# Patient Record
Sex: MALE | Race: WHITE | Employment: OTHER | URBAN - METROPOLITAN AREA
[De-identification: names, ages, dates, MRNs, and addresses within clinical notes are randomized per-mention and may not be internally consistent; named-entity substitution may affect disease eponyms.]

---

## 2017-03-06 ENCOUNTER — CARE COORDINATION (OUTPATIENT)
Dept: GERIATRIC MEDICINE | Facility: CLINIC | Age: 67
End: 2017-03-06

## 2017-03-06 NOTE — PROGRESS NOTES
"[facility] Six-Month Telephone Assessment    6 month telephone assessment completed on 3/6/17.    ER visits: No  Hospitalizations: No  TCU stays: No  Significant health status changes: denies  Falls/Injuries: No  ADL/IADL changes: No  Changes in services: No    Caregiver Assessment follow up:  n/a    Goals: See POC in chart for goal progress documentation.  No CP.  Refusal     Member continues to decline HRA visit. States he continues to live and care for his elderly mother.  States his wt is 235.  States he lives a sedentary life style and diet is poor.  Member has not seen his PCP in 2 years.  Is aware he should made an appointment with his PCP. Is also aware should have a colonoscopy, states a family hx. States plans to wait until after flu season.    States he recently put in an application \"at the [employer] down the street\".  Waiting to hear back.  Struggles with some anxiety. Attributes this to living with his brother.   Working on his MA renewal.  Recently did a spend down, $3000.  Shares with  that he is 33 years sober.     Encouraged client to call  with any questions or concerns in the meantime.         Ольга Arriaza RN, BSN, PHN  [facility]  [phone]  Fax: [phone]    "

## 2017-03-27 ENCOUNTER — CARE COORDINATION (OUTPATIENT)
Dept: GERIATRIC MEDICINE | Facility: CLINIC | Age: 67
End: 2017-03-27

## 2017-03-27 NOTE — PROGRESS NOTES
"Per CM, mailed client a \"Refusal of Home Visit\" letter.  MMIS entry.    Mailed member [employer] Self Report Health Assessment with self addressed return envelope.       Brie Herrera  Case Management Specialist  [facility]   [phone]    "

## 2017-03-27 NOTE — PROGRESS NOTES
Spoke to member who wishes to decline a home visit.  States will contact CM as needed.  Ольга Arriaza RN, BSN, PHN  [facility]  [phone]  Fax: [phone]

## 2017-06-27 ENCOUNTER — CARE COORDINATION (OUTPATIENT)
Dept: GERIATRIC MEDICINE | Facility: CLINIC | Age: 67
End: 2017-06-27

## 2017-06-27 NOTE — PROGRESS NOTES
Call placed to member to notify him of disenrollment. [employer]  termed 5/31/17.  Disenrollment checklist completed.   Ольга Arriaza RN, BSN, PHN  [facility]  [phone]  Fax: [phone]

## 2017-06-29 PROBLEM — Z76.89 HEALTH CARE HOME: Status: ACTIVE | Noted: 2017-06-29

## 2017-08-28 ENCOUNTER — OFFICE VISIT (OUTPATIENT)
Dept: INTERNAL MEDICINE | Facility: CLINIC | Age: 67
End: 2017-08-28
Payer: MEDICARE

## 2017-08-28 VITALS
WEIGHT: 228.3 LBS | DIASTOLIC BLOOD PRESSURE: 74 MMHG | HEIGHT: 71 IN | OXYGEN SATURATION: 96 % | HEART RATE: 68 BPM | BODY MASS INDEX: 31.96 KG/M2 | SYSTOLIC BLOOD PRESSURE: 128 MMHG | TEMPERATURE: 98.5 F

## 2017-08-28 DIAGNOSIS — E55.9 VITAMIN D DEFICIENCY: ICD-10-CM

## 2017-08-28 DIAGNOSIS — Z00.00 MEDICARE ANNUAL WELLNESS VISIT, SUBSEQUENT: Primary | ICD-10-CM

## 2017-08-28 DIAGNOSIS — N52.8 OTHER MALE ERECTILE DYSFUNCTION: ICD-10-CM

## 2017-08-28 DIAGNOSIS — Z11.59 NEED FOR HEPATITIS C SCREENING TEST: ICD-10-CM

## 2017-08-28 DIAGNOSIS — Z12.5 SPECIAL SCREENING FOR MALIGNANT NEOPLASM OF PROSTATE: ICD-10-CM

## 2017-08-28 DIAGNOSIS — Z12.11 SPECIAL SCREENING FOR MALIGNANT NEOPLASMS, COLON: ICD-10-CM

## 2017-08-28 DIAGNOSIS — E66.811 OBESITY (BMI 30.0-34.9): ICD-10-CM

## 2017-08-28 DIAGNOSIS — Z71.89 ACP (ADVANCE CARE PLANNING): ICD-10-CM

## 2017-08-28 DIAGNOSIS — Z13.29 SCREENING FOR THYROID DISORDER: ICD-10-CM

## 2017-08-28 DIAGNOSIS — Z13.220 SCREENING FOR LIPID DISORDERS: ICD-10-CM

## 2017-08-28 DIAGNOSIS — F32.0 MILD MAJOR DEPRESSION (H): ICD-10-CM

## 2017-08-28 DIAGNOSIS — Z23 NEED FOR VACCINATION: ICD-10-CM

## 2017-08-28 DIAGNOSIS — Z13.6 CARDIOVASCULAR SCREENING; LDL GOAL LESS THAN 130: ICD-10-CM

## 2017-08-28 DIAGNOSIS — F10.21 ALCOHOL DEPENDENCE IN REMISSION (H): ICD-10-CM

## 2017-08-28 LAB
ANION GAP SERPL CALCULATED.3IONS-SCNC: 6 MMOL/L (ref 3–14)
BASOPHILS # BLD AUTO: 0 10E9/L (ref 0–0.2)
BASOPHILS NFR BLD AUTO: 0.6 %
BUN SERPL-MCNC: 10 MG/DL (ref 7–30)
CALCIUM SERPL-MCNC: 9 MG/DL (ref 8.5–10.1)
CHLORIDE SERPL-SCNC: 105 MMOL/L (ref 94–109)
CHOLEST SERPL-MCNC: 188 MG/DL
CO2 SERPL-SCNC: 28 MMOL/L (ref 20–32)
CREAT SERPL-MCNC: 1.22 MG/DL (ref 0.66–1.25)
DIFFERENTIAL METHOD BLD: NORMAL
EOSINOPHIL # BLD AUTO: 0.2 10E9/L (ref 0–0.7)
EOSINOPHIL NFR BLD AUTO: 4.4 %
ERYTHROCYTE [DISTWIDTH] IN BLOOD BY AUTOMATED COUNT: 12.6 % (ref 10–15)
GFR SERPL CREATININE-BSD FRML MDRD: 59 ML/MIN/1.7M2
GLUCOSE SERPL-MCNC: 100 MG/DL (ref 70–99)
HCT VFR BLD AUTO: 46.2 % (ref 40–53)
HDLC SERPL-MCNC: 41 MG/DL
HGB BLD-MCNC: 15.7 G/DL (ref 13.3–17.7)
LDLC SERPL CALC-MCNC: 125 MG/DL
LYMPHOCYTES # BLD AUTO: 2 10E9/L (ref 0.8–5.3)
LYMPHOCYTES NFR BLD AUTO: 37.1 %
MCH RBC QN AUTO: 32.7 PG (ref 26.5–33)
MCHC RBC AUTO-ENTMCNC: 34 G/DL (ref 31.5–36.5)
MCV RBC AUTO: 96 FL (ref 78–100)
MONOCYTES # BLD AUTO: 0.6 10E9/L (ref 0–1.3)
MONOCYTES NFR BLD AUTO: 10.3 %
NEUTROPHILS # BLD AUTO: 2.6 10E9/L (ref 1.6–8.3)
NEUTROPHILS NFR BLD AUTO: 47.6 %
NONHDLC SERPL-MCNC: 147 MG/DL
PLATELET # BLD AUTO: 171 10E9/L (ref 150–450)
POTASSIUM SERPL-SCNC: 4 MMOL/L (ref 3.4–5.3)
PSA SERPL-ACNC: 0.97 UG/L (ref 0–4)
RBC # BLD AUTO: 4.8 10E12/L (ref 4.4–5.9)
SODIUM SERPL-SCNC: 139 MMOL/L (ref 133–144)
TRIGL SERPL-MCNC: 111 MG/DL
TSH SERPL DL<=0.005 MIU/L-ACNC: 0.89 MU/L (ref 0.4–4)
WBC # BLD AUTO: 5.5 10E9/L (ref 4–11)

## 2017-08-28 PROCEDURE — 80048 BASIC METABOLIC PNL TOTAL CA: CPT | Performed by: INTERNAL MEDICINE

## 2017-08-28 PROCEDURE — 82306 VITAMIN D 25 HYDROXY: CPT | Performed by: INTERNAL MEDICINE

## 2017-08-28 PROCEDURE — 84443 ASSAY THYROID STIM HORMONE: CPT | Performed by: INTERNAL MEDICINE

## 2017-08-28 PROCEDURE — G0472 HEP C SCREEN HIGH RISK/OTHER: HCPCS | Performed by: INTERNAL MEDICINE

## 2017-08-28 PROCEDURE — 90670 PCV13 VACCINE IM: CPT | Performed by: INTERNAL MEDICINE

## 2017-08-28 PROCEDURE — 36415 COLL VENOUS BLD VENIPUNCTURE: CPT | Performed by: INTERNAL MEDICINE

## 2017-08-28 PROCEDURE — G0009 ADMIN PNEUMOCOCCAL VACCINE: HCPCS | Performed by: INTERNAL MEDICINE

## 2017-08-28 PROCEDURE — G0439 PPPS, SUBSEQ VISIT: HCPCS | Performed by: INTERNAL MEDICINE

## 2017-08-28 PROCEDURE — G0103 PSA SCREENING: HCPCS | Performed by: INTERNAL MEDICINE

## 2017-08-28 PROCEDURE — 85025 COMPLETE CBC W/AUTO DIFF WBC: CPT | Performed by: INTERNAL MEDICINE

## 2017-08-28 PROCEDURE — 99213 OFFICE O/P EST LOW 20 MIN: CPT | Mod: 25 | Performed by: INTERNAL MEDICINE

## 2017-08-28 PROCEDURE — 80061 LIPID PANEL: CPT | Performed by: INTERNAL MEDICINE

## 2017-08-28 RX ORDER — SILDENAFIL CITRATE 20 MG/1
20-60 TABLET ORAL DAILY PRN
Qty: 30 TABLET | Refills: 0 | Status: SHIPPED | OUTPATIENT
Start: 2017-08-28 | End: 2018-06-07

## 2017-08-28 ASSESSMENT — PATIENT HEALTH QUESTIONNAIRE - PHQ9: SUM OF ALL RESPONSES TO PHQ QUESTIONS 1-9: 2

## 2017-08-28 NOTE — PATIENT INSTRUCTIONS
VIAGRA (SILDENAFIL):     *  Trial of Sildenafil (Viagra) for help with erections.      *  Brand name Viagra is Sildenafil that comes in a 25, 50, or 100 mg size tablet that is manufactured and marketed by the drug company Pfizer.    *  Sildenafil is also available as a generic medication that comes in a 20 or 40 mg tablet.  It is not technically called Viagra, but is the same medication.      *  Visit the Viagra web site (http://www.Helpstream/) for more information and also for possible discount cards.      *  Viagra and generic Sildenafil are potentially available at much cheaper prices from some [place] [facility], which can be very important if you are paying for the cost of this medication.  One example is www.Telepath    *  Depending on which version of Sildenafil you have, Take a 20 mg, 40 mg or 50 mg dose (1/2 of 100 mg tablet) 30-60 minutes before sexual intercourse.  If the first time lower dose does not work, then retry the same dose the next time.  If that dose dose not work, then try 100 mg dose.    The medication rarely works the very first time you try it since you are likely to be nervous about the medication itself, but there has to be a first time.      *  Beware of possible side effects including dizziness, headaches, colosr vision, upset stomach, nausea, passing out, problems with urination and sustained erections.  Never take the medication with nitroglycerin containing medications.  If you experience any severe side effects stop the medication and do not take it again until you speak with our clinic.     *  There may be an increased rate of side effects from Sildenafil/Viagra when alcohol is used too close to viagra    *  I will prescribe either Sildenafil or Viagra as long as it helps improve erectile dysfunction and does not cause side effects.  Contact me after you finish the first prescription.  If it works well with no major side effects, then I will give you further  "prescriptions.  If Sildenafil does not work as desired, then we may consider another similar medication (Levitra or Cialis)    *  Contact me after you use the first prescripton and let me know if it works and that there are no side effects.   If it works and there are no side effects, then I will continue it.  Also let me know what form of Sildenafil you would like to continue and where you would like to get the prescriptions.        *    5 GOALS TO PREVENT VASCULAR DISEASE:     1.  Aggressive blood pressure control, under 130/80 ideally.  Using medications if needed.    Your blood pressure is under good control    BP Readings from Last 4 Encounters:   08/28/17 128/74   02/25/15 141/80   11/05/14 112/76   11/25/13 122/80       2.  Aggressive LDL cholesterol (\"bad cholesterol\") lowering as indicated.    Your goal is an LDL under 130 for sure, preferably under 100.  (If you have diabetes or previous vascular disease, the the LDL goals would be under 100 for sure, preferably under 70.)    New guidelines identify four high-risk groups who could benefit from statins:   *people with pre-existing heart disease, such as those who have had a heart attack;   *people ages 40 to 75 who have diabetes of any type  *patients ages 40 to 75 with at least a 7.5% risk of developing cardiovascular disease over the next decade, according to a formula described in the guidelines  *patients with the sort of super-high cholesterol that sometimes runs in families, as evidenced by an LDL of 190 milligrams per deciliter or higher    Your cholesterol levels are well controlled.    Recent Labs   Lab Test  04/20/11   1144   CHOL  212*   HDL  38*   LDL  151*   TRIG  112   CHOLHDLRATIO  5.6*       3.  Aggressive diabetic prevention, screening and/or management.      You do not have diabetes as of the most recent blood tests.     4.  No smoking    5.  Consider taking low dose aspirin (81 mg) tablet once per day over the age of 50, every day unless " "there is a specific reason that you cannot take aspirin (such as side effect, allergy, or you are on another \"blood thinner\").        --Based on your current cardiac risk factors, you should take Aspirin 81 mg once per day if you are over 50 years of age.               Preventive Health Recommendations:       Male Ages 65 and over    Yearly exam:             See your health care provider every year in order to  o   Review health changes.   o   Discuss preventive care.    o   Review your medicines if your doctor has prescribed any.    Talk with your health care provider about whether you should have a test to screen for prostate cancer (PSA).    Every 3 years, have a diabetes test (fasting glucose). If you are at risk for diabetes, you should have this test more often.    Every 5 years, have a cholesterol test. Have this test more often if you are at risk for high cholesterol or heart disease.     Every 10 years, have a colonoscopy. Or, have a yearly FIT test (stool test). These exams will check for colon cancer.    Talk to with your health care provider about screening for Abdominal Aortic Aneurysm if you have a family history of AAA or have a history of smoking.  Shots:     Get a flu shot each year.     Get a tetanus shot every 10 years.     Talk to your doctor about your pneumonia vaccines. There are now two you should receive - Pneumovax (PPSV 23) and Prevnar (PCV 13).    Talk to your doctor about a shingles vaccine.     Talk to your doctor about the hepatitis B vaccine.  Nutrition:     Eat at least 5 servings of fruits and vegetables each day.     Eat whole-grain bread, whole-wheat pasta and brown rice instead of white grains and rice.     Talk to your doctor about Calcium and Vitamin D.   Lifestyle    Exercise for at least 150 minutes a week (30 minutes a day, 5 days a week). This will help you control your weight and prevent disease.     Limit alcohol to one drink per day.     No smoking.     Wear sunscreen to " prevent skin cancer.     See your dentist every six months for an exam and cleaning.     See your eye doctor every 1 to 2 years to screen for conditions such as glaucoma, macular degeneration and cataracts.

## 2017-08-28 NOTE — ASSESSMENT & PLAN NOTE
Advance Care Planning 8/28/2017: ACP Review of Chart / Resources Provided:  Reviewed chart for advance care plan.  Jesús Stock has been provided information and resources to begin or update their advance care plan.  Added by Kerry Murrell

## 2017-08-28 NOTE — LETTER
Jesús Sotck  [address] [address]      8/28/2017      Dear Mr. Jesús Stock:    I am writing to inform you of the results of the laboratory tests you had done recently.    Total Cholesterol:   Lab Results   Component Value Date    CHOL 188 08/28/2017          (Recommended: below 200)  HDL (good) Cholesterol :   Lab Results   Component Value Date    HDL 41 08/28/2017         (Recommended: 40 or more)  LDL (bad) Cholesterol:    Lab Results   Component Value Date     08/28/2017          (Recommended: below 130, below 100 if heart disease or diabetes is diagnosed)  Triglycerides:    Lab Results   Component Value Date    TRIG 111 08/28/2017       (Recommended: below 180)  Non HDL cholesterol (Cholesterol ratio:   Lab Results   Component Value Date    CHOLHDLRATIO 5.6 04/20/2011    NHDL 147 08/28/2017     (Ideally below 130, Acceptable below 160).    Additional results of your recent labs are as noted.  Liver function: NORMAL  Kidney  function: NORMAL  Hemoglobin: NORMAL  PSA: NORMAL  Electrolytes: NORMAL  Glucose: NORMAL    Your labs all looked good.      Thank you for allowing me to participate in your care. If you have any further questions or problems, please contact me via our nurse line at [phone]    Sincerely,          Tan Jaramillo M.D.  Department of Internal Medicine  [facility]

## 2017-08-28 NOTE — MR AVS SNAPSHOT
After Visit Summary   8/28/2017    Jesús Stock    MRN: [id number]           Patient Information     Date Of Birth          1950        Visit Information        Provider Department      8/28/2017 8:40 AM Tan Jaramillo MD [facility]        Today's Diagnoses     Medicare annual wellness visit, subsequent    -  1    Mild major depression (H)        Alcohol dependence in remission sober since 1984 (H)        Special screening for malignant neoplasms, colon        Screening for lipid disorders        ACP (advance care planning)        CARDIOVASCULAR SCREENING; LDL GOAL LESS THAN 130        Need for vaccination        Screening for thyroid disorder        Special screening for malignant neoplasm of prostate        Need for hepatitis C screening test        Vitamin D deficiency        Obesity (BMI 30.0-34.9)        Other male erectile dysfunction          Care Instructions    VIAGRA (SILDENAFIL):     *  Trial of Sildenafil (Viagra) for help with erections.      *  Brand name Viagra is Sildenafil that comes in a 25, 50, or 100 mg size tablet that is manufactured and marketed by the drug company Pfizer.    *  Sildenafil is also available as a generic medication that comes in a 20 or 40 mg tablet.  It is not technically called Viagra, but is the same medication.      *  Visit the Viagra web site (http://www.OpenGamma/) for more information and also for possible discount cards.      *  Viagra and generic Sildenafil are potentially available at much cheaper prices from some [place] [facility], which can be very important if you are paying for the cost of this medication.  One example is www.AVOS Cloud    *  Depending on which version of Sildenafil you have, Take a 20 mg, 40 mg or 50 mg dose (1/2 of 100 mg tablet) 30-60 minutes before sexual intercourse.  If the first time lower dose does not work, then retry the same dose the next time.  If that dose dose not  "work, then try 100 mg dose.    The medication rarely works the very first time you try it since you are likely to be nervous about the medication itself, but there has to be a first time.      *  Beware of possible side effects including dizziness, headaches, colosr vision, upset stomach, nausea, passing out, problems with urination and sustained erections.  Never take the medication with nitroglycerin containing medications.  If you experience any severe side effects stop the medication and do not take it again until you speak with our clinic.     *  There may be an increased rate of side effects from Sildenafil/Viagra when alcohol is used too close to viagra    *  I will prescribe either Sildenafil or Viagra as long as it helps improve erectile dysfunction and does not cause side effects.  Contact me after you finish the first prescription.  If it works well with no major side effects, then I will give you further prescriptions.  If Sildenafil does not work as desired, then we may consider another similar medication (Levitra or Cialis)    *  Contact me after you use the first prescripton and let me know if it works and that there are no side effects.   If it works and there are no side effects, then I will continue it.  Also let me know what form of Sildenafil you would like to continue and where you would like to get the prescriptions.        *    5 GOALS TO PREVENT VASCULAR DISEASE:     1.  Aggressive blood pressure control, under 130/80 ideally.  Using medications if needed.    Your blood pressure is under good control    BP Readings from Last 4 Encounters:   08/28/17 128/74   02/25/15 141/80   11/05/14 112/76   11/25/13 122/80       2.  Aggressive LDL cholesterol (\"bad cholesterol\") lowering as indicated.    Your goal is an LDL under 130 for sure, preferably under 100.  (If you have diabetes or previous vascular disease, the the LDL goals would be under 100 for sure, preferably under 70.)    New guidelines " "identify four high-risk groups who could benefit from statins:   *people with pre-existing heart disease, such as those who have had a heart attack;   *people ages 40 to 75 who have diabetes of any type  *patients ages 40 to 75 with at least a 7.5% risk of developing cardiovascular disease over the next decade, according to a formula described in the guidelines  *patients with the sort of super-high cholesterol that sometimes runs in families, as evidenced by an LDL of 190 milligrams per deciliter or higher    Your cholesterol levels are well controlled.    Recent Labs   Lab Test  04/20/11   1144   CHOL  212*   HDL  38*   LDL  151*   TRIG  112   CHOLHDLRATIO  5.6*       3.  Aggressive diabetic prevention, screening and/or management.      You do not have diabetes as of the most recent blood tests.     4.  No smoking    5.  Consider taking low dose aspirin (81 mg) tablet once per day over the age of 50, every day unless there is a specific reason that you cannot take aspirin (such as side effect, allergy, or you are on another \"blood thinner\").        --Based on your current cardiac risk factors, you should take Aspirin 81 mg once per day if you are over 50 years of age.               Preventive Health Recommendations:       Male Ages 65 and over    Yearly exam:             See your health care provider every year in order to  o   Review health changes.   o   Discuss preventive care.    o   Review your medicines if your doctor has prescribed any.    Talk with your health care provider about whether you should have a test to screen for prostate cancer (PSA).    Every 3 years, have a diabetes test (fasting glucose). If you are at risk for diabetes, you should have this test more often.    Every 5 years, have a cholesterol test. Have this test more often if you are at risk for high cholesterol or heart disease.     Every 10 years, have a colonoscopy. Or, have a yearly FIT test (stool test). These exams will check for " colon cancer.    Talk to with your health care provider about screening for Abdominal Aortic Aneurysm if you have a family history of AAA or have a history of smoking.  Shots:     Get a flu shot each year.     Get a tetanus shot every 10 years.     Talk to your doctor about your pneumonia vaccines. There are now two you should receive - Pneumovax (PPSV 23) and Prevnar (PCV 13).    Talk to your doctor about a shingles vaccine.     Talk to your doctor about the hepatitis B vaccine.  Nutrition:     Eat at least 5 servings of fruits and vegetables each day.     Eat whole-grain bread, whole-wheat pasta and brown rice instead of white grains and rice.     Talk to your doctor about Calcium and Vitamin D.   Lifestyle    Exercise for at least 150 minutes a week (30 minutes a day, 5 days a week). This will help you control your weight and prevent disease.     Limit alcohol to one drink per day.     No smoking.     Wear sunscreen to prevent skin cancer.     See your dentist every six months for an exam and cleaning.     See your eye doctor every 1 to 2 years to screen for conditions such as glaucoma, macular degeneration and cataracts.               Follow-ups after your visit        Additional Services     GASTROENTEROLOGY ADULT REF PROCEDURE ONLY       Last Lab Result: Creatinine (mg/dL)       Date                     Value                 04/20/2011               1.18             ----------  There is no height or weight on file to calculate BMI.     Needed:  No  Language:  English    Patient will be contacted to schedule procedure.     Please be aware that coverage of these services is subject to the terms and limitations of your health insurance plan.  Call member services at your health plan with any benefit or coverage questions.  Any procedures must be performed at a [facility] facility OR coordinated by your clinic's referral office.    Please bring the following with you to your appointment:    (1) Any  "X-Rays, CTs or MRIs which have been performed.  Contact the facility where they were done to arrange for  prior to your scheduled appointment.    (2) List of current medications   (3) This referral request   (4) Any documents/labs given to you for this referral                  Who to contact     If you have questions or need follow up information about today's clinic visit or your schedule please contact [facility] directly at [phone].  Normal or non-critical lab and imaging results will be communicated to you by MyChart, letter or phone within 4 business days after the clinic has received the results. If you do not hear from us within 7 days, please contact the clinic through [employer]rowse[employer] or phone. If you have a critical or abnormal lab result, we will notify you by phone as soon as possible.  Submit refill requests through Mozes or call your pharmacy and they will forward the refill request to us. Please allow 3 business days for your refill to be completed.          Additional Information About Your Visit        Mozes Information     Mozes lets you send messages to your doctor, view your test results, renew your prescriptions, schedule appointments and more. To sign up, go to [email or web address] . Click on \"Log in\" on the left side of the screen, which will take you to the Welcome page. Then click on \"Sign up Now\" on the right side of the page.     You will be asked to enter the access code listed below, as well as some personal information. Please follow the directions to create your username and password.     Your access code is: [email or web address]  Expires: 2017  9:49 AM     Your access code will  in 90 days. If you need help or a new code, please call your [facility] or [phone].        Care EveryWhere ID     This is your Care EveryWhere ID. This could be used by other organizations to access your [facility] medical records  [id number]        Your " "Vitals Were     Pulse Temperature Height Pulse Oximetry BMI (Body Mass Index)       68 98.5  F (36.9  C) (Oral) 5' 11\" (1.803 m) 96% 31.84 kg/m2        Blood Pressure from Last 3 Encounters:   08/28/17 128/74   02/25/15 141/80   11/05/14 112/76    Weight from Last 3 Encounters:   08/28/17 228 lb 4.8 oz (103.6 kg)   02/25/15 238 lb (108 kg)   11/05/14 230 lb (104.3 kg)              We Performed the Following     **Hepatitis C Screen Reflex to RNA FUTURE anytime     ADMIN MEDICARE: Pneumococcal Vaccine ()     Basic metabolic panel     CBC with platelets and differential     GASTROENTEROLOGY ADULT REF PROCEDURE ONLY     Lipid panel reflex to direct LDL     Pneumococcal vaccine 13 valent PCV13 IM (Prevnar) [06070]     PSA, screen     TSH with free T4 reflex     Vitamin D Deficiency          Today's Medication Changes          These changes are accurate as of: 8/28/17  9:52 AM.  If you have any questions, ask your nurse or doctor.               Start taking these medicines.        Dose/Directions    sildenafil 20 MG tablet   Commonly known as:  REVATIO/VIAGRA   Used for:  Other male erectile dysfunction   Started by:  Tan Jaramillo MD        Dose:  20-60 mg   Take 1-3 tablets (20-60 mg) by mouth daily as needed Never use with nitroglycerin, terazosin or doxazosin.   Quantity:  30 tablet   Refills:  0            Where to get your medicines      Some of these will need a paper prescription and others can be bought over the counter.  Ask your nurse if you have questions.     Bring a paper prescription for each of these medications     sildenafil 20 MG tablet                Primary Care Provider Office Phone # Fax #    Tan Jaramillo [phone]       [address] [facility] [address]        Equal Access to Services     BISI BERNSTEIN AH: Maribeth Vazquez, sonjada luqadaha, qaybta kaalmada michelle, haley juan. So wac " [phone].    ATENCIÓN: Si elio chambers, tiene a blair disposición servicios gratuitos de asistencia lingüística. Mirza bai [phone].    We comply with applicable federal civil rights laws and [place] laws. We do not discriminate on the basis of race, color, national origin, age, disability sex, sexual orientation or gender identity.            Thank you!     Thank you for choosing [facility]  for your care. Our goal is always to provide you with excellent care. Hearing back from our patients is one way we can continue to improve our services. Please take a few minutes to complete the written survey that you may receive in the mail after your visit with us. Thank you!             Your Updated Medication List - Protect others around you: Learn how to safely use, store and throw away your medicines at www.disposemymeds.org.          This list is accurate as of: 8/28/17  9:52 AM.  Always use your most recent med list.                   Brand Name Dispense Instructions for use Diagnosis    ibuprofen 200 MG tablet    ADVIL/MOTRIN     Take 1 tablet by mouth every 4 hours as needed for pain.        sildenafil 20 MG tablet    REVATIO/VIAGRA    30 tablet    Take 1-3 tablets (20-60 mg) by mouth daily as needed Never use with nitroglycerin, terazosin or doxazosin.    Other male erectile dysfunction       TYLENOL 500 MG tablet   Generic drug:  acetaminophen      Take 1-2 tablets by mouth every 6 hours as needed for pain.

## 2017-08-28 NOTE — NURSING NOTE
"Chief Complaint   Patient presents with     Medicare Visit     pt is fasting       Initial /74  Pulse 68  Temp 98.5  F (36.9  C) (Oral)  Ht 5' 11\" (1.803 m)  Wt 228 lb 4.8 oz (103.6 kg)  SpO2 96%  BMI 31.84 kg/m2 Estimated body mass index is 31.84 kg/(m^2) as calculated from the following:    Height as of this encounter: 5' 11\" (1.803 m).    Weight as of this encounter: 228 lb 4.8 oz (103.6 kg).  Medication Reconciliation: complete   Kerry Murrell CMA    Screening Questionnaire for Adult Immunization    Are you sick today?   No   Do you have allergies to medications, food, a vaccine component or latex?   No   Have you ever had a serious reaction after receiving a vaccination?   No   Do you have a long-term health problem with heart disease, lung disease, asthma, kidney disease, metabolic disease (e.g. diabetes), anemia, or other blood disorder?   No   Do you have cancer, leukemia, HIV/AIDS, or any other immune system problem?   No   In the past 3 months, have you taken medications that affect  your immune system, such as prednisone, other steroids, or anticancer drugs; drugs for the treatment of rheumatoid arthritis, Crohn s disease, or psoriasis; or have you had radiation treatments?   No   Have you had a seizure, or a brain or other nervous system problem?   No   During the past year, have you received a transfusion of blood or blood     products, or been given immune (gamma) globulin or antiviral drug?   No   For women: Are you pregnant or is there a chance you could become        pregnant during the next month?   No   Have you received any vaccinations in the past 4 weeks?   No     Immunization questionnaire answers were all negative.        Per orders of Dr. zacarias, injection of pneumonia given by Kerry Murrell. Patient instructed to remain in clinic for 15 minutes afterwards, and to report any adverse reaction to me immediately.       Screening performed by Kerry Murrell on 8/28/2017 at 9:07 " AM.

## 2017-08-28 NOTE — LETTER
[facility]  [address]  [address]  [phone]          Jesús Stock  [address] [address]      8/29/2017        Dear Rene Jesús CARRILLO Montrell:    I am writing to inform you of the results of the laboratory tests you had done recently.    Total Cholesterol:   Lab Results   Component Value Date    CHOL 188 08/28/2017          (Recommended: below 200)        HDL (good) Cholesterol :   Lab Results   Component Value Date    HDL 41 08/28/2017         (Recommended: 40 or more)  LDL (bad) Cholesterol:    Lab Results   Component Value Date     08/28/2017          (Recommended: below 130, below 100 if heart disease or diabetes is diagnosed)   Triglycerides:    Lab Results   Component Value Date    TRIG 111 08/28/2017       (Recommended: below 180)  Total cholesterol/HDL (Cholesterol ratio:   Lab Results   Component Value Date    CHOLHDLRATIO 5.6 04/20/2011    NHDL 147 08/28/2017     (Recommended: below 5.0)    Slight variations and daily fluctuations are normal and should cause little concern. Overall patterns and values are most significant.  An elevated cholesterol is one factor in increasing your risk of heart and vascular disease.    Additional results of your recent labs are as noted.   Kidney  function: NORMAL  Hemoglobin: NORMAL  PSA: NORMAL  Thyroid function: NORMAL  Electrolytes: NORMAL  Glucose: NORMAL  Vitamin D:  NORMAL    Your labs all looked good.      Thank you for allowing me to participate in your care. If you have any further questions or problems, please contact me via our nurse line at [phone].    Sincerely,          Tan Jaramillo MD  Department of Internal Medicine  [facility]

## 2017-08-29 LAB
DEPRECATED CALCIDIOL+CALCIFEROL SERPL-MC: 40 UG/L (ref 20–75)
HCV AB SERPL QL IA: NONREACTIVE

## 2017-09-01 ENCOUNTER — TELEPHONE (OUTPATIENT)
Dept: NURSING | Facility: CLINIC | Age: 67
End: 2017-09-01

## 2017-09-01 NOTE — TELEPHONE ENCOUNTER
Jesús Stock is a 66 year old male who calls with sinus congestion.  Patient feels d/t allergies.  Reports nasal congestion, nasal drainage, and mild sinus discomfort.  States symptoms began 2-3 days ago.  Nasal drainage clear in color.  Denies fever, pain, headache, or difficulty breathing/SOB.  He received the pneumococcal vaccine at office visit on 8/28/17, patient wondering if injection could cause symptoms.   Per CDC web site on pneumococcal vaccine, reviewed possible side effects of vaccine with patient.  Advised patient of home care measures and if further questions/concerns or if symptoms do not improve, worsen or new symptoms develop, call your PCP or [facility] as soon as possible.  Patient stated understanding.      Guideline used:  Telephone Triage Protocols for Nurses, Fifth Edition, Chayo Espinoza RN

## 2017-09-07 ENCOUNTER — SURGERY (OUTPATIENT)
Age: 67
End: 2017-09-07

## 2017-09-07 ENCOUNTER — HOSPITAL ENCOUNTER (OUTPATIENT)
Facility: CLINIC | Age: 67
Discharge: HOME | End: 2017-09-07
Attending: SPECIALIST | Admitting: SPECIALIST
Payer: MEDICARE

## 2017-09-07 VITALS
SYSTOLIC BLOOD PRESSURE: 110 MMHG | RESPIRATION RATE: 8 BRPM | WEIGHT: 228 LBS | DIASTOLIC BLOOD PRESSURE: 80 MMHG | BODY MASS INDEX: 31.92 KG/M2 | HEIGHT: 71 IN | OXYGEN SATURATION: 93 %

## 2017-09-07 LAB — COLONOSCOPY: NORMAL

## 2017-09-07 PROCEDURE — 25000128 H RX IP 250 OP 636: Performed by: SPECIALIST

## 2017-09-07 PROCEDURE — 88305 TISSUE EXAM BY PATHOLOGIST: CPT | Mod: 26 | Performed by: SPECIALIST

## 2017-09-07 PROCEDURE — 45380 COLONOSCOPY AND BIOPSY: CPT | Performed by: SPECIALIST

## 2017-09-07 PROCEDURE — 88305 TISSUE EXAM BY PATHOLOGIST: CPT | Performed by: SPECIALIST

## 2017-09-07 PROCEDURE — G0500 MOD SEDAT ENDO SERVICE >5YRS: HCPCS | Performed by: SPECIALIST

## 2017-09-07 PROCEDURE — 45385 COLONOSCOPY W/LESION REMOVAL: CPT | Performed by: SPECIALIST

## 2017-09-07 RX ORDER — ONDANSETRON 2 MG/ML
4 INJECTION INTRAMUSCULAR; INTRAVENOUS
Status: DISCONTINUED | OUTPATIENT
Start: 2017-09-07 | End: 2017-09-07 | Stop reason: HOSPADM

## 2017-09-07 RX ORDER — LIDOCAINE 40 MG/G
CREAM TOPICAL
Status: DISCONTINUED | OUTPATIENT
Start: 2017-09-07 | End: 2017-09-07 | Stop reason: HOSPADM

## 2017-09-07 RX ORDER — FENTANYL CITRATE 50 UG/ML
INJECTION, SOLUTION INTRAMUSCULAR; INTRAVENOUS PRN
Status: DISCONTINUED | OUTPATIENT
Start: 2017-09-07 | End: 2017-09-07 | Stop reason: HOSPADM

## 2017-09-07 RX ADMIN — MIDAZOLAM HYDROCHLORIDE 1 MG: 1 INJECTION, SOLUTION INTRAMUSCULAR; INTRAVENOUS at 08:21

## 2017-09-07 RX ADMIN — FENTANYL CITRATE 50 MCG: 50 INJECTION, SOLUTION INTRAMUSCULAR; INTRAVENOUS at 08:22

## 2017-09-07 NOTE — H&P
Pre-Endoscopy History and Physical     Jesús Stock MRN# [id number]   YOB: 1950 Age: 66 year old     Date of Procedure: 9/7/2017  Primary care provider: Tan Jaramillo  Type of Endoscopy: Colonoscopy with possible biopsy, possible polypectomy  Reason for Procedure: screening; family history of polyps  Type of Anesthesia Anticipated: Conscious Sedation    HPI:    Jesús is a 66 year old male who will be undergoing the above procedure.      A history and physical has been performed. The patient's medications and allergies have been reviewed. The risks and benefits of the procedure and the sedation options and risks were discussed with the patient.  All questions were answered and informed consent was obtained.      He denies a personal or family history of anesthesia complications or bleeding disorders.     Patient Active Problem List   Diagnosis     CARDIOVASCULAR SCREENING; LDL GOAL LESS THAN 130     Mild major depression (H)     Colon polyps     Diverticulosis of colon     ACP (advance care planning)     Health Care Home     Obesity (BMI 30.0-34.9)     Alcohol dependence in remission sober since 1984 (H)        Past Medical History:   Diagnosis Date     Alcohol dependence in remission sober since 1984 (H)     sober since 8/25/84     Backache, unspecified      Colon polyps 5/24/11    2 colon polyps removed at colonoscopy     Diverticulosis of colon 5/24/11    mild pan-colonic diverticulosis seen as incidental finding on colonoscopy     Major depressive disorder, single episode, mild (H)      Obesity (BMI 30.0-34.9) 08/28/2017    BMI 31.4     Osteoarthrosis, unspecified whether generalized or localized, unspecified site         Past Surgical History:   Procedure Laterality Date     APPENDECTOMY       COLONOSCOPY  5/24/11    2 polyps, mild pan-colonic diverticulosis     HERNIA REPAIR      X3     TONSILLECTOMY         Social History   Substance Use Topics     Smoking status: Passive Smoke  "Exposure - Never Smoker     Types: Cigars     Smokeless tobacco: Never Used      Comment: occasional     Alcohol use No      Comment: sober x 27 years       Family History   Problem Relation Age of Onset     DIABETES Father      b:1926     Hypertension Mother      b:1925     Family History Negative Brother      b:1948     Family History Negative Brother      b:1951  deferred tremor     Family History Negative Sister      b:1953     Family History Negative Brother      b:1956     Family History Negative Brother      b:1958       Prior to Admission medications    Medication Sig Start Date End Date Taking? Authorizing Provider   sildenafil (REVATIO/VIAGRA) 20 MG tablet Take 1-3 tablets (20-60 mg) by mouth daily as needed Never use with nitroglycerin, terazosin or doxazosin. 8/28/17   Tan Jaramillo MD   ibuprofen (ADVIL,MOTRIN) 200 MG tablet Take 1 tablet by mouth every 4 hours as needed for pain. 11/1/11   Tan Jaramillo MD   acetaminophen (TYLENOL) 500 MG tablet Take 1-2 tablets by mouth every 6 hours as needed for pain. 11/1/11   Tan Jaramillo MD       Allergies   Allergen Reactions     Dust Mites      Mold      No Known Drug Allergies         REVIEW OF SYSTEMS:   5 point ROS negative except as noted above in HPI, including Gen., Resp., CV, GI &  system review.    PHYSICAL EXAM:   BP (!) 157/103  Resp 12  Ht 1.803 m (5' 11\")  Wt 103.4 kg (228 lb)  SpO2 95%  BMI 31.8 kg/m2 Estimated body mass index is 31.8 kg/(m^2) as calculated from the following:    Height as of this encounter: 1.803 m (5' 11\").    Weight as of this encounter: 103.4 kg (228 lb).   GENERAL APPEARANCE: alert, and oriented  MENTAL STATUS: alert  AIRWAY EXAM: Mallampatti Class I (visualization of the soft palate, fauces, uvula, anterior and posterior pillars)  RESP: lungs clear to auscultation - no rales, rhonchi or wheezes  CV: regular rates and rhythm  DIAGNOSTICS:    Not indicated    IMPRESSION   ASA Class 1 - " Healthy patient, no medical problems    PLAN:   Plan for Colonoscopy with possible biopsy, possible polypectomy. We discussed the risks, benefits and alternatives and the patient wished to proceed.    The above has been forwarded to the consulting provider.      Signed Electronically by: eGne Grimes  September 7, 2017

## 2017-09-07 NOTE — BRIEF OP NOTE
[facility] Brief Operative Note    Pre-operative diagnosis: screening   Post-operative diagnosis Polyps, diverticula     Procedure: Procedure(s):  COLONOSCOPY - Wound Class: II-Clean Contaminated   Surgeon(s): Surgeon(s) and Role:     * Gene Grimes MD - Primary   Estimated blood loss: * No values recorded between 9/7/2017 12:00 AM and 9/7/2017  8:55 AM *    Specimens:   ID Type Source Tests Collected by Time Destination   A : polyps x 2, hot snare and cold forcep Polyp Large Intestine, Cecum SURGICAL PATHOLOGY EXAM Bella Caal RN 9/7/2017  8:34 AM    B : hot snare Polyp Large Intestine, Rectum SURGICAL PATHOLOGY EXAM Bella Caal RN 9/7/2017  8:43 AM    C : recto-sigmoid polyp, hot snare Polyp Large Intestine, Other SURGICAL PATHOLOGY EXAM Bella Caal RN 9/7/2017  8:49 AM       Findings: Please see ProVation procedure note in Chart Review

## 2017-09-08 LAB — COPATH REPORT: NORMAL

## 2017-09-10 ENCOUNTER — TRANSFERRED RECORDS (OUTPATIENT)
Dept: HEALTH INFORMATION MANAGEMENT | Facility: CLINIC | Age: 67
End: 2017-09-10

## 2018-06-07 ENCOUNTER — OFFICE VISIT (OUTPATIENT)
Dept: INTERNAL MEDICINE | Facility: CLINIC | Age: 68
End: 2018-06-07
Payer: COMMERCIAL

## 2018-06-07 VITALS
TEMPERATURE: 97.9 F | WEIGHT: 237.2 LBS | OXYGEN SATURATION: 97 % | DIASTOLIC BLOOD PRESSURE: 74 MMHG | SYSTOLIC BLOOD PRESSURE: 140 MMHG | BODY MASS INDEX: 33.08 KG/M2 | HEART RATE: 75 BPM

## 2018-06-07 DIAGNOSIS — J30.2 SEASONAL ALLERGIC RHINITIS, UNSPECIFIED CHRONICITY, UNSPECIFIED TRIGGER: ICD-10-CM

## 2018-06-07 DIAGNOSIS — H69.93 DYSFUNCTION OF BOTH EUSTACHIAN TUBES: ICD-10-CM

## 2018-06-07 DIAGNOSIS — R06.83 SNORING: ICD-10-CM

## 2018-06-07 DIAGNOSIS — R42 DIZZINESS: Primary | ICD-10-CM

## 2018-06-07 DIAGNOSIS — R40.0 DAYTIME SOMNOLENCE: ICD-10-CM

## 2018-06-07 PROCEDURE — 99214 OFFICE O/P EST MOD 30 MIN: CPT | Performed by: INTERNAL MEDICINE

## 2018-06-07 RX ORDER — AMOXICILLIN 500 MG/1
500 CAPSULE ORAL 3 TIMES DAILY
COMMUNITY
Start: 2018-06-07 | End: 2018-06-15

## 2018-06-07 RX ORDER — AMOXICILLIN 500 MG/1
CAPSULE ORAL
COMMUNITY
Start: 2018-06-06 | End: 2018-06-07

## 2018-06-07 NOTE — MR AVS SNAPSHOT
After Visit Summary   6/7/2018    Jesús Stock    MRN: [id number]           Patient Information     Date Of Birth          1950        Visit Information        Provider Department      6/7/2018 8:20 AM Tan Jaramillo MD [facility]        Today's Diagnoses     Dizziness    -  1    Dysfunction of both eustachian tubes        Seasonal allergic rhinitis, unspecified chronicity, unspecified trigger        Snoring        Daytime somnolence          Care Instructions    *  Dizziness, unclear reason.    *  Evaluation at [facility] for further clarification.      --Consult at [facility] [phone]   [email or web address]      *  evaluation for obstructive sleep apnea due to history of snoring and waking up not refreshed after a night's sleep.      --[facility] [phone]      EUSTACHIAN TUBE DYSFUNCTION:     *  Build up of fluid in the middle ear space.  There is no evidence for infection.    *  No evidence for bacterial infection, No antibiotics indicated.       --You are taking the same antibiotic we use for ear infections (amoxicillin) already for the dental infection.      *  This is relieved with decongestants (pseudoephedrine), either alone or in combination with other cold medications (such as Claritin D, Dayquil, Tylenol Cold and Sinus, etc.)    *  If there is any suggestion of allergies (sneezing, watery eyes, scratchy throat), then Claritin (or Claritin D) can be helpful.      *  Mucinex extended release (Guaifenisin) twice per day on a regular basis for the next few days, then twice per day as needed.  This helps make the secretions drain easier.     *  Take Ibuprofen (Motrin, Advil) as needed for the pain.    *  If any changes such as fevers, worsening pain, or drainage, then call us and you may need to be re-evaluated.    *  If you fail to improve with  "conservative measures or if you have multiple recurrences, then we may need to send you to the ENT specialists to evaluate your middle ear.             SEASONAL ALLERGIES:     *  Take one of the following over the counter non-sedating anti-histamines allergy tablet once per day, every day for the next 4-8 weeks during the duration of the allergy season.      --generic Allegra (fexofenidine)  OR  --generic Zyrtec (cetirizine)  OR  --generic Claritin (loratidine)         Benadryl (diphenhydramine) is a good anti-histmaine, but it causes drowsiness.  Insurances do not cover over the counter medications.      EXAMPLES OF OVER THE COUNTER NON-SEDATING ALLERGY MEDICATIONS:  (brand name and then generic versions)  The generic version are the exact same medication and work just as well, but just cost less.                  *  Use steroid nasal spray (available over the counter), Use 2 sprays into each nostril once per day, every day regardless of how you feel for the next 4-8 weeks, depending on the length of the allergy season.  This type of steroid nasal spray will take at least 10 days to reach full effect, please use it for at least 3 full weeks before deciding if it doesn't work for you.       --typical brands include Flonase (fluticasone) or Nasonex (mometasone) or Nasocort (triamcinolone)    Examples of steroid nasal spray:  (cheapest prices are from [employer] or [employer])             * Beware of decongestants or medications preparations that have a \"D\", these contain pseudoephedrine or phenylephrine which may raise your blood pressure. If your blood pressure is well controlled and you are not on multiple medications, then you may be able to take small amounts of decongestant without a large chance of side effects, but please monitor your blood pressure and if >140/90 while on the decongestants, then stop those products.     *  If you cannot tolerate decongestants or have been told not to take decongestants, you can " use chlortrimeton (chlorpheniramine) if you react poorly to decongestants.  Always try and use the lowest dose needed.  If you have a low of overnight or early morning allergy symptoms, then try taking you favorite nighttime multi symptom cold reliever medication (such as Nyquil, Vicks Formula 44, etc.)                  Follow-ups after your visit        Additional Services     NEUROLOGY ADULT REFERRAL       Your provider has referred you for the following:     Consult at [facility] [phone]   [email or web address]    Please be aware that coverage of these services is subject to the terms and limitations of your health insurance plan.  Call member services at your health plan with any benefit or coverage questions.      Please bring the following with you to your appointment:    (1) Any X-Rays, CTs or MRIs which have been performed.  Contact the facility where they were done to arrange for  prior to your scheduled appointment.    (2) List of current medications  (3) This referral request   (4) Any documents/labs given to you for this referral            SLEEP EVALUATION & MANAGEMENT REFERRAL - [facility] [phone]  (Age 18 and up)       Please be aware that coverage of these services is subject to the terms and limitations of your health insurance plan.  Call member services at your health plan with any benefit or coverage questions.      Please bring the following to your appointment:    >>   List of current medications   >>   This referral request   >>   Any documents/labs given to you for this referral                      Future tests that were ordered for you today     Open Future Orders        Priority Expected Expires Ordered    SLEEP EVALUATION & MANAGEMENT REFERRAL - [facility] [phone]  (Age 18 and up) Routine  6/7/2019 6/7/2018            Who to contact     If you have  "questions or need follow up information about today's clinic visit or your schedule please contact [facility] directly at [phone].  Normal or non-critical lab and imaging results will be communicated to you by MyChart, letter or phone within 4 business days after the clinic has received the results. If you do not hear from us within 7 days, please contact the clinic through [employer] or phone. If you have a critical or abnormal lab result, we will notify you by phone as soon as possible.  Submit refill requests through [employer] or call your pharmacy and they will forward the refill request to us. Please allow 3 business days for your refill to be completed.          Additional Information About Your Visit        [employer]Done Information     [employer] lets you send messages to your doctor, view your test results, renew your prescriptions, schedule appointments and more. To sign up, go to [email or web address] . Click on \"Log in\" on the left side of the screen, which will take you to the Welcome page. Then click on \"Sign up Now\" on the right side of the page.     You will be asked to enter the access code listed below, as well as some personal information. Please follow the directions to create your username and password.     Your access code is: [email or web address]  Expires: 2018  9:10 AM     Your access code will  in 90 days. If you need help or a new code, please call your [facility] clinic or [phone].        Care EveryWhere ID     This is your Care EveryWhere ID. This could be used by other organizations to access your [facility] medical records  [id number]        Your Vitals Were     Pulse Temperature Pulse Oximetry BMI (Body Mass Index)          75 97.9  F (36.6  C) (Oral) 97% 33.08 kg/m2         Blood Pressure from Last 3 Encounters:   18 140/74   17 110/80   17 128/74    Weight from Last 3 Encounters:   18 237 lb 3.2 oz (107.6 kg)   17 228 lb (103.4 kg) "   08/28/17 228 lb 4.8 oz (103.6 kg)              We Performed the Following     NEUROLOGY ADULT REFERRAL          Today's Medication Changes          These changes are accurate as of 6/7/18  9:10 AM.  If you have any questions, ask your nurse or doctor.               These medicines have changed or have updated prescriptions.        Dose/Directions    amoxicillin 500 MG capsule   Commonly known as:  AMOXIL   This may have changed:    - how much to take  - how to take this  - when to take this   Changed by:  Tan Jaramillo MD        Dose:  500 mg   Take 1 capsule (500 mg) by mouth 3 times daily   Refills:  0                Primary Care Provider Office Phone # Fax #    Tan Jaramillo [phone]       [address] [facility] [address]        Equal Access to Services     BROOKLYN BERNSTEIN : Hadii aad ku hadasho Soomaali, waaxda luqadaha, qaybta kaalmada adeegyada, waxjan agosto . So [facility] [phone].    ATENCIÓN: Si habla español, tiene a blair disposición servicios gratuitos de asistencia lingüística. [facility] [phone].    We comply with applicable federal civil rights laws and [place] laws. We do not discriminate on the basis of race, color, national origin, age, disability, sex, sexual orientation, or gender identity.            Thank you!     Thank you for choosing [facility]  for your care. Our goal is always to provide you with excellent care. Hearing back from our patients is one way we can continue to improve our services. Please take a few minutes to complete the written survey that you may receive in the mail after your visit with us. Thank you!             Your Updated Medication List - Protect others around you: Learn how to safely use, store and throw away your medicines at www.disposemymeds.org.          This list is accurate as of 6/7/18  9:10 AM.  Always use your most recent med list.                   Brand Name  Dispense Instructions for use Diagnosis    amoxicillin 500 MG capsule    AMOXIL     Take 1 capsule (500 mg) by mouth 3 times daily        ibuprofen 200 MG tablet    ADVIL/MOTRIN     Take 1 tablet by mouth every 4 hours as needed for pain.        TYLENOL 500 MG tablet   Generic drug:  acetaminophen      Take 1-2 tablets by mouth every 6 hours as needed for pain.

## 2018-06-07 NOTE — PROGRESS NOTES
SUBJECTIVE:   Jesús Stock is a 67 year old male who presents to clinic today for the following health issues:      Dizziness      Duration: 6 months, constant    Description   Feeling faint:  YES  Feeling like the surroundings are moving: YES  Loss of consciousness or falls: no     Intensity:  Moderate- severe    Accompanying signs and symptoms:   Nausea/vomitting: no   Palpitations: no   Weakness in arms or legs: YES- legs  Vision or speech changes: YES- focusing more on vision  Ringing in ears (Tinnitus): YES- sometimes feels clogged  Hearing loss related to dizziness: no   Other (fevers/chills/sweating/dyspnea): no     History (similar episodes/head trauma/previous evaluation/recent bleeding): has been using q tips a lot lately    Precipitating or alleviating factors (new meds/chemicals): is taking oneal X 10 days  Worse with activity/head movement: YES    Therapies tried and outcome: None          Problem list and histories reviewed & adjusted, as indicated.  Additional history: as documented        Reviewed and updated as needed this visit by clinical staff  Allergies       Reviewed and updated as needed this visit by Provider           Past Medical History:  ---------------------------  Past Medical History:   Diagnosis Date     Alcohol dependence in remission sober since 1984 (H)     sober since 8/25/84     Backache, unspecified      Colon polyps 5/24/11    2 colon polyps removed at colonoscopy     Diverticulosis of colon 5/24/11    mild pan-colonic diverticulosis seen as incidental finding on colonoscopy     Major depressive disorder, single episode, mild (H)      Obesity (BMI 30.0-34.9) 08/28/2017    BMI 31.4     Osteoarthrosis, unspecified whether generalized or localized, unspecified site        Past Surgical History:  ---------------------------  Past Surgical History:   Procedure Laterality Date     APPENDECTOMY       COLONOSCOPY  5/24/11    2 polyps, mild pan-colonic diverticulosis     COLONOSCOPY  N/A 9/7/2017    Procedure: COMBINED COLONOSCOPY, SINGLE OR MULTIPLE BIOPSY/POLYPECTOMY BY BIOPSY;;  Surgeon: Gene Grimes MD;  Location: [facility]     HERNIA REPAIR      X3     TONSILLECTOMY         Current Medications:  ---------------------------  Current Outpatient Prescriptions   Medication Sig Dispense Refill     acetaminophen (TYLENOL) 500 MG tablet Take 1-2 tablets by mouth every 6 hours as needed for pain.       amoxicillin (AMOXIL) 500 MG capsule        ibuprofen (ADVIL,MOTRIN) 200 MG tablet Take 1 tablet by mouth every 4 hours as needed for pain.       sildenafil (REVATIO/VIAGRA) 20 MG tablet Take 1-3 tablets (20-60 mg) by mouth daily as needed Never use with nitroglycerin, terazosin or doxazosin. (Patient not taking: Reported on 6/7/2018) 30 tablet 0       Allergies:  -------------  Allergies   Allergen Reactions     Dust Mites      Mold      No Known Drug Allergies        Social History:  -------------------  Social History     Social History     Marital status: Single     Spouse name: N/A     Number of children: N/A     Years of education: N/A     Occupational History     Not on file.     Social History Main Topics     Smoking status: Passive Smoke Exposure - Never Smoker     Types: Cigars     Smokeless tobacco: Never Used      Comment: occasional     Alcohol use No      Comment: sober x 27 years     Drug use: No     Sexual activity: No     Other Topics Concern     Not on file     Social History Narrative       Family Medical History:  ------------------------------  Family History   Problem Relation Age of Onset     DIABETES Father      b:1926     Hypertension Mother      b:1925     Family History Negative Brother      b:1948     Family History Negative Brother      b:1951  deferred tremor     Family History Negative Sister      b:1953     Family History Negative Brother      b:1956     Family History Negative Brother      b:1958         ROS:  REVIEW OF SYSTEMS:    RESP: negative for cough, dyspnea,  wheezing, hemoptysis  CV: negative for chest pain, palpitations, PND, DUGGAN, orthopnea; reports no changes in their ability to perform physical activity (from cardiovascular standpoint)  GI: negative for dysphagia, N/V, pain, melena, diarrhea and constipation  NEURO: negative for numbness/tingling, paralysis, incoordination, or focal weakness     OBJECTIVE:                                                    /74  Pulse 75  Temp 97.9  F (36.6  C) (Oral)  Wt 237 lb 3.2 oz (107.6 kg)  SpO2 97%  BMI 33.08 kg/m2     GENERAL alert and no distress  EYES:  Normal sclera,conjunctiva, EOMI  HENT: oral and posterior pharynx without lesions or erythema, facies symmetric  NECK: Neck supple. No LAD, without thyroidmegaly or JVD., Carotids without bruits.  RESP: Clear to ausculation bilaterally without wheezes or crackles. Normal BS in all fields.  CV: RRR normal S1S2 without murmurs, rubs or gallops. PMI normal  LYMPH: no cervical lymph adenopathy appreciated  MS: extremities- no gross deformities of the visible extremities noted, no edema  PSYCH: Alert and oriented times 3; speech- coherent  SKIN:  No obvious significant skin lesions on visible portions of face   NEURO:  CN II-XII intact and equal on both sides.    Normal and symmetrical strength in both upper and lower extremities.   Coordination with FNF intact and normal on both sodes.   Romberg test negative.   Gait moving around the office and in the hallway when leaving was normal.  Heel-toe tightrope tandem gait walk was normal  Able to balance on each foot singly.   [place] maneuvers were negative.        ASSESSMENT/PLAN:                                                      (R42) Dizziness  (primary encounter diagnosis)  Comment: Discussed issues around dizziness including benign positional vertigo and viral labyrinthitis.  Discussed basic principles of mgmt. including changing head position slowly, avoiding alcohol, trials of meclizine (or even valium if sx  severe enough).  Sx. expected to be transient over next few days to weeks and should slowly resolve.    Discussed indication for further workup, especially in pts with significant vascular risk factors.   Plan: NEUROLOGY ADULT REFERRAL            (H69.83) Dysfunction of both eustachian tubes  Comment: Discussed the pathophysiology of eustachian tube dysfunciton with the pt including a basic description of the anatomy.  Symptomatic therapy suggested: push fluids, use decongestant nasal spray up to 3 days, decongestant of choice or antihistamine-decongestant of choice as needed, saline nasal spray as needed, Robitussin DM prn.  RTC prn if symptoms persist or worsen. Call or return to clinic prn if these symptoms worsen or fail to improve as anticipated.   Plan:     (J30.2) Seasonal allergic rhinitis, unspecified chronicity, unspecified trigger  Comment: Reviewed seasonal allergies/ environmental allergies/allergic and/or chronic rhintis with the pt. and available treatment options.  Pt understands that the insurance companies have lately desiring to see trial and failure of claritin OTC before covering prescription.  Also disucssed the role of steroid nasal sprays in these types of situations. Discussed the concept of avoidance measures and taking an active role in modifying whatever can be changed in preventing exposures to knwon allergens.  Also discussed the imprtance of reconsdiering ets if they are part of the problem. Also discussed the absolute need for smoking cessation if any tobacco abuse present.   Plan:     (R06.83) Snoring  Comment: The patients symptoms as listed above sound suspicious for sleep apnea.  Discussed sleep apnea syndrome in detail including symptoms, workup, diagnostic process, treatment considerations including sleep studies, CPAP, and ENT surgery if abnormal upper airway physical abnormalities.  Will refer to pulmonary for initiation of diagnostic procedures to include sleep study if  applicable.   Plan: SLEEP EVALUATION & MANAGEMENT REFERRAL - [facility]         [phone]  (Age 18 and up)            (R40.0) Daytime somnolence  Comment: suspect obstructive sleep apnea   Plan: SLEEP EVALUATION & MANAGEMENT REFERRAL - [facility]         [phone]  (Age 18 and up)            *  Dizziness, unclear reason.    *  Evaluation at [facility] for further clarification.      --Consult at [facility] [place] [phone]   [email or web address]      *  evaluation for obstructive sleep apnea due to history of snoring and waking up not refreshed after a night's sleep.      --[facility] [phone]         See Patient Instructions    BRIANDA WALSH M.D., MD  [facility]

## 2018-06-07 NOTE — PATIENT INSTRUCTIONS
*  Dizziness, unclear reason.    *  Evaluation at [facility] for further clarification.      --Consult at [facility] - [place] [phone]   [email or web address]      *  evaluation for obstructive sleep apnea due to history of snoring and waking up not refreshed after a night's sleep.      --[facility] [phone]      EUSTACHIAN TUBE DYSFUNCTION:     *  Build up of fluid in the middle ear space.  There is no evidence for infection.    *  No evidence for bacterial infection, No antibiotics indicated.       --You are taking the same antibiotic we use for ear infections (amoxicillin) already for the dental infection.      *  This is relieved with decongestants (pseudoephedrine), either alone or in combination with other cold medications (such as Claritin D, Dayquil, Tylenol Cold and Sinus, etc.)    *  If there is any suggestion of allergies (sneezing, watery eyes, scratchy throat), then Claritin (or Claritin D) can be helpful.      *  Mucinex extended release (Guaifenisin) twice per day on a regular basis for the next few days, then twice per day as needed.  This helps make the secretions drain easier.     *  Take Ibuprofen (Motrin, Advil) as needed for the pain.    *  If any changes such as fevers, worsening pain, or drainage, then call us and you may need to be re-evaluated.    *  If you fail to improve with conservative measures or if you have multiple recurrences, then we may need to send you to the ENT specialists to evaluate your middle ear.             SEASONAL ALLERGIES:     *  Take one of the following over the counter non-sedating anti-histamines allergy tablet once per day, every day for the next 4-8 weeks during the duration of the allergy season.      --generic Allegra (fexofenidine)  OR  --generic Zyrtec (cetirizine)  OR  --generic Claritin (loratidine)         Benadryl (diphenhydramine) is a good  "anti-histmaine, but it causes drowsiness.  Insurances do not cover over the counter medications.      EXAMPLES OF OVER THE COUNTER NON-SEDATING ALLERGY MEDICATIONS:  (brand name and then generic versions)  The generic version are the exact same medication and work just as well, but just cost less.                  *  Use steroid nasal spray (available over the counter), Use 2 sprays into each nostril once per day, every day regardless of how you feel for the next 4-8 weeks, depending on the length of the allergy season.  This type of steroid nasal spray will take at least 10 days to reach full effect, please use it for at least 3 full weeks before deciding if it doesn't work for you.       --typical brands include Flonase (fluticasone) or Nasonex (mometasone) or Nasocort (triamcinolone)    Examples of steroid nasal spray:  (cheapest prices are from [employer] or [employer])             * Beware of decongestants or medications preparations that have a \"D\", these contain pseudoephedrine or phenylephrine which may raise your blood pressure. If your blood pressure is well controlled and you are not on multiple medications, then you may be able to take small amounts of decongestant without a large chance of side effects, but please monitor your blood pressure and if >140/90 while on the decongestants, then stop those products.     *  If you cannot tolerate decongestants or have been told not to take decongestants, you can use chlortrimeton (chlorpheniramine) if you react poorly to decongestants.  Always try and use the lowest dose needed.  If you have a low of overnight or early morning allergy symptoms, then try taking you favorite nighttime multi symptom cold reliever medication (such as Nyquil, Vicks Formula 44, etc.)          "

## 2019-01-01 ENCOUNTER — TELEPHONE (OUTPATIENT)
Dept: INTERNAL MEDICINE | Facility: CLINIC | Age: 69
End: 2019-01-01

## 2019-01-01 ENCOUNTER — HOME INFUSION (PRE-WILLOW HOME INFUSION) (OUTPATIENT)
Dept: PHARMACY | Facility: CLINIC | Age: 69
End: 2019-01-01

## 2019-01-01 ENCOUNTER — TRANSFERRED RECORDS (OUTPATIENT)
Dept: HEALTH INFORMATION MANAGEMENT | Facility: CLINIC | Age: 69
End: 2019-01-01

## 2019-01-01 ENCOUNTER — PATIENT OUTREACH (OUTPATIENT)
Dept: CARE COORDINATION | Facility: CLINIC | Age: 69
End: 2019-01-01

## 2019-01-01 ENCOUNTER — OFFICE VISIT (OUTPATIENT)
Dept: INTERNAL MEDICINE | Facility: CLINIC | Age: 69
End: 2019-01-01
Payer: COMMERCIAL

## 2019-01-01 ENCOUNTER — TELEPHONE (OUTPATIENT)
Dept: CARE COORDINATION | Facility: CLINIC | Age: 69
End: 2019-01-01

## 2019-01-01 ENCOUNTER — HOSPITAL ENCOUNTER (OUTPATIENT)
Facility: CLINIC | Age: 69
End: 2019-01-01
Payer: COMMERCIAL

## 2019-01-01 ENCOUNTER — DOCUMENTATION ONLY (OUTPATIENT)
Dept: INTERNAL MEDICINE | Facility: CLINIC | Age: 69
End: 2019-01-01

## 2019-01-01 ENCOUNTER — TELEPHONE (OUTPATIENT)
Dept: INTERVENTIONAL RADIOLOGY/VASCULAR | Facility: CLINIC | Age: 69
End: 2019-01-01

## 2019-01-01 ENCOUNTER — HOSPITAL ENCOUNTER (OUTPATIENT)
Facility: CLINIC | Age: 69
Discharge: HOME | End: 2019-07-29
Attending: RADIOLOGY | Admitting: RADIOLOGY
Payer: COMMERCIAL

## 2019-01-01 ENCOUNTER — APPOINTMENT (OUTPATIENT)
Dept: PHYSICAL THERAPY | Facility: CLINIC | Age: 69
DRG: 314 | End: 2019-01-01
Payer: COMMERCIAL

## 2019-01-01 ENCOUNTER — ANCILLARY PROCEDURE (OUTPATIENT)
Dept: GENERAL RADIOLOGY | Facility: CLINIC | Age: 69
End: 2019-01-01
Attending: INTERNAL MEDICINE
Payer: COMMERCIAL

## 2019-01-01 ENCOUNTER — NURSING HOME VISIT (OUTPATIENT)
Dept: GERIATRICS | Facility: CLINIC | Age: 69
End: 2019-01-01
Payer: COMMERCIAL

## 2019-01-01 ENCOUNTER — APPOINTMENT (OUTPATIENT)
Dept: PHYSICAL THERAPY | Facility: CLINIC | Age: 69
DRG: 314 | End: 2019-01-01
Attending: INTERNAL MEDICINE
Payer: COMMERCIAL

## 2019-01-01 ENCOUNTER — APPOINTMENT (OUTPATIENT)
Dept: ULTRASOUND IMAGING | Facility: CLINIC | Age: 69
DRG: 314 | End: 2019-01-01
Attending: INTERNAL MEDICINE
Payer: COMMERCIAL

## 2019-01-01 ENCOUNTER — HOSPITAL LABORATORY (OUTPATIENT)
Dept: OTHER | Facility: CLINIC | Age: 69
End: 2019-01-01

## 2019-01-01 ENCOUNTER — HOSPITAL ENCOUNTER (OUTPATIENT)
Facility: CLINIC | Age: 69
End: 2019-01-01
Admitting: RADIOLOGY
Payer: COMMERCIAL

## 2019-01-01 ENCOUNTER — ANESTHESIA (OUTPATIENT)
Dept: SURGERY | Facility: CLINIC | Age: 69
End: 2019-01-01
Payer: COMMERCIAL

## 2019-01-01 ENCOUNTER — HOSPITAL ENCOUNTER (OUTPATIENT)
Facility: CLINIC | Age: 69
Discharge: HOME | End: 2019-06-05
Attending: INTERNAL MEDICINE | Admitting: INTERNAL MEDICINE
Payer: COMMERCIAL

## 2019-01-01 ENCOUNTER — HOSPITAL ENCOUNTER (OUTPATIENT)
Facility: CLINIC | Age: 69
Setting detail: OBSERVATION
Discharge: HOME | End: 2019-11-12
Attending: EMERGENCY MEDICINE | Admitting: HOSPITALIST
Payer: COMMERCIAL

## 2019-01-01 ENCOUNTER — ANESTHESIA EVENT (OUTPATIENT)
Dept: SURGERY | Facility: CLINIC | Age: 69
End: 2019-01-01
Payer: COMMERCIAL

## 2019-01-01 ENCOUNTER — HOSPITAL ENCOUNTER (OUTPATIENT)
Dept: GENERAL RADIOLOGY | Facility: CLINIC | Age: 69
Discharge: HOME | End: 2019-11-05
Attending: INTERNAL MEDICINE | Admitting: INTERNAL MEDICINE
Payer: COMMERCIAL

## 2019-01-01 ENCOUNTER — HOSPITAL ENCOUNTER (EMERGENCY)
Facility: CLINIC | Age: 69
Discharge: LEFT BEFORE DISCHARGE | End: 2019-09-06
Payer: COMMERCIAL

## 2019-01-01 ENCOUNTER — APPOINTMENT (OUTPATIENT)
Dept: CT IMAGING | Facility: CLINIC | Age: 69
DRG: 314 | End: 2019-01-01
Attending: HOSPITALIST
Payer: COMMERCIAL

## 2019-01-01 ENCOUNTER — DOCUMENTATION ONLY (OUTPATIENT)
Dept: OTHER | Facility: CLINIC | Age: 69
End: 2019-01-01

## 2019-01-01 ENCOUNTER — APPOINTMENT (OUTPATIENT)
Dept: CT IMAGING | Facility: CLINIC | Age: 69
End: 2019-01-01
Attending: EMERGENCY MEDICINE
Payer: COMMERCIAL

## 2019-01-01 ENCOUNTER — ALLIED HEALTH/NURSE VISIT (OUTPATIENT)
Dept: PHARMACY | Facility: CLINIC | Age: 69
End: 2019-01-01
Payer: COMMERCIAL

## 2019-01-01 ENCOUNTER — APPOINTMENT (OUTPATIENT)
Dept: GENERAL RADIOLOGY | Facility: CLINIC | Age: 69
DRG: 314 | End: 2019-01-01
Attending: PHYSICIAN ASSISTANT
Payer: COMMERCIAL

## 2019-01-01 ENCOUNTER — HOSPITAL ENCOUNTER (EMERGENCY)
Facility: CLINIC | Age: 69
Discharge: HOME | End: 2019-12-09
Attending: EMERGENCY MEDICINE | Admitting: EMERGENCY MEDICINE
Payer: COMMERCIAL

## 2019-01-01 ENCOUNTER — MYC REFILL (OUTPATIENT)
Dept: INTERNAL MEDICINE | Facility: CLINIC | Age: 69
End: 2019-01-01

## 2019-01-01 ENCOUNTER — APPOINTMENT (OUTPATIENT)
Dept: LAB | Facility: CLINIC | Age: 69
End: 2019-01-01
Attending: INTERNAL MEDICINE
Payer: COMMERCIAL

## 2019-01-01 ENCOUNTER — HEALTH MAINTENANCE LETTER (OUTPATIENT)
Age: 69
End: 2019-01-01

## 2019-01-01 ENCOUNTER — TELEPHONE (OUTPATIENT)
Dept: OTHER | Facility: CLINIC | Age: 69
End: 2019-01-01

## 2019-01-01 ENCOUNTER — APPOINTMENT (OUTPATIENT)
Dept: INTERVENTIONAL RADIOLOGY/VASCULAR | Facility: CLINIC | Age: 69
End: 2019-01-01
Attending: PHYSICIAN ASSISTANT
Payer: COMMERCIAL

## 2019-01-01 ENCOUNTER — HOSPITAL ENCOUNTER (OUTPATIENT)
Dept: CT IMAGING | Facility: CLINIC | Age: 69
Discharge: HOME | End: 2019-10-09
Attending: NURSE PRACTITIONER | Admitting: NURSE PRACTITIONER
Payer: COMMERCIAL

## 2019-01-01 ENCOUNTER — HOSPITAL ENCOUNTER (OUTPATIENT)
Facility: CLINIC | Age: 69
Discharge: HOME | End: 2019-08-28
Attending: RADIOLOGY | Admitting: RADIOLOGY
Payer: COMMERCIAL

## 2019-01-01 ENCOUNTER — APPOINTMENT (OUTPATIENT)
Dept: CT IMAGING | Facility: CLINIC | Age: 69
DRG: 314 | End: 2019-01-01
Attending: INTERNAL MEDICINE
Payer: COMMERCIAL

## 2019-01-01 ENCOUNTER — HOSPITAL ENCOUNTER (EMERGENCY)
Facility: CLINIC | Age: 69
Discharge: HOME | End: 2019-08-17
Attending: EMERGENCY MEDICINE | Admitting: EMERGENCY MEDICINE
Payer: COMMERCIAL

## 2019-01-01 ENCOUNTER — APPOINTMENT (OUTPATIENT)
Dept: INTERVENTIONAL RADIOLOGY/VASCULAR | Facility: CLINIC | Age: 69
DRG: 314 | End: 2019-01-01
Attending: INTERNAL MEDICINE
Payer: COMMERCIAL

## 2019-01-01 ENCOUNTER — DISCHARGE SUMMARY NURSING HOME (OUTPATIENT)
Dept: GERIATRICS | Facility: CLINIC | Age: 69
End: 2019-01-01
Payer: COMMERCIAL

## 2019-01-01 ENCOUNTER — ALLIED HEALTH/NURSE VISIT (OUTPATIENT)
Dept: EDUCATION SERVICES | Facility: CLINIC | Age: 69
End: 2019-01-01
Payer: COMMERCIAL

## 2019-01-01 ENCOUNTER — DOCUMENTATION ONLY (OUTPATIENT)
Dept: CARE COORDINATION | Facility: CLINIC | Age: 69
End: 2019-01-01

## 2019-01-01 ENCOUNTER — HOSPITAL ENCOUNTER (EMERGENCY)
Facility: CLINIC | Age: 69
End: 2019-01-01
Payer: COMMERCIAL

## 2019-01-01 ENCOUNTER — HOSPITAL ENCOUNTER (OUTPATIENT)
Facility: CLINIC | Age: 69
Discharge: HOME | End: 2019-10-11
Attending: RADIOLOGY | Admitting: RADIOLOGY
Payer: COMMERCIAL

## 2019-01-01 ENCOUNTER — HOSPITAL ENCOUNTER (INPATIENT)
Facility: CLINIC | Age: 69
LOS: 10 days | Discharge: NURSING, REHAB OR LONG-TERM CARE | DRG: 314 | End: 2019-09-18
Attending: EMERGENCY MEDICINE | Admitting: INTERNAL MEDICINE
Payer: COMMERCIAL

## 2019-01-01 ENCOUNTER — HOSPITAL ENCOUNTER (OUTPATIENT)
Facility: CLINIC | Age: 69
Setting detail: OBSERVATION
Discharge: HOME | End: 2019-06-04
Attending: EMERGENCY MEDICINE | Admitting: INTERNAL MEDICINE
Payer: COMMERCIAL

## 2019-01-01 ENCOUNTER — HOSPITAL ENCOUNTER (EMERGENCY)
Facility: CLINIC | Age: 69
Discharge: HOME | End: 2019-10-20
Attending: EMERGENCY MEDICINE | Admitting: EMERGENCY MEDICINE
Payer: COMMERCIAL

## 2019-01-01 ENCOUNTER — APPOINTMENT (OUTPATIENT)
Dept: INTERVENTIONAL RADIOLOGY/VASCULAR | Facility: CLINIC | Age: 69
End: 2019-01-01
Attending: INTERNAL MEDICINE
Payer: COMMERCIAL

## 2019-01-01 ENCOUNTER — APPOINTMENT (OUTPATIENT)
Dept: GENERAL RADIOLOGY | Facility: CLINIC | Age: 69
End: 2019-01-01
Attending: EMERGENCY MEDICINE
Payer: COMMERCIAL

## 2019-01-01 ENCOUNTER — HOSPITAL ENCOUNTER (OUTPATIENT)
Facility: CLINIC | Age: 69
Discharge: HOME | End: 2019-07-18
Attending: INTERNAL MEDICINE | Admitting: INTERNAL MEDICINE
Payer: COMMERCIAL

## 2019-01-01 ENCOUNTER — HOSPITAL ENCOUNTER (EMERGENCY)
Facility: CLINIC | Age: 69
Discharge: HOME | End: 2019-09-20
Attending: EMERGENCY MEDICINE | Admitting: EMERGENCY MEDICINE
Payer: COMMERCIAL

## 2019-01-01 ENCOUNTER — PATIENT OUTREACH (OUTPATIENT)
Dept: EDUCATION SERVICES | Facility: CLINIC | Age: 69
End: 2019-01-01
Payer: COMMERCIAL

## 2019-01-01 ENCOUNTER — MEDICAL CORRESPONDENCE (OUTPATIENT)
Dept: PHARMACY | Facility: CLINIC | Age: 69
End: 2019-01-01

## 2019-01-01 ENCOUNTER — MEDICAL CORRESPONDENCE (OUTPATIENT)
Dept: HEALTH INFORMATION MANAGEMENT | Facility: CLINIC | Age: 69
End: 2019-01-01

## 2019-01-01 ENCOUNTER — APPOINTMENT (OUTPATIENT)
Dept: ULTRASOUND IMAGING | Facility: CLINIC | Age: 69
DRG: 314 | End: 2019-01-01
Attending: NURSE PRACTITIONER
Payer: COMMERCIAL

## 2019-01-01 VITALS
DIASTOLIC BLOOD PRESSURE: 79 MMHG | OXYGEN SATURATION: 99 % | RESPIRATION RATE: 16 BRPM | BODY MASS INDEX: 18.75 KG/M2 | HEART RATE: 81 BPM | SYSTOLIC BLOOD PRESSURE: 124 MMHG | WEIGHT: 133.9 LBS | TEMPERATURE: 97.6 F | HEIGHT: 71 IN

## 2019-01-01 VITALS
RESPIRATION RATE: 18 BRPM | WEIGHT: 164 LBS | SYSTOLIC BLOOD PRESSURE: 151 MMHG | OXYGEN SATURATION: 98 % | TEMPERATURE: 98.1 F | HEIGHT: 71 IN | DIASTOLIC BLOOD PRESSURE: 98 MMHG | BODY MASS INDEX: 22.96 KG/M2 | HEART RATE: 84 BPM

## 2019-01-01 VITALS
HEART RATE: 99 BPM | BODY MASS INDEX: 27.37 KG/M2 | WEIGHT: 195.5 LBS | DIASTOLIC BLOOD PRESSURE: 68 MMHG | TEMPERATURE: 96.9 F | DIASTOLIC BLOOD PRESSURE: 77 MMHG | OXYGEN SATURATION: 99 % | SYSTOLIC BLOOD PRESSURE: 113 MMHG | HEART RATE: 81 BPM | HEIGHT: 71 IN | SYSTOLIC BLOOD PRESSURE: 112 MMHG | RESPIRATION RATE: 12 BRPM | OXYGEN SATURATION: 98 % | RESPIRATION RATE: 16 BRPM | TEMPERATURE: 98.2 F

## 2019-01-01 VITALS
OXYGEN SATURATION: 97 % | DIASTOLIC BLOOD PRESSURE: 77 MMHG | RESPIRATION RATE: 16 BRPM | HEART RATE: 74 BPM | SYSTOLIC BLOOD PRESSURE: 120 MMHG

## 2019-01-01 VITALS
OXYGEN SATURATION: 96 % | HEIGHT: 71 IN | RESPIRATION RATE: 16 BRPM | HEART RATE: 84 BPM | DIASTOLIC BLOOD PRESSURE: 57 MMHG | BODY MASS INDEX: 20.29 KG/M2 | TEMPERATURE: 97.7 F | SYSTOLIC BLOOD PRESSURE: 79 MMHG | WEIGHT: 144.9 LBS

## 2019-01-01 VITALS
RESPIRATION RATE: 20 BRPM | DIASTOLIC BLOOD PRESSURE: 55 MMHG | SYSTOLIC BLOOD PRESSURE: 95 MMHG | OXYGEN SATURATION: 98 % | HEART RATE: 90 BPM | TEMPERATURE: 98.3 F

## 2019-01-01 VITALS
DIASTOLIC BLOOD PRESSURE: 66 MMHG | SYSTOLIC BLOOD PRESSURE: 108 MMHG | RESPIRATION RATE: 12 BRPM | HEIGHT: 71 IN | OXYGEN SATURATION: 98 % | BODY MASS INDEX: 28.28 KG/M2 | TEMPERATURE: 98 F | HEART RATE: 94 BPM | WEIGHT: 202 LBS

## 2019-01-01 VITALS
RESPIRATION RATE: 16 BRPM | TEMPERATURE: 95.9 F | WEIGHT: 186.8 LBS | DIASTOLIC BLOOD PRESSURE: 69 MMHG | SYSTOLIC BLOOD PRESSURE: 119 MMHG | BODY MASS INDEX: 26.15 KG/M2 | HEART RATE: 68 BPM | OXYGEN SATURATION: 98 % | HEIGHT: 71 IN

## 2019-01-01 VITALS
HEART RATE: 62 BPM | HEIGHT: 71 IN | SYSTOLIC BLOOD PRESSURE: 115 MMHG | RESPIRATION RATE: 18 BRPM | TEMPERATURE: 98.4 F | DIASTOLIC BLOOD PRESSURE: 68 MMHG | BODY MASS INDEX: 20.4 KG/M2 | OXYGEN SATURATION: 95 % | WEIGHT: 145.7 LBS

## 2019-01-01 VITALS
SYSTOLIC BLOOD PRESSURE: 139 MMHG | OXYGEN SATURATION: 97 % | RESPIRATION RATE: 18 BRPM | DIASTOLIC BLOOD PRESSURE: 76 MMHG | HEIGHT: 71 IN | TEMPERATURE: 97.7 F | WEIGHT: 144.9 LBS | BODY MASS INDEX: 20.29 KG/M2 | HEART RATE: 66 BPM

## 2019-01-01 VITALS
BODY MASS INDEX: 25.93 KG/M2 | SYSTOLIC BLOOD PRESSURE: 147 MMHG | TEMPERATURE: 96.6 F | HEIGHT: 71 IN | RESPIRATION RATE: 16 BRPM | DIASTOLIC BLOOD PRESSURE: 86 MMHG | HEART RATE: 73 BPM | WEIGHT: 185.19 LBS | OXYGEN SATURATION: 100 %

## 2019-01-01 VITALS
TEMPERATURE: 97.5 F | SYSTOLIC BLOOD PRESSURE: 152 MMHG | HEIGHT: 71 IN | BODY MASS INDEX: 18.62 KG/M2 | DIASTOLIC BLOOD PRESSURE: 84 MMHG | OXYGEN SATURATION: 94 % | WEIGHT: 133 LBS | RESPIRATION RATE: 12 BRPM | HEART RATE: 73 BPM

## 2019-01-01 VITALS
BODY MASS INDEX: 27.86 KG/M2 | HEART RATE: 70 BPM | BODY MASS INDEX: 28.73 KG/M2 | HEART RATE: 85 BPM | WEIGHT: 199 LBS | TEMPERATURE: 97.8 F | DIASTOLIC BLOOD PRESSURE: 70 MMHG | OXYGEN SATURATION: 98 % | WEIGHT: 206 LBS | OXYGEN SATURATION: 97 % | SYSTOLIC BLOOD PRESSURE: 110 MMHG | DIASTOLIC BLOOD PRESSURE: 70 MMHG | TEMPERATURE: 97.5 F | HEIGHT: 71 IN | SYSTOLIC BLOOD PRESSURE: 118 MMHG

## 2019-01-01 VITALS
HEIGHT: 71 IN | RESPIRATION RATE: 12 BRPM | OXYGEN SATURATION: 97 % | SYSTOLIC BLOOD PRESSURE: 88 MMHG | HEART RATE: 109 BPM | TEMPERATURE: 97.4 F | WEIGHT: 135 LBS | BODY MASS INDEX: 18.9 KG/M2 | DIASTOLIC BLOOD PRESSURE: 50 MMHG

## 2019-01-01 VITALS
DIASTOLIC BLOOD PRESSURE: 72 MMHG | WEIGHT: 147.2 LBS | OXYGEN SATURATION: 97 % | SYSTOLIC BLOOD PRESSURE: 122 MMHG | HEIGHT: 71 IN | TEMPERATURE: 97 F | BODY MASS INDEX: 20.61 KG/M2 | HEART RATE: 61 BPM | RESPIRATION RATE: 18 BRPM

## 2019-01-01 VITALS
WEIGHT: 170 LBS | HEART RATE: 82 BPM | HEIGHT: 71 IN | SYSTOLIC BLOOD PRESSURE: 117 MMHG | OXYGEN SATURATION: 94 % | RESPIRATION RATE: 18 BRPM | BODY MASS INDEX: 23.8 KG/M2 | TEMPERATURE: 97.5 F | DIASTOLIC BLOOD PRESSURE: 75 MMHG

## 2019-01-01 VITALS
HEIGHT: 71 IN | DIASTOLIC BLOOD PRESSURE: 79 MMHG | HEART RATE: 73 BPM | WEIGHT: 168.4 LBS | RESPIRATION RATE: 18 BRPM | BODY MASS INDEX: 23.57 KG/M2 | OXYGEN SATURATION: 100 % | SYSTOLIC BLOOD PRESSURE: 122 MMHG | TEMPERATURE: 97.6 F

## 2019-01-01 VITALS
OXYGEN SATURATION: 95 % | HEART RATE: 71 BPM | SYSTOLIC BLOOD PRESSURE: 152 MMHG | WEIGHT: 144.9 LBS | BODY MASS INDEX: 20.29 KG/M2 | HEIGHT: 71 IN | DIASTOLIC BLOOD PRESSURE: 95 MMHG | RESPIRATION RATE: 18 BRPM | TEMPERATURE: 98.5 F

## 2019-01-01 VITALS
HEART RATE: 109 BPM | OXYGEN SATURATION: 96 % | RESPIRATION RATE: 18 BRPM | HEIGHT: 71 IN | SYSTOLIC BLOOD PRESSURE: 109 MMHG | TEMPERATURE: 97.6 F | BODY MASS INDEX: 23.38 KG/M2 | WEIGHT: 167 LBS | DIASTOLIC BLOOD PRESSURE: 72 MMHG

## 2019-01-01 VITALS
TEMPERATURE: 98 F | RESPIRATION RATE: 16 BRPM | DIASTOLIC BLOOD PRESSURE: 66 MMHG | SYSTOLIC BLOOD PRESSURE: 110 MMHG | HEIGHT: 71 IN | OXYGEN SATURATION: 99 % | BODY MASS INDEX: 24.48 KG/M2 | WEIGHT: 174.9 LBS | HEART RATE: 90 BPM

## 2019-01-01 VITALS
HEART RATE: 65 BPM | BODY MASS INDEX: 24.74 KG/M2 | OXYGEN SATURATION: 94 % | SYSTOLIC BLOOD PRESSURE: 126 MMHG | RESPIRATION RATE: 14 BRPM | WEIGHT: 176.7 LBS | DIASTOLIC BLOOD PRESSURE: 68 MMHG | TEMPERATURE: 98.4 F | HEIGHT: 71 IN

## 2019-01-01 VITALS
DIASTOLIC BLOOD PRESSURE: 80 MMHG | TEMPERATURE: 97.4 F | BODY MASS INDEX: 24.08 KG/M2 | OXYGEN SATURATION: 98 % | HEIGHT: 71 IN | RESPIRATION RATE: 14 BRPM | WEIGHT: 172 LBS | SYSTOLIC BLOOD PRESSURE: 146 MMHG | HEART RATE: 66 BPM

## 2019-01-01 VITALS
DIASTOLIC BLOOD PRESSURE: 71 MMHG | TEMPERATURE: 98.6 F | OXYGEN SATURATION: 97 % | SYSTOLIC BLOOD PRESSURE: 123 MMHG | HEART RATE: 72 BPM | RESPIRATION RATE: 15 BRPM

## 2019-01-01 VITALS
HEART RATE: 98 BPM | BODY MASS INDEX: 25.9 KG/M2 | RESPIRATION RATE: 20 BRPM | HEIGHT: 71 IN | WEIGHT: 185 LBS | SYSTOLIC BLOOD PRESSURE: 100 MMHG | DIASTOLIC BLOOD PRESSURE: 60 MMHG | OXYGEN SATURATION: 100 % | TEMPERATURE: 98.9 F

## 2019-01-01 VITALS
TEMPERATURE: 97.9 F | HEART RATE: 65 BPM | BODY MASS INDEX: 17.78 KG/M2 | DIASTOLIC BLOOD PRESSURE: 66 MMHG | RESPIRATION RATE: 18 BRPM | HEIGHT: 71 IN | WEIGHT: 127 LBS | OXYGEN SATURATION: 99 % | SYSTOLIC BLOOD PRESSURE: 99 MMHG

## 2019-01-01 VITALS
SYSTOLIC BLOOD PRESSURE: 132 MMHG | RESPIRATION RATE: 18 BRPM | HEART RATE: 84 BPM | TEMPERATURE: 97.9 F | OXYGEN SATURATION: 99 % | DIASTOLIC BLOOD PRESSURE: 87 MMHG

## 2019-01-01 DIAGNOSIS — R53.81 PHYSICAL DECONDITIONING: ICD-10-CM

## 2019-01-01 DIAGNOSIS — G89.29 OTHER CHRONIC PAIN: ICD-10-CM

## 2019-01-01 DIAGNOSIS — R74.01 TRANSAMINITIS: ICD-10-CM

## 2019-01-01 DIAGNOSIS — E11.9 TYPE 2 DIABETES MELLITUS WITHOUT COMPLICATION, WITH LONG-TERM CURRENT USE OF INSULIN (H): ICD-10-CM

## 2019-01-01 DIAGNOSIS — F32.0 MILD MAJOR DEPRESSION (H): ICD-10-CM

## 2019-01-01 DIAGNOSIS — Z79.4 TYPE 2 DIABETES MELLITUS WITHOUT COMPLICATION, WITH LONG-TERM CURRENT USE OF INSULIN (H): ICD-10-CM

## 2019-01-01 DIAGNOSIS — Z71.89 OTHER SPECIFIED COUNSELING: ICD-10-CM

## 2019-01-01 DIAGNOSIS — C25.9 MALIGNANT NEOPLASM OF PANCREAS, UNSPECIFIED LOCATION OF MALIGNANCY (H): ICD-10-CM

## 2019-01-01 DIAGNOSIS — Z51.5 HOSPICE CARE PATIENT: ICD-10-CM

## 2019-01-01 DIAGNOSIS — E11.9 TYPE 2 DIABETES MELLITUS WITHOUT COMPLICATION, WITH LONG-TERM CURRENT USE OF INSULIN (H): Primary | ICD-10-CM

## 2019-01-01 DIAGNOSIS — D63.0 ANEMIA IN NEOPLASTIC DISEASE: ICD-10-CM

## 2019-01-01 DIAGNOSIS — F33.2 SEVERE EPISODE OF RECURRENT MAJOR DEPRESSIVE DISORDER, WITHOUT PSYCHOTIC FEATURES (H): ICD-10-CM

## 2019-01-01 DIAGNOSIS — C25.0 MALIGNANT NEOPLASM OF HEAD OF PANCREAS (H): ICD-10-CM

## 2019-01-01 DIAGNOSIS — R11.2 NAUSEA AND VOMITING, INTRACTABILITY OF VOMITING NOT SPECIFIED, UNSPECIFIED VOMITING TYPE: ICD-10-CM

## 2019-01-01 DIAGNOSIS — Z79.4 TYPE 2 DIABETES MELLITUS WITHOUT COMPLICATION, WITH LONG-TERM CURRENT USE OF INSULIN (H): Primary | ICD-10-CM

## 2019-01-01 DIAGNOSIS — R78.81 MSSA BACTEREMIA: ICD-10-CM

## 2019-01-01 DIAGNOSIS — E46 MALNUTRITION, UNSPECIFIED TYPE (H): Primary | ICD-10-CM

## 2019-01-01 DIAGNOSIS — K75.0 LIVER ABSCESS: ICD-10-CM

## 2019-01-01 DIAGNOSIS — B95.61 MSSA BACTEREMIA: ICD-10-CM

## 2019-01-01 DIAGNOSIS — E86.1 HYPOTENSION DUE TO HYPOVOLEMIA: ICD-10-CM

## 2019-01-01 DIAGNOSIS — K86.89 PANCREATIC MASS: ICD-10-CM

## 2019-01-01 DIAGNOSIS — R63.4 UNINTENDED WEIGHT LOSS: ICD-10-CM

## 2019-01-01 DIAGNOSIS — M62.81 GENERALIZED MUSCLE WEAKNESS: ICD-10-CM

## 2019-01-01 DIAGNOSIS — E46 MALNUTRITION, UNSPECIFIED TYPE (H): ICD-10-CM

## 2019-01-01 DIAGNOSIS — K59.00 CONSTIPATION, UNSPECIFIED CONSTIPATION TYPE: ICD-10-CM

## 2019-01-01 DIAGNOSIS — E80.6 HYPERBILIRUBINEMIA: ICD-10-CM

## 2019-01-01 DIAGNOSIS — Z23 NEED FOR PROPHYLACTIC VACCINATION AGAINST STREPTOCOCCUS PNEUMONIAE (PNEUMOCOCCUS): ICD-10-CM

## 2019-01-01 DIAGNOSIS — R11.0 NAUSEA: Primary | ICD-10-CM

## 2019-01-01 DIAGNOSIS — C25.0 MALIGNANT NEOPLASM OF HEAD OF PANCREAS (H): Primary | ICD-10-CM

## 2019-01-01 DIAGNOSIS — E43 SEVERE PROTEIN-CALORIE MALNUTRITION (H): Primary | ICD-10-CM

## 2019-01-01 DIAGNOSIS — R62.7 FAILURE TO THRIVE IN ADULT: ICD-10-CM

## 2019-01-01 DIAGNOSIS — R10.9 ABDOMINAL PAIN, UNSPECIFIED ABDOMINAL LOCATION: ICD-10-CM

## 2019-01-01 DIAGNOSIS — E86.0 DEHYDRATION: ICD-10-CM

## 2019-01-01 DIAGNOSIS — Z00.00 MEDICARE ANNUAL WELLNESS VISIT, SUBSEQUENT: Primary | ICD-10-CM

## 2019-01-01 DIAGNOSIS — F32.A DEPRESSION, UNSPECIFIED DEPRESSION TYPE: ICD-10-CM

## 2019-01-01 DIAGNOSIS — R50.9 FEVER, UNSPECIFIED FEVER CAUSE: ICD-10-CM

## 2019-01-01 DIAGNOSIS — J30.2 SEASONAL ALLERGIC RHINITIS: ICD-10-CM

## 2019-01-01 DIAGNOSIS — Z53.9 DIAGNOSIS NOT YET DEFINED: Primary | ICD-10-CM

## 2019-01-01 DIAGNOSIS — B37.0 THRUSH: ICD-10-CM

## 2019-01-01 DIAGNOSIS — R78.81 STAPHYLOCOCCUS AUREUS BACTEREMIA: Primary | ICD-10-CM

## 2019-01-01 DIAGNOSIS — B95.61 STAPHYLOCOCCUS AUREUS BACTEREMIA: Primary | ICD-10-CM

## 2019-01-01 DIAGNOSIS — R11.2 NON-INTRACTABLE VOMITING WITH NAUSEA, UNSPECIFIED VOMITING TYPE: ICD-10-CM

## 2019-01-01 DIAGNOSIS — E86.0 DEHYDRATION: Primary | ICD-10-CM

## 2019-01-01 DIAGNOSIS — R62.7 FAILURE TO THRIVE IN ADULT: Primary | ICD-10-CM

## 2019-01-01 DIAGNOSIS — Z01.818 PREOPERATIVE EXAMINATION: Primary | ICD-10-CM

## 2019-01-01 DIAGNOSIS — F10.21 ALCOHOL DEPENDENCE IN REMISSION (H): ICD-10-CM

## 2019-01-01 DIAGNOSIS — K59.03 DRUG INDUCED CONSTIPATION: ICD-10-CM

## 2019-01-01 DIAGNOSIS — E43 SEVERE MALNUTRITION (H): Primary | ICD-10-CM

## 2019-01-01 DIAGNOSIS — R41.89 COGNITIVE IMPAIRMENT: ICD-10-CM

## 2019-01-01 DIAGNOSIS — N52.9 ED (ERECTILE DYSFUNCTION): ICD-10-CM

## 2019-01-01 DIAGNOSIS — Z91.89 AT RISK FOR HEALTHCARE ASSOCIATED INFECTION: ICD-10-CM

## 2019-01-01 DIAGNOSIS — Z09 HOSPITAL DISCHARGE FOLLOW-UP: Primary | ICD-10-CM

## 2019-01-01 DIAGNOSIS — Z76.89 HEALTH CARE HOME: ICD-10-CM

## 2019-01-01 DIAGNOSIS — K75.0 HEPATIC ABSCESS: ICD-10-CM

## 2019-01-01 DIAGNOSIS — F41.9 ANXIETY: ICD-10-CM

## 2019-01-01 DIAGNOSIS — K75.0 LIVER ABSCESS: Primary | ICD-10-CM

## 2019-01-01 DIAGNOSIS — K59.00 CONSTIPATION: ICD-10-CM

## 2019-01-01 DIAGNOSIS — R10.9 ABDOMINAL DISCOMFORT: Primary | ICD-10-CM

## 2019-01-01 DIAGNOSIS — N52.8 OTHER MALE ERECTILE DYSFUNCTION: ICD-10-CM
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
## 2019-01-01 LAB
ABO + RH BLD: NORMAL
ABO + RH BLD: NORMAL
ALBUMIN SERPL-MCNC: 1.7 G/DL (ref 3.4–5)
ALBUMIN SERPL-MCNC: 1.9 G/DL (ref 3.4–5)
ALBUMIN SERPL-MCNC: 2.2 G/DL (ref 3.4–5)
ALBUMIN SERPL-MCNC: 2.6 G/DL (ref 3.4–5)
ALBUMIN SERPL-MCNC: 3.2 G/DL (ref 3.4–5)
ALBUMIN SERPL-MCNC: 3.4 G/DL (ref 3.4–5)
ALBUMIN SERPL-MCNC: 3.5 G/DL (ref 3.4–5)
ALBUMIN SERPL-MCNC: 3.5 G/DL (ref 3.4–5)
ALBUMIN SERPL-MCNC: 3.9 G/DL (ref 3.4–5)
ALBUMIN SERPL-MCNC: 4 G/DL (ref 3.4–5)
ALBUMIN UR-MCNC: NEGATIVE MG/DL
ALP SERPL-CCNC: 128 U/L (ref 40–150)
ALP SERPL-CCNC: 128 U/L (ref 40–150)
ALP SERPL-CCNC: 145 U/L (ref 40–150)
ALP SERPL-CCNC: 158 U/L (ref 40–150)
ALP SERPL-CCNC: 211 U/L (ref 40–150)
ALP SERPL-CCNC: 300 U/L (ref 40–150)
ALP SERPL-CCNC: 356 U/L (ref 40–150)
ALP SERPL-CCNC: 517 U/L (ref 40–150)
ALP SERPL-CCNC: 546 U/L (ref 40–150)
ALP SERPL-CCNC: 555 U/L (ref 40–150)
ALT SERPL W P-5'-P-CCNC: 16 U/L (ref 0–70)
ALT SERPL W P-5'-P-CCNC: 18 U/L (ref 0–70)
ALT SERPL W P-5'-P-CCNC: 21 U/L (ref 0–70)
ALT SERPL W P-5'-P-CCNC: 22 U/L (ref 0–70)
ALT SERPL W P-5'-P-CCNC: 31 U/L (ref 0–70)
ALT SERPL W P-5'-P-CCNC: 34 U/L (ref 0–70)
ALT SERPL W P-5'-P-CCNC: 465 U/L (ref 0–70)
ALT SERPL W P-5'-P-CCNC: 489 U/L (ref 0–70)
ALT SERPL W P-5'-P-CCNC: 537 U/L (ref 0–70)
ALT SERPL W P-5'-P-CCNC: 6 U/L (ref 0–70)
ALT SERPL W P-5'-P-CCNC: 64 U/L (ref 0–70)
AMMONIA PLAS-SCNC: <10 UMOL/L (ref 10–50)
ANION GAP SERPL CALCULATED.3IONS-SCNC: 10 MMOL/L (ref 3–14)
ANION GAP SERPL CALCULATED.3IONS-SCNC: 11 MMOL/L (ref 3–14)
ANION GAP SERPL CALCULATED.3IONS-SCNC: 13 MMOL/L (ref 3–14)
ANION GAP SERPL CALCULATED.3IONS-SCNC: 18 MMOL/L (ref 3–14)
ANION GAP SERPL CALCULATED.3IONS-SCNC: 3 MMOL/L (ref 3–14)
ANION GAP SERPL CALCULATED.3IONS-SCNC: 4 MMOL/L (ref 3–14)
ANION GAP SERPL CALCULATED.3IONS-SCNC: 5 MMOL/L (ref 3–14)
ANION GAP SERPL CALCULATED.3IONS-SCNC: 6 MMOL/L (ref 3–14)
ANION GAP SERPL CALCULATED.3IONS-SCNC: 7 MMOL/L (ref 3–14)
ANION GAP SERPL CALCULATED.3IONS-SCNC: 8 MMOL/L (ref 3–14)
ANION GAP SERPL CALCULATED.3IONS-SCNC: 8 MMOL/L (ref 3–14)
APAP SERPL-MCNC: <2 MG/L (ref 10–20)
APPEARANCE UR: CLEAR
APTT PPP: 41 SEC (ref 22–37)
AST SERPL W P-5'-P-CCNC: 14 U/L (ref 0–45)
AST SERPL W P-5'-P-CCNC: 14 U/L (ref 0–45)
AST SERPL W P-5'-P-CCNC: 15 U/L (ref 0–45)
AST SERPL W P-5'-P-CCNC: 15 U/L (ref 0–45)
AST SERPL W P-5'-P-CCNC: 19 U/L (ref 0–45)
AST SERPL W P-5'-P-CCNC: 22 U/L (ref 0–45)
AST SERPL W P-5'-P-CCNC: 24 U/L (ref 0–45)
AST SERPL W P-5'-P-CCNC: 243 U/L (ref 0–45)
AST SERPL W P-5'-P-CCNC: 25 U/L (ref 0–45)
AST SERPL W P-5'-P-CCNC: 286 U/L (ref 0–45)
AST SERPL W P-5'-P-CCNC: 303 U/L (ref 0–45)
AST SERPL W P-5'-P-CCNC: 31 U/L (ref 0–45)
AST SERPL W P-5'-P-CCNC: 53 U/L (ref 0–45)
BACTERIA SPEC CULT: ABNORMAL
BACTERIA SPEC CULT: NO GROWTH
BACTERIA SPEC CULT: NORMAL
BASOPHILS # BLD AUTO: 0 10E9/L (ref 0–0.2)
BASOPHILS # BLD AUTO: 0.1 10E9/L (ref 0–0.2)
BASOPHILS NFR BLD AUTO: 0.2 %
BASOPHILS NFR BLD AUTO: 0.4 %
BASOPHILS NFR BLD AUTO: 0.4 %
BASOPHILS NFR BLD AUTO: 0.7 %
BASOPHILS NFR BLD AUTO: 0.9 %
BASOPHILS NFR BLD AUTO: 1.1 %
BASOPHILS NFR BLD AUTO: 1.1 %
BASOPHILS NFR BLD AUTO: 1.4 %
BASOPHILS NFR BLD AUTO: 1.9 %
BASOPHILS NFR BLD AUTO: 2 %
BASOPHILS NFR BLD AUTO: 2.6 %
BILIRUB DIRECT SERPL-MCNC: 0.2 MG/DL (ref 0–0.2)
BILIRUB DIRECT SERPL-MCNC: 8.2 MG/DL (ref 0–0.2)
BILIRUB SERPL-MCNC: 0.5 MG/DL (ref 0.2–1.3)
BILIRUB SERPL-MCNC: 0.7 MG/DL (ref 0.2–1.3)
BILIRUB SERPL-MCNC: 1 MG/DL (ref 0.2–1.3)
BILIRUB SERPL-MCNC: 10.6 MG/DL (ref 0.2–1.3)
BILIRUB SERPL-MCNC: 7.3 MG/DL (ref 0.2–1.3)
BILIRUB SERPL-MCNC: 8.3 MG/DL (ref 0.2–1.3)
BILIRUB UR QL STRIP: NEGATIVE
BLD GP AB SCN SERPL QL: NORMAL
BLOOD BANK CMNT PATIENT-IMP: NORMAL
BUN SERPL-MCNC: 10 MG/DL (ref 7–30)
BUN SERPL-MCNC: 10 MG/DL (ref 7–30)
BUN SERPL-MCNC: 11 MG/DL (ref 7–30)
BUN SERPL-MCNC: 12 MG/DL (ref 7–30)
BUN SERPL-MCNC: 12 MG/DL (ref 7–30)
BUN SERPL-MCNC: 14 MG/DL (ref 7–30)
BUN SERPL-MCNC: 15 MG/DL (ref 7–30)
BUN SERPL-MCNC: 16 MG/DL (ref 7–30)
BUN SERPL-MCNC: 2 MG/DL (ref 7–30)
BUN SERPL-MCNC: 22 MG/DL (ref 7–30)
BUN SERPL-MCNC: 4 MG/DL (ref 7–30)
BUN SERPL-MCNC: 4 MG/DL (ref 7–30)
BUN SERPL-MCNC: 5 MG/DL (ref 7–30)
BUN SERPL-MCNC: 6 MG/DL (ref 7–30)
BUN SERPL-MCNC: 7 MG/DL (ref 7–30)
BUN SERPL-MCNC: 8 MG/DL (ref 7–30)
BUN SERPL-MCNC: 8 MG/DL (ref 7–30)
BUN SERPL-MCNC: 9 MG/DL (ref 7–30)
CALCIUM SERPL-MCNC: 7.3 MG/DL (ref 8.5–10.1)
CALCIUM SERPL-MCNC: 7.5 MG/DL (ref 8.5–10.1)
CALCIUM SERPL-MCNC: 7.7 MG/DL (ref 8.5–10.1)
CALCIUM SERPL-MCNC: 7.9 MG/DL (ref 8.5–10.1)
CALCIUM SERPL-MCNC: 8 MG/DL (ref 8.5–10.1)
CALCIUM SERPL-MCNC: 8.1 MG/DL (ref 8.5–10.1)
CALCIUM SERPL-MCNC: 8.2 MG/DL (ref 8.5–10.1)
CALCIUM SERPL-MCNC: 8.2 MG/DL (ref 8.5–10.1)
CALCIUM SERPL-MCNC: 8.3 MG/DL (ref 8.5–10.1)
CALCIUM SERPL-MCNC: 8.4 MG/DL (ref 8.5–10.1)
CALCIUM SERPL-MCNC: 8.4 MG/DL (ref 8.5–10.1)
CALCIUM SERPL-MCNC: 8.6 MG/DL (ref 8.5–10.1)
CALCIUM SERPL-MCNC: 8.8 MG/DL (ref 8.5–10.1)
CALCIUM SERPL-MCNC: 8.8 MG/DL (ref 8.5–10.1)
CALCIUM SERPL-MCNC: 9 MG/DL (ref 8.5–10.1)
CALCIUM SERPL-MCNC: 9.1 MG/DL (ref 8.5–10.1)
CALCIUM SERPL-MCNC: 9.1 MG/DL (ref 8.5–10.1)
CALCIUM SERPL-MCNC: 9.4 MG/DL (ref 8.5–10.1)
CALCIUM SERPL-MCNC: 9.4 MG/DL (ref 8.5–10.1)
CALCIUM SERPL-MCNC: 9.5 MG/DL (ref 8.5–10.1)
CHLORIDE SERPL-SCNC: 101 MMOL/L (ref 94–109)
CHLORIDE SERPL-SCNC: 103 MMOL/L (ref 94–109)
CHLORIDE SERPL-SCNC: 104 MMOL/L (ref 94–109)
CHLORIDE SERPL-SCNC: 105 MMOL/L (ref 94–109)
CHLORIDE SERPL-SCNC: 105 MMOL/L (ref 94–109)
CHLORIDE SERPL-SCNC: 106 MMOL/L (ref 94–109)
CHLORIDE SERPL-SCNC: 106 MMOL/L (ref 94–109)
CHLORIDE SERPL-SCNC: 107 MMOL/L (ref 94–109)
CHLORIDE SERPL-SCNC: 108 MMOL/L (ref 94–109)
CHLORIDE SERPL-SCNC: 109 MMOL/L (ref 94–109)
CHLORIDE SERPL-SCNC: 110 MMOL/L (ref 94–109)
CHLORIDE SERPL-SCNC: 113 MMOL/L (ref 94–109)
CHLORIDE SERPL-SCNC: 95 MMOL/L (ref 94–109)
CHLORIDE SERPL-SCNC: 97 MMOL/L (ref 94–109)
CHLORIDE SERPL-SCNC: 98 MMOL/L (ref 94–109)
CHOLEST SERPL-MCNC: 183 MG/DL
CO2 BLDCOV-SCNC: 22 MMOL/L (ref 21–28)
CO2 BLDCOV-SCNC: 24 MMOL/L (ref 21–28)
CO2 BLDCOV-SCNC: 27 MMOL/L (ref 21–28)
CO2 SERPL-SCNC: 18 MMOL/L (ref 20–32)
CO2 SERPL-SCNC: 22 MMOL/L (ref 20–32)
CO2 SERPL-SCNC: 23 MMOL/L (ref 20–32)
CO2 SERPL-SCNC: 23 MMOL/L (ref 20–32)
CO2 SERPL-SCNC: 24 MMOL/L (ref 20–32)
CO2 SERPL-SCNC: 24 MMOL/L (ref 20–32)
CO2 SERPL-SCNC: 25 MMOL/L (ref 20–32)
CO2 SERPL-SCNC: 26 MMOL/L (ref 20–32)
CO2 SERPL-SCNC: 27 MMOL/L (ref 20–32)
CO2 SERPL-SCNC: 28 MMOL/L (ref 20–32)
CO2 SERPL-SCNC: 28 MMOL/L (ref 20–32)
CO2 SERPL-SCNC: 29 MMOL/L (ref 20–32)
CO2 SERPL-SCNC: 30 MMOL/L (ref 20–32)
COLOR UR AUTO: YELLOW
COPATH REPORT: NORMAL
CREAT BLD-MCNC: 1 MG/DL (ref 0.66–1.25)
CREAT SERPL-MCNC: 0.63 MG/DL (ref 0.66–1.25)
CREAT SERPL-MCNC: 0.64 MG/DL (ref 0.66–1.25)
CREAT SERPL-MCNC: 0.67 MG/DL (ref 0.66–1.25)
CREAT SERPL-MCNC: 0.69 MG/DL (ref 0.66–1.25)
CREAT SERPL-MCNC: 0.71 MG/DL (ref 0.66–1.25)
CREAT SERPL-MCNC: 0.73 MG/DL (ref 0.66–1.25)
CREAT SERPL-MCNC: 0.73 MG/DL (ref 0.66–1.25)
CREAT SERPL-MCNC: 0.74 MG/DL (ref 0.66–1.25)
CREAT SERPL-MCNC: 0.77 MG/DL (ref 0.66–1.25)
CREAT SERPL-MCNC: 0.81 MG/DL (ref 0.66–1.25)
CREAT SERPL-MCNC: 0.84 MG/DL (ref 0.66–1.25)
CREAT SERPL-MCNC: 0.86 MG/DL (ref 0.66–1.25)
CREAT SERPL-MCNC: 0.88 MG/DL (ref 0.66–1.25)
CREAT SERPL-MCNC: 0.88 MG/DL (ref 0.66–1.25)
CREAT SERPL-MCNC: 0.89 MG/DL (ref 0.66–1.25)
CREAT SERPL-MCNC: 0.89 MG/DL (ref 0.66–1.25)
CREAT SERPL-MCNC: 0.9 MG/DL (ref 0.66–1.25)
CREAT SERPL-MCNC: 0.9 MG/DL (ref 0.66–1.25)
CREAT SERPL-MCNC: 0.91 MG/DL (ref 0.66–1.25)
CREAT SERPL-MCNC: 0.92 MG/DL (ref 0.66–1.25)
CREAT SERPL-MCNC: 0.93 MG/DL (ref 0.66–1.25)
CREAT SERPL-MCNC: 0.95 MG/DL (ref 0.66–1.25)
CREAT SERPL-MCNC: 0.96 MG/DL (ref 0.66–1.25)
CREAT SERPL-MCNC: 0.96 MG/DL (ref 0.66–1.25)
CREAT SERPL-MCNC: 0.98 MG/DL (ref 0.66–1.25)
CREAT SERPL-MCNC: 0.98 MG/DL (ref 0.66–1.25)
CREAT SERPL-MCNC: 1 MG/DL (ref 0.66–1.25)
CREAT SERPL-MCNC: 1.01 MG/DL (ref 0.66–1.25)
CREAT SERPL-MCNC: 1.03 MG/DL (ref 0.66–1.25)
CREAT SERPL-MCNC: 1.06 MG/DL (ref 0.66–1.25)
CREAT SERPL-MCNC: 1.07 MG/DL (ref 0.66–1.25)
CREAT SERPL-MCNC: 1.11 MG/DL (ref 0.66–1.25)
CREAT UR-MCNC: 100 MG/DL
CRP SERPL-MCNC: 5.8 MG/L (ref 0–8)
CRP SERPL-MCNC: 7.6 MG/L (ref 0–8)
DIFFERENTIAL METHOD BLD: ABNORMAL
EOSINOPHIL # BLD AUTO: 0 10E9/L (ref 0–0.7)
EOSINOPHIL # BLD AUTO: 0.1 10E9/L (ref 0–0.7)
EOSINOPHIL # BLD AUTO: 0.2 10E9/L (ref 0–0.7)
EOSINOPHIL # BLD AUTO: 0.3 10E9/L (ref 0–0.7)
EOSINOPHIL NFR BLD AUTO: 0.1 %
EOSINOPHIL NFR BLD AUTO: 0.2 %
EOSINOPHIL NFR BLD AUTO: 0.4 %
EOSINOPHIL NFR BLD AUTO: 2.1 %
EOSINOPHIL NFR BLD AUTO: 2.4 %
EOSINOPHIL NFR BLD AUTO: 2.5 %
EOSINOPHIL NFR BLD AUTO: 2.9 %
EOSINOPHIL NFR BLD AUTO: 3.7 %
EOSINOPHIL NFR BLD AUTO: 3.7 %
EOSINOPHIL NFR BLD AUTO: 4.9 %
EOSINOPHIL NFR BLD AUTO: 7.7 %
EOSINOPHIL NFR BLD AUTO: 8.2 %
EOSINOPHIL NFR BLD AUTO: 9 %
ERCP: NORMAL
ERCP: NORMAL
ERYTHROCYTE [DISTWIDTH] IN BLOOD BY AUTOMATED COUNT: 12.3 % (ref 10–15)
ERYTHROCYTE [DISTWIDTH] IN BLOOD BY AUTOMATED COUNT: 12.4 % (ref 10–15)
ERYTHROCYTE [DISTWIDTH] IN BLOOD BY AUTOMATED COUNT: 12.5 % (ref 10–15)
ERYTHROCYTE [DISTWIDTH] IN BLOOD BY AUTOMATED COUNT: 12.7 % (ref 10–15)
ERYTHROCYTE [DISTWIDTH] IN BLOOD BY AUTOMATED COUNT: 12.7 % (ref 10–15)
ERYTHROCYTE [DISTWIDTH] IN BLOOD BY AUTOMATED COUNT: 12.8 % (ref 10–15)
ERYTHROCYTE [DISTWIDTH] IN BLOOD BY AUTOMATED COUNT: 13.1 % (ref 10–15)
ERYTHROCYTE [DISTWIDTH] IN BLOOD BY AUTOMATED COUNT: 13.4 % (ref 10–15)
ERYTHROCYTE [DISTWIDTH] IN BLOOD BY AUTOMATED COUNT: 13.5 % (ref 10–15)
ERYTHROCYTE [DISTWIDTH] IN BLOOD BY AUTOMATED COUNT: 13.5 % (ref 10–15)
ERYTHROCYTE [DISTWIDTH] IN BLOOD BY AUTOMATED COUNT: 13.6 % (ref 10–15)
ERYTHROCYTE [DISTWIDTH] IN BLOOD BY AUTOMATED COUNT: 13.7 % (ref 10–15)
ERYTHROCYTE [DISTWIDTH] IN BLOOD BY AUTOMATED COUNT: 14.2 % (ref 10–15)
ERYTHROCYTE [DISTWIDTH] IN BLOOD BY AUTOMATED COUNT: 14.5 % (ref 10–15)
ERYTHROCYTE [DISTWIDTH] IN BLOOD BY AUTOMATED COUNT: 14.8 % (ref 10–15)
ERYTHROCYTE [DISTWIDTH] IN BLOOD BY AUTOMATED COUNT: 15.2 % (ref 10–15)
ERYTHROCYTE [DISTWIDTH] IN BLOOD BY AUTOMATED COUNT: 15.3 % (ref 10–15)
ERYTHROCYTE [DISTWIDTH] IN BLOOD BY AUTOMATED COUNT: 15.4 % (ref 10–15)
ERYTHROCYTE [SEDIMENTATION RATE] IN BLOOD BY WESTERGREN METHOD: 55 MM/H (ref 0–20)
ETHANOL SERPL-MCNC: <0.01 G/DL
GFR SERPL CREATININE-BSD FRML MDRD: 67 ML/MIN/{1.73_M2}
GFR SERPL CREATININE-BSD FRML MDRD: 70 ML/MIN/{1.73_M2}
GFR SERPL CREATININE-BSD FRML MDRD: 71 ML/MIN/{1.73_M2}
GFR SERPL CREATININE-BSD FRML MDRD: 74 ML/MIN/{1.73_M2}
GFR SERPL CREATININE-BSD FRML MDRD: 74 ML/MIN/{1.73_M2}
GFR SERPL CREATININE-BSD FRML MDRD: 75 ML/MIN/{1.73_M2}
GFR SERPL CREATININE-BSD FRML MDRD: 76 ML/MIN/{1.73_M2}
GFR SERPL CREATININE-BSD FRML MDRD: 78 ML/MIN/{1.73_M2}
GFR SERPL CREATININE-BSD FRML MDRD: 78 ML/MIN/{1.73_M2}
GFR SERPL CREATININE-BSD FRML MDRD: 80 ML/MIN/{1.73_M2}
GFR SERPL CREATININE-BSD FRML MDRD: 81 ML/MIN/{1.73_M2}
GFR SERPL CREATININE-BSD FRML MDRD: 81 ML/MIN/{1.73_M2}
GFR SERPL CREATININE-BSD FRML MDRD: 83 ML/MIN/{1.73_M2}
GFR SERPL CREATININE-BSD FRML MDRD: 84 ML/MIN/{1.73_M2}
GFR SERPL CREATININE-BSD FRML MDRD: 85 ML/MIN/{1.73_M2}
GFR SERPL CREATININE-BSD FRML MDRD: 86 ML/MIN/{1.73_M2}
GFR SERPL CREATININE-BSD FRML MDRD: 86 ML/MIN/{1.73_M2}
GFR SERPL CREATININE-BSD FRML MDRD: 87 ML/MIN/{1.73_M2}
GFR SERPL CREATININE-BSD FRML MDRD: 88 ML/MIN/{1.73_M2}
GFR SERPL CREATININE-BSD FRML MDRD: 89 ML/MIN/{1.73_M2}
GFR SERPL CREATININE-BSD FRML MDRD: >90 ML/MIN/{1.73_M2}
GLUCOSE BLDC GLUCOMTR-MCNC: 100 MG/DL (ref 70–99)
GLUCOSE BLDC GLUCOMTR-MCNC: 101 MG/DL (ref 70–99)
GLUCOSE BLDC GLUCOMTR-MCNC: 101 MG/DL (ref 70–99)
GLUCOSE BLDC GLUCOMTR-MCNC: 103 MG/DL (ref 70–99)
GLUCOSE BLDC GLUCOMTR-MCNC: 105 MG/DL (ref 70–99)
GLUCOSE BLDC GLUCOMTR-MCNC: 106 MG/DL (ref 70–99)
GLUCOSE BLDC GLUCOMTR-MCNC: 107 MG/DL (ref 70–99)
GLUCOSE BLDC GLUCOMTR-MCNC: 109 MG/DL (ref 70–99)
GLUCOSE BLDC GLUCOMTR-MCNC: 109 MG/DL (ref 70–99)
GLUCOSE BLDC GLUCOMTR-MCNC: 112 MG/DL (ref 70–99)
GLUCOSE BLDC GLUCOMTR-MCNC: 112 MG/DL (ref 70–99)
GLUCOSE BLDC GLUCOMTR-MCNC: 113 MG/DL (ref 70–99)
GLUCOSE BLDC GLUCOMTR-MCNC: 115 MG/DL (ref 70–99)
GLUCOSE BLDC GLUCOMTR-MCNC: 115 MG/DL (ref 70–99)
GLUCOSE BLDC GLUCOMTR-MCNC: 118 MG/DL (ref 70–99)
GLUCOSE BLDC GLUCOMTR-MCNC: 119 MG/DL (ref 70–99)
GLUCOSE BLDC GLUCOMTR-MCNC: 120 MG/DL (ref 70–99)
GLUCOSE BLDC GLUCOMTR-MCNC: 120 MG/DL (ref 70–99)
GLUCOSE BLDC GLUCOMTR-MCNC: 122 MG/DL (ref 70–99)
GLUCOSE BLDC GLUCOMTR-MCNC: 122 MG/DL (ref 70–99)
GLUCOSE BLDC GLUCOMTR-MCNC: 123 MG/DL (ref 70–99)
GLUCOSE BLDC GLUCOMTR-MCNC: 123 MG/DL (ref 70–99)
GLUCOSE BLDC GLUCOMTR-MCNC: 124 MG/DL (ref 70–99)
GLUCOSE BLDC GLUCOMTR-MCNC: 125 MG/DL (ref 70–99)
GLUCOSE BLDC GLUCOMTR-MCNC: 125 MG/DL (ref 70–99)
GLUCOSE BLDC GLUCOMTR-MCNC: 127 MG/DL (ref 70–99)
GLUCOSE BLDC GLUCOMTR-MCNC: 128 MG/DL (ref 70–99)
GLUCOSE BLDC GLUCOMTR-MCNC: 130 MG/DL (ref 70–99)
GLUCOSE BLDC GLUCOMTR-MCNC: 132 MG/DL (ref 70–99)
GLUCOSE BLDC GLUCOMTR-MCNC: 134 MG/DL (ref 70–99)
GLUCOSE BLDC GLUCOMTR-MCNC: 134 MG/DL (ref 70–99)
GLUCOSE BLDC GLUCOMTR-MCNC: 136 MG/DL (ref 70–99)
GLUCOSE BLDC GLUCOMTR-MCNC: 138 MG/DL (ref 70–99)
GLUCOSE BLDC GLUCOMTR-MCNC: 138 MG/DL (ref 70–99)
GLUCOSE BLDC GLUCOMTR-MCNC: 140 MG/DL (ref 70–99)
GLUCOSE BLDC GLUCOMTR-MCNC: 141 MG/DL (ref 70–99)
GLUCOSE BLDC GLUCOMTR-MCNC: 145 MG/DL (ref 70–99)
GLUCOSE BLDC GLUCOMTR-MCNC: 146 MG/DL (ref 70–99)
GLUCOSE BLDC GLUCOMTR-MCNC: 147 MG/DL (ref 70–99)
GLUCOSE BLDC GLUCOMTR-MCNC: 148 MG/DL (ref 70–99)
GLUCOSE BLDC GLUCOMTR-MCNC: 149 MG/DL (ref 70–99)
GLUCOSE BLDC GLUCOMTR-MCNC: 151 MG/DL (ref 70–99)
GLUCOSE BLDC GLUCOMTR-MCNC: 153 MG/DL (ref 70–99)
GLUCOSE BLDC GLUCOMTR-MCNC: 156 MG/DL (ref 70–99)
GLUCOSE BLDC GLUCOMTR-MCNC: 161 MG/DL (ref 70–99)
GLUCOSE BLDC GLUCOMTR-MCNC: 161 MG/DL (ref 70–99)
GLUCOSE BLDC GLUCOMTR-MCNC: 164 MG/DL (ref 70–99)
GLUCOSE BLDC GLUCOMTR-MCNC: 164 MG/DL (ref 70–99)
GLUCOSE BLDC GLUCOMTR-MCNC: 167 MG/DL (ref 70–99)
GLUCOSE BLDC GLUCOMTR-MCNC: 167 MG/DL (ref 70–99)
GLUCOSE BLDC GLUCOMTR-MCNC: 172 MG/DL (ref 70–99)
GLUCOSE BLDC GLUCOMTR-MCNC: 172 MG/DL (ref 70–99)
GLUCOSE BLDC GLUCOMTR-MCNC: 179 MG/DL (ref 70–99)
GLUCOSE BLDC GLUCOMTR-MCNC: 181 MG/DL (ref 70–99)
GLUCOSE BLDC GLUCOMTR-MCNC: 185 MG/DL (ref 70–99)
GLUCOSE BLDC GLUCOMTR-MCNC: 188 MG/DL (ref 70–99)
GLUCOSE BLDC GLUCOMTR-MCNC: 198 MG/DL (ref 70–99)
GLUCOSE BLDC GLUCOMTR-MCNC: 244 MG/DL (ref 70–99)
GLUCOSE BLDC GLUCOMTR-MCNC: 46 MG/DL (ref 70–99)
GLUCOSE BLDC GLUCOMTR-MCNC: 49 MG/DL (ref 70–99)
GLUCOSE BLDC GLUCOMTR-MCNC: 55 MG/DL (ref 70–99)
GLUCOSE BLDC GLUCOMTR-MCNC: 58 MG/DL (ref 70–99)
GLUCOSE BLDC GLUCOMTR-MCNC: 58 MG/DL (ref 70–99)
GLUCOSE BLDC GLUCOMTR-MCNC: 59 MG/DL (ref 70–99)
GLUCOSE BLDC GLUCOMTR-MCNC: 60 MG/DL (ref 70–99)
GLUCOSE BLDC GLUCOMTR-MCNC: 63 MG/DL (ref 70–99)
GLUCOSE BLDC GLUCOMTR-MCNC: 70 MG/DL (ref 70–99)
GLUCOSE BLDC GLUCOMTR-MCNC: 73 MG/DL (ref 70–99)
GLUCOSE BLDC GLUCOMTR-MCNC: 73 MG/DL (ref 70–99)
GLUCOSE BLDC GLUCOMTR-MCNC: 74 MG/DL (ref 70–99)
GLUCOSE BLDC GLUCOMTR-MCNC: 75 MG/DL (ref 70–99)
GLUCOSE BLDC GLUCOMTR-MCNC: 76 MG/DL (ref 70–99)
GLUCOSE BLDC GLUCOMTR-MCNC: 79 MG/DL (ref 70–99)
GLUCOSE BLDC GLUCOMTR-MCNC: 82 MG/DL (ref 70–99)
GLUCOSE BLDC GLUCOMTR-MCNC: 82 MG/DL (ref 70–99)
GLUCOSE BLDC GLUCOMTR-MCNC: 83 MG/DL (ref 70–99)
GLUCOSE BLDC GLUCOMTR-MCNC: 83 MG/DL (ref 70–99)
GLUCOSE BLDC GLUCOMTR-MCNC: 88 MG/DL (ref 70–99)
GLUCOSE BLDC GLUCOMTR-MCNC: 91 MG/DL (ref 70–99)
GLUCOSE BLDC GLUCOMTR-MCNC: 93 MG/DL (ref 70–99)
GLUCOSE BLDC GLUCOMTR-MCNC: 94 MG/DL (ref 70–99)
GLUCOSE BLDC GLUCOMTR-MCNC: 97 MG/DL (ref 70–99)
GLUCOSE SERPL-MCNC: 101 MG/DL (ref 70–99)
GLUCOSE SERPL-MCNC: 102 MG/DL (ref 70–99)
GLUCOSE SERPL-MCNC: 106 MG/DL (ref 70–99)
GLUCOSE SERPL-MCNC: 109 MG/DL (ref 70–99)
GLUCOSE SERPL-MCNC: 110 MG/DL (ref 70–99)
GLUCOSE SERPL-MCNC: 114 MG/DL (ref 70–99)
GLUCOSE SERPL-MCNC: 115 MG/DL (ref 70–99)
GLUCOSE SERPL-MCNC: 117 MG/DL (ref 70–99)
GLUCOSE SERPL-MCNC: 125 MG/DL (ref 70–99)
GLUCOSE SERPL-MCNC: 134 MG/DL (ref 70–99)
GLUCOSE SERPL-MCNC: 135 MG/DL (ref 70–99)
GLUCOSE SERPL-MCNC: 136 MG/DL (ref 70–99)
GLUCOSE SERPL-MCNC: 139 MG/DL (ref 70–99)
GLUCOSE SERPL-MCNC: 156 MG/DL (ref 70–99)
GLUCOSE SERPL-MCNC: 160 MG/DL (ref 70–99)
GLUCOSE SERPL-MCNC: 165 MG/DL (ref 70–99)
GLUCOSE SERPL-MCNC: 167 MG/DL (ref 70–99)
GLUCOSE SERPL-MCNC: 178 MG/DL (ref 70–99)
GLUCOSE SERPL-MCNC: 198 MG/DL (ref 70–99)
GLUCOSE SERPL-MCNC: 200 MG/DL (ref 70–99)
GLUCOSE SERPL-MCNC: 277 MG/DL (ref 70–99)
GLUCOSE SERPL-MCNC: 395 MG/DL (ref 70–99)
GLUCOSE SERPL-MCNC: 65 MG/DL (ref 70–99)
GLUCOSE SERPL-MCNC: 73 MG/DL (ref 70–99)
GLUCOSE SERPL-MCNC: 86 MG/DL (ref 70–99)
GLUCOSE SERPL-MCNC: 98 MG/DL (ref 70–99)
GLUCOSE UR STRIP-MCNC: 300 MG/DL
GLUCOSE UR STRIP-MCNC: >=1000 MG/DL
GLUCOSE UR STRIP-MCNC: NEGATIVE MG/DL
GRAM STN SPEC: NORMAL
GRAM STN SPEC: NORMAL
HBA1C MFR BLD: 14 % (ref 0–5.6)
HBA1C MFR BLD: 7.6 % (ref 0–5.6)
HBA1C MFR BLD: 7.7 % (ref 0–5.6)
HBA1C MFR BLD: 8.5 % (ref 0–5.6)
HCT VFR BLD AUTO: 24.3 % (ref 40–53)
HCT VFR BLD AUTO: 25.4 % (ref 40–53)
HCT VFR BLD AUTO: 25.6 % (ref 40–53)
HCT VFR BLD AUTO: 25.6 % (ref 40–53)
HCT VFR BLD AUTO: 26.3 % (ref 40–53)
HCT VFR BLD AUTO: 27.5 % (ref 40–53)
HCT VFR BLD AUTO: 28.3 % (ref 40–53)
HCT VFR BLD AUTO: 28.8 % (ref 40–53)
HCT VFR BLD AUTO: 31.2 % (ref 40–53)
HCT VFR BLD AUTO: 31.2 % (ref 40–53)
HCT VFR BLD AUTO: 31.3 % (ref 40–53)
HCT VFR BLD AUTO: 31.9 % (ref 40–53)
HCT VFR BLD AUTO: 32 % (ref 40–53)
HCT VFR BLD AUTO: 34.5 % (ref 40–53)
HCT VFR BLD AUTO: 37.8 % (ref 40–53)
HCT VFR BLD AUTO: 38.6 % (ref 40–53)
HCT VFR BLD AUTO: 39.7 % (ref 40–53)
HCT VFR BLD AUTO: 40.3 % (ref 40–53)
HCT VFR BLD AUTO: 41.1 % (ref 40–53)
HCT VFR BLD AUTO: 42.4 % (ref 40–53)
HCT VFR BLD AUTO: 45.7 % (ref 40–53)
HDLC SERPL-MCNC: 34 MG/DL
HGB BLD-MCNC: 10 G/DL (ref 13.3–17.7)
HGB BLD-MCNC: 10.1 G/DL (ref 13.3–17.7)
HGB BLD-MCNC: 10.2 G/DL (ref 13.3–17.7)
HGB BLD-MCNC: 10.4 G/DL (ref 13.3–17.7)
HGB BLD-MCNC: 10.7 G/DL (ref 13.3–17.7)
HGB BLD-MCNC: 11.3 G/DL (ref 13.3–17.7)
HGB BLD-MCNC: 12.9 G/DL (ref 13.3–17.7)
HGB BLD-MCNC: 13.1 G/DL (ref 13.3–17.7)
HGB BLD-MCNC: 13.7 G/DL (ref 13.3–17.7)
HGB BLD-MCNC: 13.8 G/DL (ref 13.3–17.7)
HGB BLD-MCNC: 13.9 G/DL (ref 13.3–17.7)
HGB BLD-MCNC: 14.3 G/DL (ref 13.3–17.7)
HGB BLD-MCNC: 16.1 G/DL (ref 13.3–17.7)
HGB BLD-MCNC: 8 G/DL (ref 13.3–17.7)
HGB BLD-MCNC: 8.5 G/DL (ref 13.3–17.7)
HGB BLD-MCNC: 8.6 G/DL (ref 13.3–17.7)
HGB BLD-MCNC: 8.8 G/DL (ref 13.3–17.7)
HGB BLD-MCNC: 9.1 G/DL (ref 13.3–17.7)
HGB BLD-MCNC: 9.4 G/DL (ref 13.3–17.7)
HGB BLD-MCNC: 9.7 G/DL (ref 13.3–17.7)
HGB BLD-MCNC: 9.9 G/DL (ref 13.3–17.7)
HGB UR QL STRIP: NEGATIVE
IMM GRANULOCYTES # BLD: 0 10E9/L (ref 0–0.4)
IMM GRANULOCYTES # BLD: 0.1 10E9/L (ref 0–0.4)
IMM GRANULOCYTES NFR BLD: 0 %
IMM GRANULOCYTES NFR BLD: 0.2 %
IMM GRANULOCYTES NFR BLD: 0.3 %
IMM GRANULOCYTES NFR BLD: 0.3 %
IMM GRANULOCYTES NFR BLD: 0.4 %
IMM GRANULOCYTES NFR BLD: 0.4 %
IMM GRANULOCYTES NFR BLD: 0.6 %
IMM GRANULOCYTES NFR BLD: 0.7 %
IMM GRANULOCYTES NFR BLD: 0.7 %
INR PPP: 1.05 (ref 0.86–1.14)
INR PPP: 1.09 (ref 0.86–1.14)
INR PPP: 1.13 (ref 0.86–1.14)
INR PPP: 1.17 (ref 0.86–1.14)
INR PPP: 1.77 (ref 0.86–1.14)
INTERPRETATION ECG - MUSE: NORMAL
INTERPRETATION ECG - MUSE: NORMAL
KETONES UR STRIP-MCNC: 40 MG/DL
KETONES UR STRIP-MCNC: NEGATIVE MG/DL
KETONES UR STRIP-MCNC: NEGATIVE MG/DL
LACTATE BLD-SCNC: 0.7 MMOL/L (ref 0.7–2.1)
LACTATE BLD-SCNC: 0.9 MMOL/L (ref 0.7–2)
LACTATE BLD-SCNC: 1 MMOL/L (ref 0.7–2)
LACTATE BLD-SCNC: 1.1 MMOL/L (ref 0.7–2.1)
LACTATE BLD-SCNC: 1.5 MMOL/L (ref 0.7–2.1)
LACTATE BLD-SCNC: 1.9 MMOL/L (ref 0.7–2)
LACTATE BLD-SCNC: 2.8 MMOL/L (ref 0.7–2)
LACTATE BLD-SCNC: 5.3 MMOL/L (ref 0.7–2)
LDLC SERPL CALC-MCNC: 106 MG/DL
LEUKOCYTE ESTERASE UR QL STRIP: NEGATIVE
LIPASE SERPL-CCNC: 20 U/L (ref 73–393)
LIPASE SERPL-CCNC: 95 U/L (ref 73–393)
LIPASE SERPL-CCNC: <10 U/L (ref 73–393)
LYMPHOCYTES # BLD AUTO: 0.9 10E9/L (ref 0.8–5.3)
LYMPHOCYTES # BLD AUTO: 1 10E9/L (ref 0.8–5.3)
LYMPHOCYTES # BLD AUTO: 1.1 10E9/L (ref 0.8–5.3)
LYMPHOCYTES # BLD AUTO: 1.2 10E9/L (ref 0.8–5.3)
LYMPHOCYTES # BLD AUTO: 1.2 10E9/L (ref 0.8–5.3)
LYMPHOCYTES # BLD AUTO: 1.5 10E9/L (ref 0.8–5.3)
LYMPHOCYTES # BLD AUTO: 1.5 10E9/L (ref 0.8–5.3)
LYMPHOCYTES # BLD AUTO: 1.6 10E9/L (ref 0.8–5.3)
LYMPHOCYTES # BLD AUTO: 1.7 10E9/L (ref 0.8–5.3)
LYMPHOCYTES # BLD AUTO: 1.8 10E9/L (ref 0.8–5.3)
LYMPHOCYTES # BLD AUTO: 1.9 10E9/L (ref 0.8–5.3)
LYMPHOCYTES # BLD AUTO: 2 10E9/L (ref 0.8–5.3)
LYMPHOCYTES # BLD AUTO: 2 10E9/L (ref 0.8–5.3)
LYMPHOCYTES NFR BLD AUTO: 10.2 %
LYMPHOCYTES NFR BLD AUTO: 17.4 %
LYMPHOCYTES NFR BLD AUTO: 19.6 %
LYMPHOCYTES NFR BLD AUTO: 23.9 %
LYMPHOCYTES NFR BLD AUTO: 24.4 %
LYMPHOCYTES NFR BLD AUTO: 25.4 %
LYMPHOCYTES NFR BLD AUTO: 31.4 %
LYMPHOCYTES NFR BLD AUTO: 31.6 %
LYMPHOCYTES NFR BLD AUTO: 35.1 %
LYMPHOCYTES NFR BLD AUTO: 37.2 %
LYMPHOCYTES NFR BLD AUTO: 42.4 %
LYMPHOCYTES NFR BLD AUTO: 44 %
LYMPHOCYTES NFR BLD AUTO: 45 %
Lab: ABNORMAL
Lab: NORMAL
MAGNESIUM SERPL-MCNC: 1.8 MG/DL (ref 1.6–2.3)
MAGNESIUM SERPL-MCNC: 1.9 MG/DL (ref 1.6–2.3)
MAGNESIUM SERPL-MCNC: 2 MG/DL (ref 1.6–2.3)
MAGNESIUM SERPL-MCNC: 2.1 MG/DL (ref 1.6–2.3)
MAGNESIUM SERPL-MCNC: 2.2 MG/DL (ref 1.6–2.3)
MAGNESIUM SERPL-MCNC: 2.2 MG/DL (ref 1.6–2.3)
MCH RBC QN AUTO: 31 PG (ref 26.5–33)
MCH RBC QN AUTO: 31.1 PG (ref 26.5–33)
MCH RBC QN AUTO: 31.2 PG (ref 26.5–33)
MCH RBC QN AUTO: 31.4 PG (ref 26.5–33)
MCH RBC QN AUTO: 31.4 PG (ref 26.5–33)
MCH RBC QN AUTO: 31.7 PG (ref 26.5–33)
MCH RBC QN AUTO: 31.7 PG (ref 26.5–33)
MCH RBC QN AUTO: 31.9 PG (ref 26.5–33)
MCH RBC QN AUTO: 32 PG (ref 26.5–33)
MCH RBC QN AUTO: 32 PG (ref 26.5–33)
MCH RBC QN AUTO: 32.2 PG (ref 26.5–33)
MCH RBC QN AUTO: 32.2 PG (ref 26.5–33)
MCH RBC QN AUTO: 32.3 PG (ref 26.5–33)
MCH RBC QN AUTO: 32.4 PG (ref 26.5–33)
MCH RBC QN AUTO: 32.5 PG (ref 26.5–33)
MCH RBC QN AUTO: 32.5 PG (ref 26.5–33)
MCH RBC QN AUTO: 32.6 PG (ref 26.5–33)
MCH RBC QN AUTO: 33 PG (ref 26.5–33)
MCH RBC QN AUTO: 33.1 PG (ref 26.5–33)
MCHC RBC AUTO-ENTMCNC: 31.3 G/DL (ref 31.5–36.5)
MCHC RBC AUTO-ENTMCNC: 32.3 G/DL (ref 31.5–36.5)
MCHC RBC AUTO-ENTMCNC: 32.5 G/DL (ref 31.5–36.5)
MCHC RBC AUTO-ENTMCNC: 32.7 G/DL (ref 31.5–36.5)
MCHC RBC AUTO-ENTMCNC: 32.8 G/DL (ref 31.5–36.5)
MCHC RBC AUTO-ENTMCNC: 32.9 G/DL (ref 31.5–36.5)
MCHC RBC AUTO-ENTMCNC: 33 G/DL (ref 31.5–36.5)
MCHC RBC AUTO-ENTMCNC: 33.5 G/DL (ref 31.5–36.5)
MCHC RBC AUTO-ENTMCNC: 33.5 G/DL (ref 31.5–36.5)
MCHC RBC AUTO-ENTMCNC: 33.6 G/DL (ref 31.5–36.5)
MCHC RBC AUTO-ENTMCNC: 33.7 G/DL (ref 31.5–36.5)
MCHC RBC AUTO-ENTMCNC: 33.7 G/DL (ref 31.5–36.5)
MCHC RBC AUTO-ENTMCNC: 33.8 G/DL (ref 31.5–36.5)
MCHC RBC AUTO-ENTMCNC: 34 G/DL (ref 31.5–36.5)
MCHC RBC AUTO-ENTMCNC: 34.1 G/DL (ref 31.5–36.5)
MCHC RBC AUTO-ENTMCNC: 34.2 G/DL (ref 31.5–36.5)
MCHC RBC AUTO-ENTMCNC: 34.3 G/DL (ref 31.5–36.5)
MCHC RBC AUTO-ENTMCNC: 35 G/DL (ref 31.5–36.5)
MCHC RBC AUTO-ENTMCNC: 35.2 G/DL (ref 31.5–36.5)
MCHC RBC AUTO-ENTMCNC: 35.5 G/DL (ref 31.5–36.5)
MCHC RBC AUTO-ENTMCNC: 35.8 G/DL (ref 31.5–36.5)
MCV RBC AUTO: 92 FL (ref 78–100)
MCV RBC AUTO: 92 FL (ref 78–100)
MCV RBC AUTO: 93 FL (ref 78–100)
MCV RBC AUTO: 94 FL (ref 78–100)
MCV RBC AUTO: 95 FL (ref 78–100)
MCV RBC AUTO: 96 FL (ref 78–100)
MCV RBC AUTO: 96 FL (ref 78–100)
MCV RBC AUTO: 97 FL (ref 78–100)
MCV RBC AUTO: 97 FL (ref 78–100)
MCV RBC AUTO: 98 FL (ref 78–100)
MCV RBC AUTO: 99 FL (ref 78–100)
MICROALBUMIN UR-MCNC: 14 MG/L
MICROALBUMIN/CREAT UR: 13.8 MG/G CR (ref 0–17)
MONOCYTES # BLD AUTO: 0.3 10E9/L (ref 0–1.3)
MONOCYTES # BLD AUTO: 0.4 10E9/L (ref 0–1.3)
MONOCYTES # BLD AUTO: 0.5 10E9/L (ref 0–1.3)
MONOCYTES # BLD AUTO: 0.5 10E9/L (ref 0–1.3)
MONOCYTES # BLD AUTO: 0.6 10E9/L (ref 0–1.3)
MONOCYTES # BLD AUTO: 0.7 10E9/L (ref 0–1.3)
MONOCYTES # BLD AUTO: 0.8 10E9/L (ref 0–1.3)
MONOCYTES # BLD AUTO: 0.9 10E9/L (ref 0–1.3)
MONOCYTES # BLD AUTO: 1.3 10E9/L (ref 0–1.3)
MONOCYTES NFR BLD AUTO: 10.3 %
MONOCYTES NFR BLD AUTO: 10.4 %
MONOCYTES NFR BLD AUTO: 11.1 %
MONOCYTES NFR BLD AUTO: 11.5 %
MONOCYTES NFR BLD AUTO: 12.3 %
MONOCYTES NFR BLD AUTO: 12.9 %
MONOCYTES NFR BLD AUTO: 13.6 %
MONOCYTES NFR BLD AUTO: 15.6 %
MONOCYTES NFR BLD AUTO: 16.9 %
MONOCYTES NFR BLD AUTO: 17.7 %
MONOCYTES NFR BLD AUTO: 4.4 %
MONOCYTES NFR BLD AUTO: 6 %
MONOCYTES NFR BLD AUTO: 9.8 %
NEUTROPHILS # BLD AUTO: 1 10E9/L (ref 1.6–8.3)
NEUTROPHILS # BLD AUTO: 1.2 10E9/L (ref 1.6–8.3)
NEUTROPHILS # BLD AUTO: 1.4 10E9/L (ref 1.6–8.3)
NEUTROPHILS # BLD AUTO: 1.7 10E9/L (ref 1.6–8.3)
NEUTROPHILS # BLD AUTO: 12 10E9/L (ref 1.6–8.3)
NEUTROPHILS # BLD AUTO: 2.3 10E9/L (ref 1.6–8.3)
NEUTROPHILS # BLD AUTO: 2.6 10E9/L (ref 1.6–8.3)
NEUTROPHILS # BLD AUTO: 2.8 10E9/L (ref 1.6–8.3)
NEUTROPHILS # BLD AUTO: 2.9 10E9/L (ref 1.6–8.3)
NEUTROPHILS # BLD AUTO: 3.1 10E9/L (ref 1.6–8.3)
NEUTROPHILS # BLD AUTO: 3.8 10E9/L (ref 1.6–8.3)
NEUTROPHILS # BLD AUTO: 3.9 10E9/L (ref 1.6–8.3)
NEUTROPHILS # BLD AUTO: 4.7 10E9/L (ref 1.6–8.3)
NEUTROPHILS NFR BLD AUTO: 29.6 %
NEUTROPHILS NFR BLD AUTO: 33.6 %
NEUTROPHILS NFR BLD AUTO: 33.7 %
NEUTROPHILS NFR BLD AUTO: 44.1 %
NEUTROPHILS NFR BLD AUTO: 49.4 %
NEUTROPHILS NFR BLD AUTO: 52.4 %
NEUTROPHILS NFR BLD AUTO: 53.6 %
NEUTROPHILS NFR BLD AUTO: 55.5 %
NEUTROPHILS NFR BLD AUTO: 55.9 %
NEUTROPHILS NFR BLD AUTO: 59.9 %
NEUTROPHILS NFR BLD AUTO: 69.9 %
NEUTROPHILS NFR BLD AUTO: 74.6 %
NEUTROPHILS NFR BLD AUTO: 82.8 %
NITRATE UR QL: NEGATIVE
NONHDLC SERPL-MCNC: 149 MG/DL
NRBC # BLD AUTO: 0 10*3/UL
NRBC BLD AUTO-RTO: 0 /100
NT-PROBNP SERPL-MCNC: 49 PG/ML (ref 0–900)
PCO2 BLDV: 30 MM HG (ref 40–50)
PCO2 BLDV: 38 MM HG (ref 40–50)
PCO2 BLDV: 40 MM HG (ref 40–50)
PH BLDV: 7.42 PH (ref 7.32–7.43)
PH BLDV: 7.44 PH (ref 7.32–7.43)
PH BLDV: 7.46 PH (ref 7.32–7.43)
PH UR STRIP: 5 PH (ref 5–7)
PH UR STRIP: 5 PH (ref 5–7)
PH UR STRIP: 5.5 PH (ref 5–7)
PHOSPHATE SERPL-MCNC: 2.6 MG/DL (ref 2.5–4.5)
PHOSPHATE SERPL-MCNC: 2.7 MG/DL (ref 2.5–4.5)
PHOSPHATE SERPL-MCNC: 2.8 MG/DL (ref 2.5–4.5)
PHOSPHATE SERPL-MCNC: 2.9 MG/DL (ref 2.5–4.5)
PHOSPHATE SERPL-MCNC: 3 MG/DL (ref 2.5–4.5)
PLATELET # BLD AUTO: 102 10E9/L (ref 150–450)
PLATELET # BLD AUTO: 141 10E9/L (ref 150–450)
PLATELET # BLD AUTO: 162 10E9/L (ref 150–450)
PLATELET # BLD AUTO: 176 10E9/L (ref 150–450)
PLATELET # BLD AUTO: 179 10E9/L (ref 150–450)
PLATELET # BLD AUTO: 179 10E9/L (ref 150–450)
PLATELET # BLD AUTO: 180 10E9/L (ref 150–450)
PLATELET # BLD AUTO: 183 10E9/L (ref 150–450)
PLATELET # BLD AUTO: 183 10E9/L (ref 150–450)
PLATELET # BLD AUTO: 185 10E9/L (ref 150–450)
PLATELET # BLD AUTO: 194 10E9/L (ref 150–450)
PLATELET # BLD AUTO: 195 10E9/L (ref 150–450)
PLATELET # BLD AUTO: 205 10E9/L (ref 150–450)
PLATELET # BLD AUTO: 224 10E9/L (ref 150–450)
PLATELET # BLD AUTO: 263 10E9/L (ref 150–450)
PLATELET # BLD AUTO: 278 10E9/L (ref 150–450)
PLATELET # BLD AUTO: 294 10E9/L (ref 150–450)
PLATELET # BLD AUTO: 320 10E9/L (ref 150–450)
PLATELET # BLD AUTO: 321 10E9/L (ref 150–450)
PLATELET # BLD AUTO: 357 10E9/L (ref 150–450)
PLATELET # BLD AUTO: 63 10E9/L (ref 150–450)
PLATELET # BLD AUTO: 71 10E9/L (ref 150–450)
PO2 BLDV: 15 MM HG (ref 25–47)
PO2 BLDV: 30 MM HG (ref 25–47)
PO2 BLDV: 35 MM HG (ref 25–47)
POTASSIUM SERPL-SCNC: 3.2 MMOL/L (ref 3.4–5.3)
POTASSIUM SERPL-SCNC: 3.3 MMOL/L (ref 3.4–5.3)
POTASSIUM SERPL-SCNC: 3.5 MMOL/L (ref 3.4–5.3)
POTASSIUM SERPL-SCNC: 3.6 MMOL/L (ref 3.4–5.3)
POTASSIUM SERPL-SCNC: 3.7 MMOL/L (ref 3.4–5.3)
POTASSIUM SERPL-SCNC: 3.8 MMOL/L (ref 3.4–5.3)
POTASSIUM SERPL-SCNC: 3.9 MMOL/L (ref 3.4–5.3)
POTASSIUM SERPL-SCNC: 3.9 MMOL/L (ref 3.4–5.3)
POTASSIUM SERPL-SCNC: 4 MMOL/L (ref 3.4–5.3)
POTASSIUM SERPL-SCNC: 4 MMOL/L (ref 3.4–5.3)
POTASSIUM SERPL-SCNC: 4.1 MMOL/L (ref 3.4–5.3)
POTASSIUM SERPL-SCNC: 4.2 MMOL/L (ref 3.4–5.3)
POTASSIUM SERPL-SCNC: 4.3 MMOL/L (ref 3.4–5.3)
POTASSIUM SERPL-SCNC: 4.5 MMOL/L (ref 3.4–5.3)
PROCALCITONIN SERPL-MCNC: 0.84 NG/ML
PROCALCITONIN SERPL-MCNC: 12.68 NG/ML
PROT SERPL-MCNC: 5.6 G/DL (ref 6.8–8.8)
PROT SERPL-MCNC: 5.8 G/DL (ref 6.8–8.8)
PROT SERPL-MCNC: 6 G/DL (ref 6.8–8.8)
PROT SERPL-MCNC: 6.1 G/DL (ref 6.8–8.8)
PROT SERPL-MCNC: 6.6 G/DL (ref 6.8–8.8)
PROT SERPL-MCNC: 7.1 G/DL (ref 6.8–8.8)
PROT SERPL-MCNC: 7.1 G/DL (ref 6.8–8.8)
PROT SERPL-MCNC: 7.2 G/DL (ref 6.8–8.8)
PROT SERPL-MCNC: 7.7 G/DL (ref 6.8–8.8)
PROT SERPL-MCNC: 8.1 G/DL (ref 6.8–8.8)
RBC # BLD AUTO: 2.57 10E12/L (ref 4.4–5.9)
RBC # BLD AUTO: 2.68 10E12/L (ref 4.4–5.9)
RBC # BLD AUTO: 2.74 10E12/L (ref 4.4–5.9)
RBC # BLD AUTO: 2.75 10E12/L (ref 4.4–5.9)
RBC # BLD AUTO: 2.76 10E12/L (ref 4.4–5.9)
RBC # BLD AUTO: 2.94 10E12/L (ref 4.4–5.9)
RBC # BLD AUTO: 3.06 10E12/L (ref 4.4–5.9)
RBC # BLD AUTO: 3.06 10E12/L (ref 4.4–5.9)
RBC # BLD AUTO: 3.22 10E12/L (ref 4.4–5.9)
RBC # BLD AUTO: 3.23 10E12/L (ref 4.4–5.9)
RBC # BLD AUTO: 3.27 10E12/L (ref 4.4–5.9)
RBC # BLD AUTO: 3.32 10E12/L (ref 4.4–5.9)
RBC # BLD AUTO: 3.35 10E12/L (ref 4.4–5.9)
RBC # BLD AUTO: 3.64 10E12/L (ref 4.4–5.9)
RBC # BLD AUTO: 3.99 10E12/L (ref 4.4–5.9)
RBC # BLD AUTO: 4.07 10E12/L (ref 4.4–5.9)
RBC # BLD AUTO: 4.18 10E12/L (ref 4.4–5.9)
RBC # BLD AUTO: 4.21 10E12/L (ref 4.4–5.9)
RBC # BLD AUTO: 4.35 10E12/L (ref 4.4–5.9)
RBC # BLD AUTO: 4.39 10E12/L (ref 4.4–5.9)
RBC # BLD AUTO: 4.95 10E12/L (ref 4.4–5.9)
RBC #/AREA URNS AUTO: 1 /HPF (ref 0–2)
RBC #/AREA URNS AUTO: <1 /HPF (ref 0–2)
SAO2 % BLDV FROM PO2: 21 %
SAO2 % BLDV FROM PO2: 61 %
SAO2 % BLDV FROM PO2: 72 %
SODIUM SERPL-SCNC: 131 MMOL/L (ref 133–144)
SODIUM SERPL-SCNC: 132 MMOL/L (ref 133–144)
SODIUM SERPL-SCNC: 132 MMOL/L (ref 133–144)
SODIUM SERPL-SCNC: 134 MMOL/L (ref 133–144)
SODIUM SERPL-SCNC: 135 MMOL/L (ref 133–144)
SODIUM SERPL-SCNC: 135 MMOL/L (ref 133–144)
SODIUM SERPL-SCNC: 136 MMOL/L (ref 133–144)
SODIUM SERPL-SCNC: 136 MMOL/L (ref 133–144)
SODIUM SERPL-SCNC: 137 MMOL/L (ref 133–144)
SODIUM SERPL-SCNC: 138 MMOL/L (ref 133–144)
SODIUM SERPL-SCNC: 139 MMOL/L (ref 133–144)
SODIUM SERPL-SCNC: 140 MMOL/L (ref 133–144)
SODIUM SERPL-SCNC: 141 MMOL/L (ref 133–144)
SODIUM SERPL-SCNC: 141 MMOL/L (ref 133–144)
SODIUM SERPL-SCNC: 143 MMOL/L (ref 133–144)
SODIUM SERPL-SCNC: 145 MMOL/L (ref 133–144)
SOURCE: ABNORMAL
SOURCE: ABNORMAL
SOURCE: NORMAL
SP GR UR STRIP: 1 (ref 1–1.03)
SP GR UR STRIP: 1 (ref 1–1.03)
SP GR UR STRIP: 1.02 (ref 1–1.03)
SPECIMEN EXP DATE BLD: NORMAL
SPECIMEN SOURCE: ABNORMAL
SPECIMEN SOURCE: ABNORMAL
SPECIMEN SOURCE: NORMAL
SQUAMOUS #/AREA URNS AUTO: <1 /HPF (ref 0–1)
TRIGL SERPL-MCNC: 217 MG/DL
TROPONIN I SERPL-MCNC: <0.015 UG/L (ref 0–0.04)
TSH SERPL DL<=0.005 MIU/L-ACNC: 1.75 MU/L (ref 0.4–4)
UPPER EUS: NORMAL
UROBILINOGEN UR STRIP-ACNC: 0.2 EU/DL (ref 0.2–1)
UROBILINOGEN UR STRIP-MCNC: 2 MG/DL (ref 0–2)
UROBILINOGEN UR STRIP-MCNC: 2 MG/DL (ref 0–2)
VIT B12 SERPL-MCNC: 631 PG/ML (ref 193–986)
WBC # BLD AUTO: 10.4 10E9/L (ref 4–11)
WBC # BLD AUTO: 12.3 10E9/L (ref 4–11)
WBC # BLD AUTO: 14.6 10E9/L (ref 4–11)
WBC # BLD AUTO: 3.2 10E9/L (ref 4–11)
WBC # BLD AUTO: 3.5 10E9/L (ref 4–11)
WBC # BLD AUTO: 3.5 10E9/L (ref 4–11)
WBC # BLD AUTO: 4.3 10E9/L (ref 4–11)
WBC # BLD AUTO: 4.7 10E9/L (ref 4–11)
WBC # BLD AUTO: 4.8 10E9/L (ref 4–11)
WBC # BLD AUTO: 5.2 10E9/L (ref 4–11)
WBC # BLD AUTO: 5.3 10E9/L (ref 4–11)
WBC # BLD AUTO: 5.4 10E9/L (ref 4–11)
WBC # BLD AUTO: 5.5 10E9/L (ref 4–11)
WBC # BLD AUTO: 5.6 10E9/L (ref 4–11)
WBC # BLD AUTO: 5.6 10E9/L (ref 4–11)
WBC # BLD AUTO: 5.7 10E9/L (ref 4–11)
WBC # BLD AUTO: 6.2 10E9/L (ref 4–11)
WBC # BLD AUTO: 6.3 10E9/L (ref 4–11)
WBC # BLD AUTO: 6.8 10E9/L (ref 4–11)
WBC # BLD AUTO: 7.1 10E9/L (ref 4–11)
WBC # BLD AUTO: 7.9 10E9/L (ref 4–11)
WBC #/AREA URNS AUTO: 1 /HPF (ref 0–5)
WBC #/AREA URNS AUTO: 3 /HPF (ref 0–5)

## 2019-01-01 PROCEDURE — 83605 ASSAY OF LACTIC ACID: CPT

## 2019-01-01 PROCEDURE — 27210437 ZZH NUTRITION PRODUCT SEMIELEM INTERMED LITER

## 2019-01-01 PROCEDURE — 25000132 ZZH RX MED GY IP 250 OP 250 PS 637: Performed by: PHYSICIAN ASSISTANT

## 2019-01-01 PROCEDURE — 85027 COMPLETE CBC AUTOMATED: CPT | Performed by: HOSPITALIST

## 2019-01-01 PROCEDURE — G0108 DIAB MANAGE TRN  PER INDIV: HCPCS

## 2019-01-01 PROCEDURE — 40000305 ZZH STATISTIC PRE PROC ASSESS I: Performed by: INTERNAL MEDICINE

## 2019-01-01 PROCEDURE — 36415 COLL VENOUS BLD VENIPUNCTURE: CPT | Performed by: INTERNAL MEDICINE

## 2019-01-01 PROCEDURE — 25000128 H RX IP 250 OP 636: Performed by: NURSE ANESTHETIST, CERTIFIED REGISTERED

## 2019-01-01 PROCEDURE — 80053 COMPREHEN METABOLIC PANEL: CPT | Performed by: EMERGENCY MEDICINE

## 2019-01-01 PROCEDURE — 36415 COLL VENOUS BLD VENIPUNCTURE: CPT | Performed by: PHYSICIAN ASSISTANT

## 2019-01-01 PROCEDURE — 25800030 ZZH RX IP 258 OP 636: Performed by: NURSE PRACTITIONER

## 2019-01-01 PROCEDURE — 25000128 H RX IP 250 OP 636: Performed by: ANESTHESIOLOGY

## 2019-01-01 PROCEDURE — 87186 SC STD MICRODIL/AGAR DIL: CPT | Performed by: RADIOLOGY

## 2019-01-01 PROCEDURE — 99214 OFFICE O/P EST MOD 30 MIN: CPT | Performed by: INTERNAL MEDICINE

## 2019-01-01 PROCEDURE — 95250 CONT GLUC MNTR PHYS/QHP EQP: CPT

## 2019-01-01 PROCEDURE — 85027 COMPLETE CBC AUTOMATED: CPT | Performed by: INTERNAL MEDICINE

## 2019-01-01 PROCEDURE — C1769 GUIDE WIRE: HCPCS | Performed by: INTERNAL MEDICINE

## 2019-01-01 PROCEDURE — 83036 HEMOGLOBIN GLYCOSYLATED A1C: CPT | Performed by: NURSE PRACTITIONER

## 2019-01-01 PROCEDURE — 83036 HEMOGLOBIN GLYCOSYLATED A1C: CPT | Performed by: EMERGENCY MEDICINE

## 2019-01-01 PROCEDURE — 25000132 ZZH RX MED GY IP 250 OP 250 PS 637: Performed by: HOSPITALIST

## 2019-01-01 PROCEDURE — 25800025 ZZH RX 258: Performed by: PHYSICIAN ASSISTANT

## 2019-01-01 PROCEDURE — 99310 SBSQ NF CARE HIGH MDM 45: CPT | Performed by: NURSE PRACTITIONER

## 2019-01-01 PROCEDURE — 85027 COMPLETE CBC AUTOMATED: CPT | Performed by: NURSE PRACTITIONER

## 2019-01-01 PROCEDURE — 96375 TX/PRO/DX INJ NEW DRUG ADDON: CPT

## 2019-01-01 PROCEDURE — 25800030 ZZH RX IP 258 OP 636: Performed by: INTERNAL MEDICINE

## 2019-01-01 PROCEDURE — 84132 ASSAY OF SERUM POTASSIUM: CPT | Performed by: PHYSICIAN ASSISTANT

## 2019-01-01 PROCEDURE — 25000128 H RX IP 250 OP 636: Performed by: INTERNAL MEDICINE

## 2019-01-01 PROCEDURE — 83605 ASSAY OF LACTIC ACID: CPT | Performed by: INTERNAL MEDICINE

## 2019-01-01 PROCEDURE — 82962 GLUCOSE BLOOD TEST: CPT | Mod: 91

## 2019-01-01 PROCEDURE — 00000146 ZZHCL STATISTIC GLUCOSE BY METER IP

## 2019-01-01 PROCEDURE — 82565 ASSAY OF CREATININE: CPT | Performed by: HOSPITALIST

## 2019-01-01 PROCEDURE — 96376 TX/PRO/DX INJ SAME DRUG ADON: CPT

## 2019-01-01 PROCEDURE — 80048 BASIC METABOLIC PNL TOTAL CA: CPT | Performed by: EMERGENCY MEDICINE

## 2019-01-01 PROCEDURE — 12000000 ZZH R&B MED SURG/OB

## 2019-01-01 PROCEDURE — 25800030 ZZH RX IP 258 OP 636: Performed by: EMERGENCY MEDICINE

## 2019-01-01 PROCEDURE — 25000128 H RX IP 250 OP 636

## 2019-01-01 PROCEDURE — 82140 ASSAY OF AMMONIA: CPT | Performed by: EMERGENCY MEDICINE

## 2019-01-01 PROCEDURE — 36415 COLL VENOUS BLD VENIPUNCTURE: CPT | Performed by: HOSPITALIST

## 2019-01-01 PROCEDURE — 84100 ASSAY OF PHOSPHORUS: CPT | Performed by: EMERGENCY MEDICINE

## 2019-01-01 PROCEDURE — G0378 HOSPITAL OBSERVATION PER HR: HCPCS

## 2019-01-01 PROCEDURE — 83735 ASSAY OF MAGNESIUM: CPT | Performed by: INTERNAL MEDICINE

## 2019-01-01 PROCEDURE — 88305 TISSUE EXAM BY PATHOLOGIST: CPT | Performed by: INTERNAL MEDICINE

## 2019-01-01 PROCEDURE — 99607 MTMS BY PHARM ADDL 15 MIN: CPT | Performed by: PHARMACIST

## 2019-01-01 PROCEDURE — 90732 PPSV23 VACC 2 YRS+ SUBQ/IM: CPT | Performed by: INTERNAL MEDICINE

## 2019-01-01 PROCEDURE — 84145 PROCALCITONIN (PCT): CPT | Performed by: INTERNAL MEDICINE

## 2019-01-01 PROCEDURE — 25000125 ZZHC RX 250: Performed by: INTERNAL MEDICINE

## 2019-01-01 PROCEDURE — 97161 PT EVAL LOW COMPLEX 20 MIN: CPT | Mod: GP

## 2019-01-01 PROCEDURE — 96374 THER/PROPH/DIAG INJ IV PUSH: CPT

## 2019-01-01 PROCEDURE — 85025 COMPLETE CBC W/AUTO DIFF WBC: CPT | Performed by: EMERGENCY MEDICINE

## 2019-01-01 PROCEDURE — C2617 STENT, NON-COR, TEM W/O DEL: HCPCS | Performed by: INTERNAL MEDICINE

## 2019-01-01 PROCEDURE — 25000128 H RX IP 250 OP 636: Performed by: PHYSICIAN ASSISTANT

## 2019-01-01 PROCEDURE — 84460 ALANINE AMINO (ALT) (SGPT): CPT | Performed by: NURSE PRACTITIONER

## 2019-01-01 PROCEDURE — 25000128 H RX IP 250 OP 636: Performed by: EMERGENCY MEDICINE

## 2019-01-01 PROCEDURE — 87070 CULTURE OTHR SPECIMN AEROBIC: CPT | Performed by: RADIOLOGY

## 2019-01-01 PROCEDURE — 88305 TISSUE EXAM BY PATHOLOGIST: CPT | Mod: 26 | Performed by: INTERNAL MEDICINE

## 2019-01-01 PROCEDURE — 99232 SBSQ HOSP IP/OBS MODERATE 35: CPT | Performed by: PHYSICIAN ASSISTANT

## 2019-01-01 PROCEDURE — 84484 ASSAY OF TROPONIN QUANT: CPT | Performed by: EMERGENCY MEDICINE

## 2019-01-01 PROCEDURE — 80053 COMPREHEN METABOLIC PANEL: CPT | Performed by: INTERNAL MEDICINE

## 2019-01-01 PROCEDURE — 80048 BASIC METABOLIC PNL TOTAL CA: CPT | Performed by: NURSE PRACTITIONER

## 2019-01-01 PROCEDURE — 85025 COMPLETE CBC W/AUTO DIFF WBC: CPT | Performed by: INTERNAL MEDICINE

## 2019-01-01 PROCEDURE — 85610 PROTHROMBIN TIME: CPT | Performed by: INTERNAL MEDICINE

## 2019-01-01 PROCEDURE — 99152 MOD SED SAME PHYS/QHP 5/>YRS: CPT

## 2019-01-01 PROCEDURE — 99285 EMERGENCY DEPT VISIT HI MDM: CPT | Mod: 25

## 2019-01-01 PROCEDURE — 36569 INSJ PICC 5 YR+ W/O IMAGING: CPT

## 2019-01-01 PROCEDURE — 25000132 ZZH RX MED GY IP 250 OP 250 PS 637: Performed by: INTERNAL MEDICINE

## 2019-01-01 PROCEDURE — 85610 PROTHROMBIN TIME: CPT | Performed by: EMERGENCY MEDICINE

## 2019-01-01 PROCEDURE — 80076 HEPATIC FUNCTION PANEL: CPT | Performed by: HOSPITALIST

## 2019-01-01 PROCEDURE — 25000125 ZZHC RX 250: Performed by: NURSE PRACTITIONER

## 2019-01-01 PROCEDURE — 36415 COLL VENOUS BLD VENIPUNCTURE: CPT | Performed by: EMERGENCY MEDICINE

## 2019-01-01 PROCEDURE — 99231 SBSQ HOSP IP/OBS SF/LOW 25: CPT | Performed by: SURGERY

## 2019-01-01 PROCEDURE — 40000170 ZZH STATISTIC PRE-PROCEDURE ASSESSMENT II: Performed by: INTERNAL MEDICINE

## 2019-01-01 PROCEDURE — 85025 COMPLETE CBC W/AUTO DIFF WBC: CPT | Performed by: HOSPITALIST

## 2019-01-01 PROCEDURE — 25800030 ZZH RX IP 258 OP 636: Performed by: NURSE ANESTHETIST, CERTIFIED REGISTERED

## 2019-01-01 PROCEDURE — 99207 ZZC MOONLIGHTING INDICATOR: CPT | Performed by: INTERNAL MEDICINE

## 2019-01-01 PROCEDURE — 99213 OFFICE O/P EST LOW 20 MIN: CPT | Performed by: NURSE PRACTITIONER

## 2019-01-01 PROCEDURE — 74019 RADEX ABDOMEN 2 VIEWS: CPT

## 2019-01-01 PROCEDURE — 77001 FLUOROGUIDE FOR VEIN DEVICE: CPT

## 2019-01-01 PROCEDURE — 40000894 ZZH STATISTIC OT IP EVAL DEFER: Performed by: OCCUPATIONAL THERAPIST

## 2019-01-01 PROCEDURE — 25000131 ZZH RX MED GY IP 250 OP 636 PS 637: Performed by: PHYSICIAN ASSISTANT

## 2019-01-01 PROCEDURE — 83735 ASSAY OF MAGNESIUM: CPT | Performed by: EMERGENCY MEDICINE

## 2019-01-01 PROCEDURE — 93005 ELECTROCARDIOGRAM TRACING: CPT

## 2019-01-01 PROCEDURE — 87070 CULTURE OTHR SPECIMN AEROBIC: CPT | Performed by: PHYSICIAN ASSISTANT

## 2019-01-01 PROCEDURE — 99207 ZZC CDG-CODE CATEGORY CHANGED: CPT | Performed by: INTERNAL MEDICINE

## 2019-01-01 PROCEDURE — 40000067 ZZH STATISTIC ERCP (OR PROCEDURE): Performed by: INTERNAL MEDICINE

## 2019-01-01 PROCEDURE — 80076 HEPATIC FUNCTION PANEL: CPT | Performed by: INTERNAL MEDICINE

## 2019-01-01 PROCEDURE — 25000131 ZZH RX MED GY IP 250 OP 636 PS 637: Performed by: INTERNAL MEDICINE

## 2019-01-01 PROCEDURE — 74177 CT ABD & PELVIS W/CONTRAST: CPT

## 2019-01-01 PROCEDURE — 25000566 ZZH SEVOFLURANE, EA 15 MIN: Performed by: INTERNAL MEDICINE

## 2019-01-01 PROCEDURE — 85049 AUTOMATED PLATELET COUNT: CPT | Performed by: EMERGENCY MEDICINE

## 2019-01-01 PROCEDURE — 96361 HYDRATE IV INFUSION ADD-ON: CPT

## 2019-01-01 PROCEDURE — 88172 CYTP DX EVAL FNA 1ST EA SITE: CPT | Performed by: INTERNAL MEDICINE

## 2019-01-01 PROCEDURE — 71000012 ZZH RECOVERY PHASE 1 LEVEL 1 FIRST HR: Performed by: INTERNAL MEDICINE

## 2019-01-01 PROCEDURE — 36415 COLL VENOUS BLD VENIPUNCTURE: CPT | Performed by: NURSE PRACTITIONER

## 2019-01-01 PROCEDURE — 27210218 ZZH KIT SHRLOCK 4FR SNGL LUM PICC

## 2019-01-01 PROCEDURE — 27211193 ZZ H WOUND GLUE CR1

## 2019-01-01 PROCEDURE — 96372 THER/PROPH/DIAG INJ SC/IM: CPT

## 2019-01-01 PROCEDURE — 83735 ASSAY OF MAGNESIUM: CPT | Performed by: PHYSICIAN ASSISTANT

## 2019-01-01 PROCEDURE — 85730 THROMBOPLASTIN TIME PARTIAL: CPT | Performed by: RADIOLOGY

## 2019-01-01 PROCEDURE — 82803 BLOOD GASES ANY COMBINATION: CPT

## 2019-01-01 PROCEDURE — 71000027 ZZH RECOVERY PHASE 2 EACH 15 MINS: Performed by: INTERNAL MEDICINE

## 2019-01-01 PROCEDURE — 86900 BLOOD TYPING SEROLOGIC ABO: CPT | Performed by: EMERGENCY MEDICINE

## 2019-01-01 PROCEDURE — 0F9030Z DRAINAGE OF LIVER WITH DRAINAGE DEVICE, PERCUTANEOUS APPROACH: ICD-10-PCS | Performed by: RADIOLOGY

## 2019-01-01 PROCEDURE — 25000128 H RX IP 250 OP 636: Performed by: RADIOLOGY

## 2019-01-01 PROCEDURE — C1788 PORT, INDWELLING, IMP: HCPCS

## 2019-01-01 PROCEDURE — 83735 ASSAY OF MAGNESIUM: CPT | Performed by: HOSPITALIST

## 2019-01-01 PROCEDURE — 83690 ASSAY OF LIPASE: CPT | Performed by: EMERGENCY MEDICINE

## 2019-01-01 PROCEDURE — 25000128 H RX IP 250 OP 636: Performed by: NURSE PRACTITIONER

## 2019-01-01 PROCEDURE — 25000125 ZZHC RX 250: Performed by: NURSE ANESTHETIST, CERTIFIED REGISTERED

## 2019-01-01 PROCEDURE — C1876 STENT, NON-COA/NON-COV W/DEL: HCPCS | Performed by: INTERNAL MEDICINE

## 2019-01-01 PROCEDURE — 99225 ZZC SUBSEQUENT OBSERVATION CARE,LEVEL II: CPT | Performed by: PHYSICIAN ASSISTANT

## 2019-01-01 PROCEDURE — 87040 BLOOD CULTURE FOR BACTERIA: CPT | Performed by: PHYSICIAN ASSISTANT

## 2019-01-01 PROCEDURE — 99233 SBSQ HOSP IP/OBS HIGH 50: CPT | Performed by: INTERNAL MEDICINE

## 2019-01-01 PROCEDURE — G0439 PPPS, SUBSEQ VISIT: HCPCS | Performed by: INTERNAL MEDICINE

## 2019-01-01 PROCEDURE — P9041 ALBUMIN (HUMAN),5%, 50ML: HCPCS | Performed by: INTERNAL MEDICINE

## 2019-01-01 PROCEDURE — 81001 URINALYSIS AUTO W/SCOPE: CPT | Performed by: EMERGENCY MEDICINE

## 2019-01-01 PROCEDURE — 37000008 ZZH ANESTHESIA TECHNICAL FEE, 1ST 30 MIN: Performed by: INTERNAL MEDICINE

## 2019-01-01 PROCEDURE — 36000054 ZZH SURGERY LEVEL 2 W FLUORO 1ST 30 MIN: Performed by: INTERNAL MEDICINE

## 2019-01-01 PROCEDURE — 99207 C PAF COMPLETED  NO CHARGE: CPT | Performed by: INTERNAL MEDICINE

## 2019-01-01 PROCEDURE — 97116 GAIT TRAINING THERAPY: CPT | Mod: GP

## 2019-01-01 PROCEDURE — 25000128 H RX IP 250 OP 636: Performed by: HOSPITALIST

## 2019-01-01 PROCEDURE — G0009 ADMIN PNEUMOCOCCAL VACCINE: HCPCS | Performed by: INTERNAL MEDICINE

## 2019-01-01 PROCEDURE — 25000125 ZZHC RX 250: Performed by: EMERGENCY MEDICINE

## 2019-01-01 PROCEDURE — G0179 MD RECERTIFICATION HHA PT: HCPCS | Mod: GV | Performed by: INTERNAL MEDICINE

## 2019-01-01 PROCEDURE — 80329 ANALGESICS NON-OPIOID 1 OR 2: CPT | Performed by: EMERGENCY MEDICINE

## 2019-01-01 PROCEDURE — 27210735 XR PERCUTANEOUS GASTROSTOMY TUBE PLACEMENT

## 2019-01-01 PROCEDURE — 37000009 ZZH ANESTHESIA TECHNICAL FEE, EACH ADDTL 15 MIN: Performed by: INTERNAL MEDICINE

## 2019-01-01 PROCEDURE — 96365 THER/PROPH/DIAG IV INF INIT: CPT

## 2019-01-01 PROCEDURE — 83605 ASSAY OF LACTIC ACID: CPT | Performed by: NURSE PRACTITIONER

## 2019-01-01 PROCEDURE — 85025 COMPLETE CBC W/AUTO DIFF WBC: CPT | Performed by: NURSE PRACTITIONER

## 2019-01-01 PROCEDURE — 25000125 ZZHC RX 250

## 2019-01-01 PROCEDURE — 25000125 ZZHC RX 250: Performed by: RADIOLOGY

## 2019-01-01 PROCEDURE — 85025 COMPLETE CBC W/AUTO DIFF WBC: CPT | Performed by: PHYSICIAN ASSISTANT

## 2019-01-01 PROCEDURE — 99213 OFFICE O/P EST LOW 20 MIN: CPT | Performed by: INTERNAL MEDICINE

## 2019-01-01 PROCEDURE — 99284 EMERGENCY DEPT VISIT MOD MDM: CPT | Mod: 25

## 2019-01-01 PROCEDURE — 86140 C-REACTIVE PROTEIN: CPT | Performed by: EMERGENCY MEDICINE

## 2019-01-01 PROCEDURE — 82565 ASSAY OF CREATININE: CPT | Performed by: NURSE PRACTITIONER

## 2019-01-01 PROCEDURE — 82043 UR ALBUMIN QUANTITATIVE: CPT | Performed by: INTERNAL MEDICINE

## 2019-01-01 PROCEDURE — 82962 GLUCOSE BLOOD TEST: CPT

## 2019-01-01 PROCEDURE — 36000052 ZZH SURGERY LEVEL 2 EA 15 ADDTL MIN: Performed by: INTERNAL MEDICINE

## 2019-01-01 PROCEDURE — 25000132 ZZH RX MED GY IP 250 OP 250 PS 637: Performed by: EMERGENCY MEDICINE

## 2019-01-01 PROCEDURE — 25000125 ZZHC RX 250: Performed by: HOSPITALIST

## 2019-01-01 PROCEDURE — 80048 BASIC METABOLIC PNL TOTAL CA: CPT | Performed by: PHYSICIAN ASSISTANT

## 2019-01-01 PROCEDURE — 25500064 ZZH RX 255 OP 636: Performed by: INTERNAL MEDICINE

## 2019-01-01 PROCEDURE — 82565 ASSAY OF CREATININE: CPT | Mod: 91

## 2019-01-01 PROCEDURE — 71046 X-RAY EXAM CHEST 2 VIEWS: CPT

## 2019-01-01 PROCEDURE — 84132 ASSAY OF SERUM POTASSIUM: CPT | Performed by: HOSPITALIST

## 2019-01-01 PROCEDURE — 99232 SBSQ HOSP IP/OBS MODERATE 35: CPT | Performed by: INTERNAL MEDICINE

## 2019-01-01 PROCEDURE — 27210820 ZZH WOUND GLUE CR3

## 2019-01-01 PROCEDURE — 80061 LIPID PANEL: CPT | Performed by: INTERNAL MEDICINE

## 2019-01-01 PROCEDURE — 99232 SBSQ HOSP IP/OBS MODERATE 35: CPT | Performed by: PSYCHIATRY & NEUROLOGY

## 2019-01-01 PROCEDURE — 80320 DRUG SCREEN QUANTALCOHOLS: CPT | Performed by: EMERGENCY MEDICINE

## 2019-01-01 PROCEDURE — 99309 SBSQ NF CARE MODERATE MDM 30: CPT | Performed by: NURSE PRACTITIONER

## 2019-01-01 PROCEDURE — 87205 SMEAR GRAM STAIN: CPT | Performed by: RADIOLOGY

## 2019-01-01 PROCEDURE — C1726 CATH, BAL DIL, NON-VASCULAR: HCPCS | Performed by: INTERNAL MEDICINE

## 2019-01-01 PROCEDURE — 27210908 ZZH NEEDLE CR4

## 2019-01-01 PROCEDURE — 80048 BASIC METABOLIC PNL TOTAL CA: CPT | Performed by: INTERNAL MEDICINE

## 2019-01-01 PROCEDURE — 87040 BLOOD CULTURE FOR BACTERIA: CPT | Performed by: INTERNAL MEDICINE

## 2019-01-01 PROCEDURE — 80053 COMPREHEN METABOLIC PANEL: CPT | Performed by: NURSE PRACTITIONER

## 2019-01-01 PROCEDURE — 76937 US GUIDE VASCULAR ACCESS: CPT

## 2019-01-01 PROCEDURE — 96374 THER/PROPH/DIAG INJ IV PUSH: CPT | Mod: 59

## 2019-01-01 PROCEDURE — 36561 INSERT TUNNELED CV CATH: CPT

## 2019-01-01 PROCEDURE — 99207 ZZC CDG-MDM COMPONENT: MEETS MODERATE - UP CODED: CPT | Performed by: INTERNAL MEDICINE

## 2019-01-01 PROCEDURE — G0463 HOSPITAL OUTPT CLINIC VISIT: HCPCS

## 2019-01-01 PROCEDURE — 93970 EXTREMITY STUDY: CPT

## 2019-01-01 PROCEDURE — 84450 TRANSFERASE (AST) (SGOT): CPT | Performed by: INTERNAL MEDICINE

## 2019-01-01 PROCEDURE — 76942 ECHO GUIDE FOR BIOPSY: CPT

## 2019-01-01 PROCEDURE — 99316 NF DSCHRG MGMT 30 MIN+: CPT | Performed by: NURSE PRACTITIONER

## 2019-01-01 PROCEDURE — 99219 ZZC INITIAL OBSERVATION CARE,LEVL II: CPT | Performed by: INTERNAL MEDICINE

## 2019-01-01 PROCEDURE — 99207 ZZC DOWN CODE DUE TO SUBSEQUENT EXAM: CPT | Performed by: CLINICAL NURSE SPECIALIST

## 2019-01-01 PROCEDURE — 99219 ZZC INITIAL OBSERVATION CARE,LEVL II: CPT | Performed by: PHYSICIAN ASSISTANT

## 2019-01-01 PROCEDURE — 81001 URINALYSIS AUTO W/SCOPE: CPT | Performed by: PHYSICIAN ASSISTANT

## 2019-01-01 PROCEDURE — 86901 BLOOD TYPING SEROLOGIC RH(D): CPT | Performed by: EMERGENCY MEDICINE

## 2019-01-01 PROCEDURE — C9113 INJ PANTOPRAZOLE SODIUM, VIA: HCPCS | Performed by: EMERGENCY MEDICINE

## 2019-01-01 PROCEDURE — 83605 ASSAY OF LACTIC ACID: CPT | Performed by: EMERGENCY MEDICINE

## 2019-01-01 PROCEDURE — 85652 RBC SED RATE AUTOMATED: CPT | Performed by: INTERNAL MEDICINE

## 2019-01-01 PROCEDURE — 99605 MTMS BY PHARM NP 15 MIN: CPT | Performed by: PHARMACIST

## 2019-01-01 PROCEDURE — 74330 X-RAY BILE/PANC ENDOSCOPY: CPT

## 2019-01-01 PROCEDURE — 84100 ASSAY OF PHOSPHORUS: CPT | Performed by: PHYSICIAN ASSISTANT

## 2019-01-01 PROCEDURE — 99214 OFFICE O/P EST MOD 30 MIN: CPT | Performed by: NURSE PRACTITIONER

## 2019-01-01 PROCEDURE — 86140 C-REACTIVE PROTEIN: CPT | Performed by: INTERNAL MEDICINE

## 2019-01-01 PROCEDURE — 87800 DETECT AGNT MULT DNA DIREC: CPT | Performed by: PHYSICIAN ASSISTANT

## 2019-01-01 PROCEDURE — 27210794 ZZH OR GENERAL SUPPLY STERILE: Performed by: INTERNAL MEDICINE

## 2019-01-01 PROCEDURE — 86850 RBC ANTIBODY SCREEN: CPT | Performed by: EMERGENCY MEDICINE

## 2019-01-01 PROCEDURE — 99214 OFFICE O/P EST MOD 30 MIN: CPT | Mod: 25 | Performed by: INTERNAL MEDICINE

## 2019-01-01 PROCEDURE — 83036 HEMOGLOBIN GLYCOSYLATED A1C: CPT | Performed by: INTERNAL MEDICINE

## 2019-01-01 PROCEDURE — 99217 ZZC OBSERVATION CARE DISCHARGE: CPT | Mod: GW | Performed by: HOSPITALIST

## 2019-01-01 PROCEDURE — 25800030 ZZH RX IP 258 OP 636: Performed by: ANESTHESIOLOGY

## 2019-01-01 PROCEDURE — 81003 URINALYSIS AUTO W/O SCOPE: CPT | Performed by: INTERNAL MEDICINE

## 2019-01-01 PROCEDURE — 82607 VITAMIN B-12: CPT | Performed by: INTERNAL MEDICINE

## 2019-01-01 PROCEDURE — 88172 CYTP DX EVAL FNA 1ST EA SITE: CPT | Mod: 26,76 | Performed by: INTERNAL MEDICINE

## 2019-01-01 PROCEDURE — 97530 THERAPEUTIC ACTIVITIES: CPT | Mod: GP

## 2019-01-01 PROCEDURE — 99223 1ST HOSP IP/OBS HIGH 75: CPT | Performed by: FAMILY MEDICINE

## 2019-01-01 PROCEDURE — 99226 ZZC SUBSEQUENT OBSERVATION CARE,LEVEL III: CPT | Performed by: PHYSICIAN ASSISTANT

## 2019-01-01 PROCEDURE — 87040 BLOOD CULTURE FOR BACTERIA: CPT | Mod: XS | Performed by: PHYSICIAN ASSISTANT

## 2019-01-01 PROCEDURE — 99223 1ST HOSP IP/OBS HIGH 75: CPT | Performed by: PSYCHIATRY & NEUROLOGY

## 2019-01-01 PROCEDURE — 87077 CULTURE AEROBIC IDENTIFY: CPT | Performed by: RADIOLOGY

## 2019-01-01 PROCEDURE — 80076 HEPATIC FUNCTION PANEL: CPT | Performed by: PHYSICIAN ASSISTANT

## 2019-01-01 PROCEDURE — 83605 ASSAY OF LACTIC ACID: CPT | Performed by: PHYSICIAN ASSISTANT

## 2019-01-01 PROCEDURE — 99305 1ST NF CARE MODERATE MDM 35: CPT | Mod: AI | Performed by: INTERNAL MEDICINE

## 2019-01-01 PROCEDURE — 88173 CYTOPATH EVAL FNA REPORT: CPT | Mod: 26 | Performed by: INTERNAL MEDICINE

## 2019-01-01 PROCEDURE — 40000863 ZZH STATISTIC RADIOLOGY XRAY, US, CT, MAR, NM

## 2019-01-01 PROCEDURE — 40000063 ZZH STATISTIC EGD (OR PROCEDURE): Performed by: INTERNAL MEDICINE

## 2019-01-01 PROCEDURE — 85610 PROTHROMBIN TIME: CPT | Performed by: RADIOLOGY

## 2019-01-01 PROCEDURE — 87186 SC STD MICRODIL/AGAR DIL: CPT | Performed by: PHYSICIAN ASSISTANT

## 2019-01-01 PROCEDURE — 83880 ASSAY OF NATRIURETIC PEPTIDE: CPT | Performed by: EMERGENCY MEDICINE

## 2019-01-01 PROCEDURE — 96360 HYDRATION IV INFUSION INIT: CPT

## 2019-01-01 PROCEDURE — 84443 ASSAY THYROID STIM HORMONE: CPT | Performed by: INTERNAL MEDICINE

## 2019-01-01 PROCEDURE — 99239 HOSP IP/OBS DSCHRG MGMT >30: CPT | Performed by: HOSPITALIST

## 2019-01-01 PROCEDURE — 36000056 ZZH SURGERY LEVEL 3 1ST 30 MIN: Performed by: INTERNAL MEDICINE

## 2019-01-01 PROCEDURE — 99217 ZZC OBSERVATION CARE DISCHARGE: CPT | Performed by: INTERNAL MEDICINE

## 2019-01-01 PROCEDURE — 88173 CYTOPATH EVAL FNA REPORT: CPT | Performed by: INTERNAL MEDICINE

## 2019-01-01 PROCEDURE — 99221 1ST HOSP IP/OBS SF/LOW 40: CPT | Performed by: SURGERY

## 2019-01-01 PROCEDURE — 36000058 ZZH SURGERY LEVEL 3 EA 15 ADDTL MIN: Performed by: INTERNAL MEDICINE

## 2019-01-01 PROCEDURE — C1887 CATHETER, GUIDING: HCPCS | Performed by: INTERNAL MEDICINE

## 2019-01-01 PROCEDURE — 0JPV3WZ REMOVAL OF TOTALLY IMPLANTABLE VASCULAR ACCESS DEVICE FROM UPPER EXTREMITY SUBCUTANEOUS TISSUE AND FASCIA, PERCUTANEOUS APPROACH: ICD-10-PCS | Performed by: RADIOLOGY

## 2019-01-01 PROCEDURE — 84145 PROCALCITONIN (PCT): CPT | Performed by: HOSPITALIST

## 2019-01-01 PROCEDURE — 87077 CULTURE AEROBIC IDENTIFY: CPT | Performed by: PHYSICIAN ASSISTANT

## 2019-01-01 PROCEDURE — 99215 OFFICE O/P EST HI 40 MIN: CPT | Performed by: CLINICAL NURSE SPECIALIST

## 2019-01-01 PROCEDURE — 99226 ZZC SUBSEQUENT OBSERVATION CARE,LEVEL III: CPT | Performed by: HOSPITALIST

## 2019-01-01 PROCEDURE — 80048 BASIC METABOLIC PNL TOTAL CA: CPT | Performed by: HOSPITALIST

## 2019-01-01 DEVICE — STENT BILIARY WALLFLEX UNCOVERED 10X40MM M00570890: Type: IMPLANTABLE DEVICE | Site: BILE DUCT | Status: FUNCTIONAL

## 2019-01-01 DEVICE — STENT COTTON LEUNG (AMSTERDAM) BIL 10FRX07CM CLSO-10-7
Type: IMPLANTABLE DEVICE | Site: BILE DUCT | Status: NON-FUNCTIONAL
Removed: 2019-07-18

## 2019-01-01 RX ORDER — FENTANYL CITRATE 50 UG/ML
INJECTION, SOLUTION INTRAMUSCULAR; INTRAVENOUS
Status: DISCONTINUED
Start: 2019-01-01 | End: 2019-01-01 | Stop reason: HOSPADM

## 2019-01-01 RX ORDER — MAGNESIUM SULFATE HEPTAHYDRATE 40 MG/ML
2 INJECTION, SOLUTION INTRAVENOUS DAILY PRN
Status: DISCONTINUED | OUTPATIENT
Start: 2019-01-01 | End: 2019-01-01 | Stop reason: HOSPADM

## 2019-01-01 RX ORDER — POLYETHYLENE GLYCOL 3350 17 G/17G
17 POWDER, FOR SOLUTION ORAL DAILY
COMMUNITY

## 2019-01-01 RX ORDER — FENTANYL CITRATE 50 UG/ML
25-50 INJECTION, SOLUTION INTRAMUSCULAR; INTRAVENOUS
Status: DISCONTINUED | OUTPATIENT
Start: 2019-01-01 | End: 2019-01-01 | Stop reason: HOSPADM

## 2019-01-01 RX ORDER — ATROPINE SULFATE 10 MG/ML
2-4 SOLUTION/ DROPS OPHTHALMIC
Qty: 5 ML | Refills: 1 | Status: SHIPPED | OUTPATIENT
Start: 2019-01-01

## 2019-01-01 RX ORDER — LIDOCAINE 40 MG/G
CREAM TOPICAL
Status: DISCONTINUED | OUTPATIENT
Start: 2019-01-01 | End: 2019-01-01 | Stop reason: HOSPADM

## 2019-01-01 RX ORDER — HEPARIN SODIUM (PORCINE) LOCK FLUSH IV SOLN 100 UNIT/ML 100 UNIT/ML
SOLUTION INTRAVENOUS
Status: COMPLETED
Start: 2019-01-01 | End: 2019-01-01

## 2019-01-01 RX ORDER — METFORMIN HCL 500 MG
1000 TABLET, EXTENDED RELEASE 24 HR ORAL
Status: ON HOLD | COMMUNITY
End: 2019-01-01

## 2019-01-01 RX ORDER — DEXAMETHASONE SODIUM PHOSPHATE 4 MG/ML
INJECTION, SOLUTION INTRA-ARTICULAR; INTRALESIONAL; INTRAMUSCULAR; INTRAVENOUS; SOFT TISSUE PRN
Status: DISCONTINUED | OUTPATIENT
Start: 2019-01-01 | End: 2019-01-01

## 2019-01-01 RX ORDER — SULFAMETHOXAZOLE AND TRIMETHOPRIM 200; 40 MG/5ML; MG/5ML
20 SUSPENSION ORAL 2 TIMES DAILY
Status: DISCONTINUED | OUTPATIENT
Start: 2019-01-01 | End: 2019-01-01

## 2019-01-01 RX ORDER — LIDOCAINE HYDROCHLORIDE 20 MG/ML
INJECTION, SOLUTION INFILTRATION; PERINEURAL PRN
Status: DISCONTINUED | OUTPATIENT
Start: 2019-01-01 | End: 2019-01-01

## 2019-01-01 RX ORDER — DEXTROSE MONOHYDRATE 25 G/50ML
25-50 INJECTION, SOLUTION INTRAVENOUS
Status: CANCELLED | OUTPATIENT
Start: 2019-01-01

## 2019-01-01 RX ORDER — MIRTAZAPINE 15 MG/1
15 TABLET, FILM COATED ORAL AT BEDTIME
Qty: 30 TABLET | Refills: 1 | Status: SHIPPED | OUTPATIENT
Start: 2019-01-01 | End: 2019-01-01

## 2019-01-01 RX ORDER — FENTANYL CITRATE 50 UG/ML
INJECTION, SOLUTION INTRAMUSCULAR; INTRAVENOUS
Status: DISCONTINUED
Start: 2019-01-01 | End: 2019-01-01 | Stop reason: WASHOUT

## 2019-01-01 RX ORDER — NICOTINE POLACRILEX 4 MG
15-30 LOZENGE BUCCAL
Status: DISCONTINUED | OUTPATIENT
Start: 2019-01-01 | End: 2019-01-01

## 2019-01-01 RX ORDER — AMOXICILLIN 250 MG
1 CAPSULE ORAL 2 TIMES DAILY PRN
Status: DISCONTINUED | OUTPATIENT
Start: 2019-01-01 | End: 2019-01-01 | Stop reason: HOSPADM

## 2019-01-01 RX ORDER — LORAZEPAM 0.5 MG/1
0.5 TABLET ORAL EVERY 6 HOURS PRN
Qty: 10 TABLET | Refills: 0 | Status: SHIPPED | OUTPATIENT
Start: 2019-01-01 | End: 2019-01-01

## 2019-01-01 RX ORDER — LEVOFLOXACIN 500 MG/1
500 TABLET, FILM COATED ORAL DAILY
Status: DISCONTINUED | OUTPATIENT
Start: 2019-01-01 | End: 2019-01-01 | Stop reason: HOSPADM

## 2019-01-01 RX ORDER — DEXTROSE MONOHYDRATE 25 G/50ML
25-50 INJECTION, SOLUTION INTRAVENOUS
Status: DISCONTINUED | OUTPATIENT
Start: 2019-01-01 | End: 2019-01-01 | Stop reason: HOSPADM

## 2019-01-01 RX ORDER — FENTANYL CITRATE 50 UG/ML
INJECTION, SOLUTION INTRAMUSCULAR; INTRAVENOUS
Status: COMPLETED
Start: 2019-01-01 | End: 2019-01-01

## 2019-01-01 RX ORDER — LIDOCAINE HYDROCHLORIDE 10 MG/ML
10 INJECTION, SOLUTION EPIDURAL; INFILTRATION; INTRACAUDAL; PERINEURAL ONCE
Status: COMPLETED | OUTPATIENT
Start: 2019-01-01 | End: 2019-01-01

## 2019-01-01 RX ORDER — POTASSIUM CHLORIDE 1500 MG/1
20-40 TABLET, EXTENDED RELEASE ORAL
Status: DISCONTINUED | OUTPATIENT
Start: 2019-01-01 | End: 2019-01-01 | Stop reason: HOSPADM

## 2019-01-01 RX ORDER — DEXTROSE MONOHYDRATE 25 G/50ML
25-50 INJECTION, SOLUTION INTRAVENOUS
Status: DISCONTINUED | OUTPATIENT
Start: 2019-01-01 | End: 2019-01-01

## 2019-01-01 RX ORDER — MEPERIDINE HYDROCHLORIDE 25 MG/ML
12.5 INJECTION INTRAMUSCULAR; INTRAVENOUS; SUBCUTANEOUS
Status: DISCONTINUED | OUTPATIENT
Start: 2019-01-01 | End: 2019-01-01 | Stop reason: HOSPADM

## 2019-01-01 RX ORDER — SULFAMETHOXAZOLE/TRIMETHOPRIM 800-160 MG
1 TABLET ORAL 2 TIMES DAILY
Status: DISPENSED | OUTPATIENT
Start: 2019-01-01 | End: 2019-01-01

## 2019-01-01 RX ORDER — LIDOCAINE HYDROCHLORIDE 20 MG/ML
JELLY TOPICAL
Status: COMPLETED
Start: 2019-01-01 | End: 2019-01-01

## 2019-01-01 RX ORDER — MORPHINE SULFATE 15 MG/1
15 TABLET, FILM COATED, EXTENDED RELEASE ORAL EVERY 12 HOURS
Qty: 60 TABLET | Refills: 0 | Status: SHIPPED | OUTPATIENT
Start: 2019-01-01

## 2019-01-01 RX ORDER — ONDANSETRON 4 MG/1
4 TABLET, FILM COATED ORAL EVERY 8 HOURS PRN
COMMUNITY
End: 2019-01-01

## 2019-01-01 RX ORDER — SULFAMETHOXAZOLE/TRIMETHOPRIM 400MG-80MG
1 TABLET ORAL 2 TIMES DAILY
Status: DISCONTINUED | OUTPATIENT
Start: 2019-01-01 | End: 2019-01-01 | Stop reason: DRUGHIGH

## 2019-01-01 RX ORDER — MAGNESIUM SULFATE HEPTAHYDRATE 40 MG/ML
4 INJECTION, SOLUTION INTRAVENOUS EVERY 4 HOURS PRN
Status: DISCONTINUED | OUTPATIENT
Start: 2019-01-01 | End: 2019-01-01 | Stop reason: HOSPADM

## 2019-01-01 RX ORDER — NALOXONE HYDROCHLORIDE 0.4 MG/ML
.1-.4 INJECTION, SOLUTION INTRAMUSCULAR; INTRAVENOUS; SUBCUTANEOUS
Status: DISCONTINUED | OUTPATIENT
Start: 2019-01-01 | End: 2019-01-01 | Stop reason: HOSPADM

## 2019-01-01 RX ORDER — ACETAMINOPHEN 325 MG/1
650 TABLET ORAL EVERY 4 HOURS PRN
Status: DISCONTINUED | OUTPATIENT
Start: 2019-01-01 | End: 2019-01-01 | Stop reason: HOSPADM

## 2019-01-01 RX ORDER — CEFAZOLIN SODIUM 2 G/100ML
2 INJECTION, SOLUTION INTRAVENOUS ONCE
Status: COMPLETED | OUTPATIENT
Start: 2019-01-01 | End: 2019-01-01

## 2019-01-01 RX ORDER — SODIUM CHLORIDE AND POTASSIUM CHLORIDE 150; 900 MG/100ML; MG/100ML
INJECTION, SOLUTION INTRAVENOUS CONTINUOUS
Status: DISCONTINUED | OUTPATIENT
Start: 2019-01-01 | End: 2019-01-01

## 2019-01-01 RX ORDER — MIRTAZAPINE 15 MG/1
15 TABLET, FILM COATED ORAL AT BEDTIME
DISCHARGE
Start: 2019-01-01 | End: 2019-01-01

## 2019-01-01 RX ORDER — ONDANSETRON 2 MG/ML
INJECTION INTRAMUSCULAR; INTRAVENOUS
Status: COMPLETED
Start: 2019-01-01 | End: 2019-01-01

## 2019-01-01 RX ORDER — PROCHLORPERAZINE 25 MG
12.5 SUPPOSITORY, RECTAL RECTAL EVERY 12 HOURS PRN
Status: DISCONTINUED | OUTPATIENT
Start: 2019-01-01 | End: 2019-01-01 | Stop reason: HOSPADM

## 2019-01-01 RX ORDER — IOPAMIDOL 755 MG/ML
89 INJECTION, SOLUTION INTRAVASCULAR ONCE
Status: COMPLETED | OUTPATIENT
Start: 2019-01-01 | End: 2019-01-01

## 2019-01-01 RX ORDER — SODIUM CHLORIDE 9 MG/ML
1000 INJECTION, SOLUTION INTRAVENOUS CONTINUOUS
Status: DISCONTINUED | OUTPATIENT
Start: 2019-01-01 | End: 2019-01-01 | Stop reason: HOSPADM

## 2019-01-01 RX ORDER — PIPERACILLIN SODIUM, TAZOBACTAM SODIUM 3; .375 G/15ML; G/15ML
3.38 INJECTION, POWDER, LYOPHILIZED, FOR SOLUTION INTRAVENOUS EVERY 6 HOURS
Status: DISCONTINUED | OUTPATIENT
Start: 2019-01-01 | End: 2019-01-01

## 2019-01-01 RX ORDER — HYDROMORPHONE HYDROCHLORIDE 1 MG/ML
0.5 INJECTION, SOLUTION INTRAMUSCULAR; INTRAVENOUS; SUBCUTANEOUS ONCE
Status: COMPLETED | OUTPATIENT
Start: 2019-01-01 | End: 2019-01-01

## 2019-01-01 RX ORDER — CEFAZOLIN SODIUM 2 G/100ML
2 INJECTION, SOLUTION INTRAVENOUS EVERY 8 HOURS
Status: DISCONTINUED | OUTPATIENT
Start: 2019-01-01 | End: 2019-01-01

## 2019-01-01 RX ORDER — GLUCOSAMINE HCL/CHONDROITIN SU 500-400 MG
CAPSULE ORAL
Qty: 100 EACH | Refills: 3 | Status: SHIPPED | OUTPATIENT
Start: 2019-01-01 | End: 2019-01-01

## 2019-01-01 RX ORDER — SODIUM CHLORIDE, SODIUM LACTATE, POTASSIUM CHLORIDE, CALCIUM CHLORIDE 600; 310; 30; 20 MG/100ML; MG/100ML; MG/100ML; MG/100ML
INJECTION, SOLUTION INTRAVENOUS CONTINUOUS
Status: DISCONTINUED | OUTPATIENT
Start: 2019-01-01 | End: 2019-01-01 | Stop reason: HOSPADM

## 2019-01-01 RX ORDER — INDOMETHACIN 50 MG/1
100 SUPPOSITORY RECTAL
Status: COMPLETED | OUTPATIENT
Start: 2019-01-01 | End: 2019-01-01

## 2019-01-01 RX ORDER — ACETAMINOPHEN 650 MG/1
650 SUPPOSITORY RECTAL EVERY 4 HOURS PRN
Status: DISCONTINUED | OUTPATIENT
Start: 2019-01-01 | End: 2019-01-01 | Stop reason: HOSPADM

## 2019-01-01 RX ORDER — FENTANYL CITRATE 50 UG/ML
INJECTION, SOLUTION INTRAMUSCULAR; INTRAVENOUS PRN
Status: DISCONTINUED | OUTPATIENT
Start: 2019-01-01 | End: 2019-01-01

## 2019-01-01 RX ORDER — PROCHLORPERAZINE MALEATE 5 MG
5 TABLET ORAL EVERY 6 HOURS PRN
Status: DISCONTINUED | OUTPATIENT
Start: 2019-01-01 | End: 2019-01-01 | Stop reason: HOSPADM

## 2019-01-01 RX ORDER — FLUORIDE TOOTHPASTE
15 TOOTHPASTE DENTAL 4 TIMES DAILY PRN
Status: DISCONTINUED | OUTPATIENT
Start: 2019-01-01 | End: 2019-01-01 | Stop reason: HOSPADM

## 2019-01-01 RX ORDER — FLUMAZENIL 0.1 MG/ML
0.2 INJECTION, SOLUTION INTRAVENOUS
Status: DISCONTINUED | OUTPATIENT
Start: 2019-01-01 | End: 2019-01-01 | Stop reason: HOSPADM

## 2019-01-01 RX ORDER — GLIMEPIRIDE 1 MG/1
1 TABLET ORAL
Qty: 30 TABLET | Refills: 2 | Status: SHIPPED | OUTPATIENT
Start: 2019-01-01 | End: 2019-01-01

## 2019-01-01 RX ORDER — LORAZEPAM 0.5 MG/1
0.5 TABLET ORAL EVERY 6 HOURS PRN
Status: DISCONTINUED | OUTPATIENT
Start: 2019-01-01 | End: 2019-01-01 | Stop reason: HOSPADM

## 2019-01-01 RX ORDER — AMOXICILLIN 250 MG
2 CAPSULE ORAL 2 TIMES DAILY PRN
Status: DISCONTINUED | OUTPATIENT
Start: 2019-01-01 | End: 2019-01-01 | Stop reason: HOSPADM

## 2019-01-01 RX ORDER — METFORMIN HCL 500 MG
1000 TABLET, EXTENDED RELEASE 24 HR ORAL
Qty: 90 TABLET | Refills: 1 | Status: SHIPPED | OUTPATIENT
Start: 2019-01-01 | End: 2019-01-01

## 2019-01-01 RX ORDER — NICOTINE POLACRILEX 4 MG
15-30 LOZENGE BUCCAL
Status: CANCELLED | OUTPATIENT
Start: 2019-01-01

## 2019-01-01 RX ORDER — ONDANSETRON 4 MG/1
4 TABLET, FILM COATED ORAL EVERY 8 HOURS PRN
Qty: 30 TABLET | Refills: 1 | Status: CANCELLED | OUTPATIENT
Start: 2019-01-01

## 2019-01-01 RX ORDER — PROCHLORPERAZINE MALEATE 5 MG
5 TABLET ORAL EVERY 6 HOURS PRN
COMMUNITY
End: 2019-01-01

## 2019-01-01 RX ORDER — ONDANSETRON 2 MG/ML
4 INJECTION INTRAMUSCULAR; INTRAVENOUS EVERY 6 HOURS PRN
Status: DISCONTINUED | OUTPATIENT
Start: 2019-01-01 | End: 2019-01-01 | Stop reason: HOSPADM

## 2019-01-01 RX ORDER — OXYCODONE HYDROCHLORIDE 5 MG/1
5 TABLET ORAL EVERY 6 HOURS PRN
Qty: 10 TABLET | Refills: 0 | Status: SHIPPED | OUTPATIENT
Start: 2019-01-01 | End: 2019-01-01

## 2019-01-01 RX ORDER — HEPARIN SODIUM 1000 [USP'U]/ML
INJECTION, SOLUTION INTRAVENOUS; SUBCUTANEOUS
Status: DISCONTINUED
Start: 2019-01-01 | End: 2019-01-01 | Stop reason: WASHOUT

## 2019-01-01 RX ORDER — NYSTATIN 100000/ML
500000 SUSPENSION, ORAL (FINAL DOSE FORM) ORAL 4 TIMES DAILY
Qty: 120 ML | Refills: 0 | Status: SHIPPED | OUTPATIENT
Start: 2019-01-01 | End: 2019-01-01

## 2019-01-01 RX ORDER — FENTANYL CITRATE 50 UG/ML
25-50 INJECTION, SOLUTION INTRAMUSCULAR; INTRAVENOUS EVERY 5 MIN PRN
Status: DISCONTINUED | OUTPATIENT
Start: 2019-01-01 | End: 2019-01-01 | Stop reason: HOSPADM

## 2019-01-01 RX ORDER — MORPHINE SULFATE 15 MG/1
15 TABLET, FILM COATED, EXTENDED RELEASE ORAL EVERY 12 HOURS
Status: DISCONTINUED | OUTPATIENT
Start: 2019-01-01 | End: 2019-01-01 | Stop reason: HOSPADM

## 2019-01-01 RX ORDER — POLYETHYLENE GLYCOL 3350 17 G/17G
17 POWDER, FOR SOLUTION ORAL DAILY PRN
Status: DISCONTINUED | OUTPATIENT
Start: 2019-01-01 | End: 2019-01-01 | Stop reason: HOSPADM

## 2019-01-01 RX ORDER — GLIMEPIRIDE 1 MG/1
1 TABLET ORAL
Qty: 30 TABLET | Refills: 2
Start: 2019-01-01 | End: 2019-01-01

## 2019-01-01 RX ORDER — NALOXONE HYDROCHLORIDE 0.4 MG/ML
.1-.4 INJECTION, SOLUTION INTRAMUSCULAR; INTRAVENOUS; SUBCUTANEOUS
Status: DISCONTINUED | OUTPATIENT
Start: 2019-01-01 | End: 2019-01-01

## 2019-01-01 RX ORDER — CEFAZOLIN SODIUM 2 G/100ML
INJECTION, SOLUTION INTRAVENOUS
Status: DISCONTINUED
Start: 2019-01-01 | End: 2019-01-01 | Stop reason: HOSPADM

## 2019-01-01 RX ORDER — CEFAZOLIN SODIUM 2 G/100ML
2 INJECTION, SOLUTION INTRAVENOUS EVERY 8 HOURS
Status: DISCONTINUED | OUTPATIENT
Start: 2019-01-01 | End: 2019-01-01 | Stop reason: HOSPADM

## 2019-01-01 RX ORDER — SULFAMETHOXAZOLE/TRIMETHOPRIM 800-160 MG
1 TABLET ORAL 2 TIMES DAILY
Status: DISCONTINUED | OUTPATIENT
Start: 2019-01-01 | End: 2019-01-01

## 2019-01-01 RX ORDER — DEXAMETHASONE SODIUM PHOSPHATE 4 MG/ML
4 INJECTION, SOLUTION INTRA-ARTICULAR; INTRALESIONAL; INTRAMUSCULAR; INTRAVENOUS; SOFT TISSUE EVERY 10 MIN PRN
Status: DISCONTINUED | OUTPATIENT
Start: 2019-01-01 | End: 2019-01-01 | Stop reason: HOSPADM

## 2019-01-01 RX ORDER — AMOXICILLIN 250 MG
2 CAPSULE ORAL 2 TIMES DAILY
Status: DISCONTINUED | OUTPATIENT
Start: 2019-01-01 | End: 2019-01-01 | Stop reason: HOSPADM

## 2019-01-01 RX ORDER — LORAZEPAM 0.5 MG/1
0.5 TABLET ORAL EVERY 6 HOURS PRN
Status: CANCELLED | OUTPATIENT
Start: 2019-01-01

## 2019-01-01 RX ORDER — BISACODYL 10 MG
10 SUPPOSITORY, RECTAL RECTAL DAILY PRN
Status: DISCONTINUED | OUTPATIENT
Start: 2019-01-01 | End: 2019-01-01 | Stop reason: HOSPADM

## 2019-01-01 RX ORDER — MORPHINE SULFATE 15 MG/1
15 TABLET, FILM COATED, EXTENDED RELEASE ORAL EVERY 12 HOURS
Qty: 14 TABLET | Refills: 0 | Status: SHIPPED | OUTPATIENT
Start: 2019-01-01 | End: 2019-01-01

## 2019-01-01 RX ORDER — ACETAMINOPHEN 325 MG/1
650 TABLET ORAL EVERY 4 HOURS PRN
Status: DISCONTINUED | OUTPATIENT
Start: 2019-01-01 | End: 2019-01-01

## 2019-01-01 RX ORDER — SIMETHICONE 125 MG
125 TABLET,CHEWABLE ORAL 2 TIMES DAILY PRN
COMMUNITY

## 2019-01-01 RX ORDER — LORAZEPAM 0.5 MG/1
0.5 TABLET ORAL EVERY 6 HOURS PRN
Status: ON HOLD | COMMUNITY
End: 2019-01-01

## 2019-01-01 RX ORDER — HYDROMORPHONE HYDROCHLORIDE 1 MG/ML
0.2 INJECTION, SOLUTION INTRAMUSCULAR; INTRAVENOUS; SUBCUTANEOUS
Status: DISCONTINUED | OUTPATIENT
Start: 2019-01-01 | End: 2019-01-01 | Stop reason: HOSPADM

## 2019-01-01 RX ORDER — OXYCODONE HYDROCHLORIDE 5 MG/1
5 TABLET ORAL EVERY 6 HOURS PRN
Qty: 60 TABLET | Refills: 0 | Status: ON HOLD | OUTPATIENT
Start: 2019-01-01 | End: 2019-01-01

## 2019-01-01 RX ORDER — IOPAMIDOL 755 MG/ML
500 INJECTION, SOLUTION INTRAVASCULAR ONCE
Status: COMPLETED | OUTPATIENT
Start: 2019-01-01 | End: 2019-01-01

## 2019-01-01 RX ORDER — LEVOFLOXACIN 500 MG/1
500 TABLET, FILM COATED ORAL DAILY
DISCHARGE
Start: 2019-01-01 | End: 2019-01-01

## 2019-01-01 RX ORDER — BISACODYL 10 MG
10 SUPPOSITORY, RECTAL RECTAL DAILY PRN
DISCHARGE
Start: 2019-01-01 | End: 2019-01-01

## 2019-01-01 RX ORDER — CEFAZOLIN SODIUM 1 G/3ML
2 INJECTION, POWDER, FOR SOLUTION INTRAMUSCULAR; INTRAVENOUS EVERY 8 HOURS
Qty: 1 EACH | Refills: 1 | Status: SHIPPED | OUTPATIENT
Start: 2019-01-01 | End: 2019-01-01

## 2019-01-01 RX ORDER — ONDANSETRON 2 MG/ML
INJECTION INTRAMUSCULAR; INTRAVENOUS PRN
Status: DISCONTINUED | OUTPATIENT
Start: 2019-01-01 | End: 2019-01-01

## 2019-01-01 RX ORDER — DRONABINOL 2.5 MG/1
10 CAPSULE ORAL 2 TIMES DAILY
Status: DISCONTINUED | OUTPATIENT
Start: 2019-01-01 | End: 2019-01-01

## 2019-01-01 RX ORDER — SILDENAFIL CITRATE 20 MG/1
20 TABLET ORAL PRN
COMMUNITY

## 2019-01-01 RX ORDER — FENTANYL CITRATE 50 UG/ML
25-50 INJECTION, SOLUTION INTRAMUSCULAR; INTRAVENOUS EVERY 5 MIN PRN
Status: DISCONTINUED | OUTPATIENT
Start: 2019-01-01 | End: 2019-01-01

## 2019-01-01 RX ORDER — CEFAZOLIN SODIUM 2 G/100ML
2 INJECTION, SOLUTION INTRAVENOUS
Status: COMPLETED | OUTPATIENT
Start: 2019-01-01 | End: 2019-01-01

## 2019-01-01 RX ORDER — SIMETHICONE 125 MG
125 TABLET,CHEWABLE ORAL 2 TIMES DAILY
Status: ON HOLD | COMMUNITY
End: 2019-01-01

## 2019-01-01 RX ORDER — LANCETS
EACH MISCELLANEOUS
Qty: 100 EACH | Refills: 6 | Status: SHIPPED | OUTPATIENT
Start: 2019-01-01 | End: 2019-01-01

## 2019-01-01 RX ORDER — POLYETHYLENE GLYCOL 3350 17 G/17G
17 POWDER, FOR SOLUTION ORAL DAILY
Status: DISCONTINUED | OUTPATIENT
Start: 2019-01-01 | End: 2019-01-01 | Stop reason: HOSPADM

## 2019-01-01 RX ORDER — DRONABINOL 10 MG/1
10 CAPSULE ORAL 2 TIMES DAILY
COMMUNITY
End: 2019-01-01

## 2019-01-01 RX ORDER — POTASSIUM CHLORIDE 7.45 MG/ML
10 INJECTION INTRAVENOUS
Status: DISCONTINUED | OUTPATIENT
Start: 2019-01-01 | End: 2019-01-01 | Stop reason: HOSPADM

## 2019-01-01 RX ORDER — EPHEDRINE SULFATE 50 MG/ML
INJECTION, SOLUTION INTRAMUSCULAR; INTRAVENOUS; SUBCUTANEOUS PRN
Status: DISCONTINUED | OUTPATIENT
Start: 2019-01-01 | End: 2019-01-01

## 2019-01-01 RX ORDER — GLIMEPIRIDE 1 MG/1
1 TABLET ORAL
Qty: 30 TABLET | Refills: 2 | Status: ON HOLD | OUTPATIENT
Start: 2019-01-01 | End: 2019-01-01

## 2019-01-01 RX ORDER — LANOLIN ALCOHOL/MO/W.PET/CERES
100 CREAM (GRAM) TOPICAL DAILY
Status: DISPENSED | OUTPATIENT
Start: 2019-01-01 | End: 2019-01-01

## 2019-01-01 RX ORDER — POTASSIUM CL/LIDO/0.9 % NACL 10MEQ/0.1L
10 INTRAVENOUS SOLUTION, PIGGYBACK (ML) INTRAVENOUS
Status: DISCONTINUED | OUTPATIENT
Start: 2019-01-01 | End: 2019-01-01 | Stop reason: HOSPADM

## 2019-01-01 RX ORDER — TRIPROLIDINE/PSEUDOEPHEDRINE 2.5MG-60MG
1 TABLET ORAL EVERY 6 HOURS PRN
Status: ON HOLD | COMMUNITY
End: 2019-01-01

## 2019-01-01 RX ORDER — ONDANSETRON 4 MG/1
4 TABLET, FILM COATED ORAL EVERY 8 HOURS PRN
Qty: 30 TABLET | Refills: 1 | Status: SHIPPED | OUTPATIENT
Start: 2019-01-01 | End: 2019-01-01 | Stop reason: DRUGHIGH

## 2019-01-01 RX ORDER — MORPHINE SULFATE 15 MG/1
15 TABLET, FILM COATED, EXTENDED RELEASE ORAL EVERY 12 HOURS
Status: ON HOLD | COMMUNITY
End: 2019-01-01

## 2019-01-01 RX ORDER — MORPHINE SULFATE 15 MG/1
15 TABLET, FILM COATED, EXTENDED RELEASE ORAL EVERY 12 HOURS
Status: CANCELLED | OUTPATIENT
Start: 2019-01-01

## 2019-01-01 RX ORDER — AMOXICILLIN 250 MG
1-2 CAPSULE ORAL 2 TIMES DAILY
DISCHARGE
Start: 2019-01-01 | End: 2019-01-01

## 2019-01-01 RX ORDER — METFORMIN HCL 500 MG
1000 TABLET, EXTENDED RELEASE 24 HR ORAL
Start: 2019-01-01 | End: 2019-01-01

## 2019-01-01 RX ORDER — IOPAMIDOL 755 MG/ML
72 INJECTION, SOLUTION INTRAVASCULAR ONCE
Status: COMPLETED | OUTPATIENT
Start: 2019-01-01 | End: 2019-01-01

## 2019-01-01 RX ORDER — POTASSIUM CHLORIDE 29.8 MG/ML
20 INJECTION INTRAVENOUS
Status: DISCONTINUED | OUTPATIENT
Start: 2019-01-01 | End: 2019-01-01

## 2019-01-01 RX ORDER — POLYETHYLENE GLYCOL 3350 17 G/17G
17 POWDER, FOR SOLUTION ORAL DAILY
Status: DISCONTINUED | OUTPATIENT
Start: 2019-01-01 | End: 2019-01-01

## 2019-01-01 RX ORDER — SILDENAFIL CITRATE 20 MG/1
TABLET ORAL
Qty: 30 TABLET | Refills: 0 | Status: SHIPPED | OUTPATIENT
Start: 2019-01-01 | End: 2019-01-01

## 2019-01-01 RX ORDER — PSEUDOEPHEDRINE HCL 60 MG
60 TABLET ORAL PRN
Status: ON HOLD | COMMUNITY
End: 2019-01-01

## 2019-01-01 RX ORDER — ONDANSETRON 2 MG/ML
4 INJECTION INTRAMUSCULAR; INTRAVENOUS ONCE
Status: COMPLETED | OUTPATIENT
Start: 2019-01-01 | End: 2019-01-01

## 2019-01-01 RX ORDER — LORAZEPAM 0.5 MG/1
0.5 TABLET ORAL EVERY 6 HOURS PRN
Qty: 30 TABLET | Refills: 0 | Status: SHIPPED | OUTPATIENT
Start: 2019-01-01 | End: 2019-01-01

## 2019-01-01 RX ORDER — HALOPERIDOL 0.5 MG/1
.5-1 TABLET ORAL EVERY 6 HOURS PRN
Qty: 30 TABLET | Refills: 1 | Status: SHIPPED | OUTPATIENT
Start: 2019-01-01

## 2019-01-01 RX ORDER — CALCIUM CARBONATE 500 MG/1
1000 TABLET, CHEWABLE ORAL EVERY 4 HOURS PRN
Status: DISCONTINUED | OUTPATIENT
Start: 2019-01-01 | End: 2019-01-01 | Stop reason: HOSPADM

## 2019-01-01 RX ORDER — SODIUM CHLORIDE, SODIUM LACTATE, POTASSIUM CHLORIDE, CALCIUM CHLORIDE 600; 310; 30; 20 MG/100ML; MG/100ML; MG/100ML; MG/100ML
INJECTION, SOLUTION INTRAVENOUS CONTINUOUS
Status: DISCONTINUED | OUTPATIENT
Start: 2019-01-01 | End: 2019-01-01

## 2019-01-01 RX ORDER — DRONABINOL 2.5 MG/1
10 CAPSULE ORAL 2 TIMES DAILY
Status: DISCONTINUED | OUTPATIENT
Start: 2019-01-01 | End: 2019-01-01 | Stop reason: HOSPADM

## 2019-01-01 RX ORDER — ACETAMINOPHEN 650 MG/1
650 SUPPOSITORY RECTAL EVERY 4 HOURS PRN
Qty: 12 SUPPOSITORY | Refills: 1 | Status: SHIPPED | OUTPATIENT
Start: 2019-01-01 | End: 2019-01-01

## 2019-01-01 RX ORDER — ONDANSETRON HYDROCHLORIDE 4 MG/5ML
4 SOLUTION ORAL 2 TIMES DAILY
Status: DISCONTINUED | OUTPATIENT
Start: 2019-01-01 | End: 2019-01-01 | Stop reason: HOSPADM

## 2019-01-01 RX ORDER — PROCHLORPERAZINE MALEATE 5 MG
5 TABLET ORAL EVERY 6 HOURS PRN
Qty: 60 TABLET | Refills: 1 | Status: SHIPPED | OUTPATIENT
Start: 2019-01-01 | End: 2019-01-01

## 2019-01-01 RX ORDER — DRONABINOL 10 MG/1
10 CAPSULE ORAL 2 TIMES DAILY
Qty: 60 CAPSULE | Refills: 0 | Status: SHIPPED | OUTPATIENT
Start: 2019-01-01 | End: 2019-01-01

## 2019-01-01 RX ORDER — NEOSTIGMINE METHYLSULFATE 1 MG/ML
VIAL (ML) INJECTION PRN
Status: DISCONTINUED | OUTPATIENT
Start: 2019-01-01 | End: 2019-01-01

## 2019-01-01 RX ORDER — POTASSIUM CHLORIDE 1.5 G/1.58G
20-40 POWDER, FOR SOLUTION ORAL
Status: DISCONTINUED | OUTPATIENT
Start: 2019-01-01 | End: 2019-01-01 | Stop reason: HOSPADM

## 2019-01-01 RX ORDER — FLASH GLUCOSE SCANNING READER
1 EACH MISCELLANEOUS
Qty: 4 DEVICE | Refills: 0 | Status: SHIPPED | OUTPATIENT
Start: 2019-01-01 | End: 2019-01-01

## 2019-01-01 RX ORDER — BISACODYL 10 MG
SUPPOSITORY, RECTAL RECTAL
Qty: 12 SUPPOSITORY | Refills: 1 | Status: SHIPPED | OUTPATIENT
Start: 2019-01-01

## 2019-01-01 RX ORDER — SULFAMETHOXAZOLE/TRIMETHOPRIM 400MG-80MG
1 TABLET ORAL 2 TIMES DAILY
Status: DISCONTINUED | OUTPATIENT
Start: 2019-01-01 | End: 2019-01-01

## 2019-01-01 RX ORDER — LIDOCAINE HYDROCHLORIDE AND EPINEPHRINE 10; 10 MG/ML; UG/ML
INJECTION, SOLUTION INFILTRATION; PERINEURAL
Status: DISCONTINUED
Start: 2019-01-01 | End: 2019-01-01 | Stop reason: WASHOUT

## 2019-01-01 RX ORDER — LORAZEPAM 2 MG/ML
1 INJECTION INTRAMUSCULAR ONCE
Status: COMPLETED | OUTPATIENT
Start: 2019-01-01 | End: 2019-01-01

## 2019-01-01 RX ORDER — INDOMETHACIN 50 MG/1
100 SUPPOSITORY RECTAL
Status: DISCONTINUED | OUTPATIENT
Start: 2019-01-01 | End: 2019-01-01 | Stop reason: HOSPADM

## 2019-01-01 RX ORDER — ONDANSETRON 2 MG/ML
4 INJECTION INTRAMUSCULAR; INTRAVENOUS EVERY 30 MIN PRN
Status: DISCONTINUED | OUTPATIENT
Start: 2019-01-01 | End: 2019-01-01

## 2019-01-01 RX ORDER — PROPOFOL 10 MG/ML
INJECTION, EMULSION INTRAVENOUS PRN
Status: DISCONTINUED | OUTPATIENT
Start: 2019-01-01 | End: 2019-01-01

## 2019-01-01 RX ORDER — FLASH GLUCOSE SENSOR
1 KIT MISCELLANEOUS
Qty: 4 EACH | Refills: 3 | Status: SHIPPED | OUTPATIENT
Start: 2019-01-01 | End: 2019-01-01

## 2019-01-01 RX ORDER — MIRTAZAPINE 7.5 MG/1
7.5 TABLET, FILM COATED ORAL AT BEDTIME
DISCHARGE
Start: 2019-01-01 | End: 2019-01-01

## 2019-01-01 RX ORDER — HEPARIN SODIUM,PORCINE 10 UNIT/ML
2-5 VIAL (ML) INTRAVENOUS
Status: DISCONTINUED | OUTPATIENT
Start: 2019-01-01 | End: 2019-01-01 | Stop reason: HOSPADM

## 2019-01-01 RX ORDER — NICOTINE POLACRILEX 4 MG
15-30 LOZENGE BUCCAL
Status: DISCONTINUED | OUTPATIENT
Start: 2019-01-01 | End: 2019-01-01 | Stop reason: HOSPADM

## 2019-01-01 RX ORDER — SULFAMETHOXAZOLE/TRIMETHOPRIM 800-160 MG
1 TABLET ORAL 2 TIMES DAILY
Status: ON HOLD | COMMUNITY
Start: 2019-01-01 | End: 2019-01-01

## 2019-01-01 RX ORDER — HYDROMORPHONE HYDROCHLORIDE 1 MG/ML
.3-.5 INJECTION, SOLUTION INTRAMUSCULAR; INTRAVENOUS; SUBCUTANEOUS EVERY 10 MIN PRN
Status: DISCONTINUED | OUTPATIENT
Start: 2019-01-01 | End: 2019-01-01 | Stop reason: HOSPADM

## 2019-01-01 RX ORDER — LORAZEPAM 0.5 MG/1
.25-.5 TABLET ORAL EVERY 4 HOURS PRN
Qty: 30 TABLET | Refills: 1 | Status: SHIPPED | OUTPATIENT
Start: 2019-01-01

## 2019-01-01 RX ORDER — METFORMIN HCL 500 MG
1000 TABLET, EXTENDED RELEASE 24 HR ORAL
Qty: 60 TABLET | Refills: 2 | Status: ON HOLD | OUTPATIENT
Start: 2019-01-01 | End: 2019-01-01

## 2019-01-01 RX ORDER — LIDOCAINE HYDROCHLORIDE 10 MG/ML
INJECTION, SOLUTION EPIDURAL; INFILTRATION; INTRACAUDAL; PERINEURAL
Status: DISCONTINUED
Start: 2019-01-01 | End: 2019-01-01 | Stop reason: HOSPADM

## 2019-01-01 RX ORDER — ALBUMIN, HUMAN INJ 5% 5 %
25 SOLUTION INTRAVENOUS ONCE
Status: COMPLETED | OUTPATIENT
Start: 2019-01-01 | End: 2019-01-01

## 2019-01-01 RX ORDER — MIRTAZAPINE 7.5 MG/1
7.5 TABLET, FILM COATED ORAL AT BEDTIME
Status: DISCONTINUED | OUTPATIENT
Start: 2019-01-01 | End: 2019-01-01

## 2019-01-01 RX ORDER — GUAR GUM
PACKET (EA) ORAL
COMMUNITY
End: 2019-01-01

## 2019-01-01 RX ORDER — GLYCOPYRROLATE 0.2 MG/ML
INJECTION, SOLUTION INTRAMUSCULAR; INTRAVENOUS PRN
Status: DISCONTINUED | OUTPATIENT
Start: 2019-01-01 | End: 2019-01-01

## 2019-01-01 RX ORDER — ONDANSETRON 4 MG/1
4 TABLET, ORALLY DISINTEGRATING ORAL EVERY 8 HOURS PRN
Qty: 10 TABLET | Refills: 0 | Status: SHIPPED | OUTPATIENT
Start: 2019-01-01 | End: 2019-01-01

## 2019-01-01 RX ORDER — AMOXICILLIN 250 MG
1-2 CAPSULE ORAL 2 TIMES DAILY PRN
DISCHARGE
Start: 2019-01-01 | End: 2019-01-01

## 2019-01-01 RX ORDER — LIDOCAINE HYDROCHLORIDE 10 MG/ML
INJECTION, SOLUTION INFILTRATION; PERINEURAL PRN
Status: DISCONTINUED | OUTPATIENT
Start: 2019-01-01 | End: 2019-01-01

## 2019-01-01 RX ORDER — SODIUM CHLORIDE, SODIUM LACTATE, POTASSIUM CHLORIDE, CALCIUM CHLORIDE 600; 310; 30; 20 MG/100ML; MG/100ML; MG/100ML; MG/100ML
INJECTION, SOLUTION INTRAVENOUS CONTINUOUS
Status: ACTIVE | OUTPATIENT
Start: 2019-01-01 | End: 2019-01-01

## 2019-01-01 RX ORDER — GLIMEPIRIDE 1 MG/1
TABLET ORAL
Qty: 90 TABLET | Refills: 0 | Status: SHIPPED | OUTPATIENT
Start: 2019-01-01 | End: 2019-01-01

## 2019-01-01 RX ORDER — SODIUM CHLORIDE, SODIUM LACTATE, POTASSIUM CHLORIDE, CALCIUM CHLORIDE 600; 310; 30; 20 MG/100ML; MG/100ML; MG/100ML; MG/100ML
INJECTION, SOLUTION INTRAVENOUS CONTINUOUS PRN
Status: DISCONTINUED | OUTPATIENT
Start: 2019-01-01 | End: 2019-01-01

## 2019-01-01 RX ORDER — ONDANSETRON 4 MG/1
4 TABLET, FILM COATED ORAL 2 TIMES DAILY
COMMUNITY

## 2019-01-01 RX ORDER — HYDRALAZINE HYDROCHLORIDE 20 MG/ML
2.5-5 INJECTION INTRAMUSCULAR; INTRAVENOUS EVERY 10 MIN PRN
Status: DISCONTINUED | OUTPATIENT
Start: 2019-01-01 | End: 2019-01-01 | Stop reason: HOSPADM

## 2019-01-01 RX ORDER — LIDOCAINE HYDROCHLORIDE 20 MG/ML
JELLY TOPICAL ONCE
Status: COMPLETED | OUTPATIENT
Start: 2019-01-01 | End: 2019-01-01

## 2019-01-01 RX ORDER — ONDANSETRON 4 MG/1
4 TABLET, ORALLY DISINTEGRATING ORAL EVERY 30 MIN PRN
Status: DISCONTINUED | OUTPATIENT
Start: 2019-01-01 | End: 2019-01-01 | Stop reason: HOSPADM

## 2019-01-01 RX ORDER — IOPAMIDOL 755 MG/ML
84 INJECTION, SOLUTION INTRAVASCULAR ONCE
Status: COMPLETED | OUTPATIENT
Start: 2019-01-01 | End: 2019-01-01

## 2019-01-01 RX ORDER — ONDANSETRON 2 MG/ML
4 INJECTION INTRAMUSCULAR; INTRAVENOUS EVERY 30 MIN PRN
Status: DISCONTINUED | OUTPATIENT
Start: 2019-01-01 | End: 2019-01-01 | Stop reason: HOSPADM

## 2019-01-01 RX ORDER — ONDANSETRON 4 MG/1
4 TABLET, ORALLY DISINTEGRATING ORAL EVERY 6 HOURS PRN
Status: DISCONTINUED | OUTPATIENT
Start: 2019-01-01 | End: 2019-01-01 | Stop reason: HOSPADM

## 2019-01-01 RX ORDER — LABETALOL 20 MG/4 ML (5 MG/ML) INTRAVENOUS SYRINGE
10
Status: DISCONTINUED | OUTPATIENT
Start: 2019-01-01 | End: 2019-01-01 | Stop reason: HOSPADM

## 2019-01-01 RX ORDER — HEPARIN SODIUM (PORCINE) LOCK FLUSH IV SOLN 100 UNIT/ML 100 UNIT/ML
SOLUTION INTRAVENOUS
Status: DISCONTINUED
Start: 2019-01-01 | End: 2019-01-01 | Stop reason: WASHOUT

## 2019-01-01 RX ORDER — OXYCODONE HYDROCHLORIDE 5 MG/1
5 TABLET ORAL EVERY 6 HOURS PRN
Status: DISCONTINUED | OUTPATIENT
Start: 2019-01-01 | End: 2019-01-01 | Stop reason: ALTCHOICE

## 2019-01-01 RX ORDER — LORAZEPAM 0.5 MG/1
0.5 TABLET ORAL 2 TIMES DAILY
COMMUNITY

## 2019-01-01 RX ORDER — SODIUM CHLORIDE 9 MG/ML
INJECTION, SOLUTION INTRAVENOUS CONTINUOUS
Status: DISCONTINUED | OUTPATIENT
Start: 2019-01-01 | End: 2019-01-01

## 2019-01-01 RX ORDER — LIDOCAINE HYDROCHLORIDE 10 MG/ML
INJECTION, SOLUTION INFILTRATION; PERINEURAL
Status: DISCONTINUED
Start: 2019-01-01 | End: 2019-01-01 | Stop reason: HOSPADM

## 2019-01-01 RX ORDER — DEXTROSE, SODIUM CHLORIDE, SODIUM LACTATE, POTASSIUM CHLORIDE, AND CALCIUM CHLORIDE 5; .6; .31; .03; .02 G/100ML; G/100ML; G/100ML; G/100ML; G/100ML
1000 INJECTION, SOLUTION INTRAVENOUS ONCE
Status: COMPLETED | OUTPATIENT
Start: 2019-01-01 | End: 2019-01-01

## 2019-01-01 RX ORDER — FLASH GLUCOSE SCANNING READER
1 EACH MISCELLANEOUS
Qty: 4 DEVICE | Refills: 3 | Status: SHIPPED | OUTPATIENT
Start: 2019-01-01 | End: 2019-01-01

## 2019-01-01 RX ORDER — LIDOCAINE HYDROCHLORIDE 10 MG/ML
INJECTION, SOLUTION INFILTRATION; PERINEURAL
Status: COMPLETED
Start: 2019-01-01 | End: 2019-01-01

## 2019-01-01 RX ORDER — MORPHINE SULFATE 10 MG/5ML
10 SOLUTION ORAL EVERY 4 HOURS PRN
Status: DISCONTINUED | OUTPATIENT
Start: 2019-01-01 | End: 2019-01-01 | Stop reason: HOSPADM

## 2019-01-01 RX ORDER — SIMETHICONE 80 MG
80-160 TABLET,CHEWABLE ORAL 2 TIMES DAILY PRN
Status: DISCONTINUED | OUTPATIENT
Start: 2019-01-01 | End: 2019-01-01 | Stop reason: HOSPADM

## 2019-01-01 RX ORDER — POTASSIUM CHLORIDE 29.8 MG/ML
20 INJECTION INTRAVENOUS
Status: DISCONTINUED | OUTPATIENT
Start: 2019-01-01 | End: 2019-01-01 | Stop reason: HOSPADM

## 2019-01-01 RX ORDER — SODIUM CHLORIDE 9 MG/ML
1000 INJECTION, SOLUTION INTRAVENOUS DAILY
Qty: 5000 ML | Refills: 0 | Status: SHIPPED | OUTPATIENT
Start: 2019-01-01 | End: 2019-01-01

## 2019-01-01 RX ORDER — MIRTAZAPINE 15 MG/1
15 TABLET, FILM COATED ORAL AT BEDTIME
Status: DISCONTINUED | OUTPATIENT
Start: 2019-01-01 | End: 2019-01-01 | Stop reason: HOSPADM

## 2019-01-01 RX ORDER — MORPHINE SULFATE 100 MG/5ML
10 SOLUTION ORAL
Qty: 30 ML | Refills: 0 | Status: SHIPPED | OUTPATIENT
Start: 2019-01-01

## 2019-01-01 RX ORDER — AMOXICILLIN 250 MG
1 CAPSULE ORAL 2 TIMES DAILY
Status: DISCONTINUED | OUTPATIENT
Start: 2019-01-01 | End: 2019-01-01 | Stop reason: HOSPADM

## 2019-01-01 RX ORDER — OXYCODONE HYDROCHLORIDE 5 MG/1
5 CAPSULE ORAL EVERY 4 HOURS PRN
Status: ON HOLD | COMMUNITY
End: 2019-01-01

## 2019-01-01 RX ORDER — TRAMADOL HYDROCHLORIDE 50 MG/1
50 TABLET ORAL EVERY 4 HOURS PRN
COMMUNITY
End: 2019-01-01

## 2019-01-01 RX ORDER — FLUMAZENIL 0.1 MG/ML
0.2 INJECTION, SOLUTION INTRAVENOUS
Status: DISCONTINUED | OUTPATIENT
Start: 2019-01-01 | End: 2019-01-01

## 2019-01-01 RX ORDER — OXYCODONE HYDROCHLORIDE 5 MG/1
5 TABLET ORAL
Status: DISCONTINUED | OUTPATIENT
Start: 2019-01-01 | End: 2019-01-01 | Stop reason: HOSPADM

## 2019-01-01 RX ORDER — LIDOCAINE HYDROCHLORIDE AND EPINEPHRINE 10; 10 MG/ML; UG/ML
INJECTION, SOLUTION INFILTRATION; PERINEURAL
Status: COMPLETED
Start: 2019-01-01 | End: 2019-01-01

## 2019-01-01 RX ORDER — DEXTROSE MONOHYDRATE 50 MG/ML
INJECTION, SOLUTION INTRAVENOUS CONTINUOUS
Status: DISCONTINUED | OUTPATIENT
Start: 2019-01-01 | End: 2019-01-01 | Stop reason: HOSPADM

## 2019-01-01 RX ADMIN — Medication 100 MG: at 20:26

## 2019-01-01 RX ADMIN — SENNOSIDES AND DOCUSATE SODIUM 2 TABLET: 8.6; 5 TABLET ORAL at 08:12

## 2019-01-01 RX ADMIN — MORPHINE SULFATE 10 MG: 10 SOLUTION ORAL at 14:35

## 2019-01-01 RX ADMIN — MORPHINE SULFATE 15 MG: 15 TABLET, EXTENDED RELEASE ORAL at 08:47

## 2019-01-01 RX ADMIN — CEFAZOLIN SODIUM 2 G: 2 INJECTION, SOLUTION INTRAVENOUS at 10:52

## 2019-01-01 RX ADMIN — MORPHINE SULFATE 15 MG: 15 TABLET, EXTENDED RELEASE ORAL at 06:36

## 2019-01-01 RX ADMIN — SODIUM CHLORIDE, POTASSIUM CHLORIDE, SODIUM LACTATE AND CALCIUM CHLORIDE: 600; 310; 30; 20 INJECTION, SOLUTION INTRAVENOUS at 14:35

## 2019-01-01 RX ADMIN — SODIUM CHLORIDE 59 ML: 9 INJECTION, SOLUTION INTRAVENOUS at 12:15

## 2019-01-01 RX ADMIN — MIDAZOLAM 2 MG: 1 INJECTION INTRAMUSCULAR; INTRAVENOUS at 14:56

## 2019-01-01 RX ADMIN — ROCURONIUM BROMIDE 40 MG: 10 INJECTION INTRAVENOUS at 15:02

## 2019-01-01 RX ADMIN — Medication 100 MG: at 08:18

## 2019-01-01 RX ADMIN — POLYETHYLENE GLYCOL 3350 17 G: 17 POWDER, FOR SOLUTION ORAL at 08:10

## 2019-01-01 RX ADMIN — ENOXAPARIN SODIUM 40 MG: 40 INJECTION SUBCUTANEOUS at 18:04

## 2019-01-01 RX ADMIN — ONDANSETRON 4 MG: 2 INJECTION INTRAMUSCULAR; INTRAVENOUS at 12:01

## 2019-01-01 RX ADMIN — POTASSIUM CHLORIDE 40 MEQ: 1500 TABLET, EXTENDED RELEASE ORAL at 08:21

## 2019-01-01 RX ADMIN — DRONABINOL 10 MG: 2.5 CAPSULE ORAL at 15:29

## 2019-01-01 RX ADMIN — PIPERACILLIN SODIUM AND TAZOBACTAM SODIUM 3.38 G: 3; .375 INJECTION, POWDER, LYOPHILIZED, FOR SOLUTION INTRAVENOUS at 17:33

## 2019-01-01 RX ADMIN — ONDANSETRON HYDROCHLORIDE 4 MG: 4 SOLUTION ORAL at 20:08

## 2019-01-01 RX ADMIN — MORPHINE SULFATE 15 MG: 15 TABLET, EXTENDED RELEASE ORAL at 21:22

## 2019-01-01 RX ADMIN — LORAZEPAM 0.5 MG: 0.5 TABLET ORAL at 11:14

## 2019-01-01 RX ADMIN — FENTANYL CITRATE 50 MCG: 50 INJECTION, SOLUTION INTRAMUSCULAR; INTRAVENOUS at 15:01

## 2019-01-01 RX ADMIN — INSULIN ASPART 1 UNITS: 100 INJECTION, SOLUTION INTRAVENOUS; SUBCUTANEOUS at 21:23

## 2019-01-01 RX ADMIN — CEFAZOLIN SODIUM 2 G: 2 INJECTION, SOLUTION INTRAVENOUS at 18:00

## 2019-01-01 RX ADMIN — CEFAZOLIN SODIUM 2 G: 2 INJECTION, SOLUTION INTRAVENOUS at 01:02

## 2019-01-01 RX ADMIN — DRONABINOL 10 MG: 2.5 CAPSULE ORAL at 08:12

## 2019-01-01 RX ADMIN — MORPHINE SULFATE 15 MG: 15 TABLET, EXTENDED RELEASE ORAL at 08:39

## 2019-01-01 RX ADMIN — SODIUM CHLORIDE, POTASSIUM CHLORIDE, SODIUM LACTATE AND CALCIUM CHLORIDE: 600; 310; 30; 20 INJECTION, SOLUTION INTRAVENOUS at 14:55

## 2019-01-01 RX ADMIN — LORAZEPAM 0.5 MG: 0.5 TABLET ORAL at 09:18

## 2019-01-01 RX ADMIN — INSULIN ASPART 2 UNITS: 100 INJECTION, SOLUTION INTRAVENOUS; SUBCUTANEOUS at 12:39

## 2019-01-01 RX ADMIN — LIDOCAINE HYDROCHLORIDE 10 ML: 10 INJECTION, SOLUTION INFILTRATION; PERINEURAL at 13:53

## 2019-01-01 RX ADMIN — ONDANSETRON HYDROCHLORIDE 4 MG: 4 SOLUTION ORAL at 19:43

## 2019-01-01 RX ADMIN — MORPHINE SULFATE 15 MG: 15 TABLET, EXTENDED RELEASE ORAL at 06:56

## 2019-01-01 RX ADMIN — POTASSIUM CHLORIDE AND SODIUM CHLORIDE: 900; 150 INJECTION, SOLUTION INTRAVENOUS at 00:16

## 2019-01-01 RX ADMIN — MULTIVITAMIN 15 ML: LIQUID ORAL at 08:18

## 2019-01-01 RX ADMIN — PHENYLEPHRINE HYDROCHLORIDE 100 MCG: 10 INJECTION INTRAVENOUS at 15:19

## 2019-01-01 RX ADMIN — POTASSIUM CHLORIDE AND SODIUM CHLORIDE: 900; 150 INJECTION, SOLUTION INTRAVENOUS at 04:54

## 2019-01-01 RX ADMIN — DEXTROSE MONOHYDRATE: 50 INJECTION, SOLUTION INTRAVENOUS at 09:54

## 2019-01-01 RX ADMIN — ALBUMIN HUMAN 25 G: 0.05 INJECTION, SOLUTION INTRAVENOUS at 00:26

## 2019-01-01 RX ADMIN — INSULIN GLARGINE 6 UNITS: 100 INJECTION, SOLUTION SUBCUTANEOUS at 21:19

## 2019-01-01 RX ADMIN — FENTANYL CITRATE 100 MCG: 50 INJECTION, SOLUTION INTRAMUSCULAR; INTRAVENOUS at 14:31

## 2019-01-01 RX ADMIN — PIPERACILLIN SODIUM AND TAZOBACTAM SODIUM 3.38 G: 3; .375 INJECTION, POWDER, LYOPHILIZED, FOR SOLUTION INTRAVENOUS at 12:30

## 2019-01-01 RX ADMIN — MIDAZOLAM 1 MG: 1 INJECTION INTRAMUSCULAR; INTRAVENOUS at 14:22

## 2019-01-01 RX ADMIN — PHENYLEPHRINE HYDROCHLORIDE 100 MCG: 10 INJECTION INTRAVENOUS at 15:13

## 2019-01-01 RX ADMIN — SODIUM CHLORIDE, POTASSIUM CHLORIDE, SODIUM LACTATE AND CALCIUM CHLORIDE: 600; 310; 30; 20 INJECTION, SOLUTION INTRAVENOUS at 05:00

## 2019-01-01 RX ADMIN — ONDANSETRON 4 MG: 2 INJECTION INTRAMUSCULAR; INTRAVENOUS at 16:26

## 2019-01-01 RX ADMIN — DOCUSATE SODIUM 286 ML: 50 LIQUID ORAL at 02:30

## 2019-01-01 RX ADMIN — ONDANSETRON 4 MG: 2 INJECTION INTRAMUSCULAR; INTRAVENOUS at 14:27

## 2019-01-01 RX ADMIN — DEXAMETHASONE SODIUM PHOSPHATE 4 MG: 4 INJECTION, SOLUTION INTRA-ARTICULAR; INTRALESIONAL; INTRAMUSCULAR; INTRAVENOUS; SOFT TISSUE at 15:13

## 2019-01-01 RX ADMIN — MORPHINE SULFATE 15 MG: 15 TABLET, EXTENDED RELEASE ORAL at 20:06

## 2019-01-01 RX ADMIN — SODIUM CHLORIDE, POTASSIUM CHLORIDE, SODIUM LACTATE AND CALCIUM CHLORIDE 1000 ML: 600; 310; 30; 20 INJECTION, SOLUTION INTRAVENOUS at 10:31

## 2019-01-01 RX ADMIN — INSULIN ASPART 1 UNITS: 100 INJECTION, SOLUTION INTRAVENOUS; SUBCUTANEOUS at 19:57

## 2019-01-01 RX ADMIN — SENNOSIDES AND DOCUSATE SODIUM 1 TABLET: 8.6; 5 TABLET ORAL at 20:06

## 2019-01-01 RX ADMIN — INSULIN GLARGINE 12 UNITS: 100 INJECTION, SOLUTION SUBCUTANEOUS at 23:19

## 2019-01-01 RX ADMIN — DRONABINOL 10 MG: 2.5 CAPSULE ORAL at 21:54

## 2019-01-01 RX ADMIN — POLYETHYLENE GLYCOL 3350 17 G: 17 POWDER, FOR SOLUTION ORAL at 08:21

## 2019-01-01 RX ADMIN — FENTANYL CITRATE 25 MCG: 50 INJECTION, SOLUTION INTRAMUSCULAR; INTRAVENOUS at 08:19

## 2019-01-01 RX ADMIN — CEFAZOLIN SODIUM 2 G: 2 INJECTION, SOLUTION INTRAVENOUS at 00:26

## 2019-01-01 RX ADMIN — SODIUM CHLORIDE 1000 ML: 9 INJECTION, SOLUTION INTRAVENOUS at 23:53

## 2019-01-01 RX ADMIN — PIPERACILLIN SODIUM,TAZOBACTAM SODIUM 3.38 G: 3; .375 INJECTION, POWDER, FOR SOLUTION INTRAVENOUS at 00:30

## 2019-01-01 RX ADMIN — IOPAMIDOL 74 ML: 755 INJECTION, SOLUTION INTRAVENOUS at 12:15

## 2019-01-01 RX ADMIN — INSULIN ASPART 1 UNITS: 100 INJECTION, SOLUTION INTRAVENOUS; SUBCUTANEOUS at 16:01

## 2019-01-01 RX ADMIN — MORPHINE SULFATE 15 MG: 15 TABLET, EXTENDED RELEASE ORAL at 20:00

## 2019-01-01 RX ADMIN — ROCURONIUM BROMIDE 10 MG: 10 INJECTION INTRAVENOUS at 15:45

## 2019-01-01 RX ADMIN — CEFAZOLIN SODIUM 2 G: 2 INJECTION, SOLUTION INTRAVENOUS at 00:16

## 2019-01-01 RX ADMIN — OXYCODONE HYDROCHLORIDE 5 MG: 5 TABLET ORAL at 08:12

## 2019-01-01 RX ADMIN — POLYETHYLENE GLYCOL 3350 17 G: 17 POWDER, FOR SOLUTION ORAL at 08:22

## 2019-01-01 RX ADMIN — MORPHINE SULFATE 15 MG: 15 TABLET, EXTENDED RELEASE ORAL at 09:20

## 2019-01-01 RX ADMIN — FENTANYL CITRATE 25 MCG: 50 INJECTION, SOLUTION INTRAMUSCULAR; INTRAVENOUS at 08:15

## 2019-01-01 RX ADMIN — Medication 5 MG: at 15:32

## 2019-01-01 RX ADMIN — SENNOSIDES AND DOCUSATE SODIUM 1 TABLET: 8.6; 5 TABLET ORAL at 08:22

## 2019-01-01 RX ADMIN — LORAZEPAM 0.5 MG: 0.5 TABLET ORAL at 08:47

## 2019-01-01 RX ADMIN — ONDANSETRON 4 MG: 2 INJECTION INTRAMUSCULAR; INTRAVENOUS at 09:21

## 2019-01-01 RX ADMIN — DRONABINOL 10 MG: 2.5 CAPSULE ORAL at 20:07

## 2019-01-01 RX ADMIN — PIPERACILLIN SODIUM,TAZOBACTAM SODIUM 3.38 G: 3; .375 INJECTION, POWDER, FOR SOLUTION INTRAVENOUS at 18:31

## 2019-01-01 RX ADMIN — SENNOSIDES AND DOCUSATE SODIUM 1 TABLET: 8.6; 5 TABLET ORAL at 08:00

## 2019-01-01 RX ADMIN — CEFAZOLIN SODIUM 2 G: 2 INJECTION, SOLUTION INTRAVENOUS at 08:22

## 2019-01-01 RX ADMIN — ROCURONIUM BROMIDE 35 MG: 10 INJECTION INTRAVENOUS at 14:30

## 2019-01-01 RX ADMIN — MORPHINE SULFATE 15 MG: 15 TABLET, EXTENDED RELEASE ORAL at 08:07

## 2019-01-01 RX ADMIN — MIDAZOLAM 1 MG: 1 INJECTION INTRAMUSCULAR; INTRAVENOUS at 08:15

## 2019-01-01 RX ADMIN — PHENYLEPHRINE HYDROCHLORIDE 100 MCG: 10 INJECTION INTRAVENOUS at 15:03

## 2019-01-01 RX ADMIN — HYDROMORPHONE HYDROCHLORIDE 0.2 MG: 1 INJECTION, SOLUTION INTRAMUSCULAR; INTRAVENOUS; SUBCUTANEOUS at 05:31

## 2019-01-01 RX ADMIN — MIDAZOLAM HYDROCHLORIDE 1 MG: 1 INJECTION, SOLUTION INTRAMUSCULAR; INTRAVENOUS at 10:13

## 2019-01-01 RX ADMIN — IOPAMIDOL 98 ML: 755 INJECTION, SOLUTION INTRAVENOUS at 20:16

## 2019-01-01 RX ADMIN — SODIUM CHLORIDE, SODIUM LACTATE, POTASSIUM CHLORIDE, CALCIUM CHLORIDE AND DEXTROSE MONOHYDRATE 1000 ML: 5; 600; 310; 30; 20 INJECTION, SOLUTION INTRAVENOUS at 00:56

## 2019-01-01 RX ADMIN — DRONABINOL 10 MG: 2.5 CAPSULE ORAL at 08:53

## 2019-01-01 RX ADMIN — POLYETHYLENE GLYCOL 3350 17 G: 17 POWDER, FOR SOLUTION ORAL at 08:12

## 2019-01-01 RX ADMIN — ONDANSETRON 4 MG: 4 TABLET, ORALLY DISINTEGRATING ORAL at 07:23

## 2019-01-01 RX ADMIN — PIPERACILLIN SODIUM,TAZOBACTAM SODIUM 3.38 G: 3; .375 INJECTION, POWDER, FOR SOLUTION INTRAVENOUS at 05:33

## 2019-01-01 RX ADMIN — SODIUM CHLORIDE, POTASSIUM CHLORIDE, SODIUM LACTATE AND CALCIUM CHLORIDE: 600; 310; 30; 20 INJECTION, SOLUTION INTRAVENOUS at 04:09

## 2019-01-01 RX ADMIN — SODIUM CHLORIDE 62 ML: 9 INJECTION, SOLUTION INTRAVENOUS at 16:39

## 2019-01-01 RX ADMIN — SODIUM CHLORIDE, POTASSIUM CHLORIDE, SODIUM LACTATE AND CALCIUM CHLORIDE: 600; 310; 30; 20 INJECTION, SOLUTION INTRAVENOUS at 15:11

## 2019-01-01 RX ADMIN — MORPHINE SULFATE 10 MG: 10 SOLUTION ORAL at 10:46

## 2019-01-01 RX ADMIN — SODIUM CHLORIDE 1000 ML: 9 INJECTION, SOLUTION INTRAVENOUS at 14:41

## 2019-01-01 RX ADMIN — SODIUM CHLORIDE 1000 ML: 9 INJECTION, SOLUTION INTRAVENOUS at 13:27

## 2019-01-01 RX ADMIN — PIPERACILLIN SODIUM,TAZOBACTAM SODIUM 3.38 G: 3; .375 INJECTION, POWDER, FOR SOLUTION INTRAVENOUS at 09:40

## 2019-01-01 RX ADMIN — CEFAZOLIN SODIUM 2 G: 2 INJECTION, SOLUTION INTRAVENOUS at 08:20

## 2019-01-01 RX ADMIN — POTASSIUM CHLORIDE 20 MEQ: 1500 TABLET, EXTENDED RELEASE ORAL at 09:20

## 2019-01-01 RX ADMIN — SENNOSIDES AND DOCUSATE SODIUM 1 TABLET: 8.6; 5 TABLET ORAL at 21:19

## 2019-01-01 RX ADMIN — DRONABINOL 10 MG: 2.5 CAPSULE ORAL at 09:27

## 2019-01-01 RX ADMIN — DRONABINOL 10 MG: 2.5 CAPSULE ORAL at 14:05

## 2019-01-01 RX ADMIN — SULFAMETHOXAZOLE AND TRIMETHOPRIM 1 TABLET: 800; 160 TABLET ORAL at 19:17

## 2019-01-01 RX ADMIN — PIPERACILLIN SODIUM,TAZOBACTAM SODIUM 3.38 G: 3; .375 INJECTION, POWDER, FOR SOLUTION INTRAVENOUS at 05:30

## 2019-01-01 RX ADMIN — PHENYLEPHRINE HYDROCHLORIDE 100 MCG: 10 INJECTION INTRAVENOUS at 15:24

## 2019-01-01 RX ADMIN — FENTANYL CITRATE 25 MCG: 50 INJECTION, SOLUTION INTRAMUSCULAR; INTRAVENOUS at 10:16

## 2019-01-01 RX ADMIN — ONDANSETRON HYDROCHLORIDE 4 MG: 4 SOLUTION ORAL at 08:18

## 2019-01-01 RX ADMIN — PHENYLEPHRINE HYDROCHLORIDE 200 MCG: 10 INJECTION INTRAVENOUS at 14:35

## 2019-01-01 RX ADMIN — IOPAMIDOL 84 ML: 755 INJECTION, SOLUTION INTRAVENOUS at 17:18

## 2019-01-01 RX ADMIN — PIPERACILLIN SODIUM AND TAZOBACTAM SODIUM 3.38 G: 3; .375 INJECTION, POWDER, LYOPHILIZED, FOR SOLUTION INTRAVENOUS at 01:41

## 2019-01-01 RX ADMIN — MORPHINE SULFATE 15 MG: 15 TABLET, EXTENDED RELEASE ORAL at 19:17

## 2019-01-01 RX ADMIN — MORPHINE SULFATE 15 MG: 15 TABLET, EXTENDED RELEASE ORAL at 20:20

## 2019-01-01 RX ADMIN — PIPERACILLIN SODIUM,TAZOBACTAM SODIUM 3.38 G: 3; .375 INJECTION, POWDER, FOR SOLUTION INTRAVENOUS at 14:03

## 2019-01-01 RX ADMIN — LORAZEPAM 0.5 MG: 0.5 TABLET ORAL at 17:36

## 2019-01-01 RX ADMIN — DRONABINOL 10 MG: 2.5 CAPSULE ORAL at 08:07

## 2019-01-01 RX ADMIN — SODIUM CHLORIDE, POTASSIUM CHLORIDE, SODIUM LACTATE AND CALCIUM CHLORIDE: 600; 310; 30; 20 INJECTION, SOLUTION INTRAVENOUS at 04:12

## 2019-01-01 RX ADMIN — LORAZEPAM 0.5 MG: 0.5 TABLET ORAL at 18:09

## 2019-01-01 RX ADMIN — POTASSIUM CHLORIDE 20 MEQ: 1500 TABLET, EXTENDED RELEASE ORAL at 10:32

## 2019-01-01 RX ADMIN — CEFAZOLIN SODIUM 2 G: 2 INJECTION, SOLUTION INTRAVENOUS at 00:35

## 2019-01-01 RX ADMIN — SODIUM CHLORIDE 1000 ML: 9 INJECTION, SOLUTION INTRAVENOUS at 00:17

## 2019-01-01 RX ADMIN — PIPERACILLIN SODIUM AND TAZOBACTAM SODIUM 3.38 G: 3; .375 INJECTION, POWDER, LYOPHILIZED, FOR SOLUTION INTRAVENOUS at 17:00

## 2019-01-01 RX ADMIN — SODIUM CHLORIDE: 9 INJECTION, SOLUTION INTRAVENOUS at 06:54

## 2019-01-01 RX ADMIN — POLYETHYLENE GLYCOL 3350 17 G: 17 POWDER, FOR SOLUTION ORAL at 08:53

## 2019-01-01 RX ADMIN — POTASSIUM CHLORIDE AND SODIUM CHLORIDE: 900; 150 INJECTION, SOLUTION INTRAVENOUS at 11:05

## 2019-01-01 RX ADMIN — MIRTAZAPINE 7.5 MG: 7.5 TABLET ORAL at 21:56

## 2019-01-01 RX ADMIN — PROPOFOL 150 MG: 10 INJECTION, EMULSION INTRAVENOUS at 15:02

## 2019-01-01 RX ADMIN — SODIUM CHLORIDE, POTASSIUM CHLORIDE, SODIUM LACTATE AND CALCIUM CHLORIDE 1500 ML: 600; 310; 30; 20 INJECTION, SOLUTION INTRAVENOUS at 19:13

## 2019-01-01 RX ADMIN — SODIUM CHLORIDE: 9 INJECTION, SOLUTION INTRAVENOUS at 18:02

## 2019-01-01 RX ADMIN — ONDANSETRON HYDROCHLORIDE 4 MG: 2 INJECTION, SOLUTION INTRAMUSCULAR; INTRAVENOUS at 23:53

## 2019-01-01 RX ADMIN — DRONABINOL 10 MG: 2.5 CAPSULE ORAL at 08:10

## 2019-01-01 RX ADMIN — PIPERACILLIN SODIUM AND TAZOBACTAM SODIUM 3.38 G: 3; .375 INJECTION, POWDER, LYOPHILIZED, FOR SOLUTION INTRAVENOUS at 12:02

## 2019-01-01 RX ADMIN — CALCIUM CARBONATE (ANTACID) CHEW TAB 500 MG 1000 MG: 500 CHEW TAB at 15:19

## 2019-01-01 RX ADMIN — INSULIN ASPART 1 UNITS: 100 INJECTION, SOLUTION INTRAVENOUS; SUBCUTANEOUS at 14:24

## 2019-01-01 RX ADMIN — HYDROMORPHONE HYDROCHLORIDE 0.2 MG: 1 INJECTION, SOLUTION INTRAMUSCULAR; INTRAVENOUS; SUBCUTANEOUS at 00:23

## 2019-01-01 RX ADMIN — MORPHINE SULFATE 15 MG: 15 TABLET, EXTENDED RELEASE ORAL at 08:20

## 2019-01-01 RX ADMIN — GLYCOPYRROLATE 0.2 MG: 0.2 INJECTION, SOLUTION INTRAMUSCULAR; INTRAVENOUS at 14:30

## 2019-01-01 RX ADMIN — SODIUM CHLORIDE, POTASSIUM CHLORIDE, SODIUM LACTATE AND CALCIUM CHLORIDE: 600; 310; 30; 20 INJECTION, SOLUTION INTRAVENOUS at 18:06

## 2019-01-01 RX ADMIN — SODIUM CHLORIDE 1000 ML: 9 INJECTION, SOLUTION INTRAVENOUS at 19:12

## 2019-01-01 RX ADMIN — HYDROMORPHONE HYDROCHLORIDE 1 MG: 1 INJECTION, SOLUTION INTRAMUSCULAR; INTRAVENOUS; SUBCUTANEOUS at 10:31

## 2019-01-01 RX ADMIN — ONDANSETRON HYDROCHLORIDE 4 MG: 4 SOLUTION ORAL at 19:24

## 2019-01-01 RX ADMIN — CEFAZOLIN SODIUM 2 G: 2 INJECTION, SOLUTION INTRAVENOUS at 10:06

## 2019-01-01 RX ADMIN — VANCOMYCIN HYDROCHLORIDE 1500 MG: 5 INJECTION, POWDER, LYOPHILIZED, FOR SOLUTION INTRAVENOUS at 10:28

## 2019-01-01 RX ADMIN — INSULIN ASPART 1 UNITS: 100 INJECTION, SOLUTION INTRAVENOUS; SUBCUTANEOUS at 12:01

## 2019-01-01 RX ADMIN — MIRTAZAPINE 15 MG: 15 TABLET, FILM COATED ORAL at 18:31

## 2019-01-01 RX ADMIN — SODIUM CHLORIDE: 9 INJECTION, SOLUTION INTRAVENOUS at 16:30

## 2019-01-01 RX ADMIN — INSULIN ASPART 1 UNITS: 100 INJECTION, SOLUTION INTRAVENOUS; SUBCUTANEOUS at 16:22

## 2019-01-01 RX ADMIN — ONDANSETRON 4 MG: 2 INJECTION INTRAMUSCULAR; INTRAVENOUS at 19:18

## 2019-01-01 RX ADMIN — DEXTROSE MONOHYDRATE 25 ML: 500 INJECTION PARENTERAL at 12:42

## 2019-01-01 RX ADMIN — SENNOSIDES AND DOCUSATE SODIUM 1 TABLET: 8.6; 5 TABLET ORAL at 08:07

## 2019-01-01 RX ADMIN — CEFAZOLIN SODIUM 2 G: 2 INJECTION, SOLUTION INTRAVENOUS at 18:11

## 2019-01-01 RX ADMIN — Medication 5 MG: at 15:37

## 2019-01-01 RX ADMIN — MORPHINE SULFATE 15 MG: 15 TABLET, EXTENDED RELEASE ORAL at 08:03

## 2019-01-01 RX ADMIN — POLYETHYLENE GLYCOL 3350 17 G: 17 POWDER, FOR SOLUTION ORAL at 08:34

## 2019-01-01 RX ADMIN — LIDOCAINE HYDROCHLORIDE: 20 JELLY TOPICAL at 10:12

## 2019-01-01 RX ADMIN — MAGNESIUM SULFATE HEPTAHYDRATE 2 G: 40 INJECTION, SOLUTION INTRAVENOUS at 12:07

## 2019-01-01 RX ADMIN — MORPHINE SULFATE 15 MG: 15 TABLET, EXTENDED RELEASE ORAL at 07:41

## 2019-01-01 RX ADMIN — INSULIN GLARGINE 12 UNITS: 100 INJECTION, SOLUTION SUBCUTANEOUS at 21:54

## 2019-01-01 RX ADMIN — SENNOSIDES AND DOCUSATE SODIUM 1 TABLET: 8.6; 5 TABLET ORAL at 21:41

## 2019-01-01 RX ADMIN — POLYETHYLENE GLYCOL 3350 17 G: 17 POWDER, FOR SOLUTION ORAL at 08:00

## 2019-01-01 RX ADMIN — INSULIN GLARGINE 12 UNITS: 100 INJECTION, SOLUTION SUBCUTANEOUS at 22:21

## 2019-01-01 RX ADMIN — ONDANSETRON 4 MG: 2 INJECTION INTRAMUSCULAR; INTRAVENOUS at 17:37

## 2019-01-01 RX ADMIN — MORPHINE SULFATE 15 MG: 15 TABLET, EXTENDED RELEASE ORAL at 21:25

## 2019-01-01 RX ADMIN — MORPHINE SULFATE 15 MG: 15 TABLET, EXTENDED RELEASE ORAL at 19:15

## 2019-01-01 RX ADMIN — DEXTROSE MONOHYDRATE: 50 INJECTION, SOLUTION INTRAVENOUS at 09:08

## 2019-01-01 RX ADMIN — CEFAZOLIN SODIUM 2 G: 2 INJECTION, SOLUTION INTRAVENOUS at 02:52

## 2019-01-01 RX ADMIN — MORPHINE SULFATE 15 MG: 15 TABLET, EXTENDED RELEASE ORAL at 08:11

## 2019-01-01 RX ADMIN — DRONABINOL 10 MG: 2.5 CAPSULE ORAL at 20:09

## 2019-01-01 RX ADMIN — POLYETHYLENE GLYCOL 3350 17 G: 17 POWDER, FOR SOLUTION ORAL at 16:46

## 2019-01-01 RX ADMIN — MORPHINE SULFATE 15 MG: 15 TABLET, EXTENDED RELEASE ORAL at 21:41

## 2019-01-01 RX ADMIN — POTASSIUM CHLORIDE AND SODIUM CHLORIDE: 900; 150 INJECTION, SOLUTION INTRAVENOUS at 23:17

## 2019-01-01 RX ADMIN — CEFAZOLIN SODIUM 2 G: 2 INJECTION, SOLUTION INTRAVENOUS at 16:38

## 2019-01-01 RX ADMIN — ACETAMINOPHEN 650 MG: 325 TABLET ORAL at 08:00

## 2019-01-01 RX ADMIN — SENNOSIDES AND DOCUSATE SODIUM 2 TABLET: 8.6; 5 TABLET ORAL at 08:39

## 2019-01-01 RX ADMIN — CALCIUM CARBONATE (ANTACID) CHEW TAB 500 MG 1000 MG: 500 CHEW TAB at 06:10

## 2019-01-01 RX ADMIN — SULFAMETHOXAZOLE AND TRIMETHOPRIM 160 MG: 200; 40 SUSPENSION ORAL at 20:08

## 2019-01-01 RX ADMIN — PROCHLORPERAZINE MALEATE 5 MG: 5 TABLET, FILM COATED ORAL at 17:56

## 2019-01-01 RX ADMIN — SUGAMMADEX 200 MG: 100 INJECTION, SOLUTION INTRAVENOUS at 16:05

## 2019-01-01 RX ADMIN — SENNOSIDES AND DOCUSATE SODIUM 2 TABLET: 8.6; 5 TABLET ORAL at 20:00

## 2019-01-01 RX ADMIN — MIDAZOLAM HYDROCHLORIDE 1 MG: 1 INJECTION, SOLUTION INTRAMUSCULAR; INTRAVENOUS at 13:44

## 2019-01-01 RX ADMIN — MIDAZOLAM 0.5 MG: 1 INJECTION INTRAMUSCULAR; INTRAVENOUS at 08:19

## 2019-01-01 RX ADMIN — FENTANYL CITRATE 50 MCG: 50 INJECTION, SOLUTION INTRAMUSCULAR; INTRAVENOUS at 15:44

## 2019-01-01 RX ADMIN — PIPERACILLIN SODIUM,TAZOBACTAM SODIUM 3.38 G: 3; .375 INJECTION, POWDER, FOR SOLUTION INTRAVENOUS at 23:47

## 2019-01-01 RX ADMIN — PIPERACILLIN SODIUM,TAZOBACTAM SODIUM 3.38 G: 3; .375 INJECTION, POWDER, FOR SOLUTION INTRAVENOUS at 12:15

## 2019-01-01 RX ADMIN — SENNOSIDES AND DOCUSATE SODIUM 1 TABLET: 8.6; 5 TABLET ORAL at 08:03

## 2019-01-01 RX ADMIN — MIRTAZAPINE 15 MG: 15 TABLET, FILM COATED ORAL at 20:00

## 2019-01-01 RX ADMIN — ENOXAPARIN SODIUM 40 MG: 40 INJECTION SUBCUTANEOUS at 16:01

## 2019-01-01 RX ADMIN — POTASSIUM CHLORIDE 20 MEQ: 1500 TABLET, EXTENDED RELEASE ORAL at 18:00

## 2019-01-01 RX ADMIN — POLYETHYLENE GLYCOL 3350 17 G: 17 POWDER, FOR SOLUTION ORAL at 08:03

## 2019-01-01 RX ADMIN — LIDOCAINE HYDROCHLORIDE 50 MG: 10 INJECTION, SOLUTION INFILTRATION; PERINEURAL at 14:30

## 2019-01-01 RX ADMIN — ONDANSETRON HYDROCHLORIDE 4 MG: 2 INJECTION, SOLUTION INTRAMUSCULAR; INTRAVENOUS at 10:32

## 2019-01-01 RX ADMIN — SODIUM CHLORIDE 66 ML: 9 INJECTION, SOLUTION INTRAVENOUS at 17:18

## 2019-01-01 RX ADMIN — INSULIN GLARGINE 8 UNITS: 100 INJECTION, SOLUTION SUBCUTANEOUS at 22:28

## 2019-01-01 RX ADMIN — OXYCODONE HYDROCHLORIDE 5 MG: 5 TABLET ORAL at 16:29

## 2019-01-01 RX ADMIN — PANTOPRAZOLE SODIUM 40 MG: 40 INJECTION, POWDER, FOR SOLUTION INTRAVENOUS at 00:28

## 2019-01-01 RX ADMIN — Medication 2 MG: at 14:54

## 2019-01-01 RX ADMIN — SODIUM CHLORIDE, POTASSIUM CHLORIDE, SODIUM LACTATE AND CALCIUM CHLORIDE: 600; 310; 30; 20 INJECTION, SOLUTION INTRAVENOUS at 14:22

## 2019-01-01 RX ADMIN — PIPERACILLIN SODIUM AND TAZOBACTAM SODIUM 3.38 G: 3; .375 INJECTION, POWDER, LYOPHILIZED, FOR SOLUTION INTRAVENOUS at 23:16

## 2019-01-01 RX ADMIN — ONDANSETRON 4 MG: 4 TABLET, ORALLY DISINTEGRATING ORAL at 00:43

## 2019-01-01 RX ADMIN — DRONABINOL 10 MG: 2.5 CAPSULE ORAL at 21:25

## 2019-01-01 RX ADMIN — POTASSIUM CHLORIDE AND SODIUM CHLORIDE: 900; 150 INJECTION, SOLUTION INTRAVENOUS at 09:17

## 2019-01-01 RX ADMIN — PIPERACILLIN SODIUM,TAZOBACTAM SODIUM 3.38 G: 3; .375 INJECTION, POWDER, FOR SOLUTION INTRAVENOUS at 00:37

## 2019-01-01 RX ADMIN — MORPHINE SULFATE 15 MG: 15 TABLET, EXTENDED RELEASE ORAL at 08:14

## 2019-01-01 RX ADMIN — ONDANSETRON 4 MG: 2 INJECTION INTRAMUSCULAR; INTRAVENOUS at 00:11

## 2019-01-01 RX ADMIN — POTASSIUM CHLORIDE 20 MEQ: 1500 TABLET, EXTENDED RELEASE ORAL at 14:58

## 2019-01-01 RX ADMIN — PIPERACILLIN SODIUM AND TAZOBACTAM SODIUM 3.38 G: 3; .375 INJECTION, POWDER, LYOPHILIZED, FOR SOLUTION INTRAVENOUS at 05:52

## 2019-01-01 RX ADMIN — LIDOCAINE HYDROCHLORIDE 20 ML: 10 INJECTION, SOLUTION EPIDURAL; INFILTRATION; INTRACAUDAL; PERINEURAL at 10:30

## 2019-01-01 RX ADMIN — CALCIUM CARBONATE (ANTACID) CHEW TAB 500 MG 1000 MG: 500 CHEW TAB at 16:13

## 2019-01-01 RX ADMIN — MORPHINE SULFATE 15 MG: 15 TABLET, EXTENDED RELEASE ORAL at 06:25

## 2019-01-01 RX ADMIN — SODIUM CHLORIDE: 9 INJECTION, SOLUTION INTRAVENOUS at 05:20

## 2019-01-01 RX ADMIN — DRONABINOL 10 MG: 2.5 CAPSULE ORAL at 08:20

## 2019-01-01 RX ADMIN — CEFAZOLIN SODIUM 2 G: 2 INJECTION, SOLUTION INTRAVENOUS at 16:25

## 2019-01-01 RX ADMIN — MIRTAZAPINE 15 MG: 15 TABLET, FILM COATED ORAL at 22:21

## 2019-01-01 RX ADMIN — PIPERACILLIN SODIUM AND TAZOBACTAM SODIUM 3.38 G: 3; .375 INJECTION, POWDER, LYOPHILIZED, FOR SOLUTION INTRAVENOUS at 06:17

## 2019-01-01 RX ADMIN — LORAZEPAM 0.5 MG: 0.5 TABLET ORAL at 18:02

## 2019-01-01 RX ADMIN — MORPHINE SULFATE 15 MG: 15 TABLET, EXTENDED RELEASE ORAL at 21:18

## 2019-01-01 RX ADMIN — ONDANSETRON HYDROCHLORIDE 4 MG: 4 SOLUTION ORAL at 08:26

## 2019-01-01 RX ADMIN — MULTIVITAMIN 15 ML: LIQUID ORAL at 17:57

## 2019-01-01 RX ADMIN — CEFAZOLIN SODIUM 2 G: 2 INJECTION, SOLUTION INTRAVENOUS at 08:08

## 2019-01-01 RX ADMIN — DRONABINOL 10 MG: 2.5 CAPSULE ORAL at 20:01

## 2019-01-01 RX ADMIN — HEPARIN 500 UNITS: 100 SYRINGE at 08:24

## 2019-01-01 RX ADMIN — INSULIN GLARGINE 12 UNITS: 100 INJECTION, SOLUTION SUBCUTANEOUS at 21:59

## 2019-01-01 RX ADMIN — SENNOSIDES AND DOCUSATE SODIUM 1 TABLET: 8.6; 5 TABLET ORAL at 19:15

## 2019-01-01 RX ADMIN — POTASSIUM CHLORIDE AND SODIUM CHLORIDE: 900; 150 INJECTION, SOLUTION INTRAVENOUS at 15:37

## 2019-01-01 RX ADMIN — LIDOCAINE HYDROCHLORIDE 80 MG: 20 INJECTION, SOLUTION INFILTRATION; PERINEURAL at 15:01

## 2019-01-01 RX ADMIN — MORPHINE SULFATE 15 MG: 15 TABLET, EXTENDED RELEASE ORAL at 20:08

## 2019-01-01 RX ADMIN — ACETAMINOPHEN 650 MG: 325 TABLET, FILM COATED ORAL at 09:45

## 2019-01-01 RX ADMIN — IOPAMIDOL 89 ML: 755 INJECTION, SOLUTION INTRAVENOUS at 12:32

## 2019-01-01 RX ADMIN — LIDOCAINE HYDROCHLORIDE,EPINEPHRINE BITARTRATE 10 ML: 10; .01 INJECTION, SOLUTION INFILTRATION; PERINEURAL at 08:23

## 2019-01-01 RX ADMIN — GLYCOPYRROLATE 0.4 MG: 0.2 INJECTION, SOLUTION INTRAMUSCULAR; INTRAVENOUS at 14:53

## 2019-01-01 RX ADMIN — INSULIN ASPART 1 UNITS: 100 INJECTION, SOLUTION INTRAVENOUS; SUBCUTANEOUS at 08:22

## 2019-01-01 RX ADMIN — CEFAZOLIN SODIUM 2 G: 2 INJECTION, SOLUTION INTRAVENOUS at 15:46

## 2019-01-01 RX ADMIN — ACETAMINOPHEN 650 MG: 650 SUPPOSITORY RECTAL at 16:38

## 2019-01-01 RX ADMIN — MIRTAZAPINE 15 MG: 15 TABLET, FILM COATED ORAL at 19:15

## 2019-01-01 RX ADMIN — DRONABINOL 10 MG: 2.5 CAPSULE ORAL at 20:30

## 2019-01-01 RX ADMIN — FENTANYL CITRATE 50 MCG: 50 INJECTION INTRAMUSCULAR; INTRAVENOUS at 13:37

## 2019-01-01 RX ADMIN — DRONABINOL 10 MG: 2.5 CAPSULE ORAL at 08:39

## 2019-01-01 RX ADMIN — SODIUM CHLORIDE, POTASSIUM CHLORIDE, SODIUM LACTATE AND CALCIUM CHLORIDE 1000 ML: 600; 310; 30; 20 INJECTION, SOLUTION INTRAVENOUS at 13:06

## 2019-01-01 RX ADMIN — MORPHINE SULFATE 15 MG: 15 TABLET, EXTENDED RELEASE ORAL at 19:43

## 2019-01-01 RX ADMIN — SENNOSIDES AND DOCUSATE SODIUM 1 TABLET: 8.6; 5 TABLET ORAL at 08:53

## 2019-01-01 RX ADMIN — POTASSIUM CHLORIDE 20 MEQ: 1500 TABLET, EXTENDED RELEASE ORAL at 16:58

## 2019-01-01 RX ADMIN — CEFAZOLIN SODIUM 2 G: 2 INJECTION, SOLUTION INTRAVENOUS at 08:00

## 2019-01-01 RX ADMIN — ONDANSETRON 4 MG: 2 INJECTION INTRAMUSCULAR; INTRAVENOUS at 18:02

## 2019-01-01 RX ADMIN — SODIUM CHLORIDE 1000 ML: 9 INJECTION, SOLUTION INTRAVENOUS at 12:44

## 2019-01-01 RX ADMIN — VANCOMYCIN HYDROCHLORIDE 1500 MG: 5 INJECTION, POWDER, LYOPHILIZED, FOR SOLUTION INTRAVENOUS at 21:59

## 2019-01-01 RX ADMIN — LORAZEPAM 1 MG: 2 INJECTION INTRAMUSCULAR; INTRAVENOUS at 10:31

## 2019-01-01 RX ADMIN — LEVOFLOXACIN 500 MG: 500 TABLET, FILM COATED ORAL at 08:53

## 2019-01-01 RX ADMIN — INSULIN ASPART 1 UNITS: 100 INJECTION, SOLUTION INTRAVENOUS; SUBCUTANEOUS at 17:40

## 2019-01-01 RX ADMIN — SULFAMETHOXAZOLE AND TRIMETHOPRIM 160 MG: 200; 40 SUSPENSION ORAL at 08:18

## 2019-01-01 RX ADMIN — SENNOSIDES AND DOCUSATE SODIUM 2 TABLET: 8.6; 5 TABLET ORAL at 19:44

## 2019-01-01 RX ADMIN — PIPERACILLIN SODIUM,TAZOBACTAM SODIUM 3.38 G: 3; .375 INJECTION, POWDER, FOR SOLUTION INTRAVENOUS at 13:41

## 2019-01-01 RX ADMIN — CEFAZOLIN SODIUM 2 G: 2 INJECTION, SOLUTION INTRAVENOUS at 08:04

## 2019-01-01 RX ADMIN — HYDROMORPHONE HYDROCHLORIDE 0.5 MG: 1 INJECTION, SOLUTION INTRAMUSCULAR; INTRAVENOUS; SUBCUTANEOUS at 00:39

## 2019-01-01 RX ADMIN — DRONABINOL 10 MG: 2.5 CAPSULE ORAL at 20:21

## 2019-01-01 RX ADMIN — LORAZEPAM 0.5 MG: 0.5 TABLET ORAL at 09:16

## 2019-01-01 RX ADMIN — POLYETHYLENE GLYCOL 3350 17 G: 17 POWDER, FOR SOLUTION ORAL at 08:39

## 2019-01-01 RX ADMIN — MIRTAZAPINE 15 MG: 15 TABLET, FILM COATED ORAL at 22:29

## 2019-01-01 RX ADMIN — INSULIN ASPART 1 UNITS: 100 INJECTION, SOLUTION INTRAVENOUS; SUBCUTANEOUS at 12:00

## 2019-01-01 RX ADMIN — MORPHINE SULFATE 15 MG: 15 TABLET, EXTENDED RELEASE ORAL at 08:53

## 2019-01-01 RX ADMIN — MIDAZOLAM HYDROCHLORIDE 1 MG: 1 INJECTION, SOLUTION INTRAMUSCULAR; INTRAVENOUS at 13:37

## 2019-01-01 RX ADMIN — ONDANSETRON HYDROCHLORIDE 4 MG: 4 SOLUTION ORAL at 21:22

## 2019-01-01 RX ADMIN — CALCIUM CARBONATE (ANTACID) CHEW TAB 500 MG 1000 MG: 500 CHEW TAB at 08:25

## 2019-01-01 RX ADMIN — PIPERACILLIN SODIUM,TAZOBACTAM SODIUM 3.38 G: 3; .375 INJECTION, POWDER, FOR SOLUTION INTRAVENOUS at 18:39

## 2019-01-01 RX ADMIN — DEXTROSE 15 G: 15 GEL ORAL at 02:15

## 2019-01-01 RX ADMIN — MORPHINE SULFATE 15 MG: 15 TABLET, EXTENDED RELEASE ORAL at 20:30

## 2019-01-01 RX ADMIN — POLYETHYLENE GLYCOL 3350 17 G: 17 POWDER, FOR SOLUTION ORAL at 09:16

## 2019-01-01 RX ADMIN — ENOXAPARIN SODIUM 40 MG: 40 INJECTION SUBCUTANEOUS at 16:13

## 2019-01-01 RX ADMIN — MULTIVITAMIN 15 ML: LIQUID ORAL at 08:25

## 2019-01-01 RX ADMIN — FENTANYL CITRATE 50 MCG: 50 INJECTION INTRAMUSCULAR; INTRAVENOUS at 13:44

## 2019-01-01 RX ADMIN — CEFAZOLIN SODIUM 2 G: 2 INJECTION, SOLUTION INTRAVENOUS at 09:36

## 2019-01-01 RX ADMIN — SENNOSIDES AND DOCUSATE SODIUM 1 TABLET: 8.6; 5 TABLET ORAL at 07:41

## 2019-01-01 RX ADMIN — PHENYLEPHRINE HYDROCHLORIDE 200 MCG: 10 INJECTION INTRAVENOUS at 15:37

## 2019-01-01 RX ADMIN — POTASSIUM CHLORIDE AND SODIUM CHLORIDE: 900; 150 INJECTION, SOLUTION INTRAVENOUS at 20:07

## 2019-01-01 RX ADMIN — ONDANSETRON 4 MG: 2 INJECTION INTRAMUSCULAR; INTRAVENOUS at 08:58

## 2019-01-01 RX ADMIN — SULFAMETHOXAZOLE AND TRIMETHOPRIM 1 TABLET: 800; 160 TABLET ORAL at 21:22

## 2019-01-01 RX ADMIN — POLYETHYLENE GLYCOL 3350 17 G: 17 POWDER, FOR SOLUTION ORAL at 08:18

## 2019-01-01 RX ADMIN — ONDANSETRON HYDROCHLORIDE 4 MG: 2 INJECTION, SOLUTION INTRAMUSCULAR; INTRAVENOUS at 12:45

## 2019-01-01 RX ADMIN — MORPHINE SULFATE 15 MG: 15 TABLET, EXTENDED RELEASE ORAL at 08:22

## 2019-01-01 RX ADMIN — ACETAMINOPHEN 650 MG: 325 TABLET ORAL at 18:02

## 2019-01-01 RX ADMIN — DRONABINOL 10 MG: 2.5 CAPSULE ORAL at 07:53

## 2019-01-01 RX ADMIN — ONDANSETRON HYDROCHLORIDE 4 MG: 4 SOLUTION ORAL at 08:17

## 2019-01-01 RX ADMIN — DEXTROSE MONOHYDRATE 25 ML: 500 INJECTION PARENTERAL at 22:41

## 2019-01-01 RX ADMIN — DEXTROSE MONOHYDRATE 50 ML: 500 INJECTION PARENTERAL at 08:55

## 2019-01-01 RX ADMIN — PROPOFOL 200 MG: 10 INJECTION, EMULSION INTRAVENOUS at 14:30

## 2019-01-01 RX ADMIN — HYDROMORPHONE HYDROCHLORIDE 0.5 MG: 1 INJECTION, SOLUTION INTRAMUSCULAR; INTRAVENOUS; SUBCUTANEOUS at 00:17

## 2019-01-01 RX ADMIN — PIPERACILLIN SODIUM,TAZOBACTAM SODIUM 3.38 G: 3; .375 INJECTION, POWDER, FOR SOLUTION INTRAVENOUS at 17:53

## 2019-01-01 RX ADMIN — Medication 15 ML: at 13:36

## 2019-01-01 RX ADMIN — PIPERACILLIN SODIUM,TAZOBACTAM SODIUM 3.38 G: 3; .375 INJECTION, POWDER, FOR SOLUTION INTRAVENOUS at 05:44

## 2019-01-01 RX ADMIN — MORPHINE SULFATE 15 MG: 15 TABLET, EXTENDED RELEASE ORAL at 06:05

## 2019-01-01 RX ADMIN — POTASSIUM CHLORIDE AND SODIUM CHLORIDE: 900; 150 INJECTION, SOLUTION INTRAVENOUS at 16:14

## 2019-01-01 RX ADMIN — POLYETHYLENE GLYCOL 3350 17 G: 17 POWDER, FOR SOLUTION ORAL at 07:41

## 2019-01-01 RX ADMIN — ONDANSETRON 4 MG: 2 INJECTION INTRAMUSCULAR; INTRAVENOUS at 15:13

## 2019-01-01 RX ADMIN — INSULIN ASPART 1 UNITS: 100 INJECTION, SOLUTION INTRAVENOUS; SUBCUTANEOUS at 12:15

## 2019-01-01 RX ADMIN — MORPHINE SULFATE 15 MG: 15 TABLET, EXTENDED RELEASE ORAL at 08:00

## 2019-01-01 RX ADMIN — LIDOCAINE HYDROCHLORIDE 20 ML: 10 INJECTION, SOLUTION INFILTRATION; PERINEURAL at 10:30

## 2019-01-01 RX ADMIN — MIRTAZAPINE 15 MG: 15 TABLET, FILM COATED ORAL at 17:53

## 2019-01-01 RX ADMIN — DRONABINOL 10 MG: 2.5 CAPSULE ORAL at 08:47

## 2019-01-01 RX ADMIN — MORPHINE SULFATE 15 MG: 15 TABLET, EXTENDED RELEASE ORAL at 19:44

## 2019-01-01 RX ADMIN — LIDOCAINE HYDROCHLORIDE 10 ML: 10 INJECTION, SOLUTION EPIDURAL; INFILTRATION; INTRACAUDAL; PERINEURAL at 15:50

## 2019-01-01 RX ADMIN — DRONABINOL 10 MG: 2.5 CAPSULE ORAL at 07:41

## 2019-01-01 RX ADMIN — SODIUM CHLORIDE 64 ML: 9 INJECTION, SOLUTION INTRAVENOUS at 20:16

## 2019-01-01 RX ADMIN — INSULIN GLARGINE 12 UNITS: 100 INJECTION, SOLUTION SUBCUTANEOUS at 21:26

## 2019-01-01 RX ADMIN — DRONABINOL 10 MG: 2.5 CAPSULE ORAL at 09:45

## 2019-01-01 RX ADMIN — SODIUM CHLORIDE 67 ML: 9 INJECTION, SOLUTION INTRAVENOUS at 12:33

## 2019-01-01 RX ADMIN — SENNOSIDES AND DOCUSATE SODIUM 1 TABLET: 8.6; 5 TABLET ORAL at 08:11

## 2019-01-01 RX ADMIN — ACETAMINOPHEN 650 MG: 325 TABLET ORAL at 05:32

## 2019-01-01 RX ADMIN — POTASSIUM CHLORIDE AND SODIUM CHLORIDE: 900; 150 INJECTION, SOLUTION INTRAVENOUS at 11:24

## 2019-01-01 RX ADMIN — POLYETHYLENE GLYCOL 3350 17 G: 17 POWDER, FOR SOLUTION ORAL at 08:20

## 2019-01-01 RX ADMIN — DRONABINOL 10 MG: 2.5 CAPSULE ORAL at 15:27

## 2019-01-01 RX ADMIN — SODIUM CHLORIDE, POTASSIUM CHLORIDE, SODIUM LACTATE AND CALCIUM CHLORIDE: 600; 310; 30; 20 INJECTION, SOLUTION INTRAVENOUS at 13:46

## 2019-01-01 RX ADMIN — SENNOSIDES AND DOCUSATE SODIUM 1 TABLET: 8.6; 5 TABLET ORAL at 21:25

## 2019-01-01 RX ADMIN — MIRTAZAPINE 15 MG: 15 TABLET, FILM COATED ORAL at 21:25

## 2019-01-01 RX ADMIN — SODIUM CHLORIDE, POTASSIUM CHLORIDE, SODIUM LACTATE AND CALCIUM CHLORIDE 1000 ML: 600; 310; 30; 20 INJECTION, SOLUTION INTRAVENOUS at 20:09

## 2019-01-01 RX ADMIN — IOPAMIDOL 72 ML: 755 INJECTION, SOLUTION INTRAVENOUS at 16:39

## 2019-01-01 RX ADMIN — LIDOCAINE HYDROCHLORIDE 3 ML: 10 INJECTION, SOLUTION EPIDURAL; INFILTRATION; INTRACAUDAL; PERINEURAL at 08:23

## 2019-01-01 RX ADMIN — CEFAZOLIN SODIUM 2 G: 2 INJECTION, SOLUTION INTRAVENOUS at 17:39

## 2019-01-01 RX ADMIN — PROCHLORPERAZINE MALEATE 5 MG: 5 TABLET, FILM COATED ORAL at 11:23

## 2019-01-01 RX ADMIN — LORAZEPAM 0.5 MG: 0.5 TABLET ORAL at 15:48

## 2019-01-01 ASSESSMENT — ACTIVITIES OF DAILY LIVING (ADL)
ADLS_ACUITY_SCORE: 27
ADLS_ACUITY_SCORE: 26
ADLS_ACUITY_SCORE: 24
ADLS_ACUITY_SCORE: 17
ADLS_ACUITY_SCORE: 26
ADLS_ACUITY_SCORE: 26
ADLS_ACUITY_SCORE: 24
ADLS_ACUITY_SCORE: 27
ADLS_ACUITY_SCORE: 24
ADLS_ACUITY_SCORE: 27
ADLS_ACUITY_SCORE: 26
ADLS_ACUITY_SCORE: 27
ADLS_ACUITY_SCORE: 26
ADLS_ACUITY_SCORE: 26
ADLS_ACUITY_SCORE: 27
ADLS_ACUITY_SCORE: 27
ADLS_ACUITY_SCORE: 26
ADLS_ACUITY_SCORE: 26
ADLS_ACUITY_SCORE: 27
ADLS_ACUITY_SCORE: 26
ADLS_ACUITY_SCORE: 26
ADLS_ACUITY_SCORE: 27
ADLS_ACUITY_SCORE: 26
ADLS_ACUITY_SCORE: 27
ADLS_ACUITY_SCORE: 27
ADLS_ACUITY_SCORE: 26
ADLS_ACUITY_SCORE: 24
ADLS_ACUITY_SCORE: 27
ADLS_ACUITY_SCORE: 24
ADLS_ACUITY_SCORE: 27
ADLS_ACUITY_SCORE: 24
ADLS_ACUITY_SCORE: 28
ADLS_ACUITY_SCORE: 26
ADLS_ACUITY_SCORE: 27
ADLS_ACUITY_SCORE: 26
ADLS_ACUITY_SCORE: 27
ADLS_ACUITY_SCORE: 24
ADLS_ACUITY_SCORE: 27
ADLS_ACUITY_SCORE: 24
ADLS_ACUITY_SCORE: 26
ADLS_ACUITY_SCORE: 24
ADLS_ACUITY_SCORE: 26
ADLS_ACUITY_SCORE: 26
ADLS_ACUITY_SCORE: 27
ADLS_ACUITY_SCORE: 28
ADLS_ACUITY_SCORE: 26
ADLS_ACUITY_SCORE: 31
ADLS_ACUITY_SCORE: 24
ADLS_ACUITY_SCORE: 26
ADLS_ACUITY_SCORE: 28
ADLS_ACUITY_SCORE: 26
ADLS_ACUITY_SCORE: 26
ADLS_ACUITY_SCORE: 28
ADLS_ACUITY_SCORE: 26

## 2019-01-01 ASSESSMENT — MIFFLIN-ST. JEOR
SCORE: 1557.13
SCORE: 1415.36
SCORE: 1363.2
SCORE: 1531.03
SCORE: 1390.41
SCORE: 1444.39
SCORE: 1544.64
SCORE: 1550.99
SCORE: 1689.79
SCORE: 1558.24
SCORE: 1673.91
SCORE: 1703.4
SCORE: 1454.82
SCORE: 1444.39
SCORE: 1359.12
SCORE: 1588.64
SCORE: 1551.13
SCORE: 1415.82
SCORE: 1531.03
SCORE: 1626.28
SCORE: 1634.45
SCORE: 1399.49
SCORE: 1627.13
SCORE: 1538.13
SCORE: 1394.5
SCORE: 1550.13
SCORE: 1431.24
SCORE: 1444.39
SCORE: 1448.02
SCORE: 1540.1
SCORE: 1567.32
SCORE: 1538.13
SCORE: 1580.47

## 2019-01-01 ASSESSMENT — ENCOUNTER SYMPTOMS
NAUSEA: 1
NAUSEA: 1
CONSTIPATION: 1
FEVER: 0
VOMITING: 1
VOMITING: 1
ABDOMINAL PAIN: 1
VOMITING: 1
FEVER: 0
APPETITE CHANGE: 1
FEVER: 0
SHORTNESS OF BREATH: 1
VOMITING: 0
BLOOD IN STOOL: 0
WEAKNESS: 0
DIARRHEA: 1
APPETITE CHANGE: 1
APPETITE CHANGE: 1
NAUSEA: 1
CONSTIPATION: 1
DYSRHYTHMIAS: 0
ABDOMINAL PAIN: 1
WEAKNESS: 1
FATIGUE: 1
NAUSEA: 1
CONSTIPATION: 1
ABDOMINAL PAIN: 1
ABDOMINAL PAIN: 0
SHORTNESS OF BREATH: 0
NERVOUS/ANXIOUS: 1
NAUSEA: 0

## 2019-01-01 ASSESSMENT — PATIENT HEALTH QUESTIONNAIRE - PHQ9: SUM OF ALL RESPONSES TO PHQ QUESTIONS 1-9: 2

## 2019-01-01 ASSESSMENT — LIFESTYLE VARIABLES: TOBACCO_USE: 1

## 2019-01-15 NOTE — TELEPHONE ENCOUNTER
"Routing refill request to provider for review/approval because:  Drug not active on patient's medication list, \"stopped by pt\" on 6/7/18.        Requested Prescriptions   Pending Prescriptions Disp Refills     sildenafil (REVATIO) 20 MG tablet [Pharmacy Med Name: SILDENAFIL CITRATE 20MG TABS] 30 tablet 0     Sig: TAKE 1-3 TABLETS BY MOUTH DAILY AS NEEDED. NEVER USE WITH NITROGLYCERIN, TERAZOSIN, OR DOXAZOSIN    Erectile Dysfuction Protocol Failed - 1/15/2019 11:06 AM       Failed - Medication is active on med list       Passed - Absence of nitrates on medication list       Passed - Absence of Alpha Blockers on Med list       Passed - Recent (12 mo) or future (30 days) visit within the authorizing provider's specialty    Patient had office visit in the last 12 months or has a visit in the next 30 days with authorizing provider or within the authorizing provider's specialty.  See \"Patient Info\" tab in inbasket, or \"Choose Columns\" in Meds & Orders section of the refill encounter.             Passed - Patient is age 18 or older          "

## 2019-03-11 NOTE — TELEPHONE ENCOUNTER
Daughter,Oksana called with patient concerns.    Patient noted to have lost 30 pounds within the last 2 months. Weight was 198lbs as of this morning. No significant change in diet. No current exercise plan. Patient c/o polydipsia. No c/o nausea or vomiting at this time.    Normal Bowel patterns that she is aware of.    Office notes updated.    Cadence OSORIO RN, BSN, PHN

## 2019-03-13 PROBLEM — Z79.4 TYPE 2 DIABETES MELLITUS WITHOUT COMPLICATION, WITH LONG-TERM CURRENT USE OF INSULIN (H): Status: ACTIVE | Noted: 2019-01-01

## 2019-03-13 PROBLEM — E11.9 TYPE 2 DIABETES MELLITUS WITHOUT COMPLICATION, WITH LONG-TERM CURRENT USE OF INSULIN (H): Status: ACTIVE | Noted: 2019-01-01

## 2019-03-13 NOTE — PATIENT INSTRUCTIONS
*  New onset type 2 diabetes:    *  Lantus 15 units once per day    *  Start Metformin 1000 mg (2 x 500 mg) once per day with supper    *  Start Glimepiride 1 mg once per day with the first meal of the day    *  Check glucose readings 3-4 times per day, do 1-2 times before a meal, 1-2 times 2 hours after a meal.  Record values in a log book to review later.     *  Make appointment in Diabetic Education Clinic to learn more about diabetes.  Bring your glucose record book.      --[facility] Diabetes Education - [facility] [phone]   [email or web address]     *  Return to see me 1 week, return sooner if the glucose levels are not going under 250.   Bring your record books.;          DIABETES REMINDERS:     1.  Check your blood sugar at least once per day, more often if you take insulin or your diabetes has not been under good control.  1) Check your blood sugar at least once per day, more often if you take insulin or have not been under good control.  Always bring your blood sugar log and meter to your diabetes-related appointments.  2) Your blood sugar goals:   before eating and  two hours after eating (ideally never over 200)  3) Always be prepared to treat low blood sugar symptoms should it happen. Keep a sugar-containing beverage or food nearby.  4) When to call your clinic:     Blood sugar over 400     If you have 2 to 3 low blood sugars (under 70) in a row    Low readings the same time of day several days in a row  5) When to call 911: If your low blood glucose symptoms do not get better with treatment, or if you/someone else is unable to give you treatment.  6) Work with a Certified Diabetes Educator to assist you with your diabetes management  7) Contact us if you have ANY questions about your medications or instructions, or have problems with getting prescriptions filled.           Preventive Health Recommendations:     See your  health care provider every year to    Review health changes.     Discuss preventive care.      Review your medicines if your doctor has prescribed any.    Talk with your health care provider about whether you should have a test to screen for prostate cancer (PSA).    Every 3 years, have a diabetes test (fasting glucose). If you are at risk for diabetes, you should have this test more often.    Every 5 years, have a cholesterol test. Have this test more often if you are at risk for high cholesterol or heart disease.     Every 10 years, have a colonoscopy. Or, have a yearly FIT test (stool test). These exams will check for colon cancer.    Talk to with your health care provider about screening for Abdominal Aortic Aneurysm if you have a family history of AAA or have a history of smoking.  Shots:     Get a flu shot each year.     Get a tetanus shot every 10 years.     Talk to your doctor about your pneumonia vaccines. There are now two you should receive - Pneumovax (PPSV 23) and Prevnar (PCV 13).    Talk to your pharmacist about a shingles vaccine.     Talk to your doctor about the hepatitis B vaccine.  Nutrition:     Eat at least 5 servings of fruits and vegetables each day.     Eat whole-grain bread, whole-wheat pasta and brown rice instead of white grains and rice.     Get adequate Calcium and Vitamin D.   Lifestyle    Exercise for at least 150 minutes a week (30 minutes a day, 5 days a week). This will help you control your weight and prevent disease.     Limit alcohol to one drink per day.     No smoking.     Wear sunscreen to prevent skin cancer.     See your dentist every six months for an exam and cleaning.     See your eye doctor every 1 to 2 years to screen for conditions such as glaucoma, macular degeneration and cataracts.    Personalized Prevention Plan  You are due for the preventive services outlined below.  Your care team is available to assist you in scheduling these services.  If you have already  completed any of these items, please share that information with your care team to update in your medical record.    Health Maintenance Due   Topic Date Due     Zoster (Shingles) Vaccine (1 of 2) 12/11/1999     AORTIC ANEURYSM SCREENING (SYSTEM ASSIGNED)  12/11/2014     Depression Assessment - every 6 months  02/28/2018     Annual Wellness Visit  08/28/2018     FALL RISK ASSESSMENT  08/28/2018     Pneumococcal Vaccine (2 of 2 - PPSV23) 08/28/2018     Flu Vaccine (1) 09/01/2018

## 2019-03-21 NOTE — PATIENT INSTRUCTIONS
*  Reduce Lantus insulin to 12 units once per day    *  Continue all other medications at the same doses.  Contact your usual pharmacy if you need refills.     *  See diabetes education as planed early April    *  Return to see me in 4-6, sooner if needed.  Call [phone] to schedule this appointment.         DIABETES REMINDERS:     1.  Check your blood sugar at least once per day, more often if you take insulin or your diabetes has not been under good control.  1) Check your blood sugar at least once per day, more often if you take insulin or have not been under good control.  Always bring your blood sugar log and meter to your diabetes-related appointments.  2) Your blood sugar goals:   before eating and  two hours after eating.  3) Always be prepared to treat low blood sugar symptoms should it happen. Keep a sugar-containing beverage or food nearby.  4) When to call your clinic:     Blood sugar over 400     If you have 2 to 3 low blood sugars (under 70) in a row    Low readings the same time of day several days in a row  5) When to call 911: If your low blood glucose symptoms do not get better with treatment, or if you/someone else is unable to give you treatment.  6) Work with a Certified Diabetes Educator to assist you with your diabetes management  7) Contact us if you have ANY questions about your medications or instructions, or have problems with getting prescriptions filled.

## 2019-03-21 NOTE — PROGRESS NOTES
SUBJECTIVE:   Jesús Stock is a 68 year old male who presents to clinic today for the following health issues:      Diabetes Follow-up    Patient is checking blood sugars: three times daily.   Blood sugar testing frequency justification: Uncontrolled diabetes, started insulin  Results are as follows:         Three times a day unsure of readings     Diabetic concerns: None     Symptoms of hypoglycemia (low blood sugar): confusion     Paresthesias (numbness or burning in feet) or sores: No     Date of last diabetic eye exam: none    Patient seen today 1 week after starting insulin and oral medications to manage significant type 2 diabetes.    He reports that his polyuria and polydipsia has stopped.  He still does report some mild blurred vision but is slightly better.    The patient did not bring his glucose record log or his unit glucometer with him today.    He has daughter reports that he has not seen a value over 200 for the last few days, and has not seen a value under 100.  He checks it 2-4 times a day, sometimes fasting before meal, sometimes 2 hours after meal.    Reports that he has had a dramatic change in his diet, reducing many carbohydrates from his intake    Denies side effects of medications.    BP Readings from Last 2 Encounters:   03/13/19 118/70   06/07/18 140/74     Hemoglobin A1C (%)   Date Value   03/13/2019 14.0 (H)     LDL Cholesterol Calculated (mg/dL)   Date Value   03/13/2019 106 (H)   08/28/2017 125 (H)       Diabetes Management Resources    Amount of exercise or physical activity: None    Problems taking medications regularly: No    Medication side effects: none    Diet: regular (no restrictions)            Problem list and histories reviewed & adjusted, as indicated.  Additional history: as documented        Reviewed and updated as needed this visit by clinical staff  Allergies  Meds       Reviewed and updated as needed this visit by Provider           Past Medical  History:  ---------------------------  Past Medical History:   Diagnosis Date     Alcohol dependence in remission sober since 1984 (H)     sober since 8/25/84     Backache, unspecified      Colon polyps 5/24/11    2 colon polyps removed at colonoscopy     Diverticulosis of colon 5/24/11    mild pan-colonic diverticulosis seen as incidental finding on colonoscopy     Major depressive disorder, single episode, mild (H)      Obesity (BMI 30.0-34.9) 08/28/2017    BMI 31.4     Osteoarthrosis, unspecified whether generalized or localized, unspecified site      Type 2 diabetes mellitus without complication, with long-term current use of insulin (H) 03/13/2019    glucose 395       Past Surgical History:  ---------------------------  Past Surgical History:   Procedure Laterality Date     APPENDECTOMY       COLONOSCOPY  5/24/11    2 polyps, mild pan-colonic diverticulosis     COLONOSCOPY N/A 9/7/2017    Procedure: COMBINED COLONOSCOPY, SINGLE OR MULTIPLE BIOPSY/POLYPECTOMY BY BIOPSY;;  Surgeon: Gene Grimes MD;  Location: [facility]     HERNIA REPAIR      X3     TONSILLECTOMY         Current Medications:  ---------------------------  Current Outpatient Medications   Medication Sig Dispense Refill     acetaminophen (TYLENOL) 500 MG tablet Take 1-2 tablets by mouth every 6 hours as needed for pain.       alcohol swab prep pads Use to swab area of injection/valerie as directed. 100 each 3     blood glucose (NO BRAND SPECIFIED) test strip Use to test blood sugar 3 times daily or as directed. To accompany: Blood Glucose Monitor Brands: per insurance. 100 strip 6     blood glucose calibration (NO BRAND SPECIFIED) solution Check once per month To accompany: Blood Glucose Monitor Brands: per insurance. 1 Bottle 3     blood glucose monitoring (NO BRAND SPECIFIED) meter device kit Use to test blood sugar as directed. Preferred blood glucose meter OR supplies to accompany: Blood Glucose Monitor Brands: per insurance. 1 kit 0     glimepiride  (AMARYL) 1 MG tablet Take 1 tablet (1 mg) by mouth every morning (before breakfast) 30 tablet 2     ibuprofen (ADVIL,MOTRIN) 200 MG tablet Take 1 tablet by mouth every 4 hours as needed for pain.       insulin glargine (LANTUS SOLOSTAR PEN) 100 UNIT/ML pen Inject 12 Units Subcutaneous daily Lantus Solostar Pen 15 mL 1     insulin pen needle (31G X 5 MM) 31G X 5 MM miscellaneous Use pen needles daily or as directed. 100 each 0     metFORMIN (GLUCOPHAGE-XR) 500 MG 24 hr tablet Take 2 tablets (1,000 mg) by mouth daily (with dinner) 60 tablet 2     thin (NO BRAND SPECIFIED) lancets Check glucose 3-4 times per day; Use with lanceting device. To accompany: Blood Glucose Monitor Brands: per insurance. 100 each 6       Allergies:  -------------  Allergies   Allergen Reactions     Dust Mites      Mold      No Known Drug Allergies        Social History:  -------------------  Social History     Socioeconomic History     Marital status: Single     Spouse name: Not on file     Number of children: Not on file     Years of education: Not on file     Highest education level: Not on file   Occupational History     Not on file   Social Needs     Financial resource strain: Not on file     Food insecurity:     Worry: Not on file     Inability: Not on file     Transportation needs:     Medical: Not on file     Non-medical: Not on file   Tobacco Use     Smoking status: Passive Smoke Exposure - Never Smoker     Smokeless tobacco: Never Used     Tobacco comment: occasional   Substance and Sexual Activity     Alcohol use: No     Comment: sober x 27 years     Drug use: No     Sexual activity: No   Lifestyle     Physical activity:     Days per week: Not on file     Minutes per session: Not on file     Stress: Not on file   Relationships     Social connections:     Talks on phone: Not on file     Gets together: Not on file     Attends Latter day service: Not on file     Active member of club or organization: Not on file     Attends meetings of  clubs or organizations: Not on file     Relationship status: Not on file     Intimate partner violence:     Fear of current or ex partner: Not on file     Emotionally abused: Not on file     Physically abused: Not on file     Forced sexual activity: Not on file   Other Topics Concern     Parent/sibling w/ CABG, MI or angioplasty before 65F 55M? Not Asked   Social History Narrative     Not on file       Family Medical History:  ------------------------------  Family History   Problem Relation Age of Onset     Diabetes Father         b:1926     Hypertension Mother         b:1925     Family History Negative Brother         b:1948     Family History Negative Brother         b:1951  deferred tremor     Family History Negative Sister         b:1953     Family History Negative Brother         b:1956     Family History Negative Brother         b:1958         ROS:  REVIEW OF SYSTEMS:    RESP: negative for cough, dyspnea, wheezing, hemoptysis  CV: negative for chest pain, palpitations, PND, DUGGAN, orthopnea; reports no changes in their ability to perform physical activity (from cardiovascular standpoint)  GI: negative for dysphagia, N/V, pain, melena, diarrhea and constipation  NEURO: negative for numbness/tingling, paralysis, incoordination, or focal weakness     OBJECTIVE:                                                    /70   Pulse 70   Temp 97.8  F (36.6  C) (Oral)   Wt 93.4 kg (206 lb)   SpO2 97%   BMI 28.73 kg/m       Physical exam deferred today.           ASSESSMENT/PLAN:                                                      (E11.9,  Z79.4) Type 2 diabetes mellitus without complication, with long-term current use of insulin (H)  (primary encounter diagnosis)  Comment: Patient's glucose readings at home since starting medications sounds significant better.  Patient reports his glucose has not been over 200 and has not been under 100.  He reports dramatic changes in his diet, dramatically reducing intake of  carbohydrates.  He has an upcoming appointment the diabetic educator.  We will reduce the dose of Lantus to 12 units once a day for the time being.  We will titrate further based on the patient's glucose results.  Patient look into the home continuous glucose monitor system but this is not covered and is very expensive for him.  Patient does have cost issues and is paying for the Lantus pens at this time, asked them to check with her former guide to see if there is any other cheaper formulary options for once a day insulin.  If the cost does remain prohibitive for once daily insulin, can switch to the twice a day NPH insulin if needed for cost purposes.  Denies side effects with current medications.  Discussed the signs and symptoms of hypo-and hyperglycemia.    Plan: insulin glargine (LANTUS SOLOSTAR PEN) 100         UNIT/ML pen            I spent greater than 15 minutes with pt and daughter , greater than 50% of time was educational and counseling.       See Patient Instructions    BRIANDA WALSH M.D., MD  [facility]    (Chart documentation may have been completed, in part, with [employer] voice-recognition software. Even though reviewed, some grammatical, spelling, and word errors may remain.)

## 2019-03-26 NOTE — TELEPHONE ENCOUNTER
"Oksana daughter/caregiver calling on behalf of patient, we do have a signed consent to communicate on file.  Patient was diagnosed with diabetes 3/13/19.  They are wanting to transfer care to [facility] as they live in [place]. Needs diabetes follow up and has had abdominal pain and bloating intermittently for \"a few months\".   Pain is just above umbilicus , improves after eating.  Good appetite, no change in bowels, denies nausea, vomiting.  History of hernia surgery, not sure if abdominal or inguinal, appendectomy as a child.   Advised daughter to schedule appt to establish care here and pt should be seen either by previous MD, UC or ER if symptoms change or worsen before the new appt.  MARIVEL Linares R.N.    "

## 2019-04-04 NOTE — PATIENT INSTRUCTIONS
1. Plan to wear the LibrePro sensor for 14 days. It is okay to shower, bathe, and swim (up to 3 feet deep for 30 minutes)    2. Continue with your usual diabetes care plan - check blood sugars and take medicines, as prescribed.    3. Keep a log of what you eat and drink, when you take your medications and how much you take, and exercise you do while you are wearing the sensor.    3. Do not cover the sensor with extra adhesive (the small hole in the center of the sensor must remain uncovered)    4. Use a little extra care, especially when getting dressed or exercising, to avoid accidentally loosening or removing the sensor.     5. Remove the sensor if you need to have an MRI or CT scan.     Return the sensor to the [facility] on 4/19.    Follow-up appointment: 4/19 at 8:30a    [facility] for the [facility]:  For Your Diabetes Education and Nutrition Appointments Call:  [phone]   For Diabetes Education or Nutrition Related Questions:   Phone: [phone]  E-mail: [email or web address]  Fax: [phone]   If you need a medication refill please contact your pharmacy. Please allow 3 business days for your refills to be completed.    Instructions for emailing the Diabetes Educators    If you need to communicate a non-urgent message to a Diabetes Educator via email, please send to [email or web address].    Please follow the following email guidelines:    Subject line: Secure: your clinic name (example: Secure: [facility])  In the email please include: First name, middle initial, last name and date of birth.    We will be in touch with you within one (1) business day.

## 2019-04-04 NOTE — PROGRESS NOTES
"Diabetes Self-Management Education & Support    Diabetes Education Self Management & Training    SUBJECTIVE/OBJECTIVE:  Presents for: Initial Assessment for new diagnosis  Accompanied by: Self  Diabetes education in the past 24mo: No  Focus of Visit: Taking Medication, Healthy Eating, Diabetes Pathophysiology, Monitoring  Diabetes type: Type 2  Disease course: Getting harder to manage  How confident are you filling out medical forms by yourself:: Not Assessed  Diabetes management related comments/concerns: feelings of weakness & shaking, wondering if this is related to diabetes   Other concerns:: None  Cultural Influences/Ethnic Background:  American    Diabetes Symptoms & Complications  Blurred vision: Yes  Fatigue: Yes  Neuropathy: No  Foot ulcerations: No  Polydipsia: No  Polyphagia: No  Polyuria: No  Visual change: Yes  Weakness: Yes  Weight loss: Yes  Slow healing wounds: No  Recent Infection(s): No  Symptom course: Improving  Weight trend: Decreasing steadily  Autonomic neuropathy: No  CVA: No  Heart disease: No  Nephropathy: No  Peripheral neuropathy: No  Peripheral Vascular Disease: No  Retinopathy: No  Sexual dysfunction: No    Patient Problem List and Family Medical History reviewed for relevant medical history, current medical status, and diabetes risk factors.    Vitals:  There were no vitals taken for this visit.  Estimated body mass index is 28.73 kg/m  as calculated from the following:    Height as of 3/13/19: 1.803 m (5' 11\").    Weight as of 3/21/19: 93.4 kg (206 lb).   Last 3 BP:   BP Readings from Last 3 Encounters:   03/21/19 110/70   03/13/19 118/70   06/07/18 140/74       History   Smoking Status     Passive Smoke Exposure - Never Smoker     Types: Cigars   Smokeless Tobacco     Never Used     Comment: occasional       Labs:  Lab Results   Component Value Date    A1C 14.0 03/13/2019     Lab Results   Component Value Date     03/13/2019     Lab Results   Component Value Date     " 03/13/2019     HDL Cholesterol   Date Value Ref Range Status   03/13/2019 34 (L) >39 mg/dL Final   ]  GFR Estimate   Date Value Ref Range Status   03/13/2019 81 >60 mL/min/[1.73_m2] Final     Comment:     Non  GFR Calc  Starting 12/18/2018, serum creatinine based estimated GFR (eGFR) will be   calculated using the Chronic Kidney Disease Epidemiology Collaboration   (CKD-EPI) equation.       GFR Estimate If Black   Date Value Ref Range Status   03/13/2019 >90 >60 mL/min/[1.73_m2] Final     Comment:      GFR Calc  Starting 12/18/2018, serum creatinine based estimated GFR (eGFR) will be   calculated using the Chronic Kidney Disease Epidemiology Collaboration   (CKD-EPI) equation.       Lab Results   Component Value Date    CR 0.96 03/13/2019     No results found for: MICROALBUMIN    Healthy Eating  Healthy Eating Assessed Today: Yes  Cultural/Pentecostal diet restrictions?: No  Patient on a regular basis: Eats 3 meals a day  Meal planning: Avoiding sweets, Smaller portions  Meals include: Breakfast, Lunch, Dinner  Has patient met with a dietitian in the past?: No      Being Active  Being Active Assessed Today: Yes  Exercise:: Currently not exercising    Monitoring  Monitoring Assessed Today: Yes  Did patient bring glucose meter to appointment? : Yes  Low Glucose Range (mg/dL):   High Glucose Range (mg/dL): >200  Overall Range (mg/dL): 140-180        Taking Medications  Diabetes Medication(s)     Biguanides       metFORMIN (GLUCOPHAGE-XR) 500 MG 24 hr tablet    Take 2 tablets (1,000 mg) by mouth daily (with dinner)    Insulin       insulin glargine (LANTUS SOLOSTAR PEN) 100 UNIT/ML pen    Inject 12 Units Subcutaneous daily Lantus Solostar Pen    Sulfonylureas       glimepiride (AMARYL) 1 MG tablet    Take 1 tablet (1 mg) by mouth every morning (before breakfast)        Taking Medication Assessed Today: Yes  Current Treatments: Insulin Injections, Oral Agent (dual therapy)  Dose  schedule: at bedtime  Given by: Patient  Problems taking diabetes medications regularly?: No  Diabetes medication side effects?: No  Treatment Compliance: All of the time    Problem Solving  Problem Solving Assessed Today: Yes  Hypoglycemia Frequency: Rarely  Hypoglycemia Treatment: Other food  Patient carries a carbohydrate source: No  Medical alert: No  Severe weather/disaster plan for diabetes management?: No  DKA prevention plan?: No    Reducing Risks  Reducing Risks Assessed Today: Yes  Diabetes Risks: Age over 45 years, Family History, Sedentary Lifestyle  CAD Risks: Diabetes Mellitus, Sedentary lifestyle, Male sex  Has dilated eye exam at least once a year?: Yes    Healthy Coping  Healthy Coping Assessed Today: Yes  Emotional response to diabetes: Guilt/Self-blame  Informal Support system:: Children  Stage of change: ACTION (Actively working towards change)  Difficulty affording diabetes management supplies?: No  Patient Activation Measure Survey Score:  NICANOR Score (Last Two) 5/18/2011   NICANOR Raw Score 52   Activation Score 100   NICANOR Level 4       ASSESSMENT:  Patient comes in today to learn about diabetes and for LibrePro placement. He has been taking insulin over the past several weeks as well as decreasing carb intake and checking blood sugars regularly. He has noticed they have improved. His PCP decreased his insulin dosing and he is questioning if this is appropriate.    Goals Addressed as of 4/4/2019 at 2:52 PM        Patient Stated      Monitoring (pt-stated)     Added 4/4/19 by Jaycee Mo RD     My Goal: I will wear LibrePro sensor for 14 days and log all meals, snacks, beverages, and medication on logs provided.    What I need to meet my goal: log    I plan to meet my goal by this date: 4/19, at follow up appointment             Patient's most recent   Lab Results   Component Value Date    A1C 14.0 03/13/2019    is not meeting goal of <7.0    INTERVENTION:   Diabetes knowledge and skills  assessment:     Patient is knowledgeable in diabetes management concepts related to: Healthy Eating, Monitoring and Taking Medication    Patient needs further education on the following diabetes management concepts: Healthy Eating, Being Active, Monitoring, Taking Medication, Problem Solving, Reducing Risks and Healthy Coping    Based on learning assessment above, most appropriate setting for further diabetes education would be: Group class or Individual setting.    Education provided today on:  AADE Self-Care Behaviors:  Diabetes Pathophysiology  Healthy Eating: carbohydrate counting, consistency in amount, composition, and timing of food intake, portion control, plate planning method and label reading  Being Active: relationship to blood glucose, describe appropriate activity program and precautions to take  Problem Solving: low blood glucose - causes, signs/symptoms, treatment and prevention and carrying a carbohydrate source at all times    Opportunities for ongoing education and support in diabetes-self management were discussed.    Pt verbalized understanding of concepts discussed and recommendations provided today.      LibrePro sensor started today. (Lot # [id number], Serial # [id number], Expiration date 7/31/19) Sensor was inserted with no resistance or bleeding at insertion site.    Pt will plan to wear the sensor through  4/19.    WRITTEN AND VERBAL INFORMATION GIVEN TO SUPPORT UNDERSTANDING OF:  LibrePro CGM: Sensor insertion, intention of monitoring for 14 days. Keep records of BG, food intake, exercise, and medication dosing during wear.       Patient verbalizes understanding of how to remove sensor and all instructions provided.     Education Materials Provided:  [facility] Taking Charge of Your Diabetes Book, BG Log Sheet and My Plate Planner    PLAN:  See Patient Instructions for co-developed, patient-stated behavior change goals.  AVS printed and provided to patient today. See Follow-Up section for  recommended follow-up.    Jaycee Mo RD, LD   Time Spent: 60 minutes  Encounter Type: Individual    Any diabetes medication dose changes were made via the CDE Protocol and Collaborative Practice Agreement with the patient's referring provider. A copy of this encounter was shared with the provider.

## 2019-04-16 NOTE — PROGRESS NOTES
SUBJECTIVE:   Jesús Stock is a 68 year old male who presents to clinic today for the following   health issues:      1 month F/U:    New patient seen for assessment of DM.   Has H/O DM. On diet , exercise , PO Metfromin, Glimepiride and has started insulin treatment a month ago. . Blood sugars are better controlled. No parestesias. No hypoglycemias. Glucose is 100-250 range. Prior to starting insulin had high levels and last HgbA1C was elevated to 14. Has improved his diet, exercise, has lost weight 35 lbs for the past year.       Additional history: as documented    Reviewed  and updated as needed this visit by clinical staff         Reviewed and updated as needed this visit by Provider         Patient Active Problem List   Diagnosis     CARDIOVASCULAR SCREENING; LDL GOAL LESS THAN 130     Mild major depression (H)     Colon polyps     Diverticulosis of colon     ACP (advance care planning)     Health Care Home     Obesity (BMI 30.0-34.9)     Alcohol dependence in remission sober since 1984 (H)     Type 2 diabetes mellitus without complication, with long-term current use of insulin (H)     Past Surgical History:   Procedure Laterality Date     APPENDECTOMY       COLONOSCOPY  5/24/11    2 polyps, mild pan-colonic diverticulosis     COLONOSCOPY N/A 9/7/2017    Procedure: COMBINED COLONOSCOPY, SINGLE OR MULTIPLE BIOPSY/POLYPECTOMY BY BIOPSY;;  Surgeon: Gene Grimes MD;  Location: [facility]     HERNIA REPAIR      X3     TONSILLECTOMY         Social History     Tobacco Use     Smoking status: Passive Smoke Exposure - Never Smoker     Smokeless tobacco: Never Used     Tobacco comment: occasional   Substance Use Topics     Alcohol use: No     Comment: sober x 27 years     Family History   Problem Relation Age of Onset     Diabetes Father         b:1926     Hypertension Mother         b:1925     Family History Negative Brother         b:1948     Family History Negative Brother         b:1951  deferred tremor     Family  History Negative Sister         b:1953     Family History Negative Brother         b:1956     Family History Negative Brother         b:1958         Current Outpatient Medications   Medication Sig Dispense Refill     acetaminophen (TYLENOL) 500 MG tablet Take 1-2 tablets by mouth every 6 hours as needed for pain.       glimepiride (AMARYL) 1 MG tablet Take 1 tablet (1 mg) by mouth every morning (before breakfast) 30 tablet 2     ibuprofen (ADVIL,MOTRIN) 200 MG tablet Take 1 tablet by mouth every 4 hours as needed for pain.       insulin glargine (LANTUS SOLOSTAR PEN) 100 UNIT/ML pen Inject 12 Units Subcutaneous daily Lantus Solostar Pen 15 mL 1     metFORMIN (GLUCOPHAGE-XR) 500 MG 24 hr tablet Take 2 tablets (1,000 mg) by mouth daily (with dinner) 60 tablet 2     pseudoePHEDrine (SUDAFED) 60 MG tablet Take 60 mg by mouth as needed for congestion       simethicone (MYLICON) 125 MG chewable tablet Take 125 mg by mouth 2 times daily       alcohol swab prep pads Use to swab area of injection/valerie as directed. 100 each 3     blood glucose (NO BRAND SPECIFIED) test strip Use to test blood sugar 3 times daily or as directed. To accompany: Blood Glucose Monitor Brands: per insurance. 100 strip 6     blood glucose calibration (NO BRAND SPECIFIED) solution Check once per month To accompany: Blood Glucose Monitor Brands: per insurance. 1 Bottle 3     blood glucose monitoring (NO BRAND SPECIFIED) meter device kit Use to test blood sugar as directed. Preferred blood glucose meter OR supplies to accompany: Blood Glucose Monitor Brands: per insurance. 1 kit 0     insulin pen needle (31G X 5 MM) 31G X 5 MM miscellaneous Use pen needles daily or as directed. 100 each 0     thin (NO BRAND SPECIFIED) lancets Check glucose 3-4 times per day; Use with lanceting device. To accompany: Blood Glucose Monitor Brands: per insurance. 100 each 6       ROS:  Constitutional, HEENT, cardiovascular, pulmonary, gi and gu systems are negative,  "except as otherwise noted.    OBJECTIVE:     /66   Pulse 94   Temp 98  F (36.7  C) (Oral)   Resp 12   Ht 1.803 m (5' 11\")   Wt 91.6 kg (202 lb)   SpO2 98%   BMI 28.17 kg/m    Body mass index is 28.17 kg/m .   GENERAL: healthy, alert and no distress  NECK: no adenopathy, no asymmetry, masses, or scars and thyroid normal to palpation  RESP: lungs clear to auscultation - no rales, rhonchi or wheezes  CV: regular rate and rhythm, normal S1 S2, no S3 or S4, no murmur, click or rub, no peripheral edema and peripheral pulses strong  ABDOMEN: soft, nontender, no hepatosplenomegaly, no masses and bowel sounds normal  MS: no gross musculoskeletal defects noted, no edema    Diagnostic Test Results:  none     ASSESSMENT/PLAN:     Problem List Items Addressed This Visit     None       Diabetes mellitus     Continue treatment , monitor glucose   Has glucose monitoring , will be seeing diabetic education   Monitor HgbA1C     Follow-Up:in 2 months for recheck     Chirag Robert MD  [facility]      '  "

## 2019-04-16 NOTE — NURSING NOTE
"Vital signs:  Temp: 98  F (36.7  C) Temp src: Oral BP: 108/66 Pulse: 94   Resp: 12 SpO2: 98 %     Height: 180.3 cm (5' 11\") Weight: 91.6 kg (202 lb)  Estimated body mass index is 28.17 kg/m  as calculated from the following:    Height as of this encounter: 1.803 m (5' 11\").    Weight as of this encounter: 91.6 kg (202 lb).          "

## 2019-04-19 NOTE — PROGRESS NOTES
LibrePro Continuous Glucose Monitor Interpretation     Patient History:   1. Type of Diabetes: Type 2 diabetes  2. Duration of diabetes or year of diagnosis: 3/13/19  3. Current treatment regimen (include all diabetes medications, dose & dosing frequency/timing): yes:     Diabetes Medication(s)     Biguanides       metFORMIN (GLUCOPHAGE-XR) 500 MG 24 hr tablet    Take 2 tablets (1,000 mg) by mouth daily (with dinner)    Insulin       insulin glargine (LANTUS SOLOSTAR PEN) 100 UNIT/ML pen    Inject 12 Units Subcutaneous daily Lantus Solostar Pen    Sulfonylureas       glimepiride (AMARYL) 1 MG tablet    Take 1 tablet (1 mg) by mouth every morning (before breakfast)        *Abbreviated insulin dose documentation key: Insulin Trade Name (eisjzsmis-mmcsd-jjgsbb-bedtime) - i.e. Humalog 5-5-5-0 (Humalog 5 units at breakfast, 5 units at lunch, and 5 units at dinner).  4. Most Recent A1c Result:    Lab Results   Component Value Date    A1C 14.0 2019     5. Indication/s for LibrePro study: Unexplained fluctuations in glucose values.    Statistics:   1. Sensor worn for 14 days.  2. Glucose excursions:   Percent in target is: 77%  Percent above target is: 22%  Percent below target is: 1%  3. Estimated average glucose: 145 mg/dL                        Data evaluation:   1. Sensor modal day evaluation shows the followin. Consistent day-to-day patterns noted: pattern of post-prandial highs.  2. Low glucose events: 3    Patient's Logbook shows the following:   Carbohydrate counting is: accurate  Medication and/or insulin dosing is: accurate                     Assessment and Plan:  No change in current treatment plan - blood sugar overall look pretty good with the occasional post-prandial spike.    Called patient to discuss finding of LibrePro study. No answer. Patient has been informed by triage CDE that he needs to call to make appointment with diabetes educator at [facility], per patient's preference.    Will mail copy of LibrePro report to patient & again provide diabetes care scheduling line.       Jaycee Mo RD, LD  Time Spent: 15 minutes  Any diabetes medication dose changes were made via the CDE Protocol and Collaborative Practice Agreement with the patient's referring provider. A copy of this encounter was shared with the provider.

## 2019-05-22 NOTE — PROGRESS NOTES
Subjective     Jesús Stock is a 68 year old male who presents to clinic today for the following health issues:    HPI   Diabetes Follow-up      How often are you checking your blood sugar? Three times daily    What time of day are you checking your blood sugars (select all that apply)?  Before meals    Have you had any blood sugars above 200?  Yes  Average 175-190    Have you had any blood sugars below 70?  No    What symptoms do you notice when your blood sugar is low?  Shaky and Dizzy    What concerns do you have today about your diabetes? Is only taking one metformin daily, due to GI upset.  Would like to get off insulin and meetformin.     Do you have any of these symptoms? (Select all that apply)  No numbness or tingling in feet.  No redness, sores or blisters on feet.  No complaints of excessive thirst.  No reports of blurry vision.  No significant changes to weight.     Have you had a diabetic eye exam in the last 12 months? No    BP Readings from Last 2 Encounters:   04/16/19 108/66   03/21/19 110/70     Hemoglobin A1C (%)   Date Value   03/13/2019 14.0 (H)     LDL Cholesterol Calculated (mg/dL)   Date Value   03/13/2019 106 (H)   08/28/2017 125 (H)       Diabetes Management Resources    Amount of exercise or physical activity: 2-3 days/week for an average of 30-45 minutes    Problems taking medications regularly: No    Medication side effects: none    Diet: regular (no restrictions)          Patient Active Problem List   Diagnosis     CARDIOVASCULAR SCREENING; LDL GOAL LESS THAN 130     Mild major depression (H)     Colon polyps     Diverticulosis of colon     ACP (advance care planning)     Health Care Home     Obesity (BMI 30.0-34.9)     Alcohol dependence in remission sober since 1984 (H)     Type 2 diabetes mellitus without complication, with long-term current use of insulin (H)     Past Surgical History:   Procedure Laterality Date     APPENDECTOMY       COLONOSCOPY  5/24/11    2 polyps, mild  pan-colonic diverticulosis     COLONOSCOPY N/A 9/7/2017    Procedure: COMBINED COLONOSCOPY, SINGLE OR MULTIPLE BIOPSY/POLYPECTOMY BY BIOPSY;;  Surgeon: Gene Grimes MD;  Location: [facility]     HERNIA REPAIR      X3     TONSILLECTOMY         Social History     Tobacco Use     Smoking status: Passive Smoke Exposure - Never Smoker     Smokeless tobacco: Never Used     Tobacco comment: occasional   Substance Use Topics     Alcohol use: No     Comment: sober x 27 years     Family History   Problem Relation Age of Onset     Diabetes Father         b:1926     Hypertension Mother         b:1925     Family History Negative Brother         b:1948     Family History Negative Brother         b:1951  deferred tremor     Family History Negative Sister         b:1953     Family History Negative Brother         b:1956     Family History Negative Brother         b:1958         Current Outpatient Medications   Medication Sig Dispense Refill     acetaminophen (TYLENOL) 500 MG tablet Take 1-2 tablets by mouth every 6 hours as needed for pain.       alcohol swab prep pads Use to swab area of injection/valerie as directed. 100 each 3     blood glucose (NO BRAND SPECIFIED) test strip Use to test blood sugar 3 times daily or as directed. To accompany: Blood Glucose Monitor Brands: per insurance. 100 strip 6     blood glucose calibration (NO BRAND SPECIFIED) solution Check once per month To accompany: Blood Glucose Monitor Brands: per insurance. 1 Bottle 3     blood glucose monitoring (NO BRAND SPECIFIED) meter device kit Use to test blood sugar as directed. Preferred blood glucose meter OR supplies to accompany: Blood Glucose Monitor Brands: per insurance. 1 kit 0     glimepiride (AMARYL) 1 MG tablet Take 1 tablet (1 mg) by mouth every morning (before breakfast) 30 tablet 2     Guar Gum (FIBER MODULAR, NUTRISOURCE FIBER,) packet 3 times daily (with meals)       ibuprofen (ADVIL,MOTRIN) 200 MG tablet Take 1 tablet by mouth every 4 hours as  needed for pain.       insulin glargine (LANTUS SOLOSTAR PEN) 100 UNIT/ML pen Inject 12 Units Subcutaneous daily Lantus Solostar Pen 15 mL 1     insulin pen needle (31G X 5 MM) 31G X 5 MM miscellaneous Use pen needles daily or as directed. 100 each 0     metFORMIN (GLUCOPHAGE-XR) 500 MG 24 hr tablet Take 2 tablets (1,000 mg) by mouth daily (with dinner) (Patient taking differently: Take 1,000 mg by mouth daily (with dinner) ) 60 tablet 2     polyethylene glycol (MIRALAX/GLYCOLAX) powder Take 1 capful by mouth daily       thin (NO BRAND SPECIFIED) lancets Check glucose 3-4 times per day; Use with lanceting device. To accompany: Blood Glucose Monitor Brands: per insurance. 100 each 6     triprolidine-pseudoePHEDrine (APRODINE) 2.5-60 MG TABS per tablet Take 1 tablet by mouth every 6 hours as needed for allergies       pseudoePHEDrine (SUDAFED) 60 MG tablet Take 60 mg by mouth as needed for congestion       simethicone (MYLICON) 125 MG chewable tablet Take 125 mg by mouth 2 times daily       Recent Labs   Lab Test 03/13/19  0833 08/28/17  1004 04/20/11  1144   A1C 14.0*  --   --    * 125* 151*   HDL 34* 41 38*   TRIG 217* 111 112   ALT 21  --  <6   CR 0.96 1.22 1.18   GFRESTIMATED 81 59* 63   GFRESTBLACK >90 72 76   POTASSIUM 4.3 4.0 4.1   TSH 1.75 0.89 0.56      BP Readings from Last 3 Encounters:   05/22/19 112/68   04/16/19 108/66   03/21/19 110/70    Wt Readings from Last 3 Encounters:   05/22/19 88.7 kg (195 lb 8 oz)   04/16/19 91.6 kg (202 lb)   03/21/19 93.4 kg (206 lb)                      Reviewed and updated as needed this visit by Provider         Review of Systems   ROS COMP: Constitutional, HEENT, cardiovascular, pulmonary, gi and gu systems are negative, except as otherwise noted.      Objective    There were no vitals taken for this visit.  There is no height or weight on file to calculate BMI.  Physical Exam   GENERAL: healthy, alert and no distress  NECK: no adenopathy, no asymmetry, masses, or  "scars and thyroid normal to palpation  RESP: lungs clear to auscultation - no rales, rhonchi or wheezes  CV: regular rate and rhythm, normal S1 S2, no S3 or S4, no murmur, click or rub, no peripheral edema and peripheral pulses strong  ABDOMEN: soft, nontender, no hepatosplenomegaly, no masses and bowel sounds normal  MS: no gross musculoskeletal defects noted, no edema            Assessment & Plan       ICD-10-CM    1. Type 2 diabetes mellitus without complication, with long-term current use of insulin (H) E11.9 CBC with platelets    Z79.4 Basic metabolic panel     Hemoglobin A1c     ALT        BMI:   Estimated body mass index is 27.27 kg/m  as calculated from the following:    Height as of this encounter: 1.803 m (5' 11\").    Weight as of this encounter: 88.7 kg (195 lb 8 oz).       Labs pending, continue meds at this time.  F/u 3 months            No follow-ups on file.    Alix Rocha NP  [facility]        "

## 2019-05-23 NOTE — PATIENT INSTRUCTIONS
1. Continue testing 4x/day.  Call is consistently low (<100) or high (>200).  2. Continue 500mg Metformin for the next 1-2 weeks.  If side effects are still a problem, we may consider discontinuing and see if symptoms improve.  3. Continue carb controlled diet.  4. Continue exercise as able.  Carry your meter and a glucose source in case of lows.

## 2019-05-23 NOTE — PROGRESS NOTES
"Diabetes Self-Management Education & Support    Diabetes Education Self Management & Training    SUBJECTIVE/OBJECTIVE:  Presents for: Follow-up  Accompanied by: Self  Diabetes education in the past 24mo: Yes  Focus of Visit: Taking Medication, Healthy Eating, Monitoring, Problem Solving  Diabetes type: Type 2  Disease course: Improving  How confident are you filling out medical forms by yourself:: Not Assessed  Diabetes management related comments/concerns: feelings of weakness & shaking, wondering if this is related to diabetes   Other concerns:: None  Cultural Influences/Ethnic Background:  American      Diabetes Symptoms & Complications  Blurred vision: No  Fatigue: Yes  Neuropathy: No  Foot ulcerations: No  Polydipsia: No  Polyphagia: No  Polyuria: No  Visual change: Yes  Weakness: Yes  Weight loss: Yes  Slow healing wounds: No  Recent Infection(s): No  Symptom course: Improving  Weight trend: Decreasing steadily  Autonomic neuropathy: No  CVA: No  Heart disease: No  Nephropathy: No  Peripheral neuropathy: No  Peripheral Vascular Disease: No  Retinopathy: No  Sexual dysfunction: No    Patient Problem List and Family Medical History reviewed for relevant medical history, current medical status, and diabetes risk factors.    Vitals:  There were no vitals taken for this visit.  Estimated body mass index is 27.27 kg/m  as calculated from the following:    Height as of 5/22/19: 1.803 m (5' 11\").    Weight as of 5/22/19: 88.7 kg (195 lb 8 oz).   Last 3 BP:   BP Readings from Last 3 Encounters:   05/22/19 112/68   04/16/19 108/66   03/21/19 110/70       History   Smoking Status     Passive Smoke Exposure - Never Smoker     Types: Cigars   Smokeless Tobacco     Never Used     Comment: occasional       Labs:  Lab Results   Component Value Date    A1C 8.5 05/22/2019     Lab Results   Component Value Date     03/13/2019     Lab Results   Component Value Date     03/13/2019     HDL Cholesterol   Date Value Ref " Range Status   03/13/2019 34 (L) >39 mg/dL Final   ]  GFR Estimate   Date Value Ref Range Status   03/13/2019 81 >60 mL/min/[1.73_m2] Final     Comment:     Non  GFR Calc  Starting 12/18/2018, serum creatinine based estimated GFR (eGFR) will be   calculated using the Chronic Kidney Disease Epidemiology Collaboration   (CKD-EPI) equation.       GFR Estimate If Black   Date Value Ref Range Status   03/13/2019 >90 >60 mL/min/[1.73_m2] Final     Comment:      GFR Calc  Starting 12/18/2018, serum creatinine based estimated GFR (eGFR) will be   calculated using the Chronic Kidney Disease Epidemiology Collaboration   (CKD-EPI) equation.       Lab Results   Component Value Date    CR 0.96 03/13/2019     No results found for: MICROALBUMIN    Healthy Eating  Healthy Eating Assessed Today: Yes  Cultural/Yarsani diet restrictions?: No  Meal planning: Avoiding sweets, Smaller portions  Meals include: Breakfast, Lunch, Dinner  Beverages: Coffee  Has patient met with a dietitian in the past?: Yes    Being Active  Being Active Assessed Today: Yes  Exercise:: Yes  Days per week of moderate to strenuous exercise (like a brisk walk): 4  How intense was your typical exercise? : Light (like stretching or slow walking)    Monitoring  Monitoring Assessed Today: Yes  Did patient bring glucose meter to appointment? : Yes  Low Glucose Range (mg/dL):   High Glucose Range (mg/dL): >200  Overall Range (mg/dL): 140-180    7 day average 188; 14 day 177    Taking Medications  Diabetes Medication(s)     Biguanides       metFORMIN (GLUCOPHAGE-XR) 500 MG 24 hr tablet    Take 2 tablets (1,000 mg) by mouth daily (with dinner)     Patient taking differently:  Take 1,000 mg by mouth daily (with dinner)     Insulin       insulin glargine (LANTUS SOLOSTAR PEN) 100 UNIT/ML pen    Inject 12 Units Subcutaneous daily Lantus Solostar Pen    Sulfonylureas       glimepiride (AMARYL) 1 MG tablet    Take 1 tablet (1 mg) by  mouth every morning (before breakfast)          Taking Medication Assessed Today: Yes  Current Treatments: Insulin Injections, Oral Agent (dual therapy)  Dose schedule: at bedtime  Given by: Patient  Problems taking diabetes medications regularly?: No  Diabetes medication side effects?: Yes(gas, bloating, stomach pain)  Treatment Compliance: All of the time    Problem Solving  Problem Solving Assessed Today: Yes  Hypoglycemia Frequency: Rarely  Hypoglycemia Treatment: Other food  Patient carries a carbohydrate source: No  Medical alert: No  Severe weather/disaster plan for diabetes management?: No  DKA prevention plan?: No    Hypoglycemia symptoms  Dizziness or Light-Headedness: Yes  Nervousness/Anxiety: Yes  Sweats: Yes  Tremors: Yes         Reducing Risks  Reducing Risks Assessed Today: No  Diabetes Risks: Age over 45 years, Family History, Sedentary Lifestyle  CAD Risks: Diabetes Mellitus, Sedentary lifestyle, Male sex  Has dilated eye exam at least once a year?: Yes    Healthy Coping  Healthy Coping Assessed Today: Yes  Emotional response to diabetes: Ready to learn, Concern for health and well-being  Informal Support system:: Children  Stage of change: ACTION (Actively working towards change)  Difficulty affording diabetes management supplies?: No  Patient Activation Measure Survey Score:  NICANOR Score (Last Two) 5/18/2011   NICANOR Raw Score 52   Activation Score 100   NICANOR Level 4       ASSESSMENT:  Jesús presents for his first follow-up visit today.  He expresses concern with his blood sugar levels.  He's experienced a couple episodes of falling, feeling shaky/weak, but unfortunately did not test his BG level before eating or drinking anything.  It's unclear if these episodes are related to BG levels.  Jesús is having fairly significant bowel issues - constipation, stomach pain, gas, bloating.  The gastroenterologist recommended increasing fiber and Miralax.  Due to these issues, Jesús has reduced his Metformin to  500mg daily.  We discussed staying on this dose for a couple weeks.  If symptoms persist, he may want to consider of trial of discontinuing the Metformin.  In this situation, we may need to consider adding a different med or increasing the dosages of his current meds.  Jesús expresses understanding of this.  Jesús is doing well with testing BG 4x/day.  He may be interested in a personal sensor.  We discussed this process.  No changes to Jesús's treatment plan today.        Patient's most recent   Lab Results   Component Value Date    A1C 8.5 05/22/2019    is not meeting goal of <7.0    INTERVENTION:   Diabetes knowledge and skills assessment:     Patient is knowledgeable in diabetes management concepts related to: Healthy Eating, Being Active, Monitoring and Taking Medication    Patient needs further education on the following diabetes management concepts: Taking Medication, Problem Solving and Reducing Risks    Based on learning assessment above, most appropriate setting for further diabetes education would be: Individual setting.    Education provided today on:  AADE Self-Care Behaviors:  Healthy Eating: consistency in amount, composition, and timing of food intake and plate planning method  Being Active: describe appropriate activity program and precautions to take  Monitoring: frequency of monitoring  Taking Medication: action of prescribed medication, side effects of prescribed medications and when to take medications  Problem Solving: high blood glucose - causes, signs/symptoms, treatment and prevention, low blood glucose - causes, signs/symptoms, treatment and prevention and carrying a carbohydrate source at all times    Opportunities for ongoing education and support in diabetes-self management were discussed.    Pt verbalized understanding of concepts discussed and recommendations provided today.       Education Materials Provided:  No new materials provided today    PLAN:  See Patient Instructions for  co-developed, patient-stated behavior change goals.  AVS printed and provided to patient today. See Follow-Up section for recommended follow-up.    DELGADO Dee CDE    Time Spent: 60 minutes  Encounter Type: Individual    Any diabetes medication dose changes were made via the CDE Protocol and Collaborative Practice Agreement with the patient's referring provider. A copy of this encounter was shared with the provider.

## 2019-05-23 NOTE — TELEPHONE ENCOUNTER
Please advise pt A1C still high at 8.5.  Instruct to increase metformin to 2 tabs daily and continue insulin at 12 U daily  Alix Rocha CNP

## 2019-05-29 NOTE — TELEPHONE ENCOUNTER
"Requested Prescriptions   Pending Prescriptions Disp Refills     insulin pen needle (B-D U/F) 31G X 5 MM miscellaneous [Pharmacy Med Name: B-D PEN NDL MINI [id number](3/16)PRPL] 100 each 0     Sig: USE DAILY AS DIRECTED       Diabetic Supplies Protocol Passed - 5/29/2019 11:01 AM        Passed - Medication is active on med list        Passed - Patient is 18 years of age or older        Passed - Recent (6 mo) or future (30 days) visit within the authorizing provider's specialty     Patient had office visit in the last 6 months or has a visit in the next 30 days with authorizing provider.  See \"Patient Info\" tab in inbasket, or \"Choose Columns\" in Meds & Orders section of the refill encounter.            Last Written Prescription Date:  3/21/19  Last Fill Quantity: 15,  # refills: 1   Last office visit: 5/22/2019 with prescribing provider:  5/22/19   Future Office Visit:      "

## 2019-06-03 NOTE — ED TRIAGE NOTES
Patient presents to the ED with generalized weakness, decreased appetite and dark urine. Patient's family also notes that patient's eyes appear jaundice. Patient relates ongoing feelings on abdominal discomfort and decreased appetite since being diagnosed with diabetes this spring.

## 2019-06-03 NOTE — LETTER
Key Recommendations:  Patient admitted for abdominal pain and found to have a pancreatic mass. He will have an outpatient ERCP this week. Pmhx includes osteoarthritis, depression, former smoker and DM. Pt is a Type II diabetic who recently started insulin 3/2019 along with his oral diabetic medications. He reports taking his BS 3 x/day. He lives with his daughter who is supportive with his cares. Daughter provides transportation. Patient is independent with his own cares and driving. Patient expressed understanding dietary needs for managing his diabetes. He sees his IM clinic for diabetes management. He also reports seeing a DNE at the clinic within the past few weeks.     Patient will benefit from ongoing support with diabetes management and f/u as needed with pancreatic mass.     ***      Reyes Rojas    AVS/Discharge Summary is the source of truth; this is a helpful guide for improved communication of patient story

## 2019-06-03 NOTE — ED PROVIDER NOTES
History     Chief Complaint:  Dark Urine    HPI   Jesús Stock is a 68 year old male who presents to the emergency department today for evaluation of dark urine. The patient reports four days of dark urine, generalized weakness, abdominal pain, nausea, and loss of appetite. He adds that today he also experienced one episode of loose stool and also noted scleral icterus, prompting presentation. Here the patient states that he attributes his abdominal pain, nausea, and decreasing appetite to his metformin use, which he started in March of 2019. He denies any emesis, black or bloody stools, or alcohol use.     Allergies:  Dust mites  Mold     Medications:    Amaryl  Lantus  Metformin  Mylicon  Aprodine    Past Medical History:    Alcohol dependence in remission  Colon polyps  Diverticulosis of colon  Major depressive disorder  Obesity  Osteoarthrosis   Type II diabetes    Past Surgical History:    Appendectomy  Hernia repair x3  Tonsillectomy    Family History:    Father: diabetes  Mother: hypertension    Social History:  Smoking Status: Passive Smoke Exposure  Smokeless Tobacco: Never Used  Alcohol Use: Negative  Drug Use: Negative  PCP: [facility]  Marital Status:  Single      Review of Systems   Constitutional: Positive for appetite change.   Gastrointestinal: Positive for abdominal pain, diarrhea and nausea. Negative for blood in stool and vomiting.   Genitourinary:        Dark urine   Neurological: Positive for weakness.   All other systems reviewed and are negative.    Physical Exam     Patient Vitals for the past 24 hrs:   BP Temp Pulse Heart Rate Resp SpO2   06/03/19 2110 -- -- -- -- -- 99 %   06/03/19 2100 (!) 151/99 -- 74 -- -- --   06/03/19 2030 (!) 136/93 -- 75 -- -- --   06/03/19 2001 133/70 -- -- 71 24 99 %   06/03/19 2000 133/70 -- 65 -- -- 97 %   06/03/19 1930 139/79 -- 66 -- -- 98 %   06/03/19 1734 (!) 130/103 97.8  F (36.6  C) 104 -- 18 100 %     Physical Exam  Nursing note and  vitals reviewed.  Constitutional: Well nourished. Resting comfortably.   Eyes: Conjunctiva normal, mild scleral icterus.  Pupils are equal, round, and reactive to light.   ENT: Nose normal. Mucous membranes pink and moist.    Neck: Normal range of motion.  CVS: Sinus tachycardia.  Normal heart sounds.  No murmur.  Pulmonary: Lungs clear to auscultation bilaterally. No wheezes/rales/rhonchi.  GI: Abdomen soft though mild generalized tenderness. No rigidity or guarding.    MSK: No calf tenderness or swelling.  Neuro: Alert to person, place and time. Follows simple commands. Mild tremor. Gait normal.   Skin: Skin is warm and dry. No rash noted.   Psychiatric: Normal affect.     Emergency Department Course     ECG:  ECG taken at 1835, ECG read at 1835  Normal sinus rhythm  Rate 75 bpm. NM interval 162 ms. QRS duration 78 ms. QT/QTc 384/428 ms. P-R-T axes 75 33 67.    Imaging:  Radiology findings were communicated with the patient who voiced understanding of the findings.    Abd/pelvis CT,  IV  contrast only TRAUMA / AAA  1. 3.4 cm low-density pancreatic head mass consistent with pancreatic  cancer until proven otherwise. This results in biliary and pancreatic duct dilatation.  2. Vascular calcifications.    Reading per radiology    Laboratory:  Laboratory findings were communicated with the patient who voiced understanding of the findings.    Glucose by Meter: 179 (H)  UA with Microscopic: WNL  Ammonia: <10 (L)  CBC: WBC 5.7, HGB 14.3,   CMP: Glucose 198 (H), Bilirubin 7.3 (H), Alkphos 555 (H),  (HH),  (H) o/w WNL (Creatinine 1.00)  Troponin (Collected: 1835): <0.015   BNP: 49  INR: 1.05  Alcohol Level: <0.01  Magnesium: 2.1  ABO/Rh Type and Screen: B+, Antibody Screen negative  Creatinine POCT: Creatinine 1.0, GFR Estimate 74  ISTAT Gases Lactate Kun POCT: pH 7.42, PCO2 38 (L), PO2 15 (L), Bicarbonate 24, O2 Sat 21, Lactic Acid 1.1  Lipase: 95  Acetaminophen: <2    Interventions:  1912 NS 1000 ml  IV  1918 Zofran 4 mg IV    Emergency Department Course:    1747 Nursing notes and vitals reviewed.    1751 I performed an exam of the patient as documented above.     1835 IV was inserted and blood was drawn for laboratory testing, results above. Point of care creatinine obtained. ISTAT VBG obtained. EKG obtained as noted above.    1849 The patient provided a urine sample here in the emergency department. This was sent for laboratory testing, findings above.    1854 Glucose by meter obtained as noted above.    2014 The patient was sent for a CT of the abdomen and pelvis while in the emergency department, results above.     2032 Patient rechecked and updated.     2107 I updated the patient on his CT results.     2111 I spoke with Dr. Rivera of the hospitalist service from [facility] regarding patient's presentation, findings, and plan of care.    2122 I spoke with Dr. Frey of the GI service from [facility] regarding patient's presentation, findings, and plan of care.    2125 I spoke with Dr. Rivera of the hospitalist service from [facility] regarding patient's presentation, findings, and plan of care.  He reports he will sign the patient out to Dr. Johnson for admission.      I personally reviewed the laboratory and imaging results with the patient and answered all related questions prior to admission.     Impression & Plan      Medical Decision Making:  Jesús Stock is a 68 year old male who presents to the emergency department today for evaluation of weakness, abdominal pain and jaundice.  He is nontoxic on arrival and without focal deficits.  He did undergo formal CT which showed concerns for pancreatic mass with biliary and pancreatic duct dilatation.  He is noted transaminitis and hyperbilirubinemia.  I did discuss the case with GI and they recommended formal observation at this point time as patient would benefit from trending his labs as well as EUS and likely biopsy.  He had pain control  during his time in the ED.  He was accepted by hospitalist for admission    Diagnosis:      ICD-10-CM    1. Transaminitis R74.0    2. Generalized muscle weakness M62.81    3. Pancreatic mass K86.9    4. Hyperbilirubinemia E80.6      Disposition:   Admitted to hospital    Scribe Disclosure:  I, Lori Hinojosa, am serving as a scribe at 6:07 PM on 6/3/2019 to document services personally performed by Cinthya Vazquez DO based on my observations and the provider's statements to me.    [facility] EMERGENCY DEPARTMENT         Cinthya Vazquez DO  06/03/19 2237

## 2019-06-03 NOTE — TELEPHONE ENCOUNTER
Call to pt.   He states he has no appetite. His urine is dark in color going about 6 times a day.   He is drinking enough water and fluids, just not eating. Has lost a lot of weight. Weighs about 185 lbs now.     Feels weak. Depressed that he can't do what he used to do. Does not feel like harming self.     BS are 105, 117, 180, 113.     Scheduled him with Dr Robert in a few weeks.

## 2019-06-03 NOTE — TELEPHONE ENCOUNTER
Patient calling and is concerned about color of urine. Patient think he is drinking enough but not sure what a person should be drinking in a day.    No pain or discomfort.    Ok to call and  [phone]

## 2019-06-04 PROBLEM — K86.89 PANCREATIC MASS: Status: ACTIVE | Noted: 2019-01-01

## 2019-06-04 PROBLEM — R74.01 TRANSAMINITIS: Status: ACTIVE | Noted: 2019-01-01

## 2019-06-04 NOTE — PROGRESS NOTES
"PRIMARY DIAGNOSIS: \"GENERIC\" NURSING  OUTPATIENT/OBSERVATION GOALS TO BE MET BEFORE DISCHARGE:  1. ADLs back to baseline: Yes    2. Activity and level of assistance: Ambulating independently.    3. Pain status: Improved but still requiring IV narcotics.    4. Return to near baseline physical activity: Yes     Discharge Planner Nurse   Safe discharge environment identified: Yes  Barriers to discharge: Yes - GI consult in AM.         Entered by: Trinidad Fortune 06/04/2019 3:42 AM     Please review provider order for any additional goals.   Nurse to notify provider when observation goals have been met and patient is ready for discharge.  "

## 2019-06-04 NOTE — PROGRESS NOTES
GI Brief Note    Called by Dr. Vazquez in the ER.  Patient presented to ER with new jaundice and some abdominal discomfort.  Findings in ER consistent with likely obstructive jaundice from newly discovered pancreatic cancer.  Given level of transaminases and bilirubin, I would favor admission at least overnight with recheck of LFTs in the morning.  In the morning GI team can discuss with on call biliary provider (ERCP/EUS provider) appropriate timing of ERCP.    Please keep NPO after midnight in case any procedure is scheduled for 6/4.     I will order labs for the morning.    Please feel free to call me with any questions.    Linda Frey MD  [facility]  Cell/Pager: [phone]  After 5pm please call [phone]

## 2019-06-04 NOTE — H&P
[facility]    History and Physical - Hospitalist Service       Date of Admission:  6/3/2019    Assessment & Plan   Jesús Stock is a 68 year old male admitted on 6/3/2019. He has a past medical history significant for diabetes mellitus, osteoarthritis, depression, and tobacco use disorder.  He presented to emergency room due to abdominal pain and jaundice.  Found to have pancreatic mass.    1.  Pancreatic mass.  Suspicious for pancreatic carcinoma.  Needs further evaluation with tissue sample.  Gastroenterology consult.  N.p.o. after midnight.  Pain medications as needed.    2.  Diabetes mellitus.  Will be n.p.o. at midnight.  He does not feel like eating at this point.  Hold his usual diabetic medications.  Start NovoLog sliding scale.    3.  Tobacco use disorder.  I did advise complete smoking cessation.  He does not feel the need for nicotine replacement therapy at this time.       Diet: Full liquid diet.  N.p.o. after midnight.  DVT Prophylaxis: Ambulate every shift  Huggins Catheter: not present  Code Status: Full code    Disposition Plan   Expected discharge: Tomorrow, recommended to prior living arrangement   Entered: Dany Johnson DO 06/03/2019, 10:30 PM         Dany Johnson DO  [facility]    ______________________________________________________________________    Chief Complaint   Abdominal pain.    History is obtained from the patient    History of Present Illness   Jesús Stock is a 68 year old male who has a past medical history significant for diabetes mellitus, osteoarthritis, depression, and tobacco use disorder.  He has been noticing abdominal pain for the past few months.  Pain is been fairly sporadic previously.  He does notice that pain has been getting worse over the past week.  He has been taking over-the-counter medications to keep things under control occasionally.  He has not had any nausea with this.  Has not noted fevers or chills.  Earlier  today, his daughter noticed that his eyes appeared yellow.  He is also been having pale-colored stools occasionally.  Has been quite constipated for the past several weeks.  He has had occasional abdominal pain in the past.  He does not usually have problems with constipation like this.  He did have jaundice briefly as a child.  No jaundice since then.  No other acute complaints.  On evaluation in the emergency room, CT scan did see pancreatic mass.  Emergency room provider did contact gastroenterology.  Gastroenterology is requesting patient be placed in the hospital to expedite further work-up of pancreatic mass.    Review of Systems    The 10 point Review of Systems is negative other than noted in the HPI    Past Medical History    I have reviewed this patient's medical history and updated it with pertinent information if needed.   Past Medical History:   Diagnosis Date     Alcohol dependence in remission sober since 1984 (H)     sober since 8/25/84     Backache, unspecified      Colon polyps 5/24/11    2 colon polyps removed at colonoscopy     Diverticulosis of colon 5/24/11    mild pan-colonic diverticulosis seen as incidental finding on colonoscopy     Major depressive disorder, single episode, mild (H)      Obesity (BMI 30.0-34.9) 08/28/2017    BMI 31.4     Osteoarthrosis, unspecified whether generalized or localized, unspecified site      Type 2 diabetes mellitus without complication, with long-term current use of insulin (H) 03/13/2019    glucose 395       Past Surgical History   I have reviewed this patient's surgical history and updated it with pertinent information if needed.  Past Surgical History:   Procedure Laterality Date     APPENDECTOMY       COLONOSCOPY  5/24/11    2 polyps, mild pan-colonic diverticulosis     COLONOSCOPY N/A 9/7/2017    Procedure: COMBINED COLONOSCOPY, SINGLE OR MULTIPLE BIOPSY/POLYPECTOMY BY BIOPSY;;  Surgeon: Gene Grimes MD;  Location:  GI     HERNIA REPAIR      X3      TONSILLECTOMY         Social History   I have reviewed this patient's social history and updated it with pertinent information if needed.  Social History     Tobacco Use     Smoking status: Passive Smoke Exposure - Never Smoker     Smokeless tobacco: Never Used     Tobacco comment: occasional   Substance Use Topics     Alcohol use: No     Comment: sober x 27 years     Drug use: No       Family History   I have reviewed this patient's family history and updated it with pertinent information if needed.   Family History   Problem Relation Age of Onset     Diabetes Father         b:1926     Hypertension Mother         b:1925     Family History Negative Brother         b:1948     Family History Negative Brother         b:1951  deferred tremor     Family History Negative Sister         b:1953     Family History Negative Brother         b:1956     Family History Negative Brother         b:1958       Prior to Admission Medications   Prior to Admission Medications   Prescriptions Last Dose Informant Patient Reported? Taking?   Guar Gum (FIBER MODULAR, NUTRISOURCE FIBER,) packet   Yes No   Sig: 3 times daily (with meals)   acetaminophen (TYLENOL) 500 MG tablet   Yes No   Sig: Take 1-2 tablets by mouth every 6 hours as needed for pain.   alcohol swab prep pads   No No   Sig: Use to swab area of injection/valerie as directed.   blood glucose (NO BRAND SPECIFIED) test strip   No No   Sig: Use to test blood sugar 3 times daily or as directed. To accompany: Blood Glucose Monitor Brands: per insurance.   blood glucose calibration (NO BRAND SPECIFIED) solution   No No   Sig: Check once per month To accompany: Blood Glucose Monitor Brands: per insurance.   blood glucose monitoring (NO BRAND SPECIFIED) meter device kit   No No   Sig: Use to test blood sugar as directed. Preferred blood glucose meter OR supplies to accompany: Blood Glucose Monitor Brands: per insurance.   glimepiride (AMARYL) 1 MG tablet   No No   Sig: Take 1 tablet (1  mg) by mouth every morning (before breakfast)   ibuprofen (ADVIL,MOTRIN) 200 MG tablet   Yes No   Sig: Take 1 tablet by mouth every 4 hours as needed for pain.   insulin glargine (LANTUS SOLOSTAR PEN) 100 UNIT/ML pen   No No   Sig: Inject 12 Units Subcutaneous daily Lantus Solostar Pen   insulin pen needle (B-D U/F) 31G X 5 MM miscellaneous   No No   Sig: USE DAILY AS DIRECTED   metFORMIN (GLUCOPHAGE-XR) 500 MG 24 hr tablet   No No   Sig: Take 2 tablets (1,000 mg) by mouth daily (with dinner)   Patient taking differently: Take 1,000 mg by mouth daily (with dinner)    polyethylene glycol (MIRALAX/GLYCOLAX) powder   Yes No   Sig: Take 1 capful by mouth daily   pseudoePHEDrine (SUDAFED) 60 MG tablet   Yes No   Sig: Take 60 mg by mouth as needed for congestion   simethicone (MYLICON) 125 MG chewable tablet   Yes No   Sig: Take 125 mg by mouth 2 times daily   thin (NO BRAND SPECIFIED) lancets   No No   Sig: Check glucose 3-4 times per day; Use with lanceting device. To accompany: Blood Glucose Monitor Brands: per insurance.   triprolidine-pseudoePHEDrine (APRODINE) 2.5-60 MG TABS per tablet   Yes No   Sig: Take 1 tablet by mouth every 6 hours as needed for allergies      Facility-Administered Medications: None     Allergies   Allergies   Allergen Reactions     Dust Mites      Mold      No Known Drug Allergies        Physical Exam   Vital Signs: Temp: 97.8  F (36.6  C)   BP: (!) 151/99 Pulse: 74 Heart Rate: 71 Resp: 24 SpO2: 99 % O2 Device: None (Room air)    Weight: 0 lbs 0 oz    Gen:  NAD, A&Ox2 to person and place.  Does seem to have a little bit of trouble with time.  Eyes:  PERRL, + scleral icterus.  OP:  MMM, no lesions.  Neck:  Supple.  CV:  Regular, no murmurs.  Lung:  CTA b/l, normal effort.  Ab:  +BS, soft.  Skin:  Warm, dry to touch.  No rash.  Positive jaundice.  Ext:  No pitting edema LE b/l.      Data   Data reviewed today: I reviewed all medications, new labs and imaging results over the last 24  hours.    Recent Labs   Lab 06/03/19  1835   WBC 5.7   HGB 14.3   MCV 97      INR 1.05      POTASSIUM 3.6   CHLORIDE 103   CO2 26   BUN 12   CR 1.00   ANIONGAP 6   MATTHEW 9.1   *   ALBUMIN 3.5   PROTTOTAL 7.1   BILITOTAL 7.3*   ALKPHOS 555*   *   *   LIPASE 95   TROPI <0.015

## 2019-06-04 NOTE — CONSULTS
Care Transition Initial Assessment - RN        Met with: Patient and Family.  DATA   Principal Problem:    Pancreatic mass  Active Problems:    Type 2 diabetes mellitus without complication, with long-term current use of insulin (H)    Transaminitis       Cognitive Status: awake, alert and oriented.  Primary Care Clinic Name: [facility] [place]  Primary Care MD Name: Dr. Robert  Contact information and PCP information verified: Yes  Lives With: child(red), adult                     Insurance concerns: No Insurance issues identified  ASSESSMENT  Patient currently receives the following services:  none        Identified issues/concerns regarding health management: Patient admitted for abdominal pain and found to have a pancreatic mass. He will have an outpatient ERCP this week. Pmhx includes osteoarthritis, depression, former smoker and DM. Pt is a Type II diabetic who recently started insulin 3/2019 along with his oral diabetic medications. Met with patient and his daughter. He reports taking his BS 3 x/day. He lives with his daughter who is supportive with his cares. Daughter provides transportation. Patient is independent with his own cares and driving. Patient expressed understanding dietary needs for managing his diabetes. He sees his IM clinic for diabetes management. He also reports seeing a DNE at the clinic within the past few weeks. Daughter is present at the bedside. Post hospitalization appointment made with Dr. Robert.       PLAN  Patient anticipates discharging to home with daughter .        Appointments: Post hospitalization appointment scheduled with Dr. Robert at [facility] for June 12th at 1:40pm.    Will send handoff to clinic when discharge.     Care  (CTS) will continue to follow as needed.    Reyes Rojas RN BSN CTS  Care Management Team  [facility]

## 2019-06-04 NOTE — PHARMACY-ADMISSION MEDICATION HISTORY
Admission medication history interview status for this patient is complete. See [facility] admission navigator for allergy information, prior to admission medications and immunization status.     Medication history interview source(s):Patient  Medication history resources (including written lists, pill bottles, clinic record):None  Primary pharmacy: [facility], [place]    Changes made to PTA medication list:  Added:   Deleted: Pseudoephedrine  Changed: Mylicon to prn, Lantus to hs    Actions taken by pharmacist (provider contacted, etc):None     Additional medication history information:None    Medication reconciliation/reorder completed by provider prior to medication history? No    For patients on insulin therapy: Yes   Lantus 12 units at hs daily   Sliding scale Novolog N  If Yes, do you have a baseline novolog pre-meal dose:  ______units with meals   Patients eat three meals a day:   Y/N     Any Barriers to therapy:  cost of medications/comfortable with giving injections (if applicable)/ comfortable and confident with current diabetes regimen       Prior to Admission medications    Medication Sig Last Dose Taking? Auth Provider   glimepiride (AMARYL) 1 MG tablet Take 1 tablet (1 mg) by mouth every morning (before breakfast) 6/3/2019 at Unknown time Yes Tan Jaramillo MD   Guar Gum (FIBER MODULAR, NUTRISOURCE FIBER,) packet 3 times daily (with meals) 6/3/2019 at Unknown time Yes Reported, Patient   metFORMIN (GLUCOPHAGE-XR) 500 MG 24 hr tablet Take 1,000 mg by mouth daily (with dinner) 6/2/2019 Yes Unknown, Entered By History   ondansetron (ZOFRAN ODT) 4 MG ODT tab Take 1 tablet (4 mg) by mouth every 8 hours as needed for nausea  Yes Cinthya Vazquez, DO   polyethylene glycol (MIRALAX/GLYCOLAX) powder Take 1 capful by mouth daily Past Week at Unknown time Yes Reported, Patient   simethicone (MYLICON) 125 MG chewable tablet Take 125 mg by mouth 2 times daily as needed (abdominal pain)  Yes Unknown,  Entered By History   Triprolidine-Pseudoephedrine 2.5-60 MG TABS Take 0.5 tablets by mouth every 6 hours as needed (allergies) Past Week at Unknown time Yes Unknown, Entered By History   acetaminophen (TYLENOL) 500 MG tablet Take 1-2 tablets by mouth every 6 hours as needed for pain.   Tan Jaramillo MD   alcohol swab prep pads Use to swab area of injection/valerie as directed.   Tan Jaramillo MD   blood glucose (NO BRAND SPECIFIED) test strip Use to test blood sugar 3 times daily or as directed. To accompany: Blood Glucose Monitor Brands: per insurance.   Tan Jaramillo MD   blood glucose calibration (NO BRAND SPECIFIED) solution Check once per month To accompany: Blood Glucose Monitor Brands: per insurance.   Tan Jaramillo MD   blood glucose monitoring (NO BRAND SPECIFIED) meter device kit Use to test blood sugar as directed. Preferred blood glucose meter OR supplies to accompany: Blood Glucose Monitor Brands: per insurance.   Tan Jaramillo MD   ibuprofen (ADVIL,MOTRIN) 200 MG tablet Take 1 tablet by mouth every 4 hours as needed for pain.   Tan Jaramillo MD   insulin glargine (LANTUS SOLOSTAR PEN) 100 UNIT/ML pen Inject 12 Units Subcutaneous daily Lantus Solostar Pen 6/2/2019 at hs  Tan Jaramillo MD   insulin pen needle (B-D U/F) 31G X 5 MM miscellaneous USE DAILY AS DIRECTED   Tan Jaramillo MD   thin (NO BRAND SPECIFIED) lancets Check glucose 3-4 times per day; Use with lanceting device. To accompany: Blood Glucose Monitor Brands: per insurance.   Tan Jaramillo MD

## 2019-06-04 NOTE — PROGRESS NOTES
Discharge Planner   Discharge Plans in progress: will discharge home with his daughter.   Barriers to discharge plan: none  Follow up plan: Post hospitalization appointment scheduled with Dr. Robert at [facility] for June 12th at 1:40pm.        Entered by: Reyes Rojas 06/04/2019 11:40 AM

## 2019-06-04 NOTE — DISCHARGE SUMMARY
[facility] Discharge Summary    Jesús Stock MRN# [id number]   Age: 68 year old YOB: 1950     Date of Admission:  6/3/2019  Date of Discharge::  6/4/2019  Admitting Physician:  Dany Johnson DO  Discharge Physician:  Leoncio Roberson MD     Home clinic: [facility]          Admission Diagnoses:   Hyperbilirubinemia [E80.6]  Transaminitis [R74.0]  Generalized muscle weakness [M62.81]  Pancreatic mass [K86.9]          Discharge Diagnosis:   Principal Problem:    Pancreatic mass  Active Problems:    Type 2 diabetes mellitus without complication, with long-term current use of insulin (H)    Transaminitis            Procedures:   CT abdomen and pelvis with IV contrast.       IMPRESSION:  1. 3.4 cm low-density pancreatic head mass consistent with pancreatic  cancer until proven otherwise. This results in biliary and pancreatic  duct dilatation.  2. Vascular calcifications.            Medications Prior to Admission:     Medications Prior to Admission   Medication Sig Dispense Refill Last Dose     Guar Gum (FIBER MODULAR, NUTRISOURCE FIBER,) packet 3 times daily (with meals)   6/3/2019 at Unknown time     polyethylene glycol (MIRALAX/GLYCOLAX) powder Take 1 capful by mouth daily   Past Week at Unknown time     simethicone (MYLICON) 125 MG chewable tablet Take 125 mg by mouth 2 times daily as needed (abdominal pain)        Triprolidine-Pseudoephedrine 2.5-60 MG TABS Take 0.5 tablets by mouth every 6 hours as needed (allergies)   Past Week at Unknown time     acetaminophen (TYLENOL) 500 MG tablet Take 1-2 tablets by mouth every 6 hours as needed for pain.   Taking     alcohol swab prep pads Use to swab area of injection/valerie as directed. 100 each 3 Taking     blood glucose (NO BRAND SPECIFIED) test strip Use to test blood sugar 3 times daily or as directed. To accompany: Blood Glucose Monitor Brands: per insurance. 100 strip 6 Taking     blood glucose calibration (NO BRAND  SPECIFIED) solution Check once per month To accompany: Blood Glucose Monitor Brands: per insurance. 1 Bottle 3 Taking     blood glucose monitoring (NO BRAND SPECIFIED) meter device kit Use to test blood sugar as directed. Preferred blood glucose meter OR supplies to accompany: Blood Glucose Monitor Brands: per insurance. 1 kit 0 Taking     ibuprofen (ADVIL,MOTRIN) 200 MG tablet Take 1 tablet by mouth every 4 hours as needed for pain.   Taking     insulin pen needle (B-D U/F) 31G X 5 MM miscellaneous USE DAILY AS DIRECTED 100 each 1      thin (NO BRAND SPECIFIED) lancets Check glucose 3-4 times per day; Use with lanceting device. To accompany: Blood Glucose Monitor Brands: per insurance. 100 each 6 Taking             Discharge Medications:     Current Discharge Medication List      START taking these medications    Details   ondansetron (ZOFRAN ODT) 4 MG ODT tab Take 1 tablet (4 mg) by mouth every 8 hours as needed for nausea  Qty: 10 tablet, Refills: 0         CONTINUE these medications which have CHANGED    Details   glimepiride (AMARYL) 1 MG tablet Take 1 tablet (1 mg) by mouth every morning (before breakfast)  Qty: 30 tablet, Refills: 2    Comments: Do not resume until you have completed your ERCP and biopsy.  Associated Diagnoses: Type 2 diabetes mellitus without complication, with long-term current use of insulin (H)      insulin glargine (LANTUS SOLOSTAR PEN) 100 UNIT/ML pen Inject 12 Units Subcutaneous At Bedtime    Comments: Do not resume until after your ERCP and EUS.  Associated Diagnoses: Type 2 diabetes mellitus without complication, with long-term current use of insulin (H)      metFORMIN (GLUCOPHAGE-XR) 500 MG 24 hr tablet Take 2 tablets (1,000 mg) by mouth daily (with dinner)    Comments: Do not resume until after your ERCP.  Associated Diagnoses: Type 2 diabetes mellitus without complication, with long-term current use of insulin (H)         CONTINUE these medications which have NOT CHANGED     Details   Guar Gum (FIBER MODULAR, NUTRISOURCE FIBER,) packet 3 times daily (with meals)      polyethylene glycol (MIRALAX/GLYCOLAX) powder Take 1 capful by mouth daily      simethicone (MYLICON) 125 MG chewable tablet Take 125 mg by mouth 2 times daily as needed (abdominal pain)      Triprolidine-Pseudoephedrine 2.5-60 MG TABS Take 0.5 tablets by mouth every 6 hours as needed (allergies)      acetaminophen (TYLENOL) 500 MG tablet Take 1-2 tablets by mouth every 6 hours as needed for pain.      alcohol swab prep pads Use to swab area of injection/valerie as directed.  Qty: 100 each, Refills: 3    Associated Diagnoses: Type 2 diabetes mellitus without complication, with long-term current use of insulin (H)      blood glucose (NO BRAND SPECIFIED) test strip Use to test blood sugar 3 times daily or as directed. To accompany: Blood Glucose Monitor Brands: per insurance.  Qty: 100 strip, Refills: 6    Comments: REASON FOR MORE FREQUENT TESTING: new onset type 2 diabetes, large HYPERLYCEMIA, ATTEMPTING LIFESTYLE CHANGES, MEDICATION ADJUSTMENTS  Associated Diagnoses: Type 2 diabetes mellitus without complication, with long-term current use of insulin (H)      blood glucose calibration (NO BRAND SPECIFIED) solution Check once per month To accompany: Blood Glucose Monitor Brands: per insurance.  Qty: 1 Bottle, Refills: 3    Associated Diagnoses: Type 2 diabetes mellitus without complication, with long-term current use of insulin (H)      blood glucose monitoring (NO BRAND SPECIFIED) meter device kit Use to test blood sugar as directed. Preferred blood glucose meter OR supplies to accompany: Blood Glucose Monitor Brands: per insurance.  Qty: 1 kit, Refills: 0    Associated Diagnoses: Type 2 diabetes mellitus without complication, with long-term current use of insulin (H)      ibuprofen (ADVIL,MOTRIN) 200 MG tablet Take 1 tablet by mouth every 4 hours as needed for pain.      insulin pen needle (B-D U/F) 31G X 5 MM miscellaneous  "USE DAILY AS DIRECTED  Qty: 100 each, Refills: 1    Associated Diagnoses: Type 2 diabetes mellitus without complication, with long-term current use of insulin (H)      thin (NO BRAND SPECIFIED) lancets Check glucose 3-4 times per day; Use with lanceting device. To accompany: Blood Glucose Monitor Brands: per insurance.  Qty: 100 each, Refills: 6    Comments: REASON FOR MORE FREQUENT TESTING: new onset type 2 diabetes, large HYPERLYCEMIA, ATTEMPTING LIFESTYLE CHANGES, MEDICATION ADJUSTMENTS  Associated Diagnoses: Type 2 diabetes mellitus without complication, with long-term current use of insulin (H)                   Consultations:   Consultation during this admission received from gastroenterology          Hospital Course:   Jesús Stock is a 68-year-old man who came to attention in the emergency department at [facility] on 6/3/2019 due to new onset of jaundice.  The patient had been complaining of abdominal pain for weeks though this had been attributed to possible constipation.  CT scan of the abdomen completed in the emergency department revealed a pancreatic head mass with apparent obstruction of both the pancreatic and common bile ducts.    I refer the reader to admission notes by my partner, Dr. Johnson as well as the emergency room provider and Dr. Linda Frey who was consulted from the emergency room department as regards presentation and discussion of considerations.    Mr. Matson was then admitted to a medical bed on observation status.  He was seen formally by gastroenterology on 6/4/2019 and plans were made for outpatient EUS-guided biopsy and probably stent deployment.    /69 (BP Location: Left arm)   Pulse 68   Temp 95.9  F (35.5  C) (Oral)   Resp 16   Ht 1.803 m (5' 11\")   Wt 84.7 kg (186 lb 12.8 oz)   SpO2 98%   BMI 26.05 kg/m    Mr. Stock was alert, coherent and in no apparent distress at the time of discharge.  1 of his daughters was present at the bedside and " she was included in the conversation.  HEENT: No obvious facial muscular asymmetry.  Mild scleral icterus noted.  Neck: No apparent lymphadenopathy.  Chest: Clear to auscultation.  No increased work of breathing.  Heart: Regular rate and rhythm without rubs murmurs gallops.  Abdomen: Soft, nontender without palpable mass.            Discharge Instructions and Follow-Up:   Discharge diet: Regular   Discharge activity: Activity as tolerated   Discharge follow-up:  Plan is to follow-up with gastroenterology tomorrow for outpatient EUS with biopsy and stent placement.  I recommend the patient that he just simply stop all of his medications for diabetic control until after the procedure.  Thereafter, assuming he is able to comfortable and able to eat, he can resume insulin, glimepiride and metformin (unless otherwise directed) as his routine.           Discharge Disposition:   Discharged to home      Attestation:  I have reviewed today's vital signs, notes, medications, labs and imaging.  Total time: 35 minutes    Leoncio Roberson MD

## 2019-06-04 NOTE — ED NOTES
"Pt resting Comfortably on bed.  Alert & Oriented  Pt C/O States he is a little \"whoozy\" but otherwise OK.  He initially denied Zofran but then later accepted it.  Daughter in room.  "

## 2019-06-04 NOTE — PLAN OF CARE
Pt to D/C to home.  Pt provided with d/c instructions, including new medications, when medications were last given, and when to take them again.  Pt also informed to f/u with primary at scheduled appointment and GI as scheduled.  Pt verbalized understanding of all d/c and f/u instructions.  All questions were answered at this time.  Copy of paperwork sent with pt.  Medications sent to pt home pharmacy.  Daughter to provide transport.  All personal belongings sent with pt. No concerns at this time.

## 2019-06-04 NOTE — ED NOTES
[facility]  ED Nurse Handoff Report    Jesús Stock is a 68 year old male   ED Chief complaint: Generalized Weakness  . ED Diagnosis:   Final diagnoses:   Transaminitis   Generalized muscle weakness     Allergies:   Allergies   Allergen Reactions     Dust Mites      Mold      No Known Drug Allergies        Code Status: Full Code  Activity level - Baseline/Home:  Independent. Activity Level - Current:   Stand by Assist. Lift room needed: No. Bariatric: No   Needed: No   Isolation: No. Infection: Not Applicable.     Vital Signs:   Vitals:    06/03/19 1734 06/03/19 1930 06/03/19 2000 06/03/19 2001   BP: (!) 130/103 139/79 133/70 133/70   Pulse: 104 66 65    Resp: 18   24   Temp: 97.8  F (36.6  C)      SpO2: 100% 98% 97% 99%       Cardiac Rhythm:  ,      Pain level:    Patient confused: No. Patient Falls Risk: Yes.   Elimination Status: Has voided   Patient Report - Initial Complaint: generalized weakness. Focused Assessment: Jesús Stock is a 68 year old male who presents to the emergency department today for evaluation of dark urine. The patient reports four days of dark urine, generalized weakness, abdominal pain, nausea, and loss of appetite. He adds that today he also experienced one episode of loose stool and also noted scleral icterus, prompting presentation. Here the patient states that he attributes his abdominal pain, nausea, and decreasing appetite to his metformin use, which he started in March of 2019. He denies any emesis, black or bloody stools, or alcohol use.     Tests Performed:   Labs Ordered and Resulted from Time of ED Arrival Up to the Time of Departure from the ED   CBC WITH PLATELETS DIFFERENTIAL - Abnormal; Notable for the following components:       Result Value    RBC Count 4.39 (*)     All other components within normal limits   COMPREHENSIVE METABOLIC PANEL - Abnormal; Notable for the following components:    Glucose 198 (*)     Bilirubin Total 7.3 (*)      Alkaline Phosphatase 555 (*)      (*)      (*)     All other components within normal limits   AMMONIA - Abnormal; Notable for the following components:    Ammonia <10 (*)     All other components within normal limits   GLUCOSE BY METER - Abnormal; Notable for the following components:    Glucose 179 (*)     All other components within normal limits   ISTAT  GASES LACTATE SYLWIA POCT - Abnormal; Notable for the following components:    PCO2 Venous 38 (*)     PO2 Venous 15 (*)     All other components within normal limits   TROPONIN I   NT PROBNP INPATIENT   ROUTINE UA WITH MICROSCOPIC   INR   ALCOHOL ETHYL   MAGNESIUM   CREATININE POCT   LIPASE   ACETAMINOPHEN LEVEL   ISTAT CG4 GASES LACTATE SYLWIA NURSING POCT   ISTAT CREATININE NURSING POCT   ABO/RH TYPE AND SCREEN     Abd/pelvis CT,  IV  contrast only TRAUMA / AAA    (Results Pending)      Abnormal Results: see above   Treatments provided: see MAR  Family Comments: daughter present  OBS brochure/video discussed/provided to patient:  N/A  ED Medications:   Medications   0.9% sodium chloride BOLUS (0 mLs Intravenous Stopped 6/3/19 2026)   ondansetron (ZOFRAN) injection 4 mg (4 mg Intravenous Given 6/3/19 1918)   0.9% sodium chloride BOLUS (0 mLs Intravenous Stopped 6/3/19 2018)   iopamidol (ISOVUE-370) solution 500 mL (98 mLs Intravenous Given 6/3/19 2016)     Drips infusing:  No  For the majority of the shift, the patient's behavior Green. Interventions performed were none.     Severe Sepsis OR Septic Shock Diagnosis Present: No      ED Nurse Name/Phone Number: Bhavesh Dunlap,   8:33 PM    RECEIVING UNIT ED HANDOFF REVIEW    Above ED Nurse Handoff Report was reviewed: Yes  Reviewed by: Trinidad Fortune on Hailey 3, 2019 at 11:44 PM

## 2019-06-04 NOTE — CONSULTS
[facility]  Gastroenterology Consultation    Jesús Stock  [address] [place] [id number]  68 year old male    Admission Date/Time: 6/3/2019  Primary Care Provider: Dylan, [facility]    We were asked to see the patient in consultation by Dr. Johnson for evaluation of pancreatic mass.        HPI:  Jesús Stock is a 68 year old male who has a past medical history of diabetes mellitus, osteoarthritis, depression who presents with abdominal pain and jaundice.  He has noticed abdominal pain over the past few months but it has significantly worsened over the past week.  Denies nausea, vomiting.  Denies fever, chills.  His daughter noticed yellowing of the eyes yesterday.  No previous jaundice.      CT A/P shows a 3.4 cm low-density pancreatic head mass consistent with pancreatic cancer and resultant biliary and pancreatic duct dilation.  Lab work reveals a total bilirubin of 8.3, , , .  Lipase 95.  WBC 5.3.  Hemoglobin 13.7.  Afebrile.    ROS: A comprehensive ten point review of systems was negative aside from those in mentioned in the HPI.      MEDICATIONS:   No current facility-administered medications on file prior to encounter.   Current Outpatient Medications on File Prior to Encounter:  acetaminophen (TYLENOL) 500 MG tablet Take 1-2 tablets by mouth every 6 hours as needed for pain.   alcohol swab prep pads Use to swab area of injection/valerie as directed.   blood glucose (NO BRAND SPECIFIED) test strip Use to test blood sugar 3 times daily or as directed. To accompany: Blood Glucose Monitor Brands: per insurance.   blood glucose calibration (NO BRAND SPECIFIED) solution Check once per month To accompany: Blood Glucose Monitor Brands: per insurance.   blood glucose monitoring (NO BRAND SPECIFIED) meter device kit Use to test blood sugar as directed. Preferred blood glucose meter OR supplies to accompany: Blood Glucose Monitor Brands: per  insurance.   glimepiride (AMARYL) 1 MG tablet Take 1 tablet (1 mg) by mouth every morning (before breakfast)   Guar Gum (FIBER MODULAR, NUTRISOURCE FIBER,) packet 3 times daily (with meals)   ibuprofen (ADVIL,MOTRIN) 200 MG tablet Take 1 tablet by mouth every 4 hours as needed for pain.   insulin glargine (LANTUS SOLOSTAR PEN) 100 UNIT/ML pen Inject 12 Units Subcutaneous daily Lantus Solostar Pen   insulin pen needle (B-D U/F) 31G X 5 MM miscellaneous USE DAILY AS DIRECTED   metFORMIN (GLUCOPHAGE-XR) 500 MG 24 hr tablet Take 2 tablets (1,000 mg) by mouth daily (with dinner) (Patient taking differently: Take 1,000 mg by mouth daily (with dinner) )   polyethylene glycol (MIRALAX/GLYCOLAX) powder Take 1 capful by mouth daily   pseudoePHEDrine (SUDAFED) 60 MG tablet Take 60 mg by mouth as needed for congestion   simethicone (MYLICON) 125 MG chewable tablet Take 125 mg by mouth 2 times daily   thin (NO BRAND SPECIFIED) lancets Check glucose 3-4 times per day; Use with lanceting device. To accompany: Blood Glucose Monitor Brands: per insurance.   triprolidine-pseudoePHEDrine (APRODINE) 2.5-60 MG TABS per tablet Take 1 tablet by mouth every 6 hours as needed for allergies       ALLERGIES:   Allergies   Allergen Reactions     Dust Mites      Mold      No Known Drug Allergies        Past Medical History:   Diagnosis Date     Alcohol dependence in remission sober since 1984 (H)     sober since 8/25/84     Backache, unspecified      Colon polyps 5/24/11    2 colon polyps removed at colonoscopy     Diverticulosis of colon 5/24/11    mild pan-colonic diverticulosis seen as incidental finding on colonoscopy     Major depressive disorder, single episode, mild (H)      Obesity (BMI 30.0-34.9) 08/28/2017    BMI 31.4     Osteoarthrosis, unspecified whether generalized or localized, unspecified site      Type 2 diabetes mellitus without complication, with long-term current use of insulin (H) 03/13/2019    glucose 395       Past  "Surgical History:   Procedure Laterality Date     APPENDECTOMY       COLONOSCOPY  5/24/11    2 polyps, mild pan-colonic diverticulosis     COLONOSCOPY N/A 9/7/2017    Procedure: COMBINED COLONOSCOPY, SINGLE OR MULTIPLE BIOPSY/POLYPECTOMY BY BIOPSY;;  Surgeon: Gene Grimes MD;  Location: [facility]     HERNIA REPAIR      X3     TONSILLECTOMY           SOCIAL HISTORY:  Social History     Tobacco Use     Smoking status: Passive Smoke Exposure - Never Smoker     Smokeless tobacco: Never Used     Tobacco comment: occasional   Substance Use Topics     Alcohol use: No     Comment: sober x 27 years     Drug use: No       FAMILY HISTORY:  Family History   Problem Relation Age of Onset     Diabetes Father         b:1926     Hypertension Mother         b:1925     Family History Negative Brother         b:1948     Family History Negative Brother         b:1951  deferred tremor     Family History Negative Sister         b:1953     Family History Negative Brother         b:1956     Family History Negative Brother         b:1958       PHYSICAL EXAM:   /69 (BP Location: Left arm)   Pulse 68   Temp 95.9  F (35.5  C) (Oral)   Resp 16   Ht 1.803 m (5' 11\")   Wt 84.7 kg (186 lb 12.8 oz)   SpO2 98%   BMI 26.05 kg/m      Constitutional: NAD, comfortable  Cardiovascular: RRR, normal S1 and S2, no r/c/g/m  Respiratory: CTAB  Psychiatric: mentation appears normal and affect normal  Head: Normocephalic. Atraumatic.    Neck: Neck supple. No adenopathy. Thyroid symmetric, normal size, trachea midline  Eyes:  PERRL, + icterus  ENT: Hearing adequate, pharynx normal without erythema or exudate  Abdomen:   Auscultation: + BS  Appearance: mildly distended  Palpation: non-tender  NEURO: grossly negative  SKIN: no suspicious lesions or rashes  LYMPH:   anterior cervical: no adenopathy  posterior cervical: no adenopathy  supraclavicular: no adenopathy          ADDITIONAL COMMENTS:   I reviewed the patient's new clinical lab test results. "   Recent Labs   Lab Test 06/04/19  0630 06/03/19  1835 05/22/19  1542   WBC 5.3 5.7 5.6   HGB 13.7 14.3 13.9   MCV 96 97 95    179 183   INR 1.17* 1.05  --      Recent Labs   Lab Test 06/04/19  0630 06/03/19  1835 05/22/19  1542    135 135   POTASSIUM 3.9 3.6 4.0   CHLORIDE 108 103 104   CO2 25 26 26   BUN 11 12 12   CR 0.98 1.00 1.03   ANIONGAP 7 6 5   MATTHEW 8.8 9.1 9.4   * 198* 115*     Recent Labs   Lab Test 06/04/19  0630 06/03/19  1849 06/03/19  1835 05/22/19  1542 03/13/19  0833   ALBUMIN 3.2*  --  3.5  --  4.0   BILITOTAL 8.3*  --  7.3*  --  0.5   *  --  537* 64 21   *  --  303*  --  15   ALKPHOS 517*  --  555*  --  128   PROTEIN  --  Negative  --   --  Negative   LIPASE  --   --  95  --   --              .    CONSULTATION ASSESSMENT AND PLAN:      69 yo male who presents with abdominal pain and jaundice found to have likely pancreatic mass.  No evidence of cholangitis.    -  Discussed with Dr. Nelson.  Patient ok for discharge with outpatient ERCP later this week.  Our office will call to arrange.    I discussed the patient's findings and plan with Dr. Mott.        Patito Smith, [facility]  Office:  [phone] call if needed after 5PM  Cell:  [phone], not available after 5PM at this number

## 2019-06-04 NOTE — PLAN OF CARE
"PRIMARY DIAGNOSIS: \"GENERIC\" NURSING  OUTPATIENT/OBSERVATION GOALS TO BE MET BEFORE DISCHARGE:  ADLs back to baseline: Yes    Activity and level of assistance: Ambulating independently.    Pain status: Improved but still requiring IV narcotics.    Return to near baseline physical activity: Yes     Discharge Planner Nurse   Safe discharge environment identified: Yes  Barriers to discharge: Yes - GI to consult this AM.          Entered by: Trinidad Fortune 06/04/2019 6:07 AM     Please review provider order for any additional goals.   Nurse to notify provider when observation goals have been met and patient is ready for discharge.  "

## 2019-06-05 NOTE — ANESTHESIA CARE TRANSFER NOTE
Patient: Jesús Stock    Procedure(s):  ENDOSCOPIC RETROGRADE CHOLANGIOPANCREATOGRAPHY, ENDOSCOPIC UTRASOUND ESOPHAGOSCOPY WITH STENT PLACEMENT.   Esophagoscopy, gastroscopy, duodenoscopy (EGD), combined, pancreatic needle biopsies    Diagnosis: PANCREATIC MASS  Diagnosis Additional Information: No value filed.    Anesthesia Type:   General, ETT     Note:  Airway :Face Mask  Patient transferred to:PACU  Comments: Extubated awake in OR, exchanging well, to recovery, VSSHandoff Report: Identifed the Patient, Identified the Reponsible Provider, Reviewed the pertinent medical history, Discussed the surgical course, Reviewed Intra-OP anesthesia mangement and issues during anesthesia, Set expectations for post-procedure period and Allowed opportunity for questions and acknowledgement of understanding      Vitals: (Last set prior to Anesthesia Care Transfer)    CRNA VITALS  6/5/2019 1545 - 6/5/2019 1620      6/5/2019             NIBP:  161/87    Pulse:  91    NIBP Mean:  115    SpO2:  99 %    Resp Rate (set):  10                Electronically Signed By: CARMINE Mora CRNA  June 5, 2019  4:20 PM

## 2019-06-05 NOTE — OR NURSING
Dr. Nelson at bedside, plan for patient to discharge home (despite the transfer to pcu order.) Patient may take Tylenol and/or Ibuprofen at home if needed for pain.

## 2019-06-05 NOTE — ANESTHESIA PREPROCEDURE EVALUATION
Anesthesia Pre-Procedure Evaluation    Patient: Jesús Stock   MRN: [id number] : 1950          Preoperative Diagnosis: PANCREATIC MASS    Procedure(s):  ENDOSCOPIC RETROGRADE CHOLANGIOPANCREATOGRAPHY    Past Medical History:   Diagnosis Date     Alcohol dependence in remission sober since  (H)     sober since 83     Backache, unspecified      Colon polyps 11    2 colon polyps removed at colonoscopy     Diverticulosis of colon 11    mild pan-colonic diverticulosis seen as incidental finding on colonoscopy     Major depressive disorder, single episode, mild (H)      Obesity (BMI 30.0-34.9) 2017    BMI 31.4     Osteoarthrosis, unspecified whether generalized or localized, unspecified site      Type 2 diabetes mellitus without complication, with long-term current use of insulin (H) 2019    glucose 395     Past Surgical History:   Procedure Laterality Date     APPENDECTOMY       COLONOSCOPY  11    2 polyps, mild pan-colonic diverticulosis     COLONOSCOPY N/A 2017    Procedure: COMBINED COLONOSCOPY, SINGLE OR MULTIPLE BIOPSY/POLYPECTOMY BY BIOPSY;;  Surgeon: Gene Grimes MD;  Location: [facility]     HERNIA REPAIR      X3     TONSILLECTOMY         Anesthesia Evaluation     . Pt has had prior anesthetic.     No history of anesthetic complications          ROS/MED HX    ENT/Pulmonary:     (+)tobacco use, Current use , . .   (-) sleep apnea   Neurologic:       Cardiovascular:        (-) hypertension   METS/Exercise Tolerance:     Hematologic:         Musculoskeletal:         GI/Hepatic:     (+) Other GI/Hepatic pancreatic mass, jaundice     (-) GERD   Renal/Genitourinary:  - ROS Renal section negative       Endo:     (+) type II DM .      Psychiatric:     (+) psychiatric history depression      Infectious Disease:         Malignancy:         Other:                          Physical Exam  Normal systems: cardiovascular and pulmonary    Airway   Mallampati: II  TM distance: >3  "FB  Neck ROM: full    Dental   (+) caps    Cardiovascular       Pulmonary             Lab Results   Component Value Date    WBC 5.3 06/04/2019    HGB 13.7 06/04/2019    HCT 40.3 06/04/2019     06/04/2019     06/04/2019    POTASSIUM 3.9 06/04/2019    CHLORIDE 108 06/04/2019    CO2 25 06/04/2019    BUN 11 06/04/2019    CR 0.98 06/04/2019     (H) 06/04/2019    MATTHEW 8.8 06/04/2019    MAG 2.1 06/03/2019    ALBUMIN 3.2 (L) 06/04/2019    PROTTOTAL 6.6 (L) 06/04/2019     (H) 06/04/2019     (H) 06/04/2019    ALKPHOS 517 (H) 06/04/2019    BILITOTAL 8.3 (H) 06/04/2019    LIPASE 95 06/03/2019    RAQUEL <10 (L) 06/03/2019    INR 1.17 (H) 06/04/2019    TSH 1.75 03/13/2019    T4 1.07 04/20/2011       Preop Vitals  BP Readings from Last 3 Encounters:   06/05/19 103/66   06/04/19 119/69   05/22/19 112/68    Pulse Readings from Last 3 Encounters:   06/04/19 68   05/22/19 99   04/16/19 94      Resp Readings from Last 3 Encounters:   06/05/19 16   06/04/19 16   05/22/19 16    SpO2 Readings from Last 3 Encounters:   06/05/19 98%   06/04/19 98%   05/22/19 99%      Temp Readings from Last 1 Encounters:   06/05/19 35.8  C (96.4  F) (Oral)    Ht Readings from Last 1 Encounters:   06/05/19 1.803 m (5' 11\")      Wt Readings from Last 1 Encounters:   06/05/19 84 kg (185 lb 3 oz)    Estimated body mass index is 25.83 kg/m  as calculated from the following:    Height as of this encounter: 1.803 m (5' 11\").    Weight as of this encounter: 84 kg (185 lb 3 oz).       Anesthesia Plan      History & Physical Review  History and physical reviewed and following examination; no interval change.    ASA Status:  3 .    NPO Status:  > 8 hours    Plan for General and ETT with Intravenous and Propofol induction. Maintenance will be Balanced.    PONV prophylaxis:  Ondansetron (or other 5HT-3) and Dexamethasone or Solumedrol  Additional equipment: Videolaryngoscope      Postoperative Care  Postoperative pain management:  " Multi-modal analgesia.      Consents  Anesthetic plan, risks, benefits and alternatives discussed with:  Patient..                 Melonie Flores MD

## 2019-06-05 NOTE — ANESTHESIA POSTPROCEDURE EVALUATION
Patient: Jesús Stock    Procedure(s):  ENDOSCOPIC RETROGRADE CHOLANGIOPANCREATOGRAPHY, ENDOSCOPIC UTRASOUND ESOPHAGOSCOPY WITH STENT PLACEMENT.   Esophagoscopy, gastroscopy, duodenoscopy (EGD), combined, pancreatic needle biopsies    Diagnosis:PANCREATIC MASS  Diagnosis Additional Information: No value filed.    Anesthesia Type:  General, ETT    Note:  Anesthesia Post Evaluation    Patient location during evaluation: PACU  Patient participation: Able to fully participate in evaluation  Level of consciousness: awake and alert  Pain management: adequate  Airway patency: patent  Cardiovascular status: acceptable  Respiratory status: acceptable  Hydration status: acceptable  PONV: none     Anesthetic complications: None          Last vitals:  Vitals:    06/05/19 1617 06/05/19 1630 06/05/19 1632   BP: 145/79 146/85    Pulse: 81     Resp: 12 12 15   Temp: 36.1  C (97  F) 36.1  C (97  F) 36  C (96.8  F)   SpO2: 99% 100% 100%         Electronically Signed By: Micheal Murray MD  June 5, 2019  4:53 PM

## 2019-06-05 NOTE — OR NURSING
EUS with slide specimans and formalin speciman. ERCP with sphincterotomy, biliary stent placement OR 30, Ja RN, BaileyRN assist Dr Nelson

## 2019-06-05 NOTE — DISCHARGE INSTRUCTIONS
Same Day Surgery Discharge Instructions for  Sedation and General Anesthesia       It's not unusual to feel dizzy, light-headed or faint for up to 24 hours after surgery or while taking pain medication.  If you have these symptoms: sit for a few minutes before standing and have someone assist you when you get up to walk or use the bathroom.      You should rest and relax for the next 24 hours. We recommend you make arrangements to have an adult stay with you for at least 24 hours after your discharge.  Avoid hazardous and strenuous activity.      DO NOT DRIVE any vehicle or operate mechanical equipment for 24 hours following the end of your surgery.  Even though you may feel normal, your reactions may be affected by the medication you have received.      Do not drink alcoholic beverages for 24 hours following surgery.       Slowly progress to your regular diet as you feel able. It's not unusual to feel nauseated and/or vomit after receiving anesthesia.  If you develop these symptoms, drink clear liquids (apple juice, ginger ale, broth, 7-up, etc. ) until you feel better.  If your nausea and vomiting persists for 24 hours, please notify your surgeon.        All narcotic pain medications, along with inactivity and anesthesia, can cause constipation. Drinking plenty of liquids and increasing fiber intake will help.      For any questions of a medical nature, call your surgeon.      Do not make important decisions for 24 hours.      If you had general anesthesia, you may have a sore throat for a couple of days related to the breathing tube used during surgery.  You may use Cepacol lozenges to help with this discomfort.  If it worsens or if you develop a fever, contact your surgeon.       If you feel your pain is not well managed with the pain medications prescribed by your surgeon, please contact your surgeon's office to let them know so they can address your concerns.       **If you have questions or concerns about  your procedure,   call Dr. Nelson at [phone]**

## 2019-06-11 NOTE — TELEPHONE ENCOUNTER
"ED/Discharge Protocol    \"Hi, my name is Michellebenjamín Brasher, a registered nurse, and I am calling on behalf of the [facility]. I am calling to follow up and see how things are going for you after your recent visit.\"    \"I see that you were in the (ER/UC/IP) on 6/4/19.    How are you doing now that you are home?\" Per daughter on consent to communicate a lot better.     Is patient experiencing symptoms that may require a hospital visit?  no    Discharge Instructions    \"Let's review your discharge instructions.  What is/are the follow-up recommendations?  Pt. Response: Plan is to follow-up with gastroenterology tomorrow for outpatient EUS with biopsy and stent placement.  I recommend the patient that he just simply stop all of his medications for diabetic control until after the procedure.  Thereafter, assuming he is able to comfortable and able to eat, he can resume insulin, glimepiride and metformin (unless otherwise directed) as his routine.    \"Were you instructed to make a follow-up appointment?\"  Pt. Response: No. But patient will be seeing provider in clinic tomorrow.     \"When you see the provider, I would recommend that you bring your discharge instructions with you.    Medications    \"How many new medications are you on since your hospitalization/ED visit?\"    0-1  \"How many of your current medicines changed (dose, timing, name, etc.) while you were in the hospital/ED visit?\"   2 or more - Epic MTM referral needed  \"Do you have questions about your medications?\"   Yes, patient did not receive paper script for Zofran from hospital. Medication indicated that it was a locally printed medication. Advised daughter to request new prescription at office visit.   \"Were you newly diagnosed with heart failure, COPD, diabetes or did you have a heart attack?\"   No  For patients on insulin: \"Did you start on insulin in the hospital or did you have your insulin dose changed?\"   No    Medication " "reconciliation completed? Yes    Was MTM referral placed (*Make sure to put transitions as reason for referral)?   Yes - \"The Medication Therapy  will call you within the next few days to schedule an appointment with an MTM pharmacist to discuss your medicines and make sure they are working as well as they possibly can for you. This visit can be done in a [place] clinic or on the phone and is at no charge to you.\"    Call Summary    \"Do you have any questions or concerns about your condition or care plan at the moment?\"    No  Triage nurse advice given: Continue to follow-up as directed by provider and call clinic with any further questions.     Patient was in ER 0 in the past year (assess appropriateness of ER visits.)      \"If you have questions or things don't continue to improve, we encourage you contact us through the main clinic number,  [phone].  Even if the clinic is not open, triage nurses are available 24/7 to help you.     We would like you to know that our clinic has extended hours (provide information).  We also have urgent care (provide details on closest location and hours/contact info)\"      \"Thank you for your time and take care!\"        "

## 2019-06-12 PROBLEM — C25.0 MALIGNANT NEOPLASM OF HEAD OF PANCREAS (H): Status: ACTIVE | Noted: 2019-01-01

## 2019-06-12 NOTE — PROGRESS NOTES
Subjective     Jesús Stock is a 68 year old male who presents to clinic today for the following health issues:    [facility]       Hospital Follow-up Visit:    Hospital/Nursing Home/IP Rehab Facility: [facility] ( 06/05/19)  Date of Admission: 06/03/19  Date of Discharge: 06/04/19  Reason(s) for Admission: Transaminitis, Pancreatic Mass, generalized muscle weakness            Problems taking medications regularly:  None       Medication changes since discharge: None       Problems adhering to non-medication therapy:  None    Summary of hospitalization:  [facility] discharge summary reviewed  Diagnostic Tests/Treatments reviewed.  Follow up needed: clinic follow up   Other Healthcare Providers Involved in Patient s Care:         Specialist appointment - oncology and surgery   Update since discharge: improved.       Post Discharge Medication Reconciliation: discharge medications reconciled, continue medications without change.  Plan of care communicated with patient     Coding guidelines for this visit:  Type of Medical   Decision Making Face-to-Face Visit       within 7 Days of discharge Face-to-Face Visit        within 14 days of discharge   Moderate Complexity [id number]   High Complexity [id number]          Patient is seen for a follow up visit.  Recently hospitalized for jaundice, found to have a pancreatic head mass, has had ERCP with stent, biopsy, showed adenocarcinoma of the pancreas.   Has set up appointment with oncology and surgery.   Jaundice improved. No abdominal pain. Has mild nausea.   Has H/O DM. On diet , po medications and insulin. Blood sugars are controlled. No parestesias. No hypoglycemias.        Patient Active Problem List   Diagnosis     CARDIOVASCULAR SCREENING; LDL GOAL LESS THAN 130     Mild major depression (H)     Colon polyps     Diverticulosis of colon     ACP (advance care planning)     Health Care Home     Obesity (BMI 30.0-34.9)      Alcohol dependence in remission sober since 1984 (H)     Type 2 diabetes mellitus without complication, with long-term current use of insulin (H)     Pancreatic mass     Transaminitis     Malignant neoplasm of head of pancreas (H)     Past Surgical History:   Procedure Laterality Date     APPENDECTOMY       COLONOSCOPY  5/24/11    2 polyps, mild pan-colonic diverticulosis     COLONOSCOPY N/A 9/7/2017    Procedure: COMBINED COLONOSCOPY, SINGLE OR MULTIPLE BIOPSY/POLYPECTOMY BY BIOPSY;;  Surgeon: Gene Grimes MD;  Location:  GI     ENDOSCOPIC RETROGRADE CHOLANGIOPANCREATOGRAM N/A 6/5/2019    Procedure: ENDOSCOPIC RETROGRADE CHOLANGIOPANCREATOGRAPHY, ENDOSCOPIC UTRASOUND ESOPHAGOSCOPY WITH STENT PLACEMENT. ;  Surgeon: Jana Nelson MD;  Location:  OR     ESOPHAGOSCOPY, GASTROSCOPY, DUODENOSCOPY (EGD), COMBINED N/A 6/5/2019    Procedure: Esophagoscopy, gastroscopy, duodenoscopy (EGD), combined, pancreatic needle biopsies;  Surgeon: Jana Nelson MD;  Location:  OR     HERNIA REPAIR      X3     TONSILLECTOMY         Social History     Tobacco Use     Smoking status: Passive Smoke Exposure - Never Smoker     Smokeless tobacco: Never Used     Tobacco comment: occasional   Substance Use Topics     Alcohol use: No     Comment: sober x 27 years     Family History   Problem Relation Age of Onset     Diabetes Father         b:1926     Hypertension Mother         b:1925     Family History Negative Brother         b:1948     Family History Negative Brother         b:1951  deferred tremor     Family History Negative Sister         b:1953     Family History Negative Brother         b:1956     Family History Negative Brother         b:1958         Current Outpatient Medications   Medication Sig Dispense Refill     acetaminophen (TYLENOL) 500 MG tablet Take 1-2 tablets by mouth every 6 hours as needed for pain.       Continuous Blood Gluc  (FREESTYLE GIOVANY 14 DAY READER) SHAWN 1 Act every 14  days 4 Device 3     Continuous Blood Gluc Sensor (FREESTYLE GIOVANY 14 DAY SENSOR) MISC 1 Act every 14 days 4 each 3     glimepiride (AMARYL) 1 MG tablet Take 1 tablet (1 mg) by mouth every morning (before breakfast) 30 tablet 2     Guar Gum (FIBER MODULAR, NUTRISOURCE FIBER,) packet 3 times daily (with meals)       ibuprofen (ADVIL,MOTRIN) 200 MG tablet Take 1 tablet by mouth every 4 hours as needed for pain.       insulin glargine (LANTUS SOLOSTAR PEN) 100 UNIT/ML pen Inject 12 Units Subcutaneous At Bedtime       metFORMIN (GLUCOPHAGE-XR) 500 MG 24 hr tablet Take 2 tablets (1,000 mg) by mouth daily (with dinner)       polyethylene glycol (MIRALAX/GLYCOLAX) powder Take 1 capful by mouth daily       sildenafil (REVATIO) 20 MG tablet Take 20 mg by mouth as needed       simethicone (MYLICON) 125 MG chewable tablet Take 125 mg by mouth 2 times daily as needed (abdominal pain)       Triprolidine-Pseudoephedrine 2.5-60 MG TABS Take 0.5 tablets by mouth every 6 hours as needed (allergies)       alcohol swab prep pads Use to swab area of injection/valerie as directed. 100 each 3     blood glucose (NO BRAND SPECIFIED) test strip Use to test blood sugar 3 times daily or as directed. To accompany: Blood Glucose Monitor Brands: per insurance. 100 strip 6     blood glucose calibration (NO BRAND SPECIFIED) solution Check once per month To accompany: Blood Glucose Monitor Brands: per insurance. 1 Bottle 3     blood glucose monitoring (NO BRAND SPECIFIED) meter device kit Use to test blood sugar as directed. Preferred blood glucose meter OR supplies to accompany: Blood Glucose Monitor Brands: per insurance. 1 kit 0     insulin pen needle (B-D U/F) 31G X 5 MM miscellaneous USE DAILY AS DIRECTED 100 each 1     thin (NO BRAND SPECIFIED) lancets Check glucose 3-4 times per day; Use with lanceting device. To accompany: Blood Glucose Monitor Brands: per insurance. 100 each 6         Reviewed and updated as needed this visit by Provider      "    Review of Systems   ROS COMP: Constitutional, HEENT, cardiovascular, pulmonary, GI, , musculoskeletal, neuro, skin, endocrine and psych systems are negative, except as otherwise noted.      Objective    Ht 1.803 m (5' 11\")   BMI 25.83 kg/m    Body mass index is 25.83 kg/m .  Physical Exam   GENERAL: healthy, alert and no distress  EYES: Eyes grossly normal to inspection, PERRL and conjunctivae and sclerae normal  HENT: ear canals and TM's normal, nose and mouth without ulcers or lesions  NECK: no adenopathy, no asymmetry, masses, or scars and thyroid normal to palpation  RESP: lungs clear to auscultation - no rales, rhonchi or wheezes  CV: regular rate and rhythm, normal S1 S2, no S3 or S4, no murmur, click or rub, no peripheral edema and peripheral pulses strong  ABDOMEN: soft, nontender, no hepatosplenomegaly, no masses and bowel sounds normal  MS: no gross musculoskeletal defects noted, no edema  SKIN: no suspicious lesions or rashes    Diagnostic Test Results:  Labs reviewed in Epic  Results for orders placed or performed during the hospital encounter of 06/05/19   ENDOSCOPIC RETROGRADE CHOLANGIOPANCREATOGRAPHY   Result Value Ref Range    ERCP       [facility] Endoscopy Department  _______________________________________________________________________________  Patient Name: Jesús Stock            Procedure Date: 6/5/2019 2:45 PM  MRN: [id number]                       Account Number: [id number]  YOB: 1950             Admit Type: Outpatient  Age: 68                               Room: OR  Note Status: Finalized                Attending MD: Jana Nelson MD  Total Sedation Time:                  Instrument Name: 318 [id number]-Q180V ERCP  _______________________________________________________________________________     Procedure:                ERCP  Indications:              Malignant tumor of the head of pancreas  Providers:                Jana Nelson MD, " Juany Iniguez RN, Jackie Saavedra RN  Referring MD:             Chirag Robert MD, Jorge Mott MD  Medicines:                General Anesthesia, Indomethacin 100 mg NM  Complications:             No immediate complications.  _______________________________________________________________________________  Procedure:                Pre-Anesthesia Assessment:                            - Prior to the procedure, a History and Physical                             was performed, and patient medications and                             allergies were reviewed. The patient is competent.                             The risks and benefits of the procedure and the                             sedation options and risks were discussed with the                             patient. All questions were answered and informed                             consent was obtained. Patient identification and                             proposed procedure were verified by the physician                             in the procedure room. Mental Status Examination:                             alert and oriented. Airway Examination: normal                             oropharyngeal airway and neck mobilit y. Respiratory                             Examination: clear to auscultation. CV Examination:                             normal. Prophylactic Antibiotics: The patient does                             not require prophylactic antibiotics. Prior                             Anticoagulants: The patient has taken no previous                             anticoagulant or antiplatelet agents. ASA Grade                             Assessment: II - A patient with mild systemic                             disease. After reviewing the risks and benefits,                             the patient was deemed in satisfactory condition to                             undergo the procedure. The anesthesia plan was to                              use general anesthesia. Immediately prior to                             administration of medications, the patient was                             re-assessed for adequacy to receive sedatives. The                             heart rate, respiratory rate, oxygen  saturations,                             blood pressure, adequacy of pulmonary ventilation,                             and response to care were monitored throughout the                             procedure. The physical status of the patient was                             re-assessed after the procedure.                            - Immediately prior to administration of                             medications, the patient was re-assessed for                             adequacy to receive sedatives.                            - The heart rate, respiratory rate, oxygen                             saturations, blood pressure, adequacy of pulmonary                             ventilation, and response to care were monitored                             throughout the procedure.                            - The physical status of the patient was                             re-assessed after the procedure.                            After obtaining informed consent, the scope was                              passed under direct vision. Throughout the                             procedure, the patient's blood pressure, pulse, and                             oxygen saturations were monitored continuously. The                             Duodenoscope was introduced through the mouth, and                             used to inject contrast into and used to inject                             contrast into the bile duct. The ERCP was somewhat                             difficult due to challenging cannulation. The                             patient tolerated the procedure well.                                                                                    Findings:       The major papilla was bulging. The minor papilla was not seen. A long        0.035 inch Soft Jagwire was selectively passed into the biliary tree.        The short-nosed traction sphincterotome was passed over the guidewire        and the bile duct was then deeply cannulated. Contrast was injected. I         personally interpreted the bile duct images. Ductal flow of contrast was        adequate. Image quality was adequate. Contrast extended to the hepatic        ducts. The middle third of the main bile duct and upper third of the        main bile duct were moderately dilated, with a mass causing an        obstruction. The largest diameter was 12 mm. An 8 mm biliary        sphincterotomy was made with a monofilament traction (standard)        sphincterotome using ERBE electrocautery. There was self limited oozing        from the sphincterotomy which did not require treatment. One 10 Fr by 7        cm biliary stent with a single external flap and a single internal flap        was placed 6.5 cm into the common bile duct. Bile flowed through the        stent. The stent was in good position. The endoscope was withdrawn from        the patient.                                                                                   Impression:               - Post ERCP with sphincterotomy and s tent placement.  Recommendation:           - Discharge patient to home (ambulatory).                            - Clear liquid diet for 1 day.                            - Repeat ERCP in 4 weeks to exchange stent.                            - Refer to a surgeon at appointment to be scheduled.                            - Refer to an oncologist at appointment to be                             scheduled.                                                                                     MARIA DEL CARMEN Nelson MD  _____________________  Jana Nelson MD  6/5/2019 4:15:18 PM  I was physically present for the  entire viewing portion of the exam.  Jana Nelson MD  Number of Addenda: 0    Note Initiated On: 6/5/2019 2:45 PM  MRN:                      [id number]  Procedure Date:           6/5/2019 2:45:23 PM  Estimated Blood Loss:       minimal  Scope In:  Scope Out:     UPPER EUS   Result Value Ref Range    Upper EUS       [facility] Endoscopy Department  _______________________________________________________________________________  Patient Name: Jesús Stock            Procedure Date: 6/5/2019 2:49 PM  MRN: [id number]                       Account Number: [id number]  YOB: 1950             Admit Type: Outpatient  Age: 68                               Room: OR  Note Status: Finalized                Attending MD: Jana Nelson MD  Total Sedation Time:                  Instrument Name: 328 [id number] Linear EUS  _______________________________________________________________________________     Procedure:                Upper EUS  Indications:              Suspected mass in pancreas on CT scan  Providers:                Jana Nelson MD, Juany Iniguez RN, Jackie Saavedra RN  Referring MD:             Jorge Mott MD, Chirag Robert MD  Medicines:                General Anesthesia  Complications:            No immedia te complications.  _______________________________________________________________________________  Procedure:                Pre-Anesthesia Assessment:                            - Prior to the procedure, a History and Physical                             was performed, and patient medications and                             allergies were reviewed. The patient is competent.                             The risks and benefits of the procedure and the                             sedation options and risks were discussed with the                             patient. All questions were answered and informed                              consent was obtained. Patient identification and                             proposed procedure were verified by the physician                             in the procedure room. Mental Status Examination:                             alert and oriented. Airway Examination: normal                             oropharyngeal airway and neck mobility. Respiratory                              Examination: clear to auscultation. CV Examination:                             normal. Prophylactic Antibiotics: The patient does                             not require prophylactic antibiotics. Prior                             Anticoagulants: The patient has taken no previous                             anticoagulant or antiplatelet agents. ASA Grade                             Assessment: III - A patient with severe systemic                             disease. After reviewing the risks and benefits,                             the patient was deemed in satisfactory condition to                             undergo the procedure. The anesthesia plan was to                             use general anesthesia. Immediately prior to                             administration of medications, the patient was                             re-assessed for adequacy to receive sedatives. The                             heart rate, respiratory rate, oxygen saturations ,                             blood pressure, adequacy of pulmonary ventilation,                             and response to care were monitored throughout the                             procedure. The physical status of the patient was                             re-assessed after the procedure.                            - Immediately prior to administration of                             medications, the patient was re-assessed for                             adequacy to receive sedatives.                            - The heart rate, respiratory rate, oxygen                              saturations, blood pressure, adequacy of pulmonary                             ventilation, and response to care were monitored                             throughout the procedure.                            - The physical status of the patient was                             re-assessed after the procedure.                            After obtaining informed consent, the endoscope was                              passed under direct vision. Throughout the                             procedure, the patient's blood pressure, pulse, and                             oxygen saturations were monitored continuously. The                             Endosonoscope was introduced through the mouth, and                             advanced to the. The upper EUS was accomplished                             with ease. The patient tolerated the procedure well.                                                                                   Findings:       Endoscopic Finding :       No gross lesions were noted in the entire examined stomach.       The ampulla and examined duodenum were normal.       Endosonographic Finding :       There was dilation in the common bile duct which measured up to 12 mm,        obstructed by pancreas mass       Extensive hyperechoic material consistent with sludge was visualized        endosonographically in the gallbladder body.       An irregular mass was identifi ed in the pancreatic head. The mass was        hypoechoic, heterogenous and solid. The mass measured 27 mm by 20 mm in        maximal cross-sectional diameter. The outer margins were irregular. An        intact interface was seen between the mass and the superior mesenteric        artery and celiac axis suggesting a lack of invasion. There was a 3 mm        plane between the mass and the SMV. The remainder of the pancreas was        examined. The endosonographic appearance of parenchyma and the upstream         pancreatic duct indicated duct dilation, an irregularly contoured duct        and a maximum duct diameter of 6 mm. Fine needle aspiration for cytology        was performed. Color Doppler imaging was utilized prior to needle        puncture to confirm a lack of significant vascular structures within the        needle path. Four passes were made with the 25 gauge needle using a        transduodenal approach. A stylet was used. A cytologist was present and        performed a prelimi nary cytologic       examination. Preliminary cytology is suspicious for adenocarcinoma        (final results are pending). Estimated blood loss: none.       Normal celiac axis and left adrenal gland. No worrisome medastinal nodes.       No focal liver lesions                                                                                   Impression:               -2.7 cm pancreas head mass, suspicious for T2N0M0                             adenocarcinoma. FNA done and pending  Recommendation:           - Perform an ERCP today.                            - Await cytology results.                                                                                     MARIA DEL CARMEN Nelson MD  _____________________  Jana Nelson MD  6/5/2019 4:20:51 PM  I was physically present for the entire viewing portion of the exam.  Jana Nelson MD  Number of Addenda: 0    Note Initiated On: 6/5/2019 2:49 PM  MRN:                      [id number]  Procedure Date:           6/5/2019 2:49:23 PM  Total  Procedure Duration: 0 hours 26 minutes 29 seconds   Estimated Blood Loss:       none  Scope In: 3:11:09 PM  Scope Out: 3:37:38 PM     XR ERCP    Narrative    ENDOSCOPIC RETROGRADE CHOLANGIOPANCREATOGRAPHY  6/5/2019 4:11 PM     HISTORY:  Stent placement.    COMPARISON: None.      Impression    IMPRESSION: Two spot fluoroscopic images obtained demonstrating  intrahepatic and extrahepatic biliary dilatation, though distal common  bile duct difficult to  "visualize in initial image. Biliary stent in  good position by completion. Total fluoroscopic time was 3.5 minutes.    JOSIANE WADDELL MD   Hepatic panel   Result Value Ref Range    Bilirubin Direct 8.2 (H) 0.0 - 0.2 mg/dL    Bilirubin Total 10.6 (H) 0.2 - 1.3 mg/dL    Albumin 3.5 3.4 - 5.0 g/dL    Protein Total 7.1 6.8 - 8.8 g/dL    Alkaline Phosphatase 546 (H) 40 - 150 U/L     (H) 0 - 70 U/L     (H) 0 - 45 U/L   INR   Result Value Ref Range    INR 1.13 0.86 - 1.14   Glucose by meter   Result Value Ref Range    Glucose 149 (H) 70 - 99 mg/dL   Surgical pathology exam   Result Value Ref Range    Copath Report       Patient Name: GAGAN COHEN  MR#: [id number]  Specimen #: [id number]  Collected: 6/5/2019  Received: 6/5/2019  Reported: 6/7/2019 11:55  Ordering Phy(s): AUDREY RODRIGUEZ    For improved result formatting, select 'View Enhanced Report Format' under   Linked Documents section.    SPECIMEN(S):  A: FNA, pancratic head mass  B: FNA, pancratic mass, pass 2    FINAL DIAGNOSIS:  A and B. Pancreatic head, mass, endoscopic ultrasound-guided fine-needle   aspiration  - Pancreatic ductal adenocarcinoma, moderately differentiated    Electronically signed out by:    Jasper Arias M.D.    GROSS:    A. First pass. Two smears. Adequate    Second pass. 2 smears. Adequate    B2.  The specimen is received in formalin with proper patient   identification labeled \"pancreatic head mass\".  The specimen consists of numerous pink and hemorrhagic delicate tissue   core fragments measuring 1. 2.2 x 0.1  cm in aggregate. The specimen is filtered over lens paper.  The specimen   is entirely submitted i n one  cassette. (Dictated by: Felix Olivo 6/6/2019 09:37 AM)    MICROSCOPIC:    A. The smears show high cellularity with irregular fragments of large   malignant epithelial cells showing a  disordered honeycomb pattern. The tumor cells show moderately large nuclei   with moderate to marked " "nuclear  pleomorphism and occasional mitoses. Tumor cells have moderate to abundant   cytoplasm with many but not all  cells showing cytoplasmic vacuoles suggesting mucin production. Fragments   of necrotic tumoral are also  present.    B. Strips and glandular formations of malignant columnar epithelial cells   are present. A few small fragments  show fibrotic stroma with invasive malignant epithelial cells.    The technical component of this testing was completed at the [facility] with the professional component performed   at the [facility], [address] [place] [address]  [phone] ([phone])    CPT  Codes:  A: 26131-QKKA, 31173-FSDP-IDX, 70213-FTKL-CBV  B: 32784-DZFX, 86810-YVMI-GXV, 69959-LMBP-OLO    COLLECTION SITE:  Client: [facility] [place]  Location: [facility] (S)       Glucose by meter   Result Value Ref Range    Glucose 128 (H) 70 - 99 mg/dL           Assessment & Plan   Problem List Items Addressed This Visit     Type 2 diabetes mellitus without complication, with long-term current use of insulin (H)    Relevant Medications    Continuous Blood Gluc  (FREESTYLE GIOVANY 14 DAY READER) SHAWN    Continuous Blood Gluc Sensor (FREESTYLE GIOVANY 14 DAY SENSOR) MISC    Malignant neoplasm of head of pancreas (H)      Other Visit Diagnoses     Hospital discharge follow-up    -  Primary             BMI:   Estimated body mass index is 25.8 kg/m  as calculated from the following:    Height as of this encounter: 1.803 m (5' 11\").    Weight as of this encounter: 83.9 kg (185 lb).       Follow up with oncology, GI and surgery   Cont treatment     See Patient Instructions  Return in about 2 months (around 8/12/2019) for Routine Visit.    Chirag Robert MD  [facility]            "

## 2019-06-12 NOTE — NURSING NOTE
"Vital signs:  Temp: 98.9  F (37.2  C) Temp src: Oral BP: 100/60 Pulse: 98   Resp: 20 SpO2: 100 %     Height: 180.3 cm (5' 11\") Weight: 83.9 kg (185 lb)  Estimated body mass index is 25.8 kg/m  as calculated from the following:    Height as of this encounter: 1.803 m (5' 11\").    Weight as of this encounter: 83.9 kg (185 lb).          "

## 2019-06-13 NOTE — TELEPHONE ENCOUNTER
Requested Prescriptions   Pending Prescriptions Disp Refills     glimepiride (AMARYL) 1 MG tablet  Last Written Prescription Date:  06/04/2019  Last Fill Quantity: 30,  # refills: 02  Last Office Visit: 6/12/2019   Future Office Visit:      30 tablet 2     Sig: Take 1 tablet (1 mg) by mouth every morning (before breakfast)       There is no refill protocol information for this order        metFORMIN (GLUCOPHAGE-XR) 500 MG 24 hr tablet  Last Written Prescription Date:  06/04/2019  Last Fill Quantity: n/a,  # refills: n/a   Last Office Visit: 6/12/2019   Future Office Visit:            Sig: Take 2 tablets (1,000 mg) by mouth daily (with dinner)       There is no refill protocol information for this order

## 2019-06-17 NOTE — PATIENT INSTRUCTIONS
Recommendations from today's MTM visit:                                                    MTM (medication therapy management) is a service provided by a clinical pharmacist designed to help you get the most of out of your medicines.   Today we reviewed what your medicines are for, how to know if they are working, that your medicines are safe and how to make your medicine regimen as easy as possible.     1. Continue current medications - I will ask Dr. Robert if it is ok to stop your glimepiride.     Next MTM visit: as needed    To schedule another MTM appointment, please call the clinic directly or you may call the MTM scheduling line at [phone] or toll-free at [phone].     My Clinical Pharmacist's contact information:                                                      It was a pleasure talking with you today!  Please feel free to contact me with any questions or concerns you have.      Petra Cummings, PharmD  Medication Therapy Management Pharmacist  [phone]    You may receive a survey about the MTM services you received by email and/or US Mail.  I would appreciate your feedback to help me serve you better in the future. Your comments will be anonymous.

## 2019-06-17 NOTE — Clinical Note
MADISON MTMACIE note.  I also sent you an inbasket (He mentioned having some low blood sugars over the past few months (a couple reports < 70). I'm wondering if we can stop his glimepiride? It is also likely not very effective, as he is on insulin and with his new diagnosis of pancreatic cancer). Thank you,Petra Cummings, PharmDMedication Therapy Management [phone]

## 2019-06-17 NOTE — TELEPHONE ENCOUNTER
"Requested Prescriptions   Pending Prescriptions Disp Refills     glimepiride (AMARYL) 1 MG tablet [Pharmacy Med Name: GLIMEPIRIDE 1MG TABLETS] 30 tablet 0     Sig: TAKE 1 TABLET BY MOUTH EVERY MORNING BEFORE BREAKFAST   Last Written Prescription Date:  06/04/2019  Last Fill Quantity: 30,  # refills: 2   Last office visit: 6/12/2019 with prescribing provider:     Future Office Visit:      Sulfonylurea Agents Passed - 6/15/2019  1:50 PM        Passed - Blood pressure less than 140/90 in past 6 months     BP Readings from Last 3 Encounters:   06/12/19 100/60   06/05/19 147/86   06/04/19 119/69                 Passed - Patient has documented LDL within the past 12 mos.     Recent Labs   Lab Test 03/13/19  0833   *             Passed - Patient has had a Microalbumin in the past 15 mos.     Recent Labs   Lab Test 03/13/19  0833   MICROL 14   UMALCR 13.80             Passed - Patient has documented A1c within the specified period of time.     If HgbA1C is 8 or greater, it needs to be on file within the past 3 months.  If less than 8, must be on file within the past 6 months.     Recent Labs   Lab Test 06/03/19  1835   A1C 7.6*             Passed - Medication is active on med list        Passed - Patient is age 18 or older        Passed - Patient has a recent creatinine (normal) within the past 12 mos.     Recent Labs   Lab Test 06/04/19  0630 06/03/19  1835   CR 0.98 1.00   CREAT  --  1.0             Passed - Recent (6 mo) or future (30 days) visit within the authorizing provider's specialty     Patient had office visit in the last 6 months or has a visit in the next 30 days with authorizing provider or within the authorizing provider's specialty.  See \"Patient Info\" tab in inbasket, or \"Choose Columns\" in Meds & Orders section of the refill encounter.            "

## 2019-06-17 NOTE — TELEPHONE ENCOUNTER
"Requested Prescriptions   Pending Prescriptions Disp Refills     glimepiride (AMARYL) 1 MG tablet 30 tablet 2     Sig: Take 1 tablet (1 mg) by mouth every morning (before breakfast)       Sulfonylurea Agents Passed - 6/13/2019  1:27 PM        Passed - Blood pressure less than 140/90 in past 6 months     BP Readings from Last 3 Encounters:   06/12/19 100/60   06/05/19 147/86   06/04/19 119/69                 Passed - Patient has documented LDL within the past 12 mos.     Recent Labs   Lab Test 03/13/19  0833   *             Passed - Patient has had a Microalbumin in the past 15 mos.     Recent Labs   Lab Test 03/13/19  0833   MICROL 14   UMALCR 13.80             Passed - Patient has documented A1c within the specified period of time.     If HgbA1C is 8 or greater, it needs to be on file within the past 3 months.  If less than 8, must be on file within the past 6 months.     Recent Labs   Lab Test 06/03/19  1835   A1C 7.6*             Passed - Medication is active on med list        Passed - Patient is age 18 or older        Passed - Patient has a recent creatinine (normal) within the past 12 mos.     Recent Labs   Lab Test 06/04/19  0630 06/03/19  1835   CR 0.98 1.00   CREAT  --  1.0             Passed - Recent (6 mo) or future (30 days) visit within the authorizing provider's specialty     Patient had office visit in the last 6 months or has a visit in the next 30 days with authorizing provider or within the authorizing provider's specialty.  See \"Patient Info\" tab in inbasket, or \"Choose Columns\" in Meds & Orders section of the refill encounter.            metFORMIN (GLUCOPHAGE-XR) 500 MG 24 hr tablet       Sig: Take 2 tablets (1,000 mg) by mouth daily (with dinner)       Biguanide Agents Passed - 6/13/2019  1:27 PM        Passed - Blood pressure less than 140/90 in past 6 months     BP Readings from Last 3 Encounters:   06/12/19 100/60   06/05/19 147/86   06/04/19 119/69                 Passed - Patient has " "documented LDL within the past 12 mos.     Recent Labs   Lab Test 03/13/19  0833   *             Passed - Patient has had a Microalbumin in the past 15 mos.     Recent Labs   Lab Test 03/13/19  0833   MICROL 14   UMALCR 13.80             Passed - Patient is age 10 or older        Passed - Patient has documented A1c within the specified period of time.     If HgbA1C is 8 or greater, it needs to be on file within the past 3 months.  If less than 8, must be on file within the past 6 months.     Recent Labs   Lab Test 06/03/19  1835   A1C 7.6*             Passed - Patient's CR is NOT>1.4 OR Patient's EGFR is NOT<45 within past 12 mos.     Recent Labs   Lab Test 06/04/19  0630   GFRESTIMATED 78   GFRESTBLACK >90       Recent Labs   Lab Test 06/04/19  0630   CR 0.98             Passed - Patient does NOT have a diagnosis of CHF.        Passed - Medication is active on med list        Passed - Recent (6 mo) or future (30 days) visit within the authorizing provider's specialty     Patient had office visit in the last 6 months or has a visit in the next 30 days with authorizing provider or within the authorizing provider's specialty.  See \"Patient Info\" tab in inbasket, or \"Choose Columns\" in Meds & Orders section of the refill encounter.            Routing refill request to provider for review/approval because:  Need new PCP to sign for continued refills.     Cadence OSORIO RN, BSN, PHN          "

## 2019-06-17 NOTE — PROGRESS NOTES
SUBJECTIVE/OBJECTIVE:                Jesús Stock is a 68 year old male called for a transitions of care visit.  He was discharged from [facility] on 6/4/19 for Jaundice/pancreatic mass/cancer.     Chief Complaint: Medication Review.    From Discharge summary:  Plan is to follow-up with gastroenterology tomorrow for outpatient EUS with biopsy and stent placement.  I recommend the patient that he just simply stop all of his medications for diabetic control until after the procedure.  Thereafter, assuming he is able to comfortable and able to eat, he can resume insulin, glimepiride and metformin (unless otherwise directed) as his routine    Pain meds - [facility] has helped, Dr. Acosta.  Dr. Jaramillo no longer is Primary   Dr. Robert is new primary (patient moved)    Allergies/ADRs: Reviewed in Epic  Tobacco: No tobacco use   Alcohol: not currently using  Activity: minimal since returning home  PMH: Reviewed in Epic    Medication Adherence/Access:  no issues reported    Nausea:  Current medications include: ondansetron 0.4 mg tablets (needs 1x/day), and lorazepam 0.5 mg q6h prn. He wonders if he should finish the ondansetron Rx first before starting lorazepam.  States these have been helpful.  Is able to eat carrots and broccoli, left over hamburger hot dish, milk.  Denies side.     Abdominal Pain:  Current medications include: tramadol 50 mg q4h prn (~1x/day), and acetaminophen 500 mg (not currently taking), Ibuprofen 200 mg (not currently taking).  Patient state this is effective. Denies side effects.     Diabetes:  Pt currently taking glimepiride 1 mg daily, metformin XR 1000 mg daily (a few weeks ago) now back, Lantus 12 units daily . Pt is not experiencing side effects - prior to admission for jaundice, he thought his stomach issues were related to either glimepiride or metformin, and he had stopped these.  SMBG: 3-5 times daily.   Ranges (based on glucometer readings): 150-170.  Has picked up Rx for new  Jarvis, hasn't put on yet but is excited.   Patient is experiencing hypoglycemia. Frequency of hypoglycemia? Reports a few falls in the last few months, BG 60s, mostly 70s or 80s. Feels low at 88. Symptoms of low blood sugar? dizzy.   Lab Results   Component Value Date    A1C 7.6 06/03/2019    A1C 8.5 05/22/2019    A1C 14.0 03/13/2019     BP Readings from Last 3 Encounters:   06/12/19 100/60   06/05/19 147/86   06/04/19 119/69     Constipation:  Current medications include: Benefiber TID, Miralax 17 g daily. States this is effective. Denies side effects.     ED:  Current medications include: keeps sildenafil 20 mg PRN on hand, isn't currently using. States is effective when has used in the past.       Allergic rhinitis: Current medications include triplodine-pseudoephedrine 2.5-60mg PRN allergies. Primary symptoms are post-nasal drip and runny nose. Pt feels that current therapy is effective.     Today's Vitals: There were no vitals taken for this visit.-phone visit    ASSESSMENT:                 Current medications were reviewed today.      Medication Adherence: good, no issues identified    Nausea:  Improved. Patient has follow up with [facility].    Abdominal Pain:  Improved. Patient has follow up with [facility].    Diabetes:  Needs improvement. Patient would benefit from discontinuing glimepiride.    Constipation:  Improved.      ED:  Stable.      Allergic rhinitis: Stable.     PLAN:                  Post Discharge Medication Reconciliation Status: discharge medications reconciled, continue medications without change.    Suggestions/questions for Dr. Robert:  1. He mentioned having some low blood sugars over the past few months (a couple reports < 70). I'm wondering if we can stop his glimepiride? It is also likely not very effective, as he is on insulin and with his new diagnosis of pancreatic cancer.       I spent 60 minutes with this patient today. A copy of the visit note was provided to the patient's  primary care provider.    Will follow up as needed with recommendations - ok to leave voicemail per pt.     The patient was mailed a summary of these recommendations as an after visit summary.    Petra Cummings, PharmD  Medication Therapy Management Pharmacist  [phone]

## 2019-06-24 NOTE — TELEPHONE ENCOUNTER
Suggest to stop the Glimepiride, because of episodes of low blood sugars. Continue with insulin and Metformin.

## 2019-06-24 NOTE — TELEPHONE ENCOUNTER
Pharmacist calling to check on status of message below. Please advise. Thanks.    SINAN WALLACE note.  I also sent you an inbasket (He mentioned having some low blood sugars over the past few months (a couple reports < 70). I'm wondering if we can stop his glimepiride? It is also likely not very effective, as he is on insulin and with his new diagnosis of pancreatic cancer). Thank you,Petra Cummings, PharmDMedication Therapy Management [phone]

## 2019-06-26 NOTE — TELEPHONE ENCOUNTER
Spoke with daughter, Oksana, informed her that Jesús should stop the glimepiride.      Oksana also mentioned he received contrast for his CT exam and was still taking metformin at the time of exam, however has now stopped this, as his blood sugars are steady between 100-130 and he was having some nausea.  He has not had any low blood sugars.  Agree to hold off on metformin until labs are rechecked following contrast - will forward to PCP as MADISON.    Removed both glimepiride and metformin from patient's med list.  Pt meeting with Palliative Care tomorrow.    Petra Cummings, PharmD  Medication Therapy Management Pharmacist  [phone]

## 2019-06-28 NOTE — PROGRESS NOTES
Ordering Clinic: [facility]  Procedure: port placement   Arrival date:7.5.19  Arrival time: 0730  NPO explained: yes                 Does patient have transportation available pre and post procedure?   yes  Check-in procedure explained: yes  Allergies reviewed: no  Blood thinners: no  Labs: yes  H&P:  In Epic 6.12.19  Does patient require ?    no  If so, language?      services called to order ?    date requested:    arrival time requested:    Name of individual from  services assisting with scheduling appointment:

## 2019-07-10 NOTE — TELEPHONE ENCOUNTER
Pt is due in August for OV .    Prescription approved per [facility] Refill Protocol.

## 2019-07-16 NOTE — PROGRESS NOTES
[place] [address]  [facility] [address]  [phone]  Dept: [phone]    PRE-OP EVALUATION:  Today's date: 2019    Jesús Stock (: 1950) presents for pre-operative evaluation assessment as requested by Dr. Nelson.  He requires evaluation and anesthesia risk assessment prior to undergoing surgery/procedure for treatment of ERCP .    Proposed Surgery/ Procedure: ERCP  Date of Surgery/ Procedure: 19  Time of Surgery/ Procedure: 12:30  Hospital/Surgical Facility: Critical access hospital  Primary Physician: Chirag Robert  Type of Anesthesia Anticipated: General    Patient has a Health Care Directive or Living Will:  NO    1. NO - Do you have a history of heart attack, stroke, stent, bypass or surgery on an artery in the head, neck, heart or legs?  2. NO - Do you ever have any pain or discomfort in your chest?  3. NO - Do you have a history of  Heart Failure?  4. NO - Are you troubled by shortness of breath when: walking on the level, up a slight hill or at night?  5. NO - Do you currently have a cold, bronchitis or other respiratory infection?  6. NO - Do you have a cough, shortness of breath or wheezing?  7. NO - Do you sometimes get pains in the calves of your legs when you walk?  8. NO - Do you or anyone in your family have previous history of blood clots?  9. NO - Do you or does anyone in your family have a serious bleeding problem such as prolonged bleeding following surgeries or cuts?  10. NO - Have you ever had problems with anemia or been told to take iron pills?  11. NO - Have you had any abnormal blood loss such as black, tarry or bloody stools, or abnormal vaginal bleeding?  12. NO - Have you ever had a blood transfusion?  13. NO - Have you or any of your relatives ever had problems with anesthesia?  14. NO - Do you have sleep apnea, excessive snoring or daytime drowsiness?  15. NO - Do you have any prosthetic heart valves?  16. NO - Do you have prosthetic  joints?  17. NO - Is there any chance that you may be pregnant?      HPI:     HPI related to upcoming procedure: ERCP      See problem list for active medical problems.  Problems all longstanding and stable, except as noted/documented.  See ROS for pertinent symptoms related to these conditions.      MEDICAL HISTORY:     Patient Active Problem List    Diagnosis Date Noted     Malignant neoplasm of head of pancreas (H) 06/12/2019     Priority: Medium     Pancreatic mass 06/04/2019     Priority: Medium     Transaminitis 06/04/2019     Priority: Medium     Type 2 diabetes mellitus without complication, with long-term current use of insulin (H) 03/13/2019     Priority: Medium     glucose 395       Obesity (BMI 30.0-34.9) 08/28/2017     Priority: Medium     BMI 31.4       Alcohol dependence in remission sober since 1984 (H)      Priority: Medium     sober since 8/25/84       Health Care Home 06/29/2017     Priority: Medium     No longer active with [facility] case management effective 05/31/2017.         ACP (advance care planning) 12/19/2012     Priority: Medium     Received referral from provider for [employer] program.  Attempted to contact patient at # provided in chart and person receiving call states wrong #.   ISAAC Rocha RN  1/23/2013     .Discussed advance care planning with patient; information given to patient to review. 12/19/2012        Colon polyps 05/24/2011     Priority: Medium     2 colon polyps removed at colonoscopy       Diverticulosis of colon 05/24/2011     Priority: Medium     mild pan-colonic diverticulosis seen as incidental finding on colonoscopy       Mild major depression (H) 05/18/2011     Priority: Medium     CARDIOVASCULAR SCREENING; LDL GOAL LESS THAN 130 04/20/2011     Priority: Medium      Past Medical History:   Diagnosis Date     Alcohol dependence in remission sober since 1984 (H)     sober since 8/25/84     Backache, unspecified      Colon polyps 5/24/11    2 colon  polyps removed at colonoscopy     Diverticulosis of colon 5/24/11    mild pan-colonic diverticulosis seen as incidental finding on colonoscopy     Major depressive disorder, single episode, mild (H)      Obesity (BMI 30.0-34.9) 08/28/2017    BMI 31.4     Osteoarthrosis, unspecified whether generalized or localized, unspecified site      Pancreatic cancer (H)      Type 2 diabetes mellitus without complication, with long-term current use of insulin (H) 03/13/2019    glucose 395     Past Surgical History:   Procedure Laterality Date     APPENDECTOMY       COLONOSCOPY  5/24/11    2 polyps, mild pan-colonic diverticulosis     COLONOSCOPY N/A 9/7/2017    Procedure: COMBINED COLONOSCOPY, SINGLE OR MULTIPLE BIOPSY/POLYPECTOMY BY BIOPSY;;  Surgeon: Gene Grimes MD;  Location:  GI     ENDOSCOPIC RETROGRADE CHOLANGIOPANCREATOGRAM N/A 6/5/2019    Procedure: ENDOSCOPIC RETROGRADE CHOLANGIOPANCREATOGRAPHY, ENDOSCOPIC UTRASOUND ESOPHAGOSCOPY WITH STENT PLACEMENT. ;  Surgeon: Jana Nelson MD;  Location:  OR     ESOPHAGOSCOPY, GASTROSCOPY, DUODENOSCOPY (EGD), COMBINED N/A 6/5/2019    Procedure: Esophagoscopy, gastroscopy, duodenoscopy (EGD), combined, pancreatic needle biopsies;  Surgeon: Jana Nelson MD;  Location:  OR     HERNIA REPAIR      X3     TONSILLECTOMY       Current Outpatient Medications   Medication Sig Dispense Refill     acetaminophen (TYLENOL) 500 MG tablet Take 1-2 tablets by mouth every 6 hours as needed for pain.       alcohol swab prep pads Use to swab area of injection/valerie as directed. 100 each 3     blood glucose (NO BRAND SPECIFIED) test strip Use to test blood sugar 3 times daily or as directed. To accompany: Blood Glucose Monitor Brands: per insurance. 100 strip 6     blood glucose calibration (NO BRAND SPECIFIED) solution Check once per month To accompany: Blood Glucose Monitor Brands: per insurance. 1 Bottle 3     blood glucose monitoring (NO BRAND SPECIFIED) meter  device kit Use to test blood sugar as directed. Preferred blood glucose meter OR supplies to accompany: Blood Glucose Monitor Brands: per insurance. 1 kit 0     Continuous Blood Gluc  (FREESTYLE GIOVANY 14 DAY READER) SHAWN 1 Act every 14 days 4 Device 0     Continuous Blood Gluc Sensor (FREESTYLE GIOVANY 14 DAY SENSOR) MISC 1 Act every 14 days 4 each 3     ibuprofen (ADVIL,MOTRIN) 200 MG tablet Take 1 tablet by mouth every 4 hours as needed for pain.       insulin glargine (LANTUS SOLOSTAR PEN) 100 UNIT/ML pen Inject 12 Units Subcutaneous At Bedtime       insulin pen needle (B-D U/F) 31G X 5 MM miscellaneous USE DAILY AS DIRECTED 100 each 1     LORazepam (ATIVAN) 0.5 MG tablet Take 0.5 mg by mouth every 6 hours as needed for anxiety       ondansetron (ZOFRAN) 4 MG tablet Take 4 mg by mouth every 8 hours as needed for nausea       oxyCODONE (OXY-IR) 5 MG capsule Take 5 mg by mouth every 4 hours as needed for severe pain       polyethylene glycol (MIRALAX/GLYCOLAX) powder Take 1 capful by mouth daily       sildenafil (REVATIO) 20 MG tablet Take 20 mg by mouth as needed       simethicone (MYLICON) 125 MG chewable tablet Take 125 mg by mouth 2 times daily as needed (abdominal pain)       thin (NO BRAND SPECIFIED) lancets Check glucose 3-4 times per day; Use with lanceting device. To accompany: Blood Glucose Monitor Brands: per insurance. 100 each 6     Triprolidine-Pseudoephedrine 2.5-60 MG TABS Take 0.5 tablets by mouth every 6 hours as needed (allergies)       OTC products: None, except as noted above    Allergies   Allergen Reactions     Dust Mites      Mold      No Known Drug Allergies       Latex Allergy: NO    Social History     Tobacco Use     Smoking status: Passive Smoke Exposure - Never Smoker     Smokeless tobacco: Never Used     Tobacco comment: occasional   Substance Use Topics     Alcohol use: No     Comment: sober x 27 years     History   Drug Use No       REVIEW OF SYSTEMS:   CONSTITUTIONAL: NEGATIVE  "for fever, chills, change in weight  ENT/MOUTH: NEGATIVE for ear, mouth and throat problems  RESP: NEGATIVE for significant cough or SOB  BREAST: NEGATIVE for masses, tenderness or discharge  CV: NEGATIVE for chest pain, palpitations or peripheral edema  GI: NEGATIVE for nausea, abdominal pain, heartburn, or change in bowel habits  : NEGATIVE for frequency, dysuria, or hematuria  MUSCULOSKELETAL: NEGATIVE for significant arthralgias or myalgia  NEURO: NEGATIVE for weakness, dizziness or paresthesias  ENDOCRINE: NEGATIVE for temperature intolerance, skin/hair changes  HEME: NEGATIVE for bleeding problems  PSYCHIATRIC: NEGATIVE for changes in mood or affect    EXAM:   /66 (BP Location: Right arm, Patient Position: Sitting, Cuff Size: Adult Regular)   Pulse 90   Temp 98  F (36.7  C) (Oral)   Resp 16   Ht 1.803 m (5' 11\")   Wt 79.3 kg (174 lb 14.4 oz)   SpO2 99%   BMI 24.39 kg/m      GENERAL APPEARANCE: healthy, alert and no distress     NECK: no adenopathy, no asymmetry, masses, or scars and thyroid normal to palpation     RESP: lungs clear to auscultation - no rales, rhonchi or wheezes     CV: regular rates and rhythm, normal S1 S2, no S3 or S4 and no murmur, click or rub     ABDOMEN:  soft, nontender, no HSM or masses and bowel sounds normal     MS: extremities normal- no gross deformities noted, no evidence of inflammation in joints, FROM in all extremities.     NEURO: Normal strength and tone, sensory exam grossly normal, mentation intact and speech normal     PSYCH: affect normal/bright and anxious    DIAGNOSTICS:   EKG: appears normal, NSR dated 6/3/19    Recent Labs   Lab Test 06/05/19  1443 06/04/19  0630 06/03/19  1835  05/22/19  1542   HGB  --  13.7 14.3  --  13.9   PLT  --  162 179  --  183   INR 1.13 1.17* 1.05   < >  --    NA  --  140 135  --  135   POTASSIUM  --  3.9 3.6  --  4.0   CR  --  0.98 1.00  --  1.03   A1C  --   --  7.6*  --  8.5*    < > = values in this interval not displayed.    "     IMPRESSION:   Reason for surgery/procedure: ERCP    The proposed surgical procedure is considered LOW risk.    REVISED CARDIAC RISK INDEX  The patient has the following serious cardiovascular risks for perioperative complications such as (MI, PE, VFib and 3  AV Block):  No serious cardiac risks  INTERPRETATION: 0 risks: Class I (very low risk - 0.4% complication rate)    The patient has the following additional risks for perioperative complications:  No identified additional risks        RECOMMENDATIONS:     --Consult hospital rounder / IM to assist post-op medical management    --Patient is to take all scheduled medications on the day of surgery EXCEPT for modifications listed below.    APPROVAL GIVEN to proceed with proposed procedure, without further diagnostic evaluation       Signed Electronically by: Alix Rocha NP    Copy of this evaluation report is provided to requesting physician.    [facility] Preop Guidelines    Revised Cardiac Risk Index

## 2019-07-17 NOTE — PROGRESS NOTES
Ordering Clinic:   Procedure: port placement  Arrival date: 7.30.19  Arrival time: 0700  NPO explained: yes                 Does patient have transportation available pre and post procedure?   yes  Check-in procedure explained: yes  Allergies reviewed: no  Blood thinners: no  Labs: no drawn 7.16.19  H&P:  Requested from [facility]  Does patient require ?    no  If so, language?      services called to order ?    date requested:    arrival time requested:    Name of individual from  services assisting with scheduling appointment:

## 2019-07-18 NOTE — ANESTHESIA POSTPROCEDURE EVALUATION
Patient: Jesús Stock    Procedure(s):  ENDOSCOPIC RETROGRADE CHOLANGIOPANCREATOGRAPHY, WITH STENT EXCHANGE    Diagnosis:STENT EXCHANGE  Diagnosis Additional Information: No value filed.    Anesthesia Type:  General, ETT    Note:  Anesthesia Post Evaluation    Patient location during evaluation: PACU  Patient participation: Able to fully participate in evaluation  Level of consciousness: awake  Pain management: adequate  Airway patency: patent  Cardiovascular status: acceptable  Respiratory status: acceptable  Hydration status: euvolemic  PONV: controlled     Anesthetic complications: None          Last vitals:  Vitals:    07/18/19 1530 07/18/19 1545 07/18/19 1614   BP: (!) 154/91 147/90 147/83   Pulse: 68 66    Resp: 12 22 16   Temp:  97.3  F (36.3  C) 98.4  F (36.9  C)   SpO2: 97% 97% 97%         Electronically Signed By: Micheal Cox MD  July 18, 2019  4:37 PM

## 2019-07-18 NOTE — ANESTHESIA PREPROCEDURE EVALUATION
Anesthesia Pre-Procedure Evaluation    Patient: Jesús Stock   MRN: [id number] : 1950          Preoperative Diagnosis: STENT EXCHANGE    Procedure(s):  ENDOSCOPIC RETROGRADE CHOLANGIOPANCREATOGRAPHY, WITH STENT EXCHANGE    Past Medical History:   Diagnosis Date     Alcohol dependence in remission sober since  (H)     sober since 83     Backache, unspecified      Colon polyps 11    2 colon polyps removed at colonoscopy     Diverticulosis of colon 11    mild pan-colonic diverticulosis seen as incidental finding on colonoscopy     Major depressive disorder, single episode, mild (H)      Obesity (BMI 30.0-34.9) 2017    BMI 31.4     Osteoarthrosis, unspecified whether generalized or localized, unspecified site      Pancreatic cancer (H)      Type 2 diabetes mellitus without complication, with long-term current use of insulin (H) 2019    glucose 395     Past Surgical History:   Procedure Laterality Date     APPENDECTOMY       COLONOSCOPY  11    2 polyps, mild pan-colonic diverticulosis     COLONOSCOPY N/A 2017    Procedure: COMBINED COLONOSCOPY, SINGLE OR MULTIPLE BIOPSY/POLYPECTOMY BY BIOPSY;;  Surgeon: Gene Grimes MD;  Location:  GI     ENDOSCOPIC RETROGRADE CHOLANGIOPANCREATOGRAM N/A 2019    Procedure: ENDOSCOPIC RETROGRADE CHOLANGIOPANCREATOGRAPHY, ENDOSCOPIC UTRASOUND ESOPHAGOSCOPY WITH STENT PLACEMENT. ;  Surgeon: Jana Nelson MD;  Location:  OR     ESOPHAGOSCOPY, GASTROSCOPY, DUODENOSCOPY (EGD), COMBINED N/A 2019    Procedure: Esophagoscopy, gastroscopy, duodenoscopy (EGD), combined, pancreatic needle biopsies;  Surgeon: Jana Nelson MD;  Location:  OR     HERNIA REPAIR      X3     TONSILLECTOMY       Anesthesia Evaluation     .             ROS/MED HX    ENT/Pulmonary:      (-) asthma, sleep apnea, VELVET risk factors and Other pulmonary disease   Neurologic:      (-) TIA, Other neuro hx and Dementia   Cardiovascular:         (-) hypertension, CAD, CHF, arrhythmias, pulmonary hypertension and dyslipidemia   METS/Exercise Tolerance:     Hematologic:        (-) anemia   Musculoskeletal:        (-) arthritis   GI/Hepatic:        (-) GERD   Renal/Genitourinary:      (-) renal disease   Endo:     (+) type II DM Using insulin .   (-) thyroid disease, chronic steroid usage, other endocrine disorder and obesity   Psychiatric:     (+) psychiatric history depression      Infectious Disease:  - neg infectious disease ROS       Malignancy:   (+) Malignancy History of Other  Other CA Pancreas Active status post         Other:                          Physical Exam      Airway   Mallampati: II  TM distance: >3 FB  Neck ROM: full    Dental     Cardiovascular   Rhythm and rate: regular and normal  (-) no murmur    Pulmonary    breath sounds clear to auscultation    Other findings: Lab Test        07/16/19 06/05/19 06/04/19 06/03/19                       1117          1443          0630          1835          WBC          5.2           --          5.3          5.7           HGB          12.9*         --          13.7         14.3          MCV          95            --          96           97            PLT          185           --          162          179           INR           --          1.13         1.17*        1.05           Lab Test        07/16/19 06/04/19 06/03/19                       1117          0630          1835          NA           140          140          135           POTASSIUM    3.9          3.9          3.6           CHLORIDE     105          108          103           CO2          24           25           26            BUN          10           11           12            CR           0.88         0.98         1.00          ANIONGAP     11           7            6             MATTHEW          8.6          8.8          9.1           GLC          277*         102*         198*                Lab Results  "  Component Value Date    WBC 5.2 07/16/2019    HGB 12.9 (L) 07/16/2019    HCT 37.8 (L) 07/16/2019     07/16/2019     07/16/2019    POTASSIUM 3.9 07/16/2019    CHLORIDE 105 07/16/2019    CO2 24 07/16/2019    BUN 10 07/16/2019    CR 0.88 07/16/2019     (H) 07/16/2019    MATTHEW 8.6 07/16/2019    MAG 2.1 06/03/2019    ALBUMIN 3.5 06/05/2019    PROTTOTAL 7.1 06/05/2019     (H) 06/05/2019     (H) 06/05/2019    ALKPHOS 546 (H) 06/05/2019    BILITOTAL 10.6 (H) 06/05/2019    LIPASE 95 06/03/2019    RAQUEL <10 (L) 06/03/2019    INR 1.13 06/05/2019    TSH 1.75 03/13/2019    T4 1.07 04/20/2011       Preop Vitals  BP Readings from Last 3 Encounters:   07/18/19 108/74   07/16/19 110/66   06/12/19 100/60    Pulse Readings from Last 3 Encounters:   07/18/19 72   07/16/19 90   06/12/19 98      Resp Readings from Last 3 Encounters:   07/16/19 16   06/12/19 20   06/05/19 16    SpO2 Readings from Last 3 Encounters:   07/18/19 98%   07/16/19 99%   06/12/19 100%      Temp Readings from Last 1 Encounters:   07/18/19 97.7  F (36.5  C) (Temporal)    Ht Readings from Last 1 Encounters:   07/18/19 1.803 m (5' 11\")      Wt Readings from Last 1 Encounters:   07/18/19 78 kg (172 lb)    Estimated body mass index is 23.99 kg/m  as calculated from the following:    Height as of this encounter: 1.803 m (5' 11\").    Weight as of this encounter: 78 kg (172 lb).       Anesthesia Plan      History & Physical Review  History and physical reviewed and following examination; no interval change.    ASA Status:  3 .    NPO Status:  > 8 hours    Plan for General and ETT with Propofol induction. Maintenance will be Balanced.    PONV prophylaxis:  Ondansetron (or other 5HT-3) and Dexamethasone or Solumedrol       Postoperative Care  Postoperative pain management:  IV analgesics and Oral pain medications.      Consents  Anesthetic plan, risks, benefits and alternatives discussed with:  Patient..                 Micheal Cox, " MD                    .

## 2019-07-18 NOTE — ANESTHESIA CARE TRANSFER NOTE
Patient: Jesús Stock    Procedure(s):  ENDOSCOPIC RETROGRADE CHOLANGIOPANCREATOGRAPHY, WITH STENT EXCHANGE    Diagnosis: STENT EXCHANGE  Diagnosis Additional Information: No value filed.    Anesthesia Type:   General, ETT     Note:  Airway :Face Mask  Patient transferred to:PACU  Comments: To PACU, oxygen per face mask, report to RN.      Vitals: (Last set prior to Anesthesia Care Transfer)    CRNA VITALS  7/18/2019 1432 - 7/18/2019 1513      7/18/2019             NIBP:  159/75    Pulse:  63                Electronically Signed By: CARMINE Crowe CRNA  July 18, 2019  3:13 PM

## 2019-07-18 NOTE — DISCHARGE INSTRUCTIONS
GENERAL ANESTHESIA OR SEDATION ADULT DISCHARGE INSTRUCTIONS   SPECIAL PRECAUTIONS FOR 24 HOURS AFTER SURGERY    IT IS NOT UNUSUAL TO FEEL LIGHT-HEADED OR FAINT, UP TO 24 HOURS AFTER SURGERY OR WHILE TAKING PAIN MEDICATION.  IF YOU HAVE THESE SYMPTOMS; SIT FOR A FEW MINUTES BEFORE STANDING AND HAVE SOMEONE ASSIST YOU WHEN YOU GET UP TO WALK OR USE THE BATHROOM.    YOU SHOULD REST AND RELAX FOR THE NEXT 24 HOURS AND YOU MUST MAKE ARRANGEMENTS TO HAVE SOMEONE STAY WITH YOU FOR AT LEAST 24 HOURS AFTER YOUR DISCHARGE.  AVOID HAZARDOUS AND STRENUOUS ACTIVITIES.  DO NOT MAKE IMPORTANT DECISIONS FOR 24 HOURS.    DO NOT DRIVE ANY VEHICLE OR OPERATE MECHANICAL EQUIPMENT FOR 24 HOURS FOLLOWING THE END OF YOUR SURGERY.  EVEN THOUGH YOU MAY FEEL NORMAL, YOUR REACTIONS MAY BE AFFECTED BY THE MEDICATION YOU HAVE RECEIVED.    DO NOT DRINK ALCOHOLIC BEVERAGES FOR 24 HOURS FOLLOWING YOUR SURGERY.    DRINK CLEAR LIQUIDS (APPLE JUICE, GINGER ALE, 7-UP, BROTH, ETC.).  PROGRESS TO YOUR REGULAR DIET AS YOU FEEL ABLE.    YOU MAY HAVE A DRY MOUTH, A SORE THROAT, MUSCLES ACHES OR TROUBLE SLEEPING.  THESE SHOULD GO AWAY AFTER 24 HOURS.    CALL YOUR DOCTOR FOR ANY OF THE FOLLOWING:  SIGNS OF INFECTION (FEVER, GROWING TENDERNESS AT THE SURGERY SITE, A LARGE AMOUNT OF DRAINAGE OR BLEEDING, SEVERE PAIN, FOUL-SMELLING DRAINAGE, REDNESS OR SWELLING.    IT HAS BEEN OVER 8 TO 10 HOURS SINCE SURGERY AND YOU ARE STILL NOT ABLE TO URINATE (PASS WATER).     . Endoscopic Ultrasound (EUS) Discharge Instructions    You have just had an endoscopic procedure.  Your follow-up instructions are indicated below:  ? You may not drive, operate machinery, make critical decisions, or do activities that require coordination or balance until tomorrow because of the medications you were given may cause dizziness, forgetfulness, and slower reaction times.  ? Do not drink any alcohol for the rest of the day because of the medications used.  ? You may resume eating,  drinking, usual medications now.  ? Return to normal activities tomorrow.  Rest for the remainder of the day.  ? There may be some slight soreness where the tubes have been; this is normal and will wear off in a day or so.  ? Throat lozenges may be used as needed for a sore throat.  ? Some bloating may be present if air has remained in your stomach and/or bowel; this is normal and will go away in a few hours.    Additional Instructions:  <Recommendation>  <Patient instructions>    WHAT TO WATCH FOR    Problems rarely occur after the exam.  It is important for you to be aware of the early signs of a possible complication.  Call Immediately if you notice any of the followin. Unusual pain or difficulty swallowing    2. Vomiting of blood    3. Black or bloody stools    4. Temperature above 100.6 degrees F (37.5 degrees C)      If you have any questions:    If you have any questions, please contact your Physician.    If you feel that this has become a medical emergency please call 911, or visit the nearest Emergency Room

## 2019-07-29 NOTE — DISCHARGE INSTRUCTIONS
Going Home after Your Port Is Inserted  _______________________________________    Patient Name: Jesús Stock  Today's Date: July 29, 2019    The doctor who inserted your port was:   at [facility])      Have your port site and/or neck wound checked on:  Next appointment (date).      Go to:  Interventional Radiology    Your port may be accessed right away. A nurse needs to flush your port every 30 days or after each use.     When you get home:    You should have an adult with you for the first 6 hours.    No driving or drinking alcohol until tomorrow. You may still have side effects from the medicine you received today (you may feel drowsy, unsteady or forgetful).     You may go back to your regular diet today.     If you take aspirin or Plavix, you may begin taking it again tomorrow. You may restart all other medicines today. Use pain medicine as directed.    Avoid heavy lifting or the overuse of your shoulder for three days.    Caring for the port site and/or neck wound:    Keep the port site clean and dry. Cover the site with plastic before taking a shower. Do this until the site has healed.     Keep the bandage on your port site for three days. Change it if it gets wet or dirty. After three days, you may use Band-Aids until the wound has healed.    For a neck wound, leave the tape on for three days. You may cover it with a Band-Aid for comfort.     If you have oozing or bleeding from the port site or from the cut in your neck:     Put direct pressure on the wound for 5 to 10 minutes with a gauze pad.  If you still have bleeding after 10 minutes, call your doctor.    If you are bleeding a lot and can't control it with direct pressure, call 911.    Call your doctor if you have:    Bleeding from a wound after 10 minutes of direct pressure.    Swelling in your neck or over your port site.    Signs of infection: a fever over 100 F (37.8 C) under the tongue; the site is red, tender or draining.

## 2019-07-29 NOTE — OR NURSING
Pt prepped for port placement. At time of consent, pt decided he wanted questions answered from his dentist and oncologist regarding an abscess in his mouth before agreeing to procedure. Informed that the port still could be placed today and he could call his dentist and oncologist about proceeding with chemo on Wednesday. Patient not able to be redirected by daughter or staff. Patient does not want procedure today. Daughter will call to reschedule.

## 2019-08-16 NOTE — ED AVS SNAPSHOT
Emergency Department  [address] [place] [id number]  Phone:  [phone]  Fax:  [phone]                                    Jesús Stock   MRN: [id number]    Department:   Emergency Department   Date of Visit:  8/16/2019           After Visit Summary Signature Page    I have received my discharge instructions, and my questions have been answered. I have discussed any challenges I see with this plan with the nurse or doctor.    ..........................................................................................................................................  Patient/Patient Representative Signature      ..........................................................................................................................................  Patient Representative Print Name and Relationship to Patient    ..................................................               ................................................  Date                                   Time    ..........................................................................................................................................  Reviewed by Signature/Title    ...................................................              ..............................................  Date                                               Time          22EPIC Rev 08/18

## 2019-08-17 NOTE — ED PROVIDER NOTES
"  History     Chief Complaint:  Abdominal Pain    HPI   Jesús Stock is a 68 year old male who presents to the ED for evaluation of abdominal pain. The patient states that he started experiencing sharp, piercing, intermittent middle abdominal pain yesterday accompanied by nausea and vomiting that started as clear liquid that progressed to green colored. He states the green colored vomit progressed to having some \"blackish\" spots. The patient reports that he ran out of Miralax one week ago and, prior to yesterday, he had not had a bowel movement for approximately 4-5 days; he is unsure if he is still constipated upon presentation to the ED. The patient states that he is currently on Morphine for his pancreatic cancer pain, but denies any chemotherapy at the moment. The patient also reports being more active today, and drinking more coffee than usual. The patient denies any chest pain or shortness of breath. Of note, the patient was diagnosed with pancreatic cancer on 6/13/2019.    Allergies:  The patient has no known drug allergies.     Medications:    Blood glucose calibration  Blood glucose monitoring  Epidiolex  Lantus solostar pen  Ativan  Morphine  Zofran  Miralax  Revatio  Mylicon  Triprolidine-pseudoepinephrine    Past Medical History:    Alcohol dependence in remission, sober since 1984  Colon polyps  Diverticulosis of colon  Major depressive disorder  Obesity  Osteoarthrosis  Pancreatic cancer  Type 2 diabetes    Past Surgical History:    Appendectomy  Endoscopic retrograde cholangiopancreatogram  Endoscopic retrograde cholangiopancreatogram, exchange tube/stent  Hernia repair x3  Tonsillectomy     Family History:    Diabetes, father  Hypertension, mother    Social History:  Passive smoke exposure, never smoker.   Negative for alcohol use, in remission since 1984.  Negative for drug use.  Marital Status:  Single [1]     Review of Systems   Respiratory: Negative for shortness of breath.    Cardiovascular: " "Negative for chest pain.   Gastrointestinal: Positive for abdominal pain, constipation, nausea and vomiting.   All other systems reviewed and are negative.      Physical Exam     Patient Vitals for the past 24 hrs:   BP Temp Temp src Pulse Heart Rate Resp SpO2 Height Weight   08/17/19 0108 -- -- -- -- -- -- 98 % -- --   08/17/19 0107 (!) 151/98 -- -- 84 -- -- -- -- --   08/17/19 0052 -- -- -- -- -- -- 99 % -- --   08/16/19 2340 (!) 142/82 98.1  F (36.7  C) Temporal -- 88 18 99 % 1.803 m (5' 11\") 74.4 kg (164 lb)     Physical Exam  Physical Exam   General:  Sitting on bed with daughter at bedside.   HENT:  No obvious trauma to head  Right Ear:  External ear normal.   Left Ear:  External ear normal.   Nose:  Nose normal.   Eyes:  Conjunctivae and EOM are normal. Pupils are equal, round, and reactive.   Neck: Normal range of motion. Neck supple. No tracheal deviation present.   CV:  Normal heart sounds. No murmur heard.  Pulm/Chest: Effort normal and breath sounds normal.   Abd: Soft. No distension. There is no tenderness. There is no rigidity, no rebound and no guarding.   M/S: Normal range of motion.   Neuro: Alert. GCS 15.  Skin: Skin is warm and dry. No rash noted. Not diaphoretic.   Psych: Normal mood and affect. Behavior is normal.     Emergency Department Course   Imaging:  Radiographic findings were communicated with the patient who voiced understanding of the findings.  XR Abdomen 2 Views:   Nonobstructive bowel gas pattern. Moderate stool in the colon. No free air. Bile duct stent as per radiology.     Laboratory:  CBC: WBC: 4.8, HGB: 13.8, PLT: 183  CMP: Glucose 134 (H), o/w WNL (Creatinine: 0.86)  Lipase: 20 (L)  ISTAT gases lactate larry POCT: pH: 7.46 (H), pCO2: 30 (L), o/w WNL    Interventions:  0011 Zofran injection 4 mg IV  0017 NS 1L IV Bolus   Dilaudid injection 0.5 mg IV  0039 Dilaudid injection 0.5 mg IV  Pink Lady enema 286 mL rectal    Emergency Department Course:  Nursing notes and vitals " reviewed. (0103) I performed an exam of the patient as documented above.     IV inserted. Medicine administered as documented above. Blood drawn. This was sent to the lab for further testing, results above.     The patient was sent for an abdomen x-ray while in the emergency department, findings above.     0212 I rechecked the patient and discussed the results of his workup thus far.     Findings and plan explained to the Patient. Patient discharged home with instructions regarding supportive care, medications, and reasons to return. The importance of close follow-up was reviewed.     Impression & Plan    Medical Decision Making:  Jesús Stock is a very pleasant 68 year old year old patient who presents to the emergency department with concern of abdominal pain.  The patient is on narcotics for pancreatic cancer.  He has been taking MiraLAX daily, but stopped taking it 1 week ago.  He has not had a bowel movement until yesterday.  The patient has a benign nonsurgical abdomen.  Given his history, I obtained labs.  These are unremarkable.  He has no fever or leukocytosis to suggest infectious process.  Lactate is negative; therefore, I doubt mesenteric ischemia.  X-ray shows some increased stool.  There is no obstructive pattern.  I do not believe a CT is indicated.  He has no urinary symptoms to suggest UTI or kidney stone.  Patient received a pink lady enema to help relieve his symptoms.  I encouraged restarting the MiraLAX on a daily basis.  Encourage close follow-up with his primary and return with any fever or worsening pain.    The treatment plan was discussed with the patient and they expressed understanding of this plan and consented to the plan.  In addition, the patient will return to the emergency department if their symptoms persist, worsen, if new symptoms arise or if there is any concern as other pathology may be present that is not evident at this time. They also understand the importance of close  follow up in the clinic and if unable to do so will return to the emergency department for a reevaluation. All questions were answered.    Diagnosis:    ICD-10-CM   1. Abdominal pain, unspecified abdominal location R10.9   2. Constipation, unspecified constipation type K59.00       Disposition:  The patient was discharged to home.    Scribe Disclosure:  IAda, am serving as a scribe on 8/17/2019 at 1:03 AM to personally document services performed by Miguel A Arriaza DO based on my observations and the provider's statements to me.     Ada Amaral  8/16/2019    EMERGENCY DEPARTMENT       Miguel A Arriaza DO  08/17/19 0223

## 2019-08-17 NOTE — ED TRIAGE NOTES
"Sharp stabbing pain upper middle abdomen, nausea, vomiting that started clear then green, then green with some \"blackish\". Ran out of Miralax that he normally takes because he is on morphine for pancreatic cancer pain. Says he had 3 Bm today and they were normal per patient. Patient thinks that he went 4 or 5 days without a bowel movement. He also stated that he has pancreatic cancer for which he has not been treated.   "

## 2019-08-17 NOTE — ED NOTES
Pt had small to medium light haresh-colored BM following enema and states he feels better. MD notified.

## 2019-08-19 NOTE — NURSING NOTE
"/72 (BP Location: Left arm, Patient Position: Sitting, Cuff Size: Adult Small)   Pulse 109   Temp 97.6  F (36.4  C) (Oral)   Resp 18   Ht 1.803 m (5' 11\")   Wt 75.8 kg (167 lb)   SpO2 96%   BMI 23.29 kg/m     Patient is here for a Diabetes follow up needs forms filled out fr 's license and was at the ER on 08/16/2019 for abdominal pain and constipation.  "

## 2019-08-19 NOTE — PROGRESS NOTES
Subjective     Jesús Stock is a 68 year old male who presents to clinic today for the following health issues:  Patient is here for a Diabetes follow up needs forms filled out fr 's license and was at the ER on 08/16/2019 for abdominal pain and constipation.  HPI     Seen in ED 3 days ago for abdominal pain. Had abdominal X rays, no obstriction, stool seen. Possible pain related to constipation. Improved with bowel regimen.   Takes miralax daily. Better pain control.   Has h/o pancreatic cancer. Has follow up appointment with oncology for starting chemo.   Has poor appetite, has lost weight. Feels nausea. Has been prescribed pain , antinausea, antianxiety medications from oncology. Help with symptoms. Needs refills of nausea treatment.   Has symptoms of generalized weakness, uses a cane for help with ambulation. No falls.   Has h/o essential tremor. Slightly worse related to anxiety with cancer history .     Diabetes Follow-up  Patient is seen for a follow up visit.  Has H/O DM. On diet , exercise and Lantus insulin 12 units at bedtime. Blood sugars are controlled. No parestesias. No hypoglycemias.      How often are you checking your blood sugar? Four or more times daily    What time of day are you checking your blood sugars (select all that apply)?  Before and after meals    Have you had any blood sugars above 200?  Yes     Have you had any blood sugars below 70?  No    What symptoms do you notice when your blood sugar is low?  None    What concerns do you have today about your diabetes? None     Do you have any of these symptoms? (Select all that apply)  Numbness in feet     Have you had a diabetic eye exam in the last 12 months? No    BP Readings from Last 2 Encounters:   08/19/19 109/72   08/17/19 (!) 151/98     Hemoglobin A1C (%)   Date Value   06/03/2019 7.6 (H)   05/22/2019 8.5 (H)     LDL Cholesterol Calculated (mg/dL)   Date Value   03/13/2019 106 (H)   08/28/2017 125 (H)       Diabetes Management  Resources      How many servings of fruits and vegetables do you eat daily?  0-1    On average, how many sweetened beverages do you drink each day (soda, juice, sweet tea, etc)?   0    How many days per week do you miss taking your medication? 0            Patient Active Problem List   Diagnosis     CARDIOVASCULAR SCREENING; LDL GOAL LESS THAN 130     Mild major depression (H)     Colon polyps     Diverticulosis of colon     ACP (advance care planning)     Health Care Home     Obesity (BMI 30.0-34.9)     Alcohol dependence in remission sober since 1984 (H)     Type 2 diabetes mellitus without complication, with long-term current use of insulin (H)     Pancreatic mass     Transaminitis     Malignant neoplasm of head of pancreas (H)     Past Surgical History:   Procedure Laterality Date     APPENDECTOMY       COLONOSCOPY  5/24/11    2 polyps, mild pan-colonic diverticulosis     COLONOSCOPY N/A 9/7/2017    Procedure: COMBINED COLONOSCOPY, SINGLE OR MULTIPLE BIOPSY/POLYPECTOMY BY BIOPSY;;  Surgeon: Gene Grimes MD;  Location:  GI     ENDOSCOPIC RETROGRADE CHOLANGIOPANCREATOGRAM N/A 6/5/2019    Procedure: ENDOSCOPIC RETROGRADE CHOLANGIOPANCREATOGRAPHY, ENDOSCOPIC UTRASOUND ESOPHAGOSCOPY WITH STENT PLACEMENT. ;  Surgeon: Jana Nelson MD;  Location:  OR     ENDOSCOPIC RETROGRADE CHOLANGIOPANCREATOGRAPHY, EXCHANGE TUBE/STENT N/A 7/18/2019    Procedure: ENDOSCOPIC RETROGRADE CHOLANGIOPANCREATOGRAPHY, WITH STENT EXCHANGE;  Surgeon: Jana Nelson MD;  Location:  OR     ESOPHAGOSCOPY, GASTROSCOPY, DUODENOSCOPY (EGD), COMBINED N/A 6/5/2019    Procedure: Esophagoscopy, gastroscopy, duodenoscopy (EGD), combined, pancreatic needle biopsies;  Surgeon: Jana Nelson MD;  Location:  OR     HERNIA REPAIR      X3     TONSILLECTOMY         Social History     Tobacco Use     Smoking status: Passive Smoke Exposure - Never Smoker     Smokeless tobacco: Never Used     Tobacco comment:  occasional   Substance Use Topics     Alcohol use: No     Comment: sober x 27 years     Family History   Problem Relation Age of Onset     Diabetes Father         b:1926     Hypertension Mother         b:1925     Family History Negative Brother         b:1948     Family History Negative Brother         b:1951  deferred tremor     Family History Negative Sister         b:1953     Family History Negative Brother         b:1956     Family History Negative Brother         b:1958         Current Outpatient Medications   Medication Sig Dispense Refill     acetaminophen (TYLENOL) 500 MG tablet Take 1-2 tablets by mouth every 6 hours as needed for pain.       alcohol swab prep pads Use to swab area of injection/valerie as directed. 100 each 3     blood glucose (NO BRAND SPECIFIED) test strip Use to test blood sugar 3 times daily or as directed. To accompany: Blood Glucose Monitor Brands: per insurance. 100 strip 6     blood glucose calibration (NO BRAND SPECIFIED) solution Check once per month To accompany: Blood Glucose Monitor Brands: per insurance. 1 Bottle 3     blood glucose monitoring (NO BRAND SPECIFIED) meter device kit Use to test blood sugar as directed. Preferred blood glucose meter OR supplies to accompany: Blood Glucose Monitor Brands: per insurance. 1 kit 0     cannabidiol (EPIDIOLEX) 100 MG/ML oral solution Take by mouth 2 times daily       Continuous Blood Gluc  (FREESTYLE GIOVANY 14 DAY READER) SHAWN 1 Act every 14 days 4 Device 0     Continuous Blood Gluc Sensor (FREESTYLE GIOVANY 14 DAY SENSOR) MISC 1 Act every 14 days 4 each 3     ibuprofen (ADVIL,MOTRIN) 200 MG tablet Take 1 tablet by mouth every 4 hours as needed for pain.       insulin glargine (LANTUS SOLOSTAR PEN) 100 UNIT/ML pen Inject 12 Units Subcutaneous At Bedtime       insulin pen needle (B-D U/F) 31G X 5 MM miscellaneous USE DAILY AS DIRECTED 100 each 1     LORazepam (ATIVAN) 0.5 MG tablet Take 0.5 mg by mouth every 6 hours as needed for  "anxiety       morphine (MS CONTIN) 15 MG CR tablet Take 15 mg by mouth every 12 hours       ondansetron (ZOFRAN) 4 MG tablet Take 1 tablet (4 mg) by mouth every 8 hours as needed for nausea 30 tablet 1     oxyCODONE HCl (OXAYDO) 5 MG TABA Take 5 mg by mouth 6 times daily       polyethylene glycol (MIRALAX/GLYCOLAX) powder Take 1 capful by mouth daily       prochlorperazine (COMPAZINE) 5 MG tablet Take 1 tablet (5 mg) by mouth every 6 hours as needed for nausea or vomiting 60 tablet 1     sildenafil (REVATIO) 20 MG tablet Take 20 mg by mouth as needed       simethicone (MYLICON) 125 MG chewable tablet Take 125 mg by mouth 2 times daily as needed (abdominal pain)       thin (NO BRAND SPECIFIED) lancets Check glucose 3-4 times per day; Use with lanceting device. To accompany: Blood Glucose Monitor Brands: per insurance. 100 each 6     Triprolidine-Pseudoephedrine 2.5-60 MG TABS Take 0.5 tablets by mouth every 6 hours as needed (allergies)           Reviewed and updated as needed this visit by Provider         Review of Systems   ROS COMP: Constitutional, HEENT, cardiovascular, pulmonary, GI, , musculoskeletal, neuro, skin, endocrine and psych systems are negative, except as otherwise noted.      Objective    /72 (BP Location: Left arm, Patient Position: Sitting, Cuff Size: Adult Small)   Pulse 109   Temp 97.6  F (36.4  C) (Oral)   Resp 18   Ht 1.803 m (5' 11\")   Wt 75.8 kg (167 lb)   SpO2 96%   BMI 23.29 kg/m    Body mass index is 23.29 kg/m .  Physical Exam   GENERAL: chronically ill appearing, weak, alert and no distress  EYES: Eyes grossly normal to inspection, PERRL and conjunctivae and sclerae normal  HENT: ear canals and TM's normal, nose and mouth without ulcers or lesions  NECK: no adenopathy, no asymmetry, masses, or scars and thyroid normal to palpation  RESP: lungs clear to auscultation - no rales, rhonchi or wheezes  CV: regular rate and rhythm, normal S1 S2, no S3 or S4, no murmur, click or " rub, no peripheral edema and peripheral pulses strong  ABDOMEN: soft, nontender, no hepatosplenomegaly, no masses and bowel sounds normal  MS: no gross musculoskeletal defects noted, no edema, generalized weakness, mild intentional hands tremor     Diagnostic Test Results:  Labs reviewed in Epic        Assessment & Plan   Problem List Items Addressed This Visit     Type 2 diabetes mellitus without complication, with long-term current use of insulin (H) - Primary    Pancreatic mass    Relevant Medications    prochlorperazine (COMPAZINE) 5 MG tablet    ondansetron (ZOFRAN) 4 MG tablet    Malignant neoplasm of head of pancreas (H)      Other Visit Diagnoses     Generalized muscle weakness        Relevant Medications    oxyCODONE HCl (OXAYDO) 5 MG TABA         Continue insulin treatment, avoid hypoglycemias, no change in dose   Recheck HgbA1C in one month.   Paperwork for driving, related to diabetes filled in, no hypoglycemias   Continue Miralax, fluid intake  Discussed medications side effects- narcotics, sedatives   Refilled antinausea treatment   Filled in paper work for handicapped parking - related to weakness, need to use a cane   Follow up with oncology       See Patient Instructions  Return in about 4 weeks (around 9/16/2019) for Routine Visit.    Chirag Robert MD  [facility]

## 2019-08-23 NOTE — PROGRESS NOTES
Ordering Clinic: [facility]  Procedure: port placement  Arrival date: 8.29.19  Arrival time: 0700  NPO explained: yes                 Does patient have transportation available pre and post procedure?   yes  Check-in procedure explained: yes  Allergies reviewed: no with patient  Blood thinners: no  Labs: awaiting the H&P from [facility]  H&P:  Requested 8.23.19 @ 1610  Does patient require ?    no  If so, language?      services called to order ?    date requested:    arrival time requested:    Name of individual from  services assisting with scheduling appointment:    Previous sedation:  Last oral intake:    solid: ________  Liquids: _______

## 2019-08-28 NOTE — IP AVS SNAPSHOT
MRN:[id number]                      After Visit Summary   8/28/2019    Jesús Stock    MRN: [id number]           Visit Information        Department      8/28/2019  7:00 AM [facility]          Review of your medicines      UNREVIEWED medicines. Ask your doctor about these medicines       Dose / Directions   cannabidiol 100 MG/ML oral solution  Commonly known as:  EPIDIOLEX      Take by mouth 2 times daily  Refills:  0     ibuprofen 200 MG tablet  Commonly known as:  ADVIL/MOTRIN      Dose:  200 mg  Take 1 tablet by mouth every 4 hours as needed for pain.  Refills:  0     insulin glargine 100 UNIT/ML pen  Commonly known as:  LANTUS PEN  Used for:  Type 2 diabetes mellitus without complication, with long-term current use of insulin (H)      Dose:  12 Units  Inject 12 Units Subcutaneous At Bedtime  Refills:  0     LORazepam 0.5 MG tablet  Commonly known as:  ATIVAN      Dose:  0.5 mg  Take 0.5 mg by mouth every 6 hours as needed for anxiety  Refills:  0     morphine 15 MG CR tablet  Commonly known as:  MS CONTIN      Dose:  15 mg  Take 15 mg by mouth every 12 hours  Refills:  0     ondansetron 4 MG tablet  Commonly known as:  ZOFRAN  Used for:  Pancreatic mass      Dose:  4 mg  Take 1 tablet (4 mg) by mouth every 8 hours as needed for nausea  Quantity:  30 tablet  Refills:  1     oxyCODONE HCl 5 MG Taba  Commonly known as:  OXAYDO      Dose:  5 mg  Take 5 mg by mouth 6 times daily  Refills:  0     polyethylene glycol powder  Commonly known as:  MIRALAX/GLYCOLAX      Dose:  1 capful  Take 1 capful by mouth daily  Refills:  0     prochlorperazine 5 MG tablet  Commonly known as:  COMPAZINE  Used for:  Pancreatic mass      Dose:  5 mg  Take 1 tablet (5 mg) by mouth every 6 hours as needed for nausea or vomiting  Quantity:  60 tablet  Refills:  1     sildenafil 20 MG tablet  Commonly known as:  REVATIO      Dose:  20 mg  Take 20 mg by mouth as needed  Refills:  0     simethicone 125 MG  chewable tablet  Commonly known as:  MYLICON      Dose:  125 mg  Take 125 mg by mouth 2 times daily as needed (abdominal pain)  Refills:  0     Triprolidine-Pseudoephedrine 2.5-60 MG Tabs      Dose:  0.5 tablet  Take 0.5 tablets by mouth every 6 hours as needed (allergies)  Refills:  0     TYLENOL 500 MG tablet  Generic drug:  acetaminophen      Dose:  1-2 tablet  Take 1-2 tablets by mouth every 6 hours as needed for pain.  Refills:  0        CONTINUE these medicines which have NOT CHANGED       Dose / Directions   alcohol swab prep pads  Used for:  Type 2 diabetes mellitus without complication, with long-term current use of insulin (H)      Use to swab area of injection/valerie as directed.  Quantity:  100 each  Refills:  3     blood glucose calibration solution  Commonly known as:  NO BRAND SPECIFIED  Used for:  Type 2 diabetes mellitus without complication, with long-term current use of insulin (H)      Check once per month To accompany: Blood Glucose Monitor Brands: per insurance.  Quantity:  1 Bottle  Refills:  3     blood glucose monitoring meter device kit  Commonly known as:  NO BRAND SPECIFIED  Used for:  Type 2 diabetes mellitus without complication, with long-term current use of insulin (H)      Use to test blood sugar as directed. Preferred blood glucose meter OR supplies to accompany: Blood Glucose Monitor Brands: per insurance.  Quantity:  1 kit  Refills:  0     blood glucose test strip  Commonly known as:  NO BRAND SPECIFIED  Used for:  Type 2 diabetes mellitus without complication, with long-term current use of insulin (H)      Use to test blood sugar 3 times daily or as directed. To accompany: Blood Glucose Monitor Brands: per insurance.  Quantity:  100 strip  Refills:  6     FREESTYLE GIOVANY 14 DAY READER Edith  Used for:  Type 2 diabetes mellitus without complication, with long-term current use of insulin (H)      Dose:  1 Act  1 Act every 14 days  Quantity:  4 Device  Refills:  0     FREESTYLE GIOVANY  14 DAY SENSOR Misc  Used for:  Type 2 diabetes mellitus without complication, with long-term current use of insulin (H)      Dose:  1 Act  1 Act every 14 days  Quantity:  4 each  Refills:  3     insulin pen needle 31G X 5 MM miscellaneous  Commonly known as:  B-D U/F  Used for:  Type 2 diabetes mellitus without complication, with long-term current use of insulin (H)      USE DAILY AS DIRECTED  Quantity:  100 each  Refills:  1     thin lancets  Commonly known as:  NO BRAND SPECIFIED  Used for:  Type 2 diabetes mellitus without complication, with long-term current use of insulin (H)      Check glucose 3-4 times per day; Use with lanceting device. To accompany: Blood Glucose Monitor Brands: per insurance.  Quantity:  100 each  Refills:  6              Protect others around you: Learn how to safely use, store and throw away your medicines at www.disposemymeds.org.       Follow-ups after your visit       Your next 10 appointments already scheduled    Sep 20, 2019  8:20 AM CDT  [facility] with Chirag Robert [facility]  [facility] [address] [facility] [address]  [phone]         Care Instructions       Further instructions from your care team         Going Home after Your Port Is Inserted  _______________________________________    Patient Name: Jesús Stock  Today's Date: August 28, 2019    The doctor who inserted your port was:  Dr Garcia at [facility]     Have your port site and/or neck wound checked on: First access .      Go to: Infusion therapy    Your port may be accessed right away. A nurse needs to flush your port every 30 days or after each use.     When you get home:    You should have an adult with you for the first six hours    No driving or drinking alcohol until tomorrow. You may still have side effects from the medicine you received today (you may feel drowsy, unsteady or forgetful).     You may go back to your regular diet today.     If  you take aspirin or Plavix, you may begin taking it again tomorrow. You may restart all other medicines today. Use pain medicine as directed.    Avoid heavy lifting or the overuse of your shoulder for three days.    Caring for the port site and/or neck wound:    Keep the port site clean and dry. Cover the site with plastic before taking a shower. Do this until the site has healed.     Keep the bandage on your port site for three days. Change it if it gets wet or dirty. After three days, you may use Band-Aids until the wound has healed.    For a neck wound, leave the tape on for three days. You may cover it with a Band-Aid for comfort.     If you have oozing or bleeding from the port site or from the cut in your neck:     Put direct pressure on the wound for 5 to 10 minutes with a gauze pad.  If you still have bleeding after 10 minutes, call your doctor.    If you are bleeding a lot and can't control it with direct pressure, call 911.    Call your family doctor if you have:    Bleeding from a wound after 10 minutes of direct pressure.    Swelling in your neck or over your port site.    Signs of infection: a fever over 100 F (37.8 C) under the tongue; the site is red, tender or draining.      Additional Information About Your Visit       [employer] Information    [employer] gives you secure access to your electronic health record. If you see a primary care provider, you can also send messages to your care team and make appointments. If you have questions, please call your primary care clinic.  If you do not have a primary care provider, please call [phone] and they will assist you.       Care EveryWhere ID    This is your Care EveryWhere ID. This could be used by other organizations to access your [facility] medical records  [id number]       Your Vitals Were     Blood Pressure   123/71 (BP Location: Right arm)    Pulse   72    Temperature   98.6  F (37  C) (Temporal)    Respirations   15    Pulse Oximetry   97%           Primary Care Provider Office Phone # Fax #    Chirag Robert [phone]      Equal Access to Services    BISI BERNSTEIN : Maribeth hali mcmanus juan Rodroelali, raven cheriesteveha, george kamile martinez, haley juan. So [facility] [phone].    ATENCIÓN: Si habla español, tiene a blair disposición servicios gratuitos de asistencia lingüística. Llame al [phone].    We comply with applicable federal civil rights laws and [place] laws. We do not discriminate on the basis of race, color, national origin, age, disability, sex, sexual orientation, or gender identity.           Thank you!    Thank you for choosing [facility] for your care. Our goal is always to provide you with excellent care. Hearing back from our patients is one way we can continue to improve our services. Please take a few minutes to complete the written survey that you may receive in the mail after you visit. If you would like to speak to someone directly about your visit please contact Patient Relations at [phone]. Thank you!              Medication List      Medications          Morning Afternoon Evening Bedtime As Needed    alcohol swab prep pads  INSTRUCTIONS:  Use to swab area of injection/valerie as directed.                     blood glucose calibration solution  Also known as:  NO BRAND SPECIFIED  INSTRUCTIONS:  Check once per month To accompany: Blood Glucose Monitor Brands: per insurance.                     blood glucose monitoring meter device kit  Also known as:  NO BRAND SPECIFIED  INSTRUCTIONS:  Use to test blood sugar as directed. Preferred blood glucose meter OR supplies to accompany: Blood Glucose Monitor Brands: per insurance.                     blood glucose test strip  Also known as:  NO BRAND SPECIFIED  INSTRUCTIONS:  Use to test blood sugar 3 times daily or as directed. To accompany: Blood Glucose Monitor Brands: per insurance.  Doctor's comments:  REASON FOR MORE  FREQUENT TESTING: new onset type 2 diabetes, large HYPERLYCEMIA, ATTEMPTING LIFESTYLE CHANGES, MEDICATION ADJUSTMENTS                     FREESTYLE GIOVANY 14 DAY READER Edith  INSTRUCTIONS:  1 Act every 14 days                     FREESTYLE GIOVANY 14 DAY SENSOR Misc  INSTRUCTIONS:  1 Act every 14 days                     insulin pen needle 31G X 5 MM miscellaneous  Also known as:  B-D U/F  INSTRUCTIONS:  USE DAILY AS DIRECTED                     thin lancets  Also known as:  NO BRAND SPECIFIED  INSTRUCTIONS:  Check glucose 3-4 times per day; Use with lanceting device. To accompany: Blood Glucose Monitor Brands: per insurance.  Doctor's comments:  REASON FOR MORE FREQUENT TESTING: new onset type 2 diabetes, large HYPERLYCEMIA, ATTEMPTING LIFESTYLE CHANGES, MEDICATION ADJUSTMENTS                       ASK your doctor about these medications          Morning Afternoon Evening Bedtime As Needed    cannabidiol 100 MG/ML oral solution  Also known as:  EPIDIOLEX  INSTRUCTIONS:  Take by mouth 2 times daily                     ibuprofen 200 MG tablet  Also known as:  ADVIL/MOTRIN  INSTRUCTIONS:  Take 1 tablet by mouth every 4 hours as needed for pain.                     insulin glargine 100 UNIT/ML pen  Also known as:  LANTUS PEN  INSTRUCTIONS:  Inject 12 Units Subcutaneous At Bedtime  Doctor's comments:  Do not resume until after your ERCP and EUS.                     LORazepam 0.5 MG tablet  Also known as:  ATIVAN  INSTRUCTIONS:  Take 0.5 mg by mouth every 6 hours as needed for anxiety                     morphine 15 MG CR tablet  Also known as:  MS CONTIN  INSTRUCTIONS:  Take 15 mg by mouth every 12 hours                     ondansetron 4 MG tablet  Also known as:  ZOFRAN  INSTRUCTIONS:  Take 1 tablet (4 mg) by mouth every 8 hours as needed for nausea                     oxyCODONE HCl 5 MG Taba  Also known as:  OXAYDO  INSTRUCTIONS:  Take 5 mg by mouth 6 times daily                     polyethylene glycol powder  Also known  as:  MIRALAX/GLYCOLAX  INSTRUCTIONS:  Take 1 capful by mouth daily                     prochlorperazine 5 MG tablet  Also known as:  COMPAZINE  INSTRUCTIONS:  Take 1 tablet (5 mg) by mouth every 6 hours as needed for nausea or vomiting                     sildenafil 20 MG tablet  Also known as:  REVATIO  INSTRUCTIONS:  Take 20 mg by mouth as needed                     simethicone 125 MG chewable tablet  Also known as:  MYLICON  INSTRUCTIONS:  Take 125 mg by mouth 2 times daily as needed (abdominal pain)                     Triprolidine-Pseudoephedrine 2.5-60 MG Tabs  INSTRUCTIONS:  Take 0.5 tablets by mouth every 6 hours as needed (allergies)                     TYLENOL 500 MG tablet  INSTRUCTIONS:  Take 1-2 tablets by mouth every 6 hours as needed for pain.  Generic drug:  acetaminophen

## 2019-08-28 NOTE — DISCHARGE INSTRUCTIONS
Going Home after Your Port Is Inserted  _______________________________________    Patient Name: Jesús Stock  Today's Date: August 28, 2019    The doctor who inserted your port was:  Dr Garcia at [facility]     Have your port site and/or neck wound checked on: First access .      Go to: Infusion therapy    Your port may be accessed right away. A nurse needs to flush your port every 30 days or after each use.     When you get home:    You should have an adult with you for the first six hours    No driving or drinking alcohol until tomorrow. You may still have side effects from the medicine you received today (you may feel drowsy, unsteady or forgetful).     You may go back to your regular diet today.     If you take aspirin or Plavix, you may begin taking it again tomorrow. You may restart all other medicines today. Use pain medicine as directed.    Avoid heavy lifting or the overuse of your shoulder for three days.    Caring for the port site and/or neck wound:    Keep the port site clean and dry. Cover the site with plastic before taking a shower. Do this until the site has healed.     Keep the bandage on your port site for three days. Change it if it gets wet or dirty. After three days, you may use Band-Aids until the wound has healed.    For a neck wound, leave the tape on for three days. You may cover it with a Band-Aid for comfort.     If you have oozing or bleeding from the port site or from the cut in your neck:     Put direct pressure on the wound for 5 to 10 minutes with a gauze pad.  If you still have bleeding after 10 minutes, call your doctor.    If you are bleeding a lot and can't control it with direct pressure, call 911.    Call your family doctor if you have:    Bleeding from a wound after 10 minutes of direct pressure.    Swelling in your neck or over your port site.    Signs of infection: a fever over 100 F (37.8 C) under the tongue; the site is red, tender or draining.

## 2019-08-28 NOTE — IP AVS SNAPSHOT
[facility]  [address]  [facility] [address]  Phone:  [phone]                                    After Visit Summary   8/28/2019    Jesús Stock    MRN: [id number]           After Visit Summary Signature Page    I have received my discharge instructions, and my questions have been answered. I have discussed any challenges I see with this plan with the nurse or doctor.    ..........................................................................................................................................  Patient/Patient Representative Signature      ..........................................................................................................................................  Patient Representative Print Name and Relationship to Patient    ..................................................               ................................................  Date                                   Time    ..........................................................................................................................................  Reviewed by Signature/Title    ...................................................              ..............................................  Date                                               Time          22EPIC Rev 08/18

## 2019-08-28 NOTE — PROCEDURES
RADIOLOGY POST PROCEDURE NOTE w/ SEDATION  Patient name: Jesús Stock  MRN: [id number]  : 1950    Pre-procedure diagnosis: pancreatic cancer  Post-procedure diagnosis: Same    Procedure Date/Time: 2019  8:56 AM  Procedure: port placement.   Estimated blood loss: None  Specimen(s) collected with description: none    I determined this patient to be an appropriate candidate for the planned sedation and procedure and reassessed the patient IMMEDIATELY PRIOR to sedation and procedure.     The patient tolerated the procedure well with no immediate complications.  Significant findings:none    See imaging dictation for procedural details.    Provider name: Gloria Garcia DO  Assistant(s):None

## 2019-08-28 NOTE — PRE-PROCEDURE
GENERAL PRE-PROCEDURE:   Procedure:  Port placement.  Date/Time:  8/28/2019 7:45 AM    Verbal consent obtained?: Yes    Written consent obtained?: Yes    Risks and benefits: Risks, benefits and alternatives were discussed    Consent given by:  Patient  Patient states understanding of procedure being performed: Yes    Patient's understanding of procedure matches consent: Yes    Procedure consent matches procedure scheduled: Yes    Expected level of sedation:  Moderate  Appropriately NPO:  Yes  ASA Class:  Class 2- mild systemic disease, no acute problems, no functional limitations  Mallampati  :  Grade 2- soft palate, base of uvula, tonsillar pillars, and portion of posterior pharyngeal wall visible  Lungs:  Lungs clear with good breath sounds bilaterally  Heart:  Normal heart sounds and rate  History & Physical reviewed:  History and physical reviewed and no updates needed  Statement of review:  I have reviewed the lab findings, diagnostic data, medications, and the plan for sedation

## 2019-08-28 NOTE — SEDATION DOCUMENTATION
Post Procedure Summary:  Prior to the start of the procedure and with procedural staff participation, I verbally confirmed the patient s identity using two indicators, relevant allergies, that the procedure was appropriate and matched the consent or emergent situation, and that the correct equipment/implants were available. Immediately prior to starting the procedure I conducted the Time Out with the procedural staff and re-confirmed the patient s name, procedure, and site/side. (The Joint Commission universal protocol was followed.)  Yes       Sedatives: Fentanyl and Midazolam (Versed)    Vital signs, airway and pulse oximetry were monitored and remained stable throughout the procedure and sedation was maintained until the procedure was complete.  The patient was monitored by staff until sedation discharge criteria were met.    Patient tolerance: Patient tolerated the procedure well with no immediate complications.    Time of sedation in minutes: 30 Minutes    minutes from beginning to end of physician one to one monitoring.

## 2019-09-06 PROBLEM — R62.7 FAILURE TO THRIVE IN ADULT: Status: ACTIVE | Noted: 2019-01-01

## 2019-09-06 NOTE — PROGRESS NOTES
Observation goals PRIOR TO DISCHARGE    Comments:   -diagnostic tests and consults completed and resulted : Not met    -vital signs normal or at patient baseline : Not met, low BP    -tolerating oral intake to maintain hydration : Not met, poor appetite.     -safe disposition plan has been identified : No

## 2019-09-06 NOTE — ED NOTES
"Patient's daughter and son upset regarding wait time, daughter stated that the oncology clinic told them on the phone that \"the ED staff will be waiting for you to arrive and can see him right away\". Explained to daughter that there are no appointments in the ED and people are seen based on their level of illness and also based on bed availability, and that there were no open beds art that time. Son yelled at writer, calling writer \"akosuaking theresa\" and writer observed family leaving with patient shortly after.  "

## 2019-09-06 NOTE — LETTER
Transition Communication Hand-off for Care Transitions to Next Level of Care Provider    Name: Jesús Stock  : 1950  MRN #: [id number]  Primary Care Provider: Chirag Robert     Primary Clinic: [facility] [address]     Reason for Hospitalization:  Dehydration [E86.0]  Failure to thrive in adult [R62.7]  Depression, unspecified depression type [F32.9]  Admit Date/Time: 2019 12:31 PM  Discharge Date: 2019    Payor Source: Payor: [facility] / Plan: ARE MEDICARE NON [employer] PARTNERS / Product Type: HMO /     Readmission Assessment Measure (STEVE) Risk Score/category: Average           Reason for Communication Hand-off Referral: Multiple providers/specialties  Avoidable readmission within 30 days  Other Discharging [facility]    Discharge Plan:  Patient will have IV abx at discharge.    Future Appointments   Date Time Provider Department Center   2019  9:30 AM Sussy Wright PTA [facility]   2019  8:20 AM Chirag Robert MD [facility]       Any outstanding tests or procedures:        Key Recommendations:    Will need to follow-up with Oncology and Infectious Disease.      Nicolette Aguila RN    AVS/Discharge Summary is the source of truth; this is a helpful guide for improved communication of patient story

## 2019-09-06 NOTE — ED NOTES
"[facility]  ED Nurse Handoff Report    ED Chief complaint: Dehydration      ED Diagnosis:   Final diagnoses:   Dehydration   Depression, unspecified depression type   Failure to thrive in adult       Code Status: Full Code last noted in system, not addressed by ED provider. Needs to be addressed by hospitalist.     Allergies:   Allergies   Allergen Reactions     Dust Mites      Mold      No Known Drug Allergies        Activity level - Baseline/Home:  Independent  Activity Level - Current:   Unable to Assess, not out of bed in ED.     Patient's Preferred language: English   Needed?: No    Isolation: No  Infection: Not Applicable  Bariatric?: No    Vital Signs:   Vitals:    09/06/19 1233 09/06/19 1245   BP: (!) 84/42 127/78   Resp: 20    Temp: 98.9  F (37.2  C)    TempSrc: Oral    SpO2: 97% 97%   Weight: 74.4 kg (164 lb)    Height: 1.803 m (5' 11\")        Cardiac Rhythm: ,        Pain level: 0-10 Pain Scale: 0    Is this patient confused?: No   Does this patient have a guardian?  No         If yes, is there guardianship documents in the Epic \"Code/ACP\" activity?  N/A         Guardian Notified?  N/A  [place] - Suicide Severity Rating Scale Completed?  Yes  If yes, what color did the patient score?  White    Patient Report: Initial Complaint: Dehydration  Focused Assessment: pt reports to ED for evaluation of dehydration. Pt reports that over the past couple days he has been becoming increasingly weak and fatigued. Pt states he has not had much of an appetite and hasn't been drinking much. Pt states he has a hx of being dehydrated. pts daughter is at bedside and has expressed concern that the pt hasn't been taking their anxiety medication. Pt was also seen at oncology clinic and was also recommended to come to ER for evaluation for dehyration and also inability to care for self.   Tests Performed:   Results for orders placed or performed during the hospital encounter of 09/06/19   CBC with " platelets differential   Result Value Ref Range    WBC 5.4 4.0 - 11.0 10e9/L    RBC Count 3.06 (L) 4.4 - 5.9 10e12/L    Hemoglobin 9.9 (L) 13.3 - 17.7 g/dL    Hematocrit 28.3 (L) 40.0 - 53.0 %    MCV 93 78 - 100 fl    MCH 32.4 26.5 - 33.0 pg    MCHC 35.0 31.5 - 36.5 g/dL    RDW 12.3 10.0 - 15.0 %    Platelet Count 63 (L) 150 - 450 10e9/L    Diff Method Automated Method     % Neutrophils 69.9 %    % Lymphocytes 17.4 %    % Monocytes 11.5 %    % Eosinophils 0.4 %    % Basophils 0.2 %    % Immature Granulocytes 0.6 %    Nucleated RBCs 0 0 /100    Absolute Neutrophil 3.8 1.6 - 8.3 10e9/L    Absolute Lymphocytes 0.9 0.8 - 5.3 10e9/L    Absolute Monocytes 0.6 0.0 - 1.3 10e9/L    Absolute Eosinophils 0.0 0.0 - 0.7 10e9/L    Absolute Basophils 0.0 0.0 - 0.2 10e9/L    Abs Immature Granulocytes 0.0 0 - 0.4 10e9/L    Absolute Nucleated RBC 0.0    Comprehensive metabolic panel   Result Value Ref Range    Sodium 132 (L) 133 - 144 mmol/L    Potassium 3.2 (L) 3.4 - 5.3 mmol/L    Chloride 98 94 - 109 mmol/L    Carbon Dioxide 30 20 - 32 mmol/L    Anion Gap 4 3 - 14 mmol/L    Glucose 167 (H) 70 - 99 mg/dL    Urea Nitrogen 16 7 - 30 mg/dL    Creatinine 0.95 0.66 - 1.25 mg/dL    GFR Estimate 81 >60 mL/min/[1.73_m2]    GFR Estimate If Black >90 >60 mL/min/[1.73_m2]    Calcium 8.1 (L) 8.5 - 10.1 mg/dL    Bilirubin Total 1.0 0.2 - 1.3 mg/dL    Albumin 2.2 (L) 3.4 - 5.0 g/dL    Protein Total 6.0 (L) 6.8 - 8.8 g/dL    Alkaline Phosphatase 211 (H) 40 - 150 U/L    ALT 34 0 - 70 U/L    AST 25 0 - 45 U/L   Lipase   Result Value Ref Range    Lipase <10 (L) 73 - 393 U/L   ISTAT gases lactate larry POCT   Result Value Ref Range    Ph Venous 7.44 (H) 7.32 - 7.43 pH    PCO2 Venous 40 40 - 50 mm Hg    PO2 Venous 30 25 - 47 mm Hg    Bicarbonate Venous 27 21 - 28 mmol/L    O2 Sat Venous 61 %    Lactic Acid 0.7 0.7 - 2.1 mmol/L     Abnormal Results:     Labs Ordered and Resulted from Time of ED Arrival Up to the Time of Departure from the ED   CBC WITH  PLATELETS DIFFERENTIAL - Abnormal; Notable for the following components:       Result Value    RBC Count 3.06 (*)     Hemoglobin 9.9 (*)     Hematocrit 28.3 (*)     Platelet Count 63 (*)     All other components within normal limits   COMPREHENSIVE METABOLIC PANEL - Abnormal; Notable for the following components:    Sodium 132 (*)     Potassium 3.2 (*)     Glucose 167 (*)     Calcium 8.1 (*)     Albumin 2.2 (*)     Protein Total 6.0 (*)     Alkaline Phosphatase 211 (*)     All other components within normal limits   LIPASE - Abnormal; Notable for the following components:    Lipase <10 (*)     All other components within normal limits   ISTAT  GASES LACTATE SYLWIA POCT - Abnormal; Notable for the following components:    Ph Venous 7.44 (*)     All other components within normal limits   ISTAT CG4 GASES LACTATE SYLWIA NURSING POCT     Treatments provided: labs, fluids, monitoring    Family Comments: daughter present at bedside and involved in care    OBS brochure/video discussed/provided to patient/family: Yes              Name of person given brochure if not patient: Daughter              Relationship to patient: [place]    ED Medications:   Medications   0.9% sodium chloride BOLUS (1,000 mLs Intravenous New Bag 9/6/19 1327)       Drips infusing?:  Yes, IV fluids TKO.     For the majority of the shift this patient was Green.   Interventions performed were NA.    Severe Sepsis OR Septic Shock Diagnosis Present: No    To be done/followed up on inpatient unit: NA    ED NURSE PHONE NUMBER: [phone]

## 2019-09-06 NOTE — ED NOTES
Bed: ED24  Expected date: 9/6/19  Expected time: 12:30 PM  Means of arrival:   Comments:  Triage wRenem

## 2019-09-06 NOTE — PROGRESS NOTES
RECEIVING UNIT ED HANDOFF REVIEW    ED Nurse Handoff Report was reviewed by: Obey Davies RN on September 6, 2019 at 3:27 PM

## 2019-09-06 NOTE — H&P
[facility]    History and Physical  Hospitalist       Date of Admission:  9/6/2019    Assessment & Plan   Jesús Stock is a 68 year old male with PMH of recently diagnosed pancreatic cancer, type 2 diabetes, obesity, osteoarthritis, history of alcohol abuse, diverticulosis, who presents with weight loss and failure to thrive.    Failure to thrive  Weight loss  Elevated creatinine  Patient diagnosed with pancreatic cancer in 6/2019, started chemotherapy approx nine days ago. Developed nausea vomiting approx a week ago and has had very poor PO intake since, as well as increased weakness. He reports a weight loss of approx 80lbs over the past year. He appears depressed on my interview. Family notes that patient has significant physical deconditioning. Labwork notes decreased albumin 3.4-->2.2, hyponatremia, hypokalemia. Creatinine is 0.95 which is close to normal limits however his baseline may be in fact lower than 0.8. Has home care already but per family and oncologist failing to thrive at home. Will benefit from more acute assessments here.  - PT/OT, and social work consult  - Nutrition consult  - NS @ 75ml/hr overnight  - Regular diet  - PTA Marinol, Beano PRN  - PTA Morphine 15mg q12h, oxycodone PRN    Suspected active major depression: Patient has made active suicidal ideation to multiple providers, including to oncologist, ED physician. Not coping well with pancreatic cancer diagnosis. In clinic, he held off starting chemotherapy until last week, presumably due to his mental health issues. It appears that this is also contributing to his failure to thrive picture.  - Psychiatry consult  - PRN ativan    Pancreatic cancer stage 2: Follows with [facility], diagnosed in 6/2019. S/p ERCP x2 and pancreatic stent placement. Started gemcitabine and abraxane around 8/29/2019.    DM2: Diagnosed in March 2019. Initial  here. A1c 7.6% on 6/3/2019.  - Continue PTA Insulin glargine 12 units  "at bedtime   - Added sliding scale insulin + carb counting while inpatient  - Regular diet okay here given poor PO intake, failure to thrive    DVT Prophylaxis: Pneumatic Compression Devices  Code Status: DNR / DNI, discussed with patient and daugther    Disposition: Expected discharge 1-2 days    Raul Wright MD    Primary Care Physician   Chirag Robert    Chief Complaint   Poor PO intake    History is obtained from the patient and daughter Oksana    History of Present Illness   Jesús Stock is a 68 year old male who presents with poor PO intake. His primary complaint is that he is dehydrated. He describes poor appetite for a long time, several months. He had chemotherapy about a week ago and his PO intake has decreased even further. For about three days he was having diarrhea, nausea, vomiting. Took Zofran PRN. This improved slightly; in the past two or three days he denies any recent abdominal pain, diarrhea, constipation, fevers, chills, or cough. However his PO intake remains minimal. Oksana reports a home health care nurse came to assess him today and felt he needed to come to the hospital. She notes that the patient is weak, which the patient vehemently denies. He weighs approx 160lbs today, about a year ago he weighed approx 240lbs.    Patient reports having a difficult time coping with his pancreatic cancer. He reports feeling \"devastated,\" and he is trying to enjoy his prison. He sleeps well. He denies any suicidal ideation. Per ED he did report suicidal ideation earlier. The patient goes off tangents and did not answer some questions clearly. He is a former .     Past Medical History    I have reviewed this patient's medical history and updated it with pertinent information if needed.   Past Medical History:   Diagnosis Date     Alcohol dependence in remission sober since 1984 (H)     sober since 8/25/84     Backache, unspecified      Colon polyps 5/24/11    2 colon " polyps removed at colonoscopy     Diverticulosis of colon 11    mild pan-colonic diverticulosis seen as incidental finding on colonoscopy     Major depressive disorder, single episode, mild (H)      Obesity (BMI 30.0-34.9) 2017    BMI 31.4     Osteoarthrosis, unspecified whether generalized or localized, unspecified site      Pancreatic cancer (H)      Type 2 diabetes mellitus without complication, with long-term current use of insulin (H) 2019    glucose 395       Past Surgical History   I have reviewed this patient's surgical history and updated it with pertinent information if needed.  Past Surgical History:   Procedure Laterality Date     APPENDECTOMY       COLONOSCOPY  11    2 polyps, mild pan-colonic diverticulosis     COLONOSCOPY N/A 2017    Procedure: COMBINED COLONOSCOPY, SINGLE OR MULTIPLE BIOPSY/POLYPECTOMY BY BIOPSY;;  Surgeon: Gene Grimes MD;  Location:  GI     ENDOSCOPIC RETROGRADE CHOLANGIOPANCREATOGRAM N/A 2019    Procedure: ENDOSCOPIC RETROGRADE CHOLANGIOPANCREATOGRAPHY, ENDOSCOPIC UTRASOUND ESOPHAGOSCOPY WITH STENT PLACEMENT. ;  Surgeon: Jana Nelson MD;  Location:  OR     ENDOSCOPIC RETROGRADE CHOLANGIOPANCREATOGRAPHY, EXCHANGE TUBE/STENT N/A 2019    Procedure: ENDOSCOPIC RETROGRADE CHOLANGIOPANCREATOGRAPHY, WITH STENT EXCHANGE;  Surgeon: Jana Nelson MD;  Location:  OR     ESOPHAGOSCOPY, GASTROSCOPY, DUODENOSCOPY (EGD), COMBINED N/A 2019    Procedure: Esophagoscopy, gastroscopy, duodenoscopy (EGD), combined, pancreatic needle biopsies;  Surgeon: Jana Nelson MD;  Location:  OR     HERNIA REPAIR      X3     IR CHEST PORT PLACEMENT > 5 YRS OF AGE  2019     TONSILLECTOMY         Prior to Admission Medications   Prior to Admission Medications   Prescriptions Last Dose Informant Patient Reported? Taking?   Continuous Blood Gluc  (FREESTYLE GIOVANY 14 DAY READER) [facility]   No No   Si Act every 14 days    Continuous Blood Gluc Sensor (FREESTYLE GIOVANY 14 DAY SENSOR) [facility]   No No   Si Act every 14 days   LORazepam (ATIVAN) 0.5 MG tablet prn Daughter Yes No   Sig: Take 0.5 mg by mouth every 6 hours as needed for anxiety   Triprolidine-Pseudoephedrine 2.5-60 MG TABS prn Daughter Yes No   Sig: Take 0.5 tablets by mouth every 6 hours as needed (allergies)   acetaminophen (TYLENOL) 500 MG tablet prn Daughter Yes No   Sig: Take 1-2 tablets by mouth every 6 hours as needed for pain.   alcohol swab prep pads   No No   Sig: Use to swab area of injection/valerie as directed.   alpha-d-galactosidase (BEANO) tablet prn at Unknown time Daughter Yes No   Sig: Take 1 tablet by mouth 3 times daily as needed for intestinal gas   blood glucose (NO BRAND SPECIFIED) test strip   No No   Sig: Use to test blood sugar 3 times daily or as directed. To accompany: Blood Glucose Monitor Brands: per insurance.   blood glucose calibration (NO BRAND SPECIFIED) solution   No No   Sig: Check once per month To accompany: Blood Glucose Monitor Brands: per insurance.   blood glucose monitoring (NO BRAND SPECIFIED) meter device kit   No No   Sig: Use to test blood sugar as directed. Preferred blood glucose meter OR supplies to accompany: Blood Glucose Monitor Brands: per insurance.   dronabinol (MARINOL) 10 MG capsule 2019 at Unknown time Daughter Yes Yes   Sig: Take 10 mg by mouth 2 times daily   insulin glargine (LANTUS SOLOSTAR PEN) 100 UNIT/ML pen 2019 at 0300 Daughter No Yes   Sig: Inject 12 Units Subcutaneous At Bedtime   insulin pen needle (B-D U/F) 31G X 5 MM miscellaneous   No No   Sig: USE DAILY AS DIRECTED   morphine (MS CONTIN) 15 MG CR tablet 2019 at 0300 Daughter Yes Yes   Sig: Take 15 mg by mouth every 12 hours   ondansetron (ZOFRAN) 4 MG tablet 2019 at 0300 Daughter No Yes   Sig: Take 1 tablet (4 mg) by mouth every 8 hours as needed for nausea   oxyCODONE HCl (OXAYDO) 5 MG TABA prn Daughter Yes No   Sig: Take 5 mg by  mouth 6 times daily   polyethylene glycol (MIRALAX/GLYCOLAX) powder 9/5/2019 at Unknown time Daughter Yes Yes   Sig: Take 1 capful by mouth daily   prochlorperazine (COMPAZINE) 5 MG tablet prn Daughter No No   Sig: Take 1 tablet (5 mg) by mouth every 6 hours as needed for nausea or vomiting   sildenafil (REVATIO) 20 MG tablet More than a month at Unknown time Daughter Yes No   Sig: Take 20 mg by mouth as needed   simethicone (MYLICON) 125 MG chewable tablet prn Daughter Yes No   Sig: Take 125 mg by mouth 2 times daily as needed (abdominal pain)   thin (NO BRAND SPECIFIED) lancets   No No   Sig: Check glucose 3-4 times per day; Use with lanceting device. To accompany: Blood Glucose Monitor Brands: per insurance.      Facility-Administered Medications: None     Allergies   Allergies   Allergen Reactions     Dust Mites      Mold      No Known Drug Allergies        Social History   I have reviewed this patient's social history and updated it with pertinent information if needed. Jesús CARRILLO Montrell  reports that he is a non-smoker but has been exposed to tobacco smoke. He has never used smokeless tobacco. He reports that he does not drink alcohol or use drugs.    Family History   I have reviewed this patient's family history and updated it with pertinent information if needed.   Family History   Problem Relation Age of Onset     Diabetes Father         b:1926     Hypertension Mother         b:1925     Family History Negative Brother         b:1948     Family History Negative Brother         b:1951  deferred tremor     Family History Negative Sister         b:1953     Family History Negative Brother         b:1956     Family History Negative Brother         b:1958       Review of Systems   The 10 point Review of Systems is negative other than noted in the HPI or here.    Physical Exam   Temp: 98.9  F (37.2  C) Temp src: Oral BP: 127/78   Heart Rate: 81 Resp: 20 SpO2: 97 % O2 Device: None (Room air)    Vital Signs with  Ranges  Temp:  [98.3  F (36.8  C)-98.9  F (37.2  C)] 98.9  F (37.2  C)  Pulse:  [90] 90  Heart Rate:  [81] 81  Resp:  [20] 20  BP: ()/(42-78) 127/78  SpO2:  [97 %-98 %] 97 %  164 lbs 0 oz    Constitutional: Thin male in NAD  Eyes: PERRL, nonicteric, normal ocular movements  HEENT: Normocephalic, atraumatic, oral mucosa moist  Respiratory: CTAB, no wheezing or crackles  Cardiovascular: RRR, normal S1/2, no m/r/g  GI: No organomegaly, active bowel sounds, nontender  Vascular: No lower extremity pitting edema  Skin: No rashes or scars  Musculoskeletal: Moves all extremities  Neurologic: A&Ox3  Psychiatric: Generally appropriate affect and mood, goes on tangets    Data   Data reviewed today:  I personally reviewed bloodwork.  Recent Labs   Lab 09/06/19  1320   WBC 5.4   HGB 9.9*   MCV 93   PLT 63*   *   POTASSIUM 3.2*   CHLORIDE 98   CO2 30   BUN 16   CR 0.95   ANIONGAP 4   MATTHEW 8.1*   *   ALBUMIN 2.2*   PROTTOTAL 6.0*   BILITOTAL 1.0   ALKPHOS 211*   ALT 34   AST 25   LIPASE <10*       Imaging:  No results found for this or any previous visit (from the past 24 hour(s)).

## 2019-09-06 NOTE — ED TRIAGE NOTES
Arrives with possible dehydration, seen by home care nurse this morning who suggested he come in for fluids, history of pancreatic cancer, alert but confused, ABCs intact.

## 2019-09-06 NOTE — ED PROVIDER NOTES
"  History     Chief Complaint:  Concern for dehydration     HPI   Jesús Stock is a 68 year old male with recently-diagnosed pancreatic cancer and diabetes mellitus II who presents with concern for dehydration. A family member called the ED and informed us that they are concerned for dehydration and report the patient to not be taking anxiety medications. He was driven to the ED by his son. Here, the patient states he feels super dehydrated and that he has had constipation. An RN from his Oncology clinic also called recommending the pt be admitted and get placement 2/2 inability to care for himself.    Allergies:   Dust mites   Mold      Medications:    Blood glucose calibration  Blood glucose monitoring  Epidiolex  Lantus solostar pen  Ativan  Morphine  Zofran  Miralax  Revatio  Mylicon  Triprolidine-pseudoepinephrine     Past Medical History:    Alcohol dependence in remission, sober since 1984  Colon polyps  Diverticulosis of colon  Major depressive disorder  Obesity  Osteoarthrosis  Pancreatic cancer  Type 2 diabetes    Past Surgical History:    Appendectomy  Endoscopic retrograde cholangiopancreatogram  Endoscopic retrograde cholangiopancreatogram, exchange tube/stent  Hernia repair x3  Tonsillectomy  IR chest port placement     Family History:    Diabetes mellitus   Hypertension     Social History:  Marital Status:  Single   Smoker:   Passive smoke exposure   Smokeless:   Never   Alcohol:   No, sober 27 years   Drugs:   No     Review of Systems   Gastrointestinal: Positive for constipation.   All other systems reviewed and are negative.    Physical Exam     Patient Vitals for the past 24 hrs:   BP Temp Temp src Heart Rate Resp SpO2 Height Weight   09/06/19 1245 127/78 -- -- -- -- 97 % -- --   09/06/19 1233 (!) 84/42 98.9  F (37.2  C) Oral 81 20 97 % 1.803 m (5' 11\") 74.4 kg (164 lb)     Physical Exam  General/Appearance: appears stated age, appears comfortable but with some baseline confusion, pale " appearing  Eyes: EOMI, no scleral injection, no icterus  ENT: dry oral mucosa  Neck: supple, nl ROM, no stiffness  Cardiovascular: RRR, nl S1S2, no m/r/g, 2+ pulses in all 4 extremities, cap refill <2sec  Respiratory: CTAB, good air movement throughout, no wheezes/rhonchi/rales, no increased WOB, no retractions  Back: no lesions  GI: abd soft, non-distended, nttp,  no HSM, no rebound, no guarding, nl BS  MSK: RICHARDS, good tone, no bony abnormality  Skin: warm and well-perfused, no rash, no edema, no ecchymosis, nl turgor  Neuro: GCS 15, alert and oriented, no gross focal neuro deficits  Heme: no petechia, no purpura, no active bleeding    Emergency Department Course   Laboratory:  Lipase: <10  CBC: WBC: 5.4, HGB: 9.9, PLT: 63  CMP: Glucose 167, sodium 132, potassium 3.2, calcium 8.1, albumin 2.2, protein total 6.0, alkaline phosphate 211, o/w WNL (Creatinine: 0.95)  VBG: pH 7.44 / PCO2 40 / PO2 30 / Bicarb 27    Interventions:  1327: NS 1L IV Bolus     Emergency Department Course:  1240 I performed an exam of the patient as documented above.   1424 I rechecked the patient and discussed the results of their workup thus far.   1425 I consulted with Dr. Wright of the hospitalist services. They are in agreement to accept the patient for admission.    Findings and plan explained to the Patient and family who consents to admission. Discussed the patient with Dr. Wright, who will admit the patient to a OBS bed for further monitoring, evaluation, and treatment.    Impression & Plan    Medical Decision Making:  This patient is a 68-year-old male with recently-diagnosed pancreatic cancer, on chemo, as well as diabetes who presents with multiple personnel, including family as well as of oncology clinics, concern for dehydration as well as failure to thrive, depression, inability to care for himself.  Clinically he is confused, with frequently changing history.  He also makes frequent comments about wishing he was dead.  From the  "neurologic standpoint I am not sure he has a clinical capacity to make his own decision.  I think he would benefit from a more official neuropsych evaluation to help with this.  I am also concerned about his mental health status and think he would benefit from psychiatry seeing him.  Clinically he appears dehydrated, though labs are normal.  He endorses purposely not eating hoping that he could \"not eat himself to death.\"  Given everyone's concern for his well-being I think he would benefit coming into the hospital for IV fluids as well as the specialist consultations to do further neuropsych evals.    Diagnosis:    ICD-10-CM    1. Dehydration E86.0 Lipase   2. Depression, unspecified depression type F32.9    3. Failure to thrive in adult R62.7      Disposition:  Admitted to OBS    Scribe Disclosure:  I, Jose Scott, am serving as a scribe on 9/6/2019 at 12:40 PM to personally document services performed by Keesha Rock* based on my observations and the provider's statements to me.     Jose Scott  9/6/2019    EMERGENCY DEPARTMENT       Keesha Rock MD  09/06/19 1446    "

## 2019-09-06 NOTE — PHARMACY-ADMISSION MEDICATION HISTORY
Admission medication history interview status for the 9/6/2019  admission is complete. See EPIC admission navigator for prior to admission medications     Medication history source reliability:Moderate    Actions taken by pharmacist (provider contacted, etc):interviewed patient and daughter     Additional medication history information not noted on PTA med list :None    Medication reconciliation/reorder completed by provider prior to medication history? No    Time spent in this activity: 15 minutes    Prior to Admission medications    Medication Sig Last Dose Taking? Auth Provider   dronabinol (MARINOL) 10 MG capsule Take 10 mg by mouth 2 times daily 9/5/2019 at Unknown time Yes Unknown, Entered By History   insulin glargine (LANTUS SOLOSTAR PEN) 100 UNIT/ML pen Inject 12 Units Subcutaneous At Bedtime 9/6/2019 at 0300 Yes Leoncio Roberson MD   morphine (MS CONTIN) 15 MG CR tablet Take 15 mg by mouth every 12 hours 9/6/2019 at 0300 Yes Reported, Patient   ondansetron (ZOFRAN) 4 MG tablet Take 1 tablet (4 mg) by mouth every 8 hours as needed for nausea 9/6/2019 at 0300 Yes Chirag Robert MD   polyethylene glycol (MIRALAX/GLYCOLAX) powder Take 1 capful by mouth daily 9/5/2019 at Unknown time Yes Reported, Patient   acetaminophen (TYLENOL) 500 MG tablet Take 1-2 tablets by mouth every 6 hours as needed for pain. prn  Tan Jaramillo MD   alcohol swab prep pads Use to swab area of injection/valerie as directed.   Tan Jaramillo MD   alpha-d-galactosidase (BEANO) tablet Take 1 tablet by mouth 3 times daily as needed for intestinal gas prn at Unknown time  Unknown, Entered By History   blood glucose (NO BRAND SPECIFIED) test strip Use to test blood sugar 3 times daily or as directed. To accompany: Blood Glucose Monitor Brands: per insurance.   Tan Jaramillo MD   blood glucose calibration (NO BRAND SPECIFIED) solution Check once per month To accompany: Blood Glucose Monitor Brands: per  insurance.   Tan Jaramillo MD   blood glucose monitoring (NO BRAND SPECIFIED) meter device kit Use to test blood sugar as directed. Preferred blood glucose meter OR supplies to accompany: Blood Glucose Monitor Brands: per insurance.   Tan Jaramillo MD   Continuous Blood Gluc  (FREESTYLE GIOVANY 14 DAY READER) SHAWN 1 Act every 14 days   Chirag Robert MD   Continuous Blood Gluc Sensor (FREESTYLE GIOVANY 14 DAY SENSOR) MISC 1 Act every 14 days   Chirag Robert MD   insulin pen needle (B-D U/F) 31G X 5 MM miscellaneous USE DAILY AS DIRECTED   Tan Jaramillo MD   LORazepam (ATIVAN) 0.5 MG tablet Take 0.5 mg by mouth every 6 hours as needed for anxiety prn  Reported, Patient   oxyCODONE HCl (OXAYDO) 5 MG TABA Take 5 mg by mouth 6 times daily prn  Reported, Patient   prochlorperazine (COMPAZINE) 5 MG tablet Take 1 tablet (5 mg) by mouth every 6 hours as needed for nausea or vomiting prn  Chirag Robert MD   sildenafil (REVATIO) 20 MG tablet Take 20 mg by mouth as needed More than a month at Unknown time  Reported, Patient   simethicone (MYLICON) 125 MG chewable tablet Take 125 mg by mouth 2 times daily as needed (abdominal pain) prn  Unknown, Entered By History   thin (NO BRAND SPECIFIED) lancets Check glucose 3-4 times per day; Use with lanceting device. To accompany: Blood Glucose Monitor Brands: per insurance.   Tan Jaramillo MD   Triprolidine-Pseudoephedrine 2.5-60 MG TABS Take 0.5 tablets by mouth every 6 hours as needed (allergies) prn  Unknown, Entered By History

## 2019-09-07 NOTE — CONSULTS
Care Transition Initial Assessment - ELOISE     Met with: Family    Active Problems:    Failure to thrive in adult       DATA  Lives With: child(red), adult      Quality of Family Relationships: supportive, involved  Description of Support System: Supportive, Involved  Who is your support system?: Children  Support Assessment: Adequate family and caregiver support, Other (see comments)(Mental Health Concerns ).   Identified issues/concerns regarding health management: discharge planning - reg[facility] psych recommended.  Resources List: Assisted Living     Quality of Family Relationships: supportive, involved  Transportation Anticipated: TBD    ASSESSMENT  Cognitive Status: A&Ox1  Concerns to be addressed: Per ELOISE consult for discharge planning. Pt admitted 9/6/19 with failure to thrive in adult. Pt resides in his daughter Oksana's home, Oksana is pt's primary caregiver. Pt has recently-diagnosed pancreatic cancer and diabetes mellitus. Pt's primary support system are his children who are involved and supportive. ELOISE met with Oksana today re: discharge planning. Per Oksana, Psychiatry has evaluated pt and are recommending a transfer to [facility] for inpt psych treatment. Oksana informing that she and siblings have been trying to encourage pt into [facility], pt resistant due to cost and lack of incite into needs. At this time Oksana is hoping that pt will receive mental health treatment and meds that would allow his mentation to clear and from there decide the most appropriate living situation whether that is back home with Oksana or into an [facility]. ELOISE provided community resources.   Call placed to [facility] [phone] who needs pt's labs, psych notes, SW notes, H&P, MD notes, and hold (if needed) faxed to them at [phone] to determine if they can accept pt. ELOISE has faxed ninformation as requested, [facility] reviewing.     ADDENDUM:  -Call from [facility] informing pt does not meet criteria for admission onto their regis-psych unit. Need to make  referrrals to other regis-psych facilities.   -Call placed to [facility] [phone] to inquire about regis-psych openings, they are full & reviewing the pt's in their own ED's first for admissions, requesting SW call back around 1700.   -Placed call to [facility] [phone] who informs that they are full.  -Placed call to [facility] [phone], left VM and faxed referral to [phone].  -Placed call to [facility] [phone], they do not have any beds available over the weekend, asking SW to call back Sunday/Monday.   -Placed call to [facility] [phone], they cannot accept anyone until Monday at the earliest, call back Sunday/Monday.  -Placed call to [facility] [phone], they are full until Monday. Call back Sunday/Monday.  -Placed call to [facility] [phone], they would not be able to do any admissions until Monday, can fax referral to [phone] - referral sent - SW to follow up tomorrow.          PLAN  Financial costs for the patient includes:N/A  Patient given options and choices for discharge: Transfer to [facility] per psychiatry   Patient/family is agreeable to the plan?  Yes  Transportation/person available to transport on day of discharge is likely medical transport and have they been notified/set up.  Patient Goals and Preferences: Transfer to [place] per psychiatry  Patient anticipates discharging to: Transfer to [facility] per psychiatry      HANNA Roblero

## 2019-09-07 NOTE — PLAN OF CARE
Observation goals PRIOR TO DISCHARGE     Comments:     -Diagnostic tests and consults completed and resulted -Not met    -Vital signs normal or at patient baseline -Not met    -Tolerating oral intake to maintain hydration -Met    -Safe disposition plan has been identified -Not met    Nurse to notify provider when observation goals have been met and patient is ready for discharge.        Pt is alert to self,disoriented to place,time and situation.Had low grade temp 100.2, tylenol offered but pt declined, temp later came down to 98.6. BP on soft side, other VSS on RA.IVF infusing via Port-a- cath to R chest.Denies any pain, on scheduled MS contin. Up with AX1, ambulating to the bathroom and voiding O.K.Plan is  Psych,nutrition, SW/OT/PT consults today.

## 2019-09-07 NOTE — CONSULTS
CLINICAL NUTRITION SERVICES  -  ASSESSMENT NOTE      Recommendations Ordered by Registered Dietitian (RD): [place] milkshake at 10 am    Malnutrition:   % Weight Loss:  > 7.5% in 3 months (severe malnutrition)  % Intake:  </= 75% for >/= 1 month (severe malnutrition)  Subcutaneous Fat Loss:  Orbital region moderate depletion and Upper arm region severe depletion  Muscle Loss:  Temporal region moderate depletion  Fluid Retention:  None noted    Malnutrition Diagnosis: Severe malnutrition  In Context of:  Acute illness or injury  Chronic illness or disease        REASON FOR ASSESSMENT  Jesús Stock is a 68 year old male seen by Registered Dietitian for Provider Order - Pancreatic cancer failure to thrive       NUTRITION HISTORY  - Information obtained from patient, although he seemed somewhat confused and difficult to redirect during my interview.    - He states that he has had nausea, vomiting, and a poor appetite for one week prior to admit.  Overall, intake has been down for one year as he reports that he has lost 80#.  Patient noted that he avoids caffeine and has tried nutrition supplements but didn't like them and therefore does not consume on a regular basis.       CURRENT NUTRITION ORDERS  Diet Order:     Regular     Current Intake/Tolerance:  Patient reports that once he gets meds out of his system, his appetite improves.  He was able to eat 100% of breakfast today:  Omelet, hashbrowns, yogurt, a banana, an orange, a blueberry muffin, and whole milk.  He asked for assistance ordering lunch --> Cheeseburger on bun, fries, apple pie, and ice cream.       NUTRITION FOCUSED PHYSICAL ASSESSMENT FOR DIAGNOSING MALNUTRITION)  Completed:  Yes Visual assessment only (OBS Unit)         Observed:    Muscle wasting (refer to documentation in Malnutrition section) and Subcutaneous fat loss (refer to documentation in Malnutrition section)    Obtained from Chart/Interdisciplinary Team:  Thin appearing male  "    ANTHROPOMETRICS  Height: 5' 11\"  Weight: 74.4 kg (164#)(9/6)  Body mass index is 22.87 kg/m   Weight Status:  Normal BMI  IBW: 78.2 kg   % IBW: 95%  Weight History:   Wt Readings from Last 10 Encounters:   09/06/19 74.4 kg (164 lb)   08/19/19 75.8 kg (167 lb)   08/16/19 74.4 kg (164 lb)   07/29/19 76.4 kg (168 lb 6.4 oz)   07/18/19 78 kg (172 lb)   07/16/19 79.3 kg (174 lb 14.4 oz)   06/12/19 83.9 kg (185 lb)   06/05/19 84 kg (185 lb 3 oz)   06/04/19 84.7 kg (186 lb 12.8 oz)   05/22/19 88.7 kg (195 lb 8 oz)     Patient states that he is down 80# in one year (33%)  Per above, down 32# in 4 months (16%)    LABS  Labs reviewed    MEDICATIONS  Marinol for appetite stimulation       ASSESSED NUTRITION NEEDS PER APPROVED PRACTICE GUIDELINES:    Dosing Weight 74.4 kg   Estimated Energy Needs: 3741-6586 kcals (30-35 Kcal/Kg)  Justification: underweight  Estimated Protein Needs: 110-135 grams protein (1.5-1.8 g pro/Kg)  Justification: repletion  Estimated Fluid Needs: 4222-0584 mL (1 mL/Kcal)  Justification: maintenance    MALNUTRITION:  % Weight Loss:  > 7.5% in 3 months (severe malnutrition)  % Intake:  </= 75% for >/= 1 month (severe malnutrition)  Subcutaneous Fat Loss:  Orbital region moderate depletion and Upper arm region severe depletion  Muscle Loss:  Temporal region moderate depletion  Fluid Retention:  None noted    Malnutrition Diagnosis: Severe malnutrition  In Context of:  Acute illness or injury  Chronic illness or disease    NUTRITION DIAGNOSIS:  Malnutrition related to nausea, vomiting, and poor appetite with pancreatic CA diagnosis as evidenced by fat and muscle loss with diagnosis of severe malnutrition       NUTRITION INTERVENTIONS  Recommendations / Nutrition Prescription  Continue regular diet as tolerated  St[place] milkshake at 10 am     Implementation  Nutrition education: Per Provider order if indicated   Medical Food Supplement:  Ordered as above     Nutrition Goals  Patient will consume >/= " 75% meals TID and supplement once daily     MONITORING AND EVALUATION:  Progress towards goals will be monitored and evaluated per protocol and Practice Guidelines    Renetta Julio RD, LD, CNSC   Clinical Dietitian - [facility]

## 2019-09-07 NOTE — PLAN OF CARE
Disoriented to place. Low BP, other VSS on RA. Poor appetite, ate 25% of dinner, IVF infusing via Port-a- cath to R chest. Pain managed w/ scheduled MS contin. BG @ , refused lantus.Ambulating in the room w/ SBA. Psych, SW/OT/PT/ Nutrition consulted.

## 2019-09-07 NOTE — PROGRESS NOTES
09/07/19 0930   Quick Adds   Type of Visit Initial PT Evaluation   Living Environment   Lives With child(red), adult   Living Arrangements condominium   Home Accessibility stairs to enter home;stairs within home   Number of Stairs, Main Entrance 1   Stair Railings, Main Entrance none   Number of Stairs, Within Home, Primary 7   Stair Railings, Within Home, Primary railings safe and in good condition   Transportation Anticipated family or friend will provide   Living Environment Comment Pt lives with daughter in a [facility] level town home with 1 AMY and has 7 steps to go downstairs to [facility] bed room and 7 stairs up to the main floor bathroom.   Self-Care   Usual Activity Tolerance good   Current Activity Tolerance moderate   Regular Exercise No   Equipment Currently Used at Home none   Activity/Exercise/Self-Care Comment Pt was ind with all ADL's   Functional Level Prior   Ambulation 0-->independent   Transferring 0-->independent   Toileting 0-->independent   Bathing 0-->independent   Communication 0-->understands/communicates without difficulty   Swallowing 0-->swallows foods/liquids without difficulty   Cognition 1 - attention or memory deficits   Fall history within last six months yes   Number of times patient has fallen within last six months 2   Prior Functional Level Comment Pt was ind with ambulation per discussion with daugther. Pt has access to RW, std cane and WC if needed.    General Information   Onset of Illness/Injury or Date of Surgery - Date 09/06/19   Referring Physician Raul Wright MD   Patient/Family Goals Statement Go home   Pertinent History of Current Problem (include personal factors and/or comorbidities that impact the POC) Pt is a 68 yr old male admitted for dehydration and failure to thrive. PMH includes: hx of alcohol abuse, Pancreatic CA stage 2, DM. Pt has made several suicidal ideation and provider suspects active major depression. Psych has been consulted.     Precautions/Limitations fall precautions   Weight-Bearing Status - LLE full weight-bearing   Weight-Bearing Status - RLE full weight-bearing   General Observations Arguementative with daughter.    General Info Comments Activity: Ambualte with assist.    Cognitive Status Examination   Orientation person;place   Level of Consciousness alert;confused   Follows Commands and Answers Questions 50% of the time;able to follow single-step instructions   Personal Safety and Judgment impaired   Memory impaired   Pain Assessment   Patient Currently in Pain No   Range of Motion (ROM)   ROM Comment BLE: WFL   Strength   Strength Comments BLE: 4/5 bilaterally   Bed Mobility   Bed Mobility Comments Supine<>sit: MOd I   Transfer Skills   Transfer Comments STS at SBA   Gait   Gait Comments 40 ft without AD's and SBA   Balance   Balance Comments SItting: Good   Sensory Examination   Sensory Perception no deficits were identified   Coordination   Coordination Comments unable to assess   Muscle Tone   Muscle Tone Comments unable to assess   Modality Interventions   Planned Modality Interventions Comments OBS status.    General Therapy Interventions   Planned Therapy Interventions balance training;bed mobility training;gait training;strengthening;transfer training;risk factor education;home program guidelines;progressive activity/exercise;wheelchair management/propulsion training   Clinical Impression   Criteria for Skilled Therapeutic Intervention yes, treatment indicated   PT Diagnosis Impaired gait   Influenced by the following impairments decreased strength and activity [place]   Functional limitations due to impairments assistance needed with mobility   Clinical Presentation Stable/Uncomplicated   Clinical Presentation Rationale Clinical judgement   Clinical Decision Making (Complexity) Low complexity   Therapy Frequency Daily   Predicted Duration of Therapy Intervention (days/wks) 2   Anticipated Discharge Disposition  "Transitional Care Facility   Risk & Benefits of therapy have been explained Yes   Patient, Family & other staff in agreement with plan of care Yes   Clinical Impression Comments Pt presents with impaired balance and strength limiiting independence with mobility. Pt will benefit from an additional visist to assess stairs and for strengthening.    [facility] AM-PAC  \"6 Clicks\" V.2 Basic Mobility Inpatient Short Form   1. Turning from your back to your side while in a flat bed without using bedrails? 4 - None   2. Moving from lying on your back to sitting on the side of a flat bed without using bedrails? 4 - None   3. Moving to and from a bed to a chair (including a wheelchair)? 3 - A Little   4. Standing up from a chair using your arms (e.g., wheelchair, or bedside chair)? 4 - None   5. To walk in hospital room? 3 - A Little   6. Climbing 3-5 steps with a railing? 3 - A Little   Basic Mobility Raw Score (Score out of 24.Lower scores equate to lower levels of function) 21   Total Evaluation Time   Total Evaluation Time (Minutes) 30     "

## 2019-09-07 NOTE — CONSULTS
"[facility] Initial Psychiatric Consult Note      TIME SPENT IN PSYCHIATRY INITIAL CONSULT: 55 MINUTES    Consult ordered by: Dr. Wright  Reason: Depression and decision making capacity.      Initial History     The patient's care was discussed, patient seen and chart notes were reviewed.    Patient examined for psychiatric consultation.     IDENTIFICATION    Pt is a 68 year old male. Pt sees PCP Chirag Worley. Pt seen on 9/7/19 for depression.    HISTORY OF PRESENT ILLNESS    The patient currently suffers from stage three pancreatic cancer, diagnosed 6/03/19. He has 35 years of sobriety. Upon initial interview, the patient denies any major depression or anxiety. He admits to fatigue and pain but is currently undergoing chemotherapy to address his pancreatic cancer. He admits to confusion due to medication and chemotherapy. \"My memory is 50/50.\" Despite this, he retains a stable sleeping pattern and denies suicidal ideation or a plan of action.     Collateral history was collected from his daughter, whom he currently lives with. Per daughter, the patient suffers from depression and anxiety, specifically in relation to his pancreatic cancer. \"He is a chronic hoarder and is highly fearful of his cancer diagnosis. He doesn't let anything go.\" He's been struggling to regularly attend chemotherapy appointments. \"In early July, he vocalized that he didn't know if he wanted to live or die. He didn't know if he wanted to undergo treatment.\" Additionally, she notes that he refuses to tell his siblings his current prognosis. When in her presence, he does not eat or drink. He suffers from malnutrition. She reports that he responded positively from Ativan but acknowledges that this medication is inappropriate for the patient, due to it's addictive tendencies.     Upon second interview, after collecting collateral information from his daughter, it was reviewed that the patient should seek geropsych treatment at . " "[facility] to which he refuses to go. \"I'll just live in my truck.\" The patient demonstrates poor insight into his condition and the current states of his mental and physical health.       CHEMICAL DEPENDENCY HISTORY    Significant for alcohol abuse, currently in remission. He has been sober for 35 year, since 1984. He admits to suffering from DT's. The patient suffers from pancreatitis cancer.     PAST PSYCHIATRIC HISTORY    The patient endorses being followed by a psychiatrist in 1984 for bipolar depression and alcohol disorder. Past medications include: Depakote, Lithium, Prozac, Paxil, Ativan.     FAMILY HISTORY    Denies mental health issues but endorses significant alcohol dependency.     SOCIAL HISTORY    The patient currently lives in [place], [place] with his 40 year old daughter, Iliana. The patient grew up in [place], [place] with five siblings. Both parents raised, he graduated from [employer]. After high school, the patient served in the [employer]. He has held employment as a  and as a .      Medications     Medications Prior to Admission   Medication Sig Dispense Refill Last Dose     dronabinol (MARINOL) 10 MG capsule Take 10 mg by mouth 2 times daily   9/5/2019 at Unknown time     insulin glargine (LANTUS SOLOSTAR PEN) 100 UNIT/ML pen Inject 12 Units Subcutaneous At Bedtime   9/6/2019 at 0300     morphine (MS CONTIN) 15 MG CR tablet Take 15 mg by mouth every 12 hours   9/6/2019 at 0300     ondansetron (ZOFRAN) 4 MG tablet Take 1 tablet (4 mg) by mouth every 8 hours as needed for nausea 30 tablet 1 9/6/2019 at 0300     polyethylene glycol (MIRALAX/GLYCOLAX) powder Take 1 capful by mouth daily   9/5/2019 at Unknown time     acetaminophen (TYLENOL) 500 MG tablet Take 1-2 tablets by mouth every 6 hours as needed for pain.   prn     alcohol swab prep pads Use to swab area of injection/valerie as directed. 100 each 3 7/29/2019 at Unknown time     alpha-d-galactosidase (BEANO) tablet Take 1 " tablet by mouth 3 times daily as needed for intestinal gas   prn at Unknown time     blood glucose (NO BRAND SPECIFIED) test strip Use to test blood sugar 3 times daily or as directed. To accompany: Blood Glucose Monitor Brands: per insurance. 100 strip 6 7/29/2019 at Unknown time     blood glucose calibration (NO BRAND SPECIFIED) solution Check once per month To accompany: Blood Glucose Monitor Brands: per insurance. 1 Bottle 3 7/29/2019 at Unknown time     blood glucose monitoring (NO BRAND SPECIFIED) meter device kit Use to test blood sugar as directed. Preferred blood glucose meter OR supplies to accompany: Blood Glucose Monitor Brands: per insurance. 1 kit 0 7/29/2019 at Unknown time     Continuous Blood Gluc  (FREESTYLE GIOVANY 14 DAY READER) SHAWN 1 Act every 14 days 4 Device 0 7/29/2019 at Unknown time     Continuous Blood Gluc Sensor (FREESTYLE GIOVANY 14 DAY SENSOR) MISC 1 Act every 14 days 4 each 3 7/29/2019 at Unknown time     insulin pen needle (B-D U/F) 31G X 5 MM miscellaneous USE DAILY AS DIRECTED 100 each 1 Unknown at Unknown time     LORazepam (ATIVAN) 0.5 MG tablet Take 0.5 mg by mouth every 6 hours as needed for anxiety   prn     oxyCODONE HCl (OXAYDO) 5 MG TABA Take 5 mg by mouth 6 times daily   prn     prochlorperazine (COMPAZINE) 5 MG tablet Take 1 tablet (5 mg) by mouth every 6 hours as needed for nausea or vomiting 60 tablet 1 prn     sildenafil (REVATIO) 20 MG tablet Take 20 mg by mouth as needed   More than a month at Unknown time     simethicone (MYLICON) 125 MG chewable tablet Take 125 mg by mouth 2 times daily as needed (abdominal pain)   prn     thin (NO BRAND SPECIFIED) lancets Check glucose 3-4 times per day; Use with lanceting device. To accompany: Blood Glucose Monitor Brands: per insurance. 100 each 6 7/29/2019 at Unknown time     Triprolidine-Pseudoephedrine 2.5-60 MG TABS Take 0.5 tablets by mouth every 6 hours as needed (allergies)   prn       Scheduled Medications     "dronabinol  10 mg Oral BID     insulin aspart  1-7 Units Subcutaneous TID AC     insulin aspart  1-5 Units Subcutaneous At Bedtime     insulin aspart   Subcutaneous TID w/meals     insulin glargine  12 Units Subcutaneous At Bedtime     morphine  15 mg Oral Q12H     polyethylene glycol  17 g Oral Daily     PRNs:  acetaminophen, acetaminophen, alpha-d-galactosidase, glucose **OR** dextrose **OR** glucagon, LORazepam, magnesium sulfate, magnesium sulfate, melatonin, naloxone, ondansetron **OR** ondansetron, oxyCODONE, potassium chloride, potassium chloride with lidocaine, potassium chloride, potassium chloride, potassium chloride, simethicone      Allergies        Allergies   Allergen Reactions     Dust Mites      Mold      No Known Drug Allergies         Previous Medical History     Past Medical History:   Diagnosis Date     Alcohol dependence in remission sober since 1984 (H)      Backache, unspecified      Colon polyps 5/24/11     Diverticulosis of colon 5/24/11     Major depressive disorder, single episode, mild (H)      Obesity (BMI 30.0-34.9) 08/28/2017     Osteoarthrosis, unspecified whether generalized or localized, unspecified site      Pancreatic cancer (H)      Type 2 diabetes mellitus without complication, with long-term current use of insulin (H) 03/13/2019        Medical Review of Systems     /50 (BP Location: Left arm)   Temp 98.6  F (37  C) (Oral)   Resp 20   Ht 1.803 m (5' 11\")   Wt 74.4 kg (164 lb)   SpO2 91%   BMI 22.87 kg/m    Body mass index is 22.87 kg/m .    Previous 10-point ROS completed by Dr. Wright on 9/06/19 reviewed by Dr. Vivar on September 7, 2019 and is unchanged except for those problems mentioned within the HPI.      Mental Status Examination     Appearance Lying in bed, dressed in gown. Appears stated age.   Attitude Cooperative   Orientation Oriented to person, place, time   Eye Contact Poor   Speech Regular rate, rhythm, volume and tone   Language Normal "   Psychomotor Behavior Normal   Thought Process Goal-Oriented, Intact   Associations Intact   Thought Content Patient is currently negative for suicidal ideation, negative for plan or intent, able to contract no self harm and identify barriers to suicide.  Negative for obsessions, compulsions or psychosis.     Mood Anxious, depressd   Affect Flat   Fund of Knowledge Poor   Insight Poor   Judgement Poor   Attention Span & Concentration Intact   Recent & Remote Memory Poor   Gait Not tested   Muscle Tone Intact      Labs     Labs reviewed.  Recent Results (from the past 24 hour(s))   CBC with platelets differential    Collection Time: 09/06/19  1:20 PM   Result Value Ref Range    WBC 5.4 4.0 - 11.0 10e9/L    RBC Count 3.06 (L) 4.4 - 5.9 10e12/L    Hemoglobin 9.9 (L) 13.3 - 17.7 g/dL    Hematocrit 28.3 (L) 40.0 - 53.0 %    MCV 93 78 - 100 fl    MCH 32.4 26.5 - 33.0 pg    MCHC 35.0 31.5 - 36.5 g/dL    RDW 12.3 10.0 - 15.0 %    Platelet Count 63 (L) 150 - 450 10e9/L    Diff Method Automated Method     % Neutrophils 69.9 %    % Lymphocytes 17.4 %    % Monocytes 11.5 %    % Eosinophils 0.4 %    % Basophils 0.2 %    % Immature Granulocytes 0.6 %    Nucleated RBCs 0 0 /100    Absolute Neutrophil 3.8 1.6 - 8.3 10e9/L    Absolute Lymphocytes 0.9 0.8 - 5.3 10e9/L    Absolute Monocytes 0.6 0.0 - 1.3 10e9/L    Absolute Eosinophils 0.0 0.0 - 0.7 10e9/L    Absolute Basophils 0.0 0.0 - 0.2 10e9/L    Abs Immature Granulocytes 0.0 0 - 0.4 10e9/L    Absolute Nucleated RBC 0.0    Comprehensive metabolic panel    Collection Time: 09/06/19  1:20 PM   Result Value Ref Range    Sodium 132 (L) 133 - 144 mmol/L    Potassium 3.2 (L) 3.4 - 5.3 mmol/L    Chloride 98 94 - 109 mmol/L    Carbon Dioxide 30 20 - 32 mmol/L    Anion Gap 4 3 - 14 mmol/L    Glucose 167 (H) 70 - 99 mg/dL    Urea Nitrogen 16 7 - 30 mg/dL    Creatinine 0.95 0.66 - 1.25 mg/dL    GFR Estimate 81 >60 mL/min/[1.73_m2]    GFR Estimate If Black >90 >60 mL/min/[1.73_m2]    Calcium  8.1 (L) 8.5 - 10.1 mg/dL    Bilirubin Total 1.0 0.2 - 1.3 mg/dL    Albumin 2.2 (L) 3.4 - 5.0 g/dL    Protein Total 6.0 (L) 6.8 - 8.8 g/dL    Alkaline Phosphatase 211 (H) 40 - 150 U/L    ALT 34 0 - 70 U/L    AST 25 0 - 45 U/L   Lipase    Collection Time: 09/06/19  1:20 PM   Result Value Ref Range    Lipase <10 (L) 73 - 393 U/L   Hemoglobin A1c    Collection Time: 09/06/19  1:20 PM   Result Value Ref Range    Hemoglobin A1C 7.7 (H) 0 - 5.6 %   ISTAT gases lactate larry POCT    Collection Time: 09/06/19  1:21 PM   Result Value Ref Range    Ph Venous 7.44 (H) 7.32 - 7.43 pH    PCO2 Venous 40 40 - 50 mm Hg    PO2 Venous 30 25 - 47 mm Hg    Bicarbonate Venous 27 21 - 28 mmol/L    O2 Sat Venous 61 %    Lactic Acid 0.7 0.7 - 2.1 mmol/L   Glucose by meter    Collection Time: 09/06/19  4:48 PM   Result Value Ref Range    Glucose 109 (H) 70 - 99 mg/dL   Glucose by meter    Collection Time: 09/06/19  9:29 PM   Result Value Ref Range    Glucose 141 (H) 70 - 99 mg/dL   Glucose by meter    Collection Time: 09/07/19  1:59 AM   Result Value Ref Range    Glucose 106 (H) 70 - 99 mg/dL   Basic metabolic panel    Collection Time: 09/07/19  6:35 AM   Result Value Ref Range    Sodium 136 133 - 144 mmol/L    Potassium 3.3 (L) 3.4 - 5.3 mmol/L    Chloride 104 94 - 109 mmol/L    Carbon Dioxide 27 20 - 32 mmol/L    Anion Gap 5 3 - 14 mmol/L    Glucose 101 (H) 70 - 99 mg/dL    Urea Nitrogen 11 7 - 30 mg/dL    Creatinine 0.90 0.66 - 1.25 mg/dL    GFR Estimate 86 >60 mL/min/[1.73_m2]    GFR Estimate If Black >90 >60 mL/min/[1.73_m2]    Calcium 8.0 (L) 8.5 - 10.1 mg/dL   CBC with platelets    Collection Time: 09/07/19  6:35 AM   Result Value Ref Range    WBC 5.5 4.0 - 11.0 10e9/L    RBC Count 2.75 (L) 4.4 - 5.9 10e12/L    Hemoglobin 9.1 (L) 13.3 - 17.7 g/dL    Hematocrit 25.6 (L) 40.0 - 53.0 %    MCV 93 78 - 100 fl    MCH 33.1 (H) 26.5 - 33.0 pg    MCHC 35.5 31.5 - 36.5 g/dL    RDW 12.5 10.0 - 15.0 %    Platelet Count 71 (L) 150 - 450 10e9/L         Impression     This is a 68 year old male who presents at [facility] with signs and symptoms of depression and anxiety in the context of stage three pancreatic cancer.     Due to his depression and anxiety, the medication Remeron was reviewed with the patient, including benefits and potential side effects. The patient is in agreement to start. Will start Remeron 7.5mg at bedtime. Additionally, the patient was suggested to continue treatment at a [facility] facility [facility], to which he refused to go. Due to his poor judgement and lack of insight, will place the patient on a 72 hour hold.      Diagnoses     1. Major depression, recurrent, severe, without psychotic features.  2. Pancreatic cancer.   3. Alcohol use disorder, currently in remission.      Plan     1. Explained side effects, benefits and complications of medications to the patient.  2. Medication Changes: Will start Remeron 7.5mg at bedtime. No other medication changes.   3. Discussed treatment plan with patient and team.  4. Recommend the patient seek treatment at the Senior Psychiatry unit at [facility].       TIME SPENT IN PSYCHIATRY INITIAL CONSULT: 55 MINUTES     Attestation:   Olive ROA, am serving as a scribe at 7:36 AM on 9/7/2019 to document services personally performed by Hector Vivar MD based on my observations and the provider's statements to me.    Patient ID:  Name: Jesús Stock MRN: [id number]  Admission: 9/6/2019 YOB: 1950

## 2019-09-07 NOTE — PROGRESS NOTES
DATE & TIME: 9/7/19 9901-0701  Cognitive Concerns/ Orientation : A&Ox3, disoriented to time.  BEHAVIOR & AGGRESSION TOOL COLOR: Green  CIWA SCORE: n/a  ABNL VS/O2: VSS on RA, soft BP 99/49  MOBILITY: SBA  PAIN MANAGMENT: Denies  DIET: Reg  BOWEL/BLADDER: Continent  ABNL LAB/BG: Hgb 9.1 Plts 71 BG 88, 164  DRAIN/DEVICES: IVF infusing at 75ml/hr in port site.  TELEMETRY RHYTHM: n/a  SKIN: Intact  TESTS/PROCEDURES: n/a  D/C DAY/GOALS/PLACE: Pending placement to [facility] .  OTHER IMPORTANT INFO: On and off confusion throughout the day. Pt can be placed on a hold if he tries to leave.

## 2019-09-07 NOTE — PLAN OF CARE
Discharge Planner OT   Patient plan for discharge: none stated  Current status: Orders received, chart reviewed, staff consulted. Pt is OBS status, TCU has been recommended by PT, from chart review pt is being transferred to IP psych unit. Discharge plan is in place, no need for OT consult at this time.   Barriers to return to prior living situation: defer to PT  Recommendations for discharge: defer to PT  Rationale for recommendations: Discharge recommendations have been made by PT and discharge plan is in place. Will defer to PT. Orders completed.        Entered by: Arlene Lira 09/07/2019 3:10 PM

## 2019-09-07 NOTE — PLAN OF CARE
Discharge Planner PT   Patient plan for discharge: Pt wants to go home.   Current status: PT eval completed, treatment initiated. Pt is a 68 yr old male admitted for dehydration and failure to thrive. PMH includes: hx of alcohol abuse, Pancreatic CA stage 2, DM. Pt has made several suicidal ideation and provider suspects active major depression. Psych has been consulted.   Pt is a poor historian and is unable to provide clear info on home set up and PLOF. Per discussion with daughter, pt lives with her in a [facility] home with 1 AMY. Pt has a to manage 7 steps up/down with rail support to access bed/bathroom. Pt is alone in the house, when daughter has to work. Pt noted getting argumentative with daughter during PT session. Per conversation with daughter (outside pt's room), pt is very non-compliant and usually need to have a lot of ativan for him to be compliant with anything.   Currently pt is at mod I with bed mobility, SBA with STS tranfers and gait x 40 ft without UE support. Pt strongly declining to ambulate outside the room or to initiate stairs this date.   Barriers to return to prior living situation: Stairs to access, Alone during the day, Fall risk  Recommendations for discharge: TCU  Rationale for recommendations: Pt will benefit from continued skilled PT to achieve increased strength, balance and independence with functional mobility. Per chart review, noted [facility] being recommended.      Entered by: Aranza Young 09/07/2019 10:18 AM

## 2019-09-07 NOTE — CONSULTS
[facility]    Oncology Consultation     Date of Admission:  9/6/2019  Date of Consult (When I saw the patient): 09/07/19    Assessment & Plan   Jesús Stock is a 68 year old male who was admitted on 9/6/2019. I was asked to see the patient for pancreatic cancer.      Pancreatic Cancer  -followed by Dr. Farrell  -presented 6/2019 with hepatic dysfunction and 2.7 cm pancreatic mass with biliary obstruction  -clinical [id number] stage II adenocarcinoma, potentially resectable  -S/P sphincterotomy with stent placement  -began neoadjuvant gemcitabine and abraxane chemotherapy 8/28/19  -next dose due 9/11/19  -continue current supportive care with re-evaluation 9/11 as outpatient as planned    Failure to Thrive  -nutrition consult, PT/OT  -continue PTA marinol and morphine   -agree with psychiatry evaluation for depression    Anemia, Thrombocytopenia  -due to malignancy and chemotherapy  -stable at present and continue to monitor     Obey Gannon    Code Status    DNR/DNI    Primary Care Physician   Chirag Robert    History of Present Illness   Jesús Stock is a 68 year old male who presents with fatigue and failure to thrive.    Past Medical History   I have reviewed this patient's medical history and updated it with pertinent information if needed.   Past Medical History:   Diagnosis Date     Alcohol dependence in remission sober since 1984 (H)     sober since 8/25/84     Backache, unspecified      Colon polyps 5/24/11    2 colon polyps removed at colonoscopy     Diverticulosis of colon 5/24/11    mild pan-colonic diverticulosis seen as incidental finding on colonoscopy     Major depressive disorder, single episode, mild (H)      Obesity (BMI 30.0-34.9) 08/28/2017    BMI 31.4     Osteoarthrosis, unspecified whether generalized or localized, unspecified site      Pancreatic cancer (H)      Type 2 diabetes mellitus without complication, with long-term current use of insulin (H) 03/13/2019    glucose  395       Past Surgical History   I have reviewed this patient's surgical history and updated it with pertinent information if needed.  Past Surgical History:   Procedure Laterality Date     APPENDECTOMY       COLONOSCOPY  11    2 polyps, mild pan-colonic diverticulosis     COLONOSCOPY N/A 2017    Procedure: COMBINED COLONOSCOPY, SINGLE OR MULTIPLE BIOPSY/POLYPECTOMY BY BIOPSY;;  Surgeon: Gene Grimes MD;  Location:  GI     ENDOSCOPIC RETROGRADE CHOLANGIOPANCREATOGRAM N/A 2019    Procedure: ENDOSCOPIC RETROGRADE CHOLANGIOPANCREATOGRAPHY, ENDOSCOPIC UTRASOUND ESOPHAGOSCOPY WITH STENT PLACEMENT. ;  Surgeon: Jana Nelson MD;  Location:  OR     ENDOSCOPIC RETROGRADE CHOLANGIOPANCREATOGRAPHY, EXCHANGE TUBE/STENT N/A 2019    Procedure: ENDOSCOPIC RETROGRADE CHOLANGIOPANCREATOGRAPHY, WITH STENT EXCHANGE;  Surgeon: Jana Nelson MD;  Location:  OR     ESOPHAGOSCOPY, GASTROSCOPY, DUODENOSCOPY (EGD), COMBINED N/A 2019    Procedure: Esophagoscopy, gastroscopy, duodenoscopy (EGD), combined, pancreatic needle biopsies;  Surgeon: Jana Nelson MD;  Location:  OR     HERNIA REPAIR      X3     IR CHEST PORT PLACEMENT > 5 YRS OF AGE  2019     TONSILLECTOMY         Prior to Admission Medications   Prior to Admission Medications   Prescriptions Last Dose Informant Patient Reported? Taking?   Continuous Blood Gluc  (FREESTYLE GIOVANY 14 DAY READER) SHAWN   No No   Si Act every 14 days   Continuous Blood Gluc Sensor (FREESTYLE GIOVANY 14 DAY SENSOR) MISC   No No   Si Act every 14 days   LORazepam (ATIVAN) 0.5 MG tablet prn Daughter Yes No   Sig: Take 0.5 mg by mouth every 6 hours as needed for anxiety   Triprolidine-Pseudoephedrine 2.5-60 MG TABS prn Daughter Yes No   Sig: Take 0.5 tablets by mouth every 6 hours as needed (allergies)   acetaminophen (TYLENOL) 500 MG tablet prn Daughter Yes No   Sig: Take 1-2 tablets by mouth every 6 hours as needed for  pain.   alcohol swab prep pads   No No   Sig: Use to swab area of injection/valerie as directed.   alpha-d-galactosidase (BEANO) tablet prn at Unknown time Daughter Yes No   Sig: Take 1 tablet by mouth 3 times daily as needed for intestinal gas   blood glucose (NO BRAND SPECIFIED) test strip   No No   Sig: Use to test blood sugar 3 times daily or as directed. To accompany: Blood Glucose Monitor Brands: per insurance.   blood glucose calibration (NO BRAND SPECIFIED) solution   No No   Sig: Check once per month To accompany: Blood Glucose Monitor Brands: per insurance.   blood glucose monitoring (NO BRAND SPECIFIED) meter device kit   No No   Sig: Use to test blood sugar as directed. Preferred blood glucose meter OR supplies to accompany: Blood Glucose Monitor Brands: per insurance.   dronabinol (MARINOL) 10 MG capsule 9/5/2019 at Unknown time Daughter Yes Yes   Sig: Take 10 mg by mouth 2 times daily   insulin glargine (LANTUS SOLOSTAR PEN) 100 UNIT/ML pen 9/6/2019 at 0300 Daughter No Yes   Sig: Inject 12 Units Subcutaneous At Bedtime   insulin pen needle (B-D U/F) 31G X 5 MM miscellaneous   No No   Sig: USE DAILY AS DIRECTED   morphine (MS CONTIN) 15 MG CR tablet 9/6/2019 at 0300 Daughter Yes Yes   Sig: Take 15 mg by mouth every 12 hours   ondansetron (ZOFRAN) 4 MG tablet 9/6/2019 at 0300 Daughter No Yes   Sig: Take 1 tablet (4 mg) by mouth every 8 hours as needed for nausea   oxyCODONE HCl (OXAYDO) 5 MG TABA prn Daughter Yes No   Sig: Take 5 mg by mouth 6 times daily   polyethylene glycol (MIRALAX/GLYCOLAX) powder 9/5/2019 at Unknown time Daughter Yes Yes   Sig: Take 1 capful by mouth daily   prochlorperazine (COMPAZINE) 5 MG tablet prn Daughter No No   Sig: Take 1 tablet (5 mg) by mouth every 6 hours as needed for nausea or vomiting   sildenafil (REVATIO) 20 MG tablet More than a month at Unknown time Daughter Yes No   Sig: Take 20 mg by mouth as needed   simethicone (MYLICON) 125 MG chewable tablet prn Daughter Yes  No   Sig: Take 125 mg by mouth 2 times daily as needed (abdominal pain)   thin (NO BRAND SPECIFIED) lancets   No No   Sig: Check glucose 3-4 times per day; Use with lanceting device. To accompany: Blood Glucose Monitor Brands: per insurance.      Facility-Administered Medications: None     Allergies   Allergies   Allergen Reactions     Dust Mites      Mold      No Known Drug Allergies        Social History   I have reviewed this patient's social history and updated it with pertinent information if needed. Jesús Stock  reports that he is a non-smoker but has been exposed to tobacco smoke. He has never used smokeless tobacco. He reports that he does not drink alcohol or use drugs.    Family History   I have reviewed this patient's family history and updated it with pertinent information if needed.   Family History   Problem Relation Age of Onset     Diabetes Father         b:1926     Hypertension Mother         b:1925     Family History Negative Brother         b:1948     Family History Negative Brother         b:1951  deferred tremor     Family History Negative Sister         b:1953     Family History Negative Brother         b:1956     Family History Negative Brother         b:1958       Review of Systems   The 5 point Review of Systems is negative other than noted in the HPI or here.     Physical Exam   Temp: 98  F (36.7  C) Temp src: Oral BP: 99/49 Pulse: 68 Heart Rate: 76 Resp: 20 SpO2: 94 % O2 Device: None (Room air)    Vital Signs with Ranges  Temp:  [98  F (36.7  C)-100.4  F (38  C)] 98  F (36.7  C)  Pulse:  [68-90] 68  Heart Rate:  [66-85] 76  Resp:  [18-20] 20  BP: ()/(42-78) 99/49  SpO2:  [91 %-99 %] 94 %  164 lbs 0 oz    Constitutional: awake, cooperative, no acute distress, fatigued  Hematologic / Lymphatic: no cervical lymphadenopathy  GI:  soft, non-distended, non-tender, no masses palpated, no hepatosplenomegally  Skin: no bruising or bleeding  Musculoskeletal: no pedal edema    Data   Results  for orders placed or performed during the hospital encounter of 09/06/19 (from the past 24 hour(s))   CBC with platelets differential   Result Value Ref Range    WBC 5.4 4.0 - 11.0 10e9/L    RBC Count 3.06 (L) 4.4 - 5.9 10e12/L    Hemoglobin 9.9 (L) 13.3 - 17.7 g/dL    Hematocrit 28.3 (L) 40.0 - 53.0 %    MCV 93 78 - 100 fl    MCH 32.4 26.5 - 33.0 pg    MCHC 35.0 31.5 - 36.5 g/dL    RDW 12.3 10.0 - 15.0 %    Platelet Count 63 (L) 150 - 450 10e9/L    Diff Method Automated Method     % Neutrophils 69.9 %    % Lymphocytes 17.4 %    % Monocytes 11.5 %    % Eosinophils 0.4 %    % Basophils 0.2 %    % Immature Granulocytes 0.6 %    Nucleated RBCs 0 0 /100    Absolute Neutrophil 3.8 1.6 - 8.3 10e9/L    Absolute Lymphocytes 0.9 0.8 - 5.3 10e9/L    Absolute Monocytes 0.6 0.0 - 1.3 10e9/L    Absolute Eosinophils 0.0 0.0 - 0.7 10e9/L    Absolute Basophils 0.0 0.0 - 0.2 10e9/L    Abs Immature Granulocytes 0.0 0 - 0.4 10e9/L    Absolute Nucleated RBC 0.0    Comprehensive metabolic panel   Result Value Ref Range    Sodium 132 (L) 133 - 144 mmol/L    Potassium 3.2 (L) 3.4 - 5.3 mmol/L    Chloride 98 94 - 109 mmol/L    Carbon Dioxide 30 20 - 32 mmol/L    Anion Gap 4 3 - 14 mmol/L    Glucose 167 (H) 70 - 99 mg/dL    Urea Nitrogen 16 7 - 30 mg/dL    Creatinine 0.95 0.66 - 1.25 mg/dL    GFR Estimate 81 >60 mL/min/[1.73_m2]    GFR Estimate If Black >90 >60 mL/min/[1.73_m2]    Calcium 8.1 (L) 8.5 - 10.1 mg/dL    Bilirubin Total 1.0 0.2 - 1.3 mg/dL    Albumin 2.2 (L) 3.4 - 5.0 g/dL    Protein Total 6.0 (L) 6.8 - 8.8 g/dL    Alkaline Phosphatase 211 (H) 40 - 150 U/L    ALT 34 0 - 70 U/L    AST 25 0 - 45 U/L   Lipase   Result Value Ref Range    Lipase <10 (L) 73 - 393 U/L   Hemoglobin A1c   Result Value Ref Range    Hemoglobin A1C 7.7 (H) 0 - 5.6 %   ISTAT gases lactate larry POCT   Result Value Ref Range    Ph Venous 7.44 (H) 7.32 - 7.43 pH    PCO2 Venous 40 40 - 50 mm Hg    PO2 Venous 30 25 - 47 mm Hg    Bicarbonate Venous 27 21 - 28  mmol/L    O2 Sat Venous 61 %    Lactic Acid 0.7 0.7 - 2.1 mmol/L   Glucose by meter   Result Value Ref Range    Glucose 109 (H) 70 - 99 mg/dL   Glucose by meter   Result Value Ref Range    Glucose 141 (H) 70 - 99 mg/dL   Glucose by meter   Result Value Ref Range    Glucose 106 (H) 70 - 99 mg/dL   Basic metabolic panel   Result Value Ref Range    Sodium 136 133 - 144 mmol/L    Potassium 3.3 (L) 3.4 - 5.3 mmol/L    Chloride 104 94 - 109 mmol/L    Carbon Dioxide 27 20 - 32 mmol/L    Anion Gap 5 3 - 14 mmol/L    Glucose 101 (H) 70 - 99 mg/dL    Urea Nitrogen 11 7 - 30 mg/dL    Creatinine 0.90 0.66 - 1.25 mg/dL    GFR Estimate 86 >60 mL/min/[1.73_m2]    GFR Estimate If Black >90 >60 mL/min/[1.73_m2]    Calcium 8.0 (L) 8.5 - 10.1 mg/dL   CBC with platelets   Result Value Ref Range    WBC 5.5 4.0 - 11.0 10e9/L    RBC Count 2.75 (L) 4.4 - 5.9 10e12/L    Hemoglobin 9.1 (L) 13.3 - 17.7 g/dL    Hematocrit 25.6 (L) 40.0 - 53.0 %    MCV 93 78 - 100 fl    MCH 33.1 (H) 26.5 - 33.0 pg    MCHC 35.5 31.5 - 36.5 g/dL    RDW 12.5 10.0 - 15.0 %    Platelet Count 71 (L) 150 - 450 10e9/L   Magnesium   Result Value Ref Range    Magnesium 1.8 1.6 - 2.3 mg/dL   Glucose by meter   Result Value Ref Range    Glucose 88 70 - 99 mg/dL

## 2019-09-07 NOTE — PLAN OF CARE
Observation goals PRIOR TO DISCHARGE    Comments:   -diagnostic tests and consults completed and resulted: Partially met.    -vital signs normal or at patient baseline: Met    -tolerating oral intake to maintain hydration: Partially met, still has poor appetite.     -safe disposition plan has been identified : No

## 2019-09-07 NOTE — PROGRESS NOTES
[facility]    Medicine Progress Note - Hospitalist Service       Date of Admission:  9/6/2019    Assessment & Plan   Jesús Stock is a 68 year old male with PMH of stage II pancreatic adenocarcinoma with recent initiation of chemotherapy (first round 8/29), insulin dependent type II diabetes mellitus, obesity, and osteoarthritis who presented 9/6/19 with weight loss and failure to thrive. Registered to the observation unit for further evaluation and treatment.      Failure to thrive  Generalized weakness, physical deconditioning  Hyponatremia, resolved  Hypokalemia, improving: Patient diagnosed with pancreatic cancer in 6/2019, started chemotherapy 8/26/19. Developed nausea and vomiting approx one week ago and has had very poor PO intake since, as well as increased weakness. Reports a weight loss of approx 80lbs over the past year. Appears depressed on interview. Family notes that patient has significant physical deconditioning. Labwork notes decreased albumin 3.4-->2.2, hyponatremia, hypokalemia. Creatinine is 0.95 which is close to normal limits however his baseline may be in fact lower than 0.8. Has home care already but per family and oncologist failing to thrive at home.    - Pt was hydrated with IVF and electrolytes repleted per protocol   - Psychiatry consulted as depression related to acute illness seems to be playing a major role into the above presentation, see formal note by Dr. Vivar and below; recommend transfer to [facility].   - Continue PTA Marinol 10 mg po every day  - Nutrition consult today, see formal note  - Regular diet  - PT evaluated, recommending TCU at discharge however at this time focusing on Geripsych placement for more acute treatment of depression   - SW for discharge planning     Severe malnutrition in the context of acute illness: Malnutrition related to nausea, vomiting, and poor appetite with pancreatic CA diagnosis as evidenced by fat and  muscle loss with diagnosis of severe malnutrition.   -- Nutrition following, regular diet as tolerated, strawberry milkshake at 1000    Normocytic anemia, new  Thrombocytopenia, new: Hgb 8/28/19 was 11.7. Platelets at that time 156. This is secondary to malignancy and chemotherapy. No active signs of bleeding.   - Monitor     Recent Labs   Lab 09/07/19  0635 09/06/19  1320   HGB 9.1* 9.9*      Major depression, recurrent, severe, without psychotic features: Patient has made active suicidal ideation to multiple providers, including to oncologist, ED physician. Not coping well with pancreatic cancer diagnosis. In clinic, he held off starting chemotherapy until last week, presumably due to his mental health issues. It appears that this is also contributing to his failure to thrive picture.  - Psychiatry consulted, see formal note by Dr. Vivar. Started patient on Remeron 7.5 mg at bedtime. Due to poor judgement and lack of insight, Psychiatry also placed 72 hour hold--note at this time, no order for 72 hour hold is placed; if pt attempts to leave, please contact Psychiatry regarding placement of hold.    - PRN ativan      Stage II pancreatic adenocarcinoma  Cancer related pain: Follows with [facility], diagnosed in 6/2019. S/p ERCP x2 and pancreatic stent placement. Started gemcitabine and abraxane around 8/29/2019.  -- MOHPA consulted, see formal note by Dr. Gannon, plan for next dose of chemotherapy 9/11/19   -- Continue MS Contin 15 mg po BID, Oxycodone 5 mg po q4 hrs PRN, Mylicon 80 mg po BID, Zofran and Compazine PRN     IDDM: Diagnosed in March 2019. A1C on admission 7.7.  - Continue PTA Insulin glargine 12 units at bedtime   - SSI + carb counting while inpatient  - Regular diet okay here given poor PO intake, failure to thrive    Recent Labs   Lab 09/07/19  0635 09/07/19  0159 09/06/19  2129 09/06/19  1648 09/06/19  1320   *  --   --   --  167*   BGM  --  106* 141* 109*  --       Drug  induced constipation: Bowel regimen in place while patient is on narcotics.     Hx of alcohol use disorder: In remission.     Diet: Regular Diet Adult    DVT Prophylaxis: Ambulate every shift  Huggins Catheter: not present  Code Status: DNR/DNI      Disposition Plan   Expected discharge: Pending Geripsych placement at [facility].  Entered: Jayashree Fernandez PA-C 09/07/2019, 7:34 AM     The patient's care was discussed with the Attending Physician, Dr. Arriaza, Bedside Nurse and Patient.    Jayashree Fernandez PA-C  Hospitalist Service  [facility]  ______________________________________________________________________    Interval History   Evaluated by Psychiatry this AM, recommend [facility]. Electrolytes repleted per protocol. Daughter at bedside and very supportive.     Data reviewed today: I reviewed all medications, new labs and imaging results over the last 24 hours.    Physical Exam   Vital Signs: Temp: 98.6  F (37  C) Temp src: Oral BP: 101/50   Heart Rate: 76 Resp: 20 SpO2: 91 % O2 Device: None (Room air)    Weight: 164 lbs 0 oz    CONSTITUTIONAL: Pt laying in bed, dressed in hospital garb. Appears comfortable. Cooperative with interview. Accompanied by daughter at bedside.   HEENT: Normocephalic, atraumatic. Negative for conjunctival redness or scleral icterus.  CARDIOVASCULAR: RRR, no murmurs, rubs, or extra heart sounds appreciated. Pulses +2/4 and regular in upper and lower extremities, bilaterally.   RESPIRATORY: No increased work of breathing.  CTA, bilat; no wheezes, rales, or rhonchi appreciated.  GASTROINTESTINAL:  Abdomen soft, non-distended. BS auscultated in all four quadrants. Minimal tenderness upon palpation; pt does not baseline cancer related pain. No masses or organomegaly noted.  MUSCULOSKELETAL: No gross deformities noted. Normal muscle tone.   HEMATOLOGIC/LYMPHATIC/IMMUNOLOGIC: Negative for lower extremity edema, bilaterally.  NEUROLOGIC:  Alert and oriented to person, place, and time.  strength intact. Familial tremor noted in LUE. Pt reports some numbness in upper digits 4 and 5 bilat.   SKIN: Warm, dry, intact. No jaundice noted. Negative for suspicious lesions, rashes, bruising, open sores or abrasions.     Data   Recent Labs   Lab 09/07/19  0635 09/06/19  1320   WBC 5.5 5.4   HGB 9.1* 9.9*   MCV 93 93   PLT 71* 63*    132*   POTASSIUM 3.3* 3.2*   CHLORIDE 104 98   CO2 27 30   BUN 11 16   CR 0.90 0.95   ANIONGAP 5 4   MATTHEW 8.0* 8.1*   * 167*   ALBUMIN  --  2.2*   PROTTOTAL  --  6.0*   BILITOTAL  --  1.0   ALKPHOS  --  211*   ALT  --  34   AST  --  25   LIPASE  --  <10*     No results found for this or any previous visit (from the past 24 hour(s)).  Medications     sodium chloride 75 mL/hr at 09/07/19 0520       dronabinol  10 mg Oral BID     insulin aspart  1-7 Units Subcutaneous TID AC     insulin aspart  1-5 Units Subcutaneous At Bedtime     insulin aspart   Subcutaneous TID w/meals     insulin glargine  12 Units Subcutaneous At Bedtime     morphine  15 mg Oral Q12H     polyethylene glycol  17 g Oral Daily     senna-docusate  1 tablet Oral BID    Or     senna-docusate  2 tablet Oral BID

## 2019-09-07 NOTE — PLAN OF CARE
Observation goals PRIOR TO DISCHARGE     Comments:     -Diagnostic tests and consults completed and resulted -Not met    -Vital signs normal or at patient baseline -Not met    -Tolerating oral intake to maintain hydration -Met    -Safe disposition plan has been identified -Not met    Nurse to notify provider when observation goals have been met and patient is ready for discharge.

## 2019-09-08 PROBLEM — R50.9 FEVER: Status: ACTIVE | Noted: 2019-01-01

## 2019-09-08 PROBLEM — Z91.89 AT RISK FOR HEALTHCARE ASSOCIATED INFECTION: Status: ACTIVE | Noted: 2019-01-01

## 2019-09-08 NOTE — PROGRESS NOTES
Observation goals PRIOR TO DISCHARGE    Comments:   -diagnostic tests and consults completed and resulted: Partially met.     -vital signs normal or at patient baseline:  not met, elevated temp this am     -tolerating oral intake to maintain hydration: Partially met, still has poor appetite.      -safe disposition plan has been identified : No

## 2019-09-08 NOTE — PLAN OF CARE
Disoriented to place and situation.VSS ex soft BP, IVF infusing. Pain managed w/ MS contin. Poor appetite. Urine sample collected. Pending placement to [facility]. Dr. Young notified to call Dr. Bone from [facility] regarding possible transfer to [facility]. Pt's daughter to be notified before the transfer.

## 2019-09-08 NOTE — PROGRESS NOTES
Discussed case with Dr. Denise of [facility], pt to remain obs status. Awaiting [facility] placement at this time.

## 2019-09-08 NOTE — PROGRESS NOTES
Observation goals PRIOR TO DISCHARGE    Comments:   -diagnostic tests and consults completed and resulted: Partially met.     -vital signs normal or at patient baseline:  partially met, low grade fever.     -tolerating oral intake to maintain hydration: Partially met, still has poor appetite, IV infusing      -safe disposition plan has been identified : No

## 2019-09-08 NOTE — PLAN OF CARE
Observation goals PRIOR TO DISCHARGE    Comments:   -diagnostic tests and consults completed and resulted: Partially met.     -vital signs normal or at patient baseline: Not met, Tmax 100.6, soft BP.     -tolerating oral intake to maintain hydration: Partially met, still has poor appetite.      -safe disposition plan has been identified : No

## 2019-09-08 NOTE — PROGRESS NOTES
Temperature this am 101.7. Lactic acid fired up. Came back 0.9. VSS,RA, denied pain. Tylenol administered, will keep monitoring.

## 2019-09-08 NOTE — PLAN OF CARE
Disoriented to place and situation. Tmax 100.6,Tylenol given, soft BP, IVF infusing. Pain managed w/ MS contin. Zofran for nausea. Ate 25% of dinner. Awaiting urine collection. Pending placement to [facility]. Dr. Young notified to call Dr. Bone from [facility] regarding possible transfer to [facility]. Pt's daughter to be notified before the transfer.

## 2019-09-08 NOTE — CONSULTS
Note INFECTIOUS DISEASES CONSULTATION NOTE  Covering for [facility]     Date 2019     Name /  Jesús LORENA Stock   1950     MRN [id number]     Thank you for asking us to see Jesús Stock for infectious diseases evaluation    REASON FOR CONSULT Fever unknown cause, pancreatic cancer  REQUESTING PROVIDER NESS Chiu PA-C    CHIEF COMPLAINT    Fever  / Failure to thrive    HPI    67 yo w fever    Recent pancreatic cancer   Dx - 2019  Start chemo (Gemcitabine/abraxane) 19  Has biliary stent / s/p sphincterotomy    portacath R chest    DM - Dx 2019    Obesity    EtOH -- per pt, sober > 35 years      19 Admit w general malaise, slow/progressive decline since Dx pancreatic CA in 2019  General abdl discomfort  Pt denies fever / mouth sores / ear pain / urinary troubles / skin sores / myalgias to me  He denies headache    Rest of 15 pt ROS neg    T up to 103.2  F => ID consult    Temp graph (red squares / line)       Infection Risk Factors    yes Animal / bird exposure   dogs   no Travel / residence location      no Environmental exposure      no Bites (mosquito/tick)      no Ill contacts      no TB/exposure / + PPD history        OTHER HISTORY  [facility]  Past Medical History:   Diagnosis Date     Alcohol dependence in remission sober since  (H)     sober since 83     Backache, unspecified      Colon polyps 11    2 colon polyps removed at colonoscopy     Diverticulosis of colon 11    mild pan-colonic diverticulosis seen as incidental finding on colonoscopy     Major depressive disorder, single episode, mild (H)      Obesity (BMI 30.0-34.9) 2017    BMI 31.4     Osteoarthrosis, unspecified whether generalized or localized, unspecified site      Pancreatic cancer (H)      Type 2 diabetes mellitus without complication, with long-term current use of insulin (H) 2019    glucose 395     Past Surgical History:   Procedure Laterality Date     APPENDECTOMY        COLONOSCOPY  5/24/11    2 polyps, mild pan-colonic diverticulosis     COLONOSCOPY N/A 9/7/2017    Procedure: COMBINED COLONOSCOPY, SINGLE OR MULTIPLE BIOPSY/POLYPECTOMY BY BIOPSY;;  Surgeon: Gene Grimes MD;  Location: [facility]     ENDOSCOPIC RETROGRADE CHOLANGIOPANCREATOGRAM N/A 6/5/2019    Procedure: ENDOSCOPIC RETROGRADE CHOLANGIOPANCREATOGRAPHY, ENDOSCOPIC UTRASOUND ESOPHAGOSCOPY WITH STENT PLACEMENT. ;  Surgeon: Jana Nelson MD;  Location: [facility]     ENDOSCOPIC RETROGRADE CHOLANGIOPANCREATOGRAPHY, EXCHANGE TUBE/STENT N/A 7/18/2019    Procedure: ENDOSCOPIC RETROGRADE CHOLANGIOPANCREATOGRAPHY, WITH STENT EXCHANGE;  Surgeon: Jana Nelson MD;  Location: [facility]     ESOPHAGOSCOPY, GASTROSCOPY, DUODENOSCOPY (EGD), COMBINED N/A 6/5/2019    Procedure: Esophagoscopy, gastroscopy, duodenoscopy (EGD), combined, pancreatic needle biopsies;  Surgeon: Jana Nelson MD;  Location: [facility]     HERNIA REPAIR      X3     IR CHEST PORT PLACEMENT > 5 YRS OF AGE  8/28/2019     TONSILLECTOMY        Problem (# of Occurrences) Relation (Name,Age of Onset)    Diabetes (1) Father: b:1926    Family History Negative (5) Brother: b:1948, Brother: b:1951  deferred tremor, Sister: b:1953, Brother: b:1956, Brother: b:1958    Hypertension (1) Mother: b:1925        Family History   Problem Relation Age of Onset     Diabetes Father         b:1926     Hypertension Mother         b:1925     Family History Negative Brother         b:1948     Family History Negative Brother         b:1951  deferred tremor     Family History Negative Sister         b:1953     Family History Negative Brother         b:1956     Family History Negative Brother         b:1958     Social History     Socioeconomic History     Marital status: Single     Spouse name: None     Number of children: None     Years of education: None     Highest education level: None   Tobacco Use     Smoking status: Passive Smoke Exposure - Never Smoker      "Smokeless tobacco: Never Used     Tobacco comment: occasional   Substance and Sexual Activity     Alcohol use: No     Comment: sober x 27 years     Drug use: No     Sexual activity: Never   Lifestyle         Allergies   Allergen Reactions     Dust Mites      Mold      No Known Drug Allergies      Immunization History   Administered Date(s) Administered     DTaP, Unspecified 04/20/2011, 05/18/2011     FLU 6-35 months 01/09/2013     Influenza (IIV3) PF 11/01/2011, 01/09/2013     Influenza Vaccine IM > 6 months Valent IIV4 11/05/2014     Pneumo Conj 13-V (2010&after) 08/28/2017     Pneumococcal 23 valent 03/13/2019     TDAP Vaccine (Adacel) 05/18/2011     Tetanus 03/22/1990     EXAM  BP 93/46 (BP Location: Left arm)   Pulse 67   Temp 96.9  F (36.1  C) (Oral)   Resp 16   Ht 1.803 m (5' 11\")   Wt 75.3 kg (166 lb)   SpO2 98%   BMI 23.15 kg/m      gen'l   In bed watching NFL on TV  no acute distress  Hat on head     iv   Port R chest not tender / not red     lungs   Coarse sounds     mouth   No intra oral mucosal lesions  Teeth not too bad     neuro   Awake  Normal conversation  No suicidal thoughts to me  Moves all limbs     skin   Pale  No rash     abd   Soft  Mild tenderness     psyche   Calm  Smiling  cooperative       DATA  Cultures    Blood 9/8 Neg so far    LABS  Recent Labs   Lab Test 09/08/19  0624 09/07/19  0635 09/06/19  1320  06/03/19  1835   WBC 4.7 5.5 5.4   < > 5.7   CREAT  --   --   --   --  1.0   AST 31  --  25   < > 303*   ALT 31  --  34   < > 537*   BILITOTAL 0.7  --  1.0   < > 7.3*   ALKPHOS 300*  --  211*   < > 555*    < > = values in this interval not displayed.            ASSESSMENT   SUGGESTIONS    (K59.03) Drug induced constipation  (primary encounter diagnosis)  (E86.0) Dehydration  (F32.9) Depression, unspecified depression type  (R62.7) Failure to thrive in adult  (F33.2) Severe episode of recurrent major depressive disorder, without psychotic features (H)  (R50.9) Fever, unspecified " fever cause  (Z91.89) At risk for healthcare associated infection     Not neutropenic  Febrile, but no immediate clue to BSI, UTI, C diff, pneumonia  At risk for biliary infection (panc CA, biliary stent)  Has dogs, but no indication at risk unusual exposure to unusual pathogen(s)    Neither septic nor toxic at this time  Empiric broad spectrum antimicrobial agents   Follow-up cultures and adjust prn           Yonathan Arriaza MD  Covering for [facility]  Infectious Disease service  Current Meds Reviewed  Current Facility-Administered Medications   Medication     0.9% sodium chloride + KCl 20 mEq/L infusion     acetaminophen (TYLENOL) Suppository 650 mg     acetaminophen (TYLENOL) tablet 650 mg     alpha-d-galactosidase (BEANO) tablet 1 tablet     bisacodyl (DULCOLAX) Suppository 10 mg     glucose gel 15-30 g    Or     dextrose 50 % injection 25-50 mL    Or     glucagon injection 1 mg     dronabinol (MARINOL) capsule 10 mg     insulin aspart (NovoLOG) inj (RAPID ACTING)     insulin aspart (NovoLOG) inj (RAPID ACTING)     insulin aspart (NovoLOG) inj (RAPID ACTING)     insulin glargine (LANTUS PEN) injection 12 Units     LORazepam (ATIVAN) tablet 0.5 mg     magnesium sulfate 2 g in water intermittent infusion     magnesium sulfate 4 g in 100 mL sterile water (premade)     melatonin tablet 1 mg     mirtazapine (REMERON) tablet 15 mg     morphine (MS CONTIN) 12 hr tablet 15 mg     naloxone (NARCAN) injection 0.1-0.4 mg     ondansetron (ZOFRAN-ODT) ODT tab 4 mg    Or     ondansetron (ZOFRAN) injection 4 mg     oxyCODONE (ROXICODONE) tablet 5 mg     piperacillin-tazobactam (ZOSYN) 3.375 g vial to attach to  mL bag     polyethylene glycol (MIRALAX/GLYCOLAX) Packet 17 g     potassium chloride (KLOR-CON) Packet 20-40 mEq     potassium chloride 10 mEq in 100 mL intermittent infusion with 10 mg lidocaine     potassium chloride 10 mEq in 100 mL sterile water intermittent infusion (premix)     potassium chloride 20  mEq in 50 mL intermittent infusion     potassium chloride ER (K-DUR/KLOR-CON M) CR tablet 20-40 mEq     senna-docusate (SENOKOT-S/PERICOLACE) 8.6-50 MG per tablet 1 tablet    Or     senna-docusate (SENOKOT-S/PERICOLACE) 8.6-50 MG per tablet 2 tablet     senna-docusate (SENOKOT-S/PERICOLACE) 8.6-50 MG per tablet 1 tablet    Or     senna-docusate (SENOKOT-S/PERICOLACE) 8.6-50 MG per tablet 2 tablet     simethicone (MYLICON) chewable tablet  mg

## 2019-09-08 NOTE — PROGRESS NOTES
[facility]    Medicine Progress Note - Hospitalist Service       Date of Admission:  9/6/2019    Assessment & Plan   Jesús Stock is a 68 year old male with PMH of stage II pancreatic adenocarcinoma with recent initiation of chemotherapy (first round 8/29), insulin dependent type II diabetes mellitus, obesity, and osteoarthritis who presented 9/6/19 with weight loss and failure to thrive. Registered to the observation unit for further evaluation and treatment.     Fever of unclear etiology: Tmax 103.2 degrees fahrenheit this AM. WBC WNL; remainder of CBC and BMP stable. UA unremarkable. Pt is without URI sx, SOB, chest pain, rash. No diarrhea, nausea, vomiting. No rashes. Port site on right chest wall C/D/I. CXR negative for acute pathology. Procal 0.84.   -- Blood cultures   -- Start IV Zosyn   -- ID consult, appreciate recommendations   -- Monitor fever curve   -- Move to inpatient status for ongoing infectious work-up and evaluation     Encephalopathy, intermittent: Likely multifactorial (metabolic, infectious, decompensated mental health) in the setting of fevers above and MS Contin rx. On exam this AM, pt is oriented to person, place, and time. Baseline LUE familial tremor. No focal neuro deficits. No hx of dementia per chart review.   -- Monitor closely      Failure to thrive  Generalized weakness, physical deconditioning  Hyponatremia, resolved  Hypokalemia, resolved: Patient diagnosed with pancreatic cancer in 6/2019, started chemotherapy 8/26/19. Developed nausea and vomiting approx one week ago and has had very poor PO intake since, as well as increased weakness. Reports a weight loss of approx 80lbs over the past year. Appears depressed on interview. Family notes that patient has significant physical deconditioning. Labwork notes decreased albumin 3.4-->2.2, hyponatremia, hypokalemia. Creatinine is 0.95 which is close to normal limits however his baseline may be in fact lower than 0.8.  Has home care already but per family and oncologist failing to thrive at home.    - Pt was hydrated with IVF and electrolytes repleted per protocol   - Psychiatry consulted as depression related to acute illness seems to be playing a major role into the above presentation, see formal note by Dr. Vivar and below; recommend transfer to [facility] Ger[facility] Unit once medically stable   - Continue PTA Marinol 10 mg po every day  - Nutrition consult 9/7/19, see formal note  - Regular diet  - PT evaluated, recommending TCU at discharge however at this time focusing on Geripsych placement for more acute treatment of depression   - SW for discharge planning     Severe malnutrition in the context of acute illness: Malnutrition related to nausea, vomiting, and poor appetite with pancreatic CA diagnosis as evidenced by fat and muscle loss with diagnosis of severe malnutrition.   -- Nutrition following, regular diet as tolerated, strawberry milkshake at 1000    Normocytic anemia, new  Thrombocytopenia, new: Hgb 8/28/19 was 11.7. Platelets at that time 156. This is secondary to malignancy and chemotherapy. No active signs of bleeding.   - Monitor     Recent Labs   Lab 09/08/19  0624 09/07/19  0635 09/06/19  1320   HGB 10.7* 9.1* 9.9*      Major depression, recurrent, severe, without psychotic features: Patient has made active suicidal ideation to multiple providers, including to oncologist, ED physician. Not coping well with pancreatic cancer diagnosis. In clinic, he held off starting chemotherapy until last week, presumably due to his mental health issues. It appears that this is also contributing to his failure to thrive picture.  - Psychiatry consulted, see formal note by Dr. Vivar. Started patient on Remeron 7.5 mg at bedtime. No order for 72 hour hold is placed; if pt attempts to leave, please contact Psychiatry regarding placement of hold given poor judgement and lack of insight.  Pt has not given any  indication of leaving   - PRN ativan      Stage II pancreatic adenocarcinoma  Cancer related pain: Follows with [facility], diagnosed in 6/2019. S/p ERCP x2 and pancreatic stent placement. Started gemcitabine and abraxane around 8/29/2019.  -- MOHPA consulted, see formal note by Dr. Gannon, plan for next dose of chemotherapy 9/11/19   -- Continue MS Contin 15 mg po BID, Oxycodone 5 mg po q4 hrs PRN, Mylicon 80 mg po BID, Zofran and Compazine PRN     IDDM: Diagnosed in March 2019. A1C on admission 7.7.  - Continue PTA Insulin glargine 12 units at bedtime   - SSI + carb counting while inpatient  - Regular diet okay here given poor PO intake, failure to thrive    Recent Labs   Lab 09/08/19  0624 09/08/19  0602 09/07/19  2134 09/07/19  1715 09/07/19  1226 09/07/19  0817 09/07/19  0635 09/07/19  0159  09/06/19  1320   *  --   --   --   --   --  101*  --   --  167*   BGM  --  146* 244* 83 164* 88  --  106*   < >  --     < > = values in this interval not displayed.      Drug induced constipation: No BM X a few days. Passing flatus.   -- Bowel regimen in place while patient is on narcotics.     Hx of alcohol use disorder: In remission.     Diet: Regular Diet Adult  Snacks/Supplements Adult: Other; Strawberry milkshake at 10 am (RD); Between Meals  Advance Diet as Tolerated    DVT Prophylaxis: Ambulate every shift  Huggins Catheter: not present  Code Status: DNR/DNI      Disposition Plan   Expected discharge: Pending Geripsych placement at [facility].  Entered: Jayashree Fernandez PA-C 09/08/2019, 8:39 AM     The patient's care was discussed with the Attending Physician, Dr. Arriaza, Bedside Nurse and Patient.    Jayashree Fernandez PA-C  Hospitalist Service  [facility]  ______________________________________________________________________    Interval History   Tmax of 103.2 this AM. Infectious work-up initiated. Pt is without URI sx, SOB, chest pain, rash. No  diarrhea, nausea, vomiting. No rashes. Port site on right chest wall C/D/I.     Data reviewed today: I reviewed all medications, new labs and imaging results over the last 24 hours.    Physical Exam   Vital Signs: Temp: 101.7  F (38.7  C) Temp src: Oral BP: 99/49 Pulse: 67 Heart Rate: 73 Resp: 16 SpO2: 96 % O2 Device: None (Room air)    Weight: 166 lbs 0 oz    CONSTITUTIONAL: Pt laying in bed, dressed in hospital garb. Appears comfortable. Cooperative with interview. Accompanied by daughter at bedside.   HEENT: Normocephalic, atraumatic. Negative for conjunctival redness or scleral icterus.  CARDIOVASCULAR: RRR, no murmurs, rubs, or extra heart sounds appreciated. Pulses +2/4 and regular in upper and lower extremities, bilaterally.   RESPIRATORY: No increased work of breathing.  CTA, bilat; no wheezes, rales, or rhonchi appreciated.  GASTROINTESTINAL:  Abdomen soft, non-distended. BS auscultated in all four quadrants. Minimal tenderness upon palpation; pt does not baseline cancer related pain. No masses or organomegaly noted.  MUSCULOSKELETAL: No gross deformities noted. Normal muscle tone.   HEMATOLOGIC/LYMPHATIC/IMMUNOLOGIC: Negative for lower extremity edema, bilaterally.  NEUROLOGIC: Alert and oriented to person, place, and time.  strength intact. Familial tremor noted in LUE. Pt reports some numbness in upper digits 4 and 5 bilat.   SKIN: Port site in right upper chest wall C/D/I. No appreciable rashes.     Data   Recent Labs   Lab 09/08/19  0624 09/07/19  1348 09/07/19  0635 09/06/19  1320   WBC 4.7  --  5.5 5.4   HGB 10.7*  --  9.1* 9.9*   MCV 94  --  93 93   *  --  71* 63*     --  136 132*   POTASSIUM 3.5 3.5 3.3* 3.2*   CHLORIDE 107  --  104 98   CO2 27  --  27 30   BUN 9  --  11 16   CR 0.81  --  0.90 0.95   ANIONGAP 4  --  5 4   MATTHEW 7.9*  --  8.0* 8.1*   *  --  101* 167*   ALBUMIN  --   --   --  2.2*   PROTTOTAL  --   --   --  6.0*   BILITOTAL  --   --   --  1.0   ALKPHOS  --   --    --  211*   ALT  --   --   --  34   AST  --   --   --  25   LIPASE  --   --   --  <10*     No results found for this or any previous visit (from the past 24 hour(s)).  Medications     sodium chloride 75 mL/hr at 09/08/19 0654       dronabinol  10 mg Oral BID     insulin aspart  1-7 Units Subcutaneous TID AC     insulin aspart  1-5 Units Subcutaneous At Bedtime     insulin aspart   Subcutaneous TID w/meals     insulin glargine  12 Units Subcutaneous At Bedtime     mirtazapine  7.5 mg Oral At Bedtime     morphine  15 mg Oral Q12H     polyethylene glycol  17 g Oral Daily     senna-docusate  1 tablet Oral BID    Or     senna-docusate  2 tablet Oral BID

## 2019-09-08 NOTE — PLAN OF CARE
Discharge Planner PT   Patient plan for discharge: Did not discuss.  Current status: Pt agreeable to PT interventions. Pt noted to be impulsive. Pt ambulated 300 ft using std cane and CGA for safety. No LOB noted. Pt noted with improper placement of std cane during gait and provided cues for safety. Pt initially demonstrated understanding, however continues to place std cane in front of leading leg. Noted to be impulsive with occasionally increased janette. Pt went up/down 4 step using 1 rail support and CGA for safety.   Barriers to return to prior living situation: Stairs to access, Alone during the day, Fall risk  Recommendations for discharge:Noted plan is for [facility] per chart review.   Rationale for recommendations: Pt will supervision/assist with all mobility until independence is achieved. If the plan changes from [facility], then pt will need TCU stay prior to returning home.        Entered by: Aranza Young 09/08/2019 1:28 PM

## 2019-09-08 NOTE — CONSULTS
"[facility] Consult Follow-up Note      TIME SPENT IN PSYCHIATRY CONSULT: 15 MINUTES.     Interim History     The patient's care was discussed, patient seen and chart notes were reviewed. Pt examined on 09/08/19. The patient was started on Remeron 7.5mg at bedtime. Upon interview, it was reviewed that the patient was unable to obtain a bed at the senior psychiatric unit at [facility]. The patient acknowledges and understands. The patient continues to demonstrate as flat and depressed. \"I feel like I've went down the wrong route.\" He continues to demonstrate poor insight into further medical care and the need for a structured living environment. Despite his lingering depressive symptoms, the patient endorses a stable appetite and admits to a mild improvement in his sleeping pattern. He currently denies suicidal ideation or a plan of action.      Medications     Current Facility-Administered Medications Ordered in Epic   Medication Dose Route Frequency Last Rate Last Dose     acetaminophen (TYLENOL) Suppository 650 mg  650 mg Rectal Q4H PRN         acetaminophen (TYLENOL) tablet 650 mg  650 mg Oral Q4H PRN   650 mg at 09/08/19 0800     alpha-d-galactosidase (BEANO) tablet 1 tablet  1 tablet Oral TID PRN         bisacodyl (DULCOLAX) Suppository 10 mg  10 mg Rectal Daily PRN         glucose gel 15-30 g  15-30 g Oral Q15 Min PRN        Or     dextrose 50 % injection 25-50 mL  25-50 mL Intravenous Q15 Min PRN        Or     glucagon injection 1 mg  1 mg Subcutaneous Q15 Min PRN         dronabinol (MARINOL) capsule 10 mg  10 mg Oral BID   10 mg at 09/08/19 0812     insulin aspart (NovoLOG) inj (RAPID ACTING)  1-7 Units Subcutaneous TID AC   1 Units at 09/07/19 1424     insulin aspart (NovoLOG) inj (RAPID ACTING)  1-5 Units Subcutaneous At Bedtime   1 Units at 09/07/19 2155     insulin aspart (NovoLOG) inj (RAPID ACTING)   Subcutaneous TID w/meals   6 Units at 09/07/19 1424     insulin glargine (LANTUS PEN) " injection 12 Units  12 Units Subcutaneous At Bedtime   12 Units at 09/07/19 2154     LORazepam (ATIVAN) tablet 0.5 mg  0.5 mg Oral Q6H PRN   0.5 mg at 09/07/19 1802     magnesium sulfate 2 g in water intermittent infusion  2 g Intravenous Daily PRN         magnesium sulfate 4 g in 100 mL sterile water (premade)  4 g Intravenous Q4H PRN         melatonin tablet 1 mg  1 mg Oral At Bedtime PRN         mirtazapine (REMERON) tablet TABS 7.5 mg  7.5 mg Oral At Bedtime   7.5 mg at 09/07/19 2156     morphine (MS CONTIN) 12 hr tablet 15 mg  15 mg Oral Q12H   15 mg at 09/08/19 0800     naloxone (NARCAN) injection 0.1-0.4 mg  0.1-0.4 mg Intravenous Q2 Min PRN         ondansetron (ZOFRAN-ODT) ODT tab 4 mg  4 mg Oral Q6H PRN        Or     ondansetron (ZOFRAN) injection 4 mg  4 mg Intravenous Q6H PRN   4 mg at 09/07/19 1802     oxyCODONE (ROXICODONE) tablet 5 mg  5 mg Oral Q3H PRN         polyethylene glycol (MIRALAX/GLYCOLAX) Packet 17 g  17 g Oral Daily   17 g at 09/08/19 0800     potassium chloride (KLOR-CON) Packet 20-40 mEq  20-40 mEq Oral or Feeding Tube Q2H PRN         potassium chloride 10 mEq in 100 mL intermittent infusion with 10 mg lidocaine  10 mEq Intravenous Q1H PRN         potassium chloride 10 mEq in 100 mL sterile water intermittent infusion (premix)  10 mEq Intravenous Q1H PRN         potassium chloride 20 mEq in 50 mL intermittent infusion  20 mEq Intravenous Q1H PRN         potassium chloride ER (K-DUR/KLOR-CON M) CR tablet 20-40 mEq  20-40 mEq Oral Q2H PRN   20 mEq at 09/07/19 1032     senna-docusate (SENOKOT-S/PERICOLACE) 8.6-50 MG per tablet 1 tablet  1 tablet Oral BID   1 tablet at 09/08/19 0800    Or     senna-docusate (SENOKOT-S/PERICOLACE) 8.6-50 MG per tablet 2 tablet  2 tablet Oral BID   2 tablet at 09/07/19 1944     senna-docusate (SENOKOT-S/PERICOLACE) 8.6-50 MG per tablet 1 tablet  1 tablet Oral BID PRN        Or     senna-docusate (SENOKOT-S/PERICOLACE) 8.6-50 MG per tablet 2 tablet  2 tablet  "Oral BID PRN         simethicone (MYLICON) chewable tablet  mg   mg Oral BID PRN         sodium chloride 0.9% infusion   Intravenous Continuous 75 mL/hr at 09/08/19 0654       Current Outpatient Medications Ordered in Epic   Medication     bisacodyl (DULCOLAX) 10 MG suppository     mirtazapine (REMERON) 7.5 MG tablet     senna-docusate (SENOKOT-S/PERICOLACE) 8.6-50 MG tablet     senna-docusate (SENOKOT-S/PERICOLACE) 8.6-50 MG tablet         Allergies      Allergies   Allergen Reactions     Dust Mites      Mold      No Known Drug Allergies         Medical Review of Systems     BP 99/49 (BP Location: Right arm)   Pulse 67   Temp 101.7  F (38.7  C) (Oral)   Resp 16   Ht 1.803 m (5' 11\")   Wt 75.3 kg (166 lb)   SpO2 96%   BMI 23.15 kg/m    Body mass index is 23.15 kg/m .  A 10-point review of systems was performed by Olive De La Rosa and is negative, no new findings.      Psychiatric Examination     Appearance Lying in bed, dressed in gown. Appears stated age.   Attitude Cooperative   Orientation Oriented to person, place, time   Eye Contact Poor   Speech Regular rate, rhythm, volume and tone   Language Normal   Psychomotor Behavior Normal   Mood Depressed, anxious   Affect Flat, withdrawn   Thought Process Disordered   Associations Intact   Thought Content Patient is currently negative for suicidal ideation, negative for plan or intent, able to contract no self harm and identify barriers to suicide.  Negative for obsessions, compulsions or psychosis.     Fund of Knowledge Impaired   Insight Impaired   Judgement Impaired   Attention Span & Concentration Poor   Recent & Remote Memory Impaired   Gait Not tested   Muscle Tone Intact on visual inspection       Labs     Labs reviewed.  Recent Results (from the past 24 hour(s))   Glucose by meter    Collection Time: 09/07/19 12:26 PM   Result Value Ref Range    Glucose 164 (H) 70 - 99 mg/dL   Potassium    Collection Time: 09/07/19  1:48 PM   Result Value Ref " Range    Potassium 3.5 3.4 - 5.3 mmol/L   Glucose by meter    Collection Time: 09/07/19  5:15 PM   Result Value Ref Range    Glucose 83 70 - 99 mg/dL   Glucose by meter    Collection Time: 09/07/19  9:34 PM   Result Value Ref Range    Glucose 244 (H) 70 - 99 mg/dL   UA with Microscopic reflex to Culture    Collection Time: 09/08/19 12:03 AM   Result Value Ref Range    Color Urine Yellow     Appearance Urine Clear     Glucose Urine 300 (A) NEG^Negative mg/dL    Bilirubin Urine Negative NEG^Negative    Ketones Urine Negative NEG^Negative mg/dL    Specific Gravity Urine 1.005 1.003 - 1.035    Blood Urine Negative NEG^Negative    pH Urine 5.5 5.0 - 7.0 pH    Protein Albumin Urine Negative NEG^Negative mg/dL    Urobilinogen mg/dL 2.0 0.0 - 2.0 mg/dL    Nitrite Urine Negative NEG^Negative    Leukocyte Esterase Urine Negative NEG^Negative    Source Midstream Urine     WBC Urine 1 0 - 5 /HPF    RBC Urine <1 0 - 2 /HPF   Glucose by meter    Collection Time: 09/08/19  6:02 AM   Result Value Ref Range    Glucose 146 (H) 70 - 99 mg/dL   Basic metabolic panel    Collection Time: 09/08/19  6:24 AM   Result Value Ref Range    Sodium 138 133 - 144 mmol/L    Potassium 3.5 3.4 - 5.3 mmol/L    Chloride 107 94 - 109 mmol/L    Carbon Dioxide 27 20 - 32 mmol/L    Anion Gap 4 3 - 14 mmol/L    Glucose 160 (H) 70 - 99 mg/dL    Urea Nitrogen 9 7 - 30 mg/dL    Creatinine 0.81 0.66 - 1.25 mg/dL    GFR Estimate >90 >60 mL/min/[1.73_m2]    GFR Estimate If Black >90 >60 mL/min/[1.73_m2]    Calcium 7.9 (L) 8.5 - 10.1 mg/dL   CBC with platelets differential    Collection Time: 09/08/19  6:24 AM   Result Value Ref Range    WBC 4.7 4.0 - 11.0 10e9/L    RBC Count 3.32 (L) 4.4 - 5.9 10e12/L    Hemoglobin 10.7 (L) 13.3 - 17.7 g/dL    Hematocrit 31.2 (L) 40.0 - 53.0 %    MCV 94 78 - 100 fl    MCH 32.2 26.5 - 33.0 pg    MCHC 34.3 31.5 - 36.5 g/dL    RDW 12.5 10.0 - 15.0 %    Platelet Count 102 (L) 150 - 450 10e9/L    Diff Method Automated Method     %  Neutrophils 55.9 %    % Lymphocytes 24.4 %    % Monocytes 16.9 %    % Eosinophils 2.4 %    % Basophils 0.2 %    % Immature Granulocytes 0.2 %    Nucleated RBCs 0 0 /100    Absolute Neutrophil 2.6 1.6 - 8.3 10e9/L    Absolute Lymphocytes 1.1 0.8 - 5.3 10e9/L    Absolute Monocytes 0.8 0.0 - 1.3 10e9/L    Absolute Eosinophils 0.1 0.0 - 0.7 10e9/L    Absolute Basophils 0.0 0.0 - 0.2 10e9/L    Abs Immature Granulocytes 0.0 0 - 0.4 10e9/L    Absolute Nucleated RBC 0.0    Lactic acid whole blood    Collection Time: 09/08/19  8:05 AM   Result Value Ref Range    Lactic Acid 0.9 0.7 - 2.0 mmol/L        Impression     This is a 68 year old who presents at [facility] with symptoms of depression and anxiety in the context of stage three pancreatic cancer. The patient was started on Remeron 15mg at bedtime, to which he responded to positively. Appetite and sleep have improved but he still exhibits signs and symptoms of severe depression and anxiety. He continues to demonstrate poor insight into further medical care and the need for a structured living environment. Despite this, he denies active suicidal ideation or a plan of action. It was reviewed with the patient that no beds were available at the senior psychiatric unit at [facility]. At this point, due to his positive response while on Remeron, will increase to 15mg at bedtime. Patient is in agreement. No other medication changes.      Diagnoses     1. Major depression, recurrent, severe, without psychotic features.  2. Pancreatic cancer.   3. Alcohol use disorder, currently in remission     Plan     1. Explained Side effects, benefits and complications of medications to the patient.   2. Medication changes: Will increase Remeron to 15mg at bedtime. No other medication changes.   3. Discussed treatment plan with patient and team.  4. Re-consult Psychiatry.  5. Will contact the patient's daughter to review treatment plan.       TIME SPENT IN PSYCHIATRY CONSULT: 15  MINUTES.    Attestation:   I, Olive De La Rosa, am serving as a scribe at 8:25 AM on 9/8/2019 to document services personally performed by Dr. Vivar based on my observations and the provider's statements to me.      Patient ID:  Name: Jesús Stock    MRN: [id number]  Admission: 9/6/2019   YOB: 1950

## 2019-09-08 NOTE — PROGRESS NOTES
Report given to station 88 nurse. Belongings transfered with patient. Insulin pen given to NA. Family present at the moment. No further question.

## 2019-09-08 NOTE — PROGRESS NOTES
[facility]    Oncology Progress Note    Date of Service (when I saw the patient): 09/08/2019     Assessment & Plan   Jesús Stock is a 68 year old male who was admitted on 9/6/2019.       Pancreatic Cancer  -followed by Dr. Farrell  -presented 6/2019 with hepatic dysfunction and 2.7 cm pancreatic mass with biliary obstruction  -clinical T2N0M0 stage II adenocarcinoma, potentially resectable  -S/P sphincterotomy with stent placement  -began neoadjuvant gemcitabine and abraxane chemotherapy 8/28/19  -next dose due 9/11/19  -continue current supportive care with re-evaluation 9/11 as outpatient as planned     Failure to Thrive  -severe malnutrition  -nutrition consult, PT/OT  -continue PTA marinol and morphine   -appreciate psychiatry evaluation for depression    Encephalopathy  -multifactorial and metabolic, infectious, psychiatric     Anemia, Thrombocytopenia  -due to malignancy and chemotherapy  -no clinical signs of bleeding  -stable at present and continue to monitor     ID  -fever of unclear etiology  -at risk for biliary infection with stents  -cultures pending  -per hospitalist    DVT Prophylaxis: Enoxaparin (Lovenox) SQ  Code Status: DNR/DNI    Obey Gannon    Interval History   Fever noted, clinically stable    Physical Exam   Temp: 100  F (37.8  C) Temp src: Oral BP: 99/49 Pulse: 67 Heart Rate: 73 Resp: 16 SpO2: 96 % O2 Device: None (Room air)    Vitals:    09/06/19 1233 09/06/19 1600   Weight: 74.4 kg (164 lb) 75.3 kg (166 lb)     Vital Signs with Ranges  Temp:  [97.4  F (36.3  C)-103.2  F (39.6  C)] 100  F (37.8  C)  Pulse:  [67] 67  Heart Rate:  [68-87] 73  Resp:  [16-18] 16  BP: ()/(42-67) 99/49  SpO2:  [90 %-97 %] 96 %  I/O last 3 completed shifts:  In: 2663 [P.O.:600; I.V.:2063]  Out: 200 [Urine:200]    Constitutional: awake, cooperative, no acute distress  Hematologic / Lymphatic: no cervical lymphadenopathy  GI: soft, non-distended, non-tender, no masses palpated  Skin: no  bruising or bleeding  Musculoskeletal: no pedal edema    Medications     0.9% sodium chloride + KCl 20 mEq/L         dronabinol  10 mg Oral BID     insulin aspart  1-7 Units Subcutaneous TID AC     insulin aspart  1-5 Units Subcutaneous At Bedtime     insulin aspart   Subcutaneous TID w/meals     insulin glargine  12 Units Subcutaneous At Bedtime     mirtazapine  15 mg Oral At Bedtime     morphine  15 mg Oral Q12H     polyethylene glycol  17 g Oral Daily     senna-docusate  1 tablet Oral BID    Or     senna-docusate  2 tablet Oral BID       Data   Results for orders placed or performed during the hospital encounter of 09/06/19 (from the past 24 hour(s))   Glucose by meter   Result Value Ref Range    Glucose 164 (H) 70 - 99 mg/dL   Potassium   Result Value Ref Range    Potassium 3.5 3.4 - 5.3 mmol/L   Glucose by meter   Result Value Ref Range    Glucose 83 70 - 99 mg/dL   Glucose by meter   Result Value Ref Range    Glucose 244 (H) 70 - 99 mg/dL   UA with Microscopic reflex to Culture   Result Value Ref Range    Color Urine Yellow     Appearance Urine Clear     Glucose Urine 300 (A) NEG^Negative mg/dL    Bilirubin Urine Negative NEG^Negative    Ketones Urine Negative NEG^Negative mg/dL    Specific Gravity Urine 1.005 1.003 - 1.035    Blood Urine Negative NEG^Negative    pH Urine 5.5 5.0 - 7.0 pH    Protein Albumin Urine Negative NEG^Negative mg/dL    Urobilinogen mg/dL 2.0 0.0 - 2.0 mg/dL    Nitrite Urine Negative NEG^Negative    Leukocyte Esterase Urine Negative NEG^Negative    Source Midstream Urine     WBC Urine 1 0 - 5 /HPF    RBC Urine <1 0 - 2 /HPF   Glucose by meter   Result Value Ref Range    Glucose 146 (H) 70 - 99 mg/dL   Basic metabolic panel   Result Value Ref Range    Sodium 138 133 - 144 mmol/L    Potassium 3.5 3.4 - 5.3 mmol/L    Chloride 107 94 - 109 mmol/L    Carbon Dioxide 27 20 - 32 mmol/L    Anion Gap 4 3 - 14 mmol/L    Glucose 160 (H) 70 - 99 mg/dL    Urea Nitrogen 9 7 - 30 mg/dL    Creatinine  0.81 0.66 - 1.25 mg/dL    GFR Estimate >90 >60 mL/min/[1.73_m2]    GFR Estimate If Black >90 >60 mL/min/[1.73_m2]    Calcium 7.9 (L) 8.5 - 10.1 mg/dL   CBC with platelets differential   Result Value Ref Range    WBC 4.7 4.0 - 11.0 10e9/L    RBC Count 3.32 (L) 4.4 - 5.9 10e12/L    Hemoglobin 10.7 (L) 13.3 - 17.7 g/dL    Hematocrit 31.2 (L) 40.0 - 53.0 %    MCV 94 78 - 100 fl    MCH 32.2 26.5 - 33.0 pg    MCHC 34.3 31.5 - 36.5 g/dL    RDW 12.5 10.0 - 15.0 %    Platelet Count 102 (L) 150 - 450 10e9/L    Diff Method Automated Method     % Neutrophils 55.9 %    % Lymphocytes 24.4 %    % Monocytes 16.9 %    % Eosinophils 2.4 %    % Basophils 0.2 %    % Immature Granulocytes 0.2 %    Nucleated RBCs 0 0 /100    Absolute Neutrophil 2.6 1.6 - 8.3 10e9/L    Absolute Lymphocytes 1.1 0.8 - 5.3 10e9/L    Absolute Monocytes 0.8 0.0 - 1.3 10e9/L    Absolute Eosinophils 0.1 0.0 - 0.7 10e9/L    Absolute Basophils 0.0 0.0 - 0.2 10e9/L    Abs Immature Granulocytes 0.0 0 - 0.4 10e9/L    Absolute Nucleated RBC 0.0    Hepatic panel   Result Value Ref Range    Bilirubin Direct 0.2 0.0 - 0.2 mg/dL    Bilirubin Total 0.7 0.2 - 1.3 mg/dL    Albumin 1.9 (L) 3.4 - 5.0 g/dL    Protein Total 5.8 (L) 6.8 - 8.8 g/dL    Alkaline Phosphatase 300 (H) 40 - 150 U/L    ALT 31 0 - 70 U/L    AST 31 0 - 45 U/L   Procalcitonin   Result Value Ref Range    Procalcitonin 0.84 ng/ml   Lactic acid whole blood   Result Value Ref Range    Lactic Acid 0.9 0.7 - 2.0 mmol/L   XR Chest 2 Views    Narrative    CHEST TWO VIEWS  9/8/2019 9:19 AM     HISTORY: Fever, unclear etiology.    COMPARISON: 3/13/2019.      Impression    IMPRESSION: Right chest port venous catheter, its tip at the SVC. No  pneumothorax. Mild left base atelectasis. No acute airspace disease  identified. Stable cardiac silhouette.

## 2019-09-08 NOTE — PLAN OF CARE
Problem: Adult Inpatient Plan of Care  Goal: Optimal Comfort and Wellbeing  Outcome: No Change   Some soft BPs, consistent.  Max temp 98.8, All other VS WDL.  Had an episode of rigors, refused all PO medications and spit out Tylenol that was given.  Alert, was able to answer orientation questions accurately but has difficulty with word finding.  Can become confused and agitated.  Port infusing.  Discharge when fever resolves and safe disposition either to [facility] or TCU.

## 2019-09-09 NOTE — CONSULTS
[facility] Psychiatric Consult Follow-up Note         Interim History   The patient's care was discussed, patient seen and chart notes were reviewed. Pt examined on station 88.  He says that he slept okay last night with Remeron.  He continues to appear somewhat irritable and dysphoric, but denies that he is suicidal.  He seems to be in a bit of a weakened state.  When I asked him about possibly going to psychiatry unit at [facility] he was a bit resistant to that though acknowledges that he needs more help with his self-care because of feeling weak and ill.  He is cooperative with taking medication and focused on his concerns but is not getting along with family.   Medications     Current Facility-Administered Medications Ordered in Epic   Medication Dose Route Frequency Last Rate Last Dose     0.9% sodium chloride + KCl 20 mEq/L infusion   Intravenous Continuous 75 mL/hr at 09/08/19 2317       acetaminophen (TYLENOL) Suppository 650 mg  650 mg Rectal Q4H PRN         acetaminophen (TYLENOL) tablet 650 mg  650 mg Oral Q4H PRN   650 mg at 09/08/19 0800     alpha-d-galactosidase (BEANO) tablet 1 tablet  1 tablet Oral TID PRN         bisacodyl (DULCOLAX) Suppository 10 mg  10 mg Rectal Daily PRN         glucose gel 15-30 g  15-30 g Oral Q15 Min PRN        Or     dextrose 50 % injection 25-50 mL  25-50 mL Intravenous Q15 Min PRN        Or     glucagon injection 1 mg  1 mg Subcutaneous Q15 Min PRN         dronabinol (MARINOL) capsule 10 mg  10 mg Oral BID   10 mg at 09/08/19 0812     insulin aspart (NovoLOG) inj (RAPID ACTING)  1-7 Units Subcutaneous TID AC   1 Units at 09/08/19 1200     insulin aspart (NovoLOG) inj (RAPID ACTING)  1-5 Units Subcutaneous At Bedtime   1 Units at 09/07/19 2155     insulin aspart (NovoLOG) inj (RAPID ACTING)   Subcutaneous TID w/meals   4 Units at 09/08/19 1159     insulin glargine (LANTUS PEN) injection 12 Units  12 Units Subcutaneous At Bedtime   12 Units at 09/07/19  2154     LORazepam (ATIVAN) tablet 0.5 mg  0.5 mg Oral Q6H PRN   0.5 mg at 09/07/19 1802     magnesium sulfate 2 g in water intermittent infusion  2 g Intravenous Daily PRN         magnesium sulfate 4 g in 100 mL sterile water (premade)  4 g Intravenous Q4H PRN         melatonin tablet 1 mg  1 mg Oral At Bedtime PRN         mirtazapine (REMERON) tablet 15 mg  15 mg Oral At Bedtime         morphine (MS CONTIN) 12 hr tablet 15 mg  15 mg Oral Q12H   15 mg at 09/08/19 0800     naloxone (NARCAN) injection 0.1-0.4 mg  0.1-0.4 mg Intravenous Q2 Min PRN         ondansetron (ZOFRAN-ODT) ODT tab 4 mg  4 mg Oral Q6H PRN        Or     ondansetron (ZOFRAN) injection 4 mg  4 mg Intravenous Q6H PRN   4 mg at 09/07/19 1802     oxyCODONE (ROXICODONE) tablet 5 mg  5 mg Oral Q3H PRN         piperacillin-tazobactam (ZOSYN) 3.375 g vial to attach to  mL bag  3.375 g Intravenous Q6H   3.375 g at 09/09/19 0552     polyethylene glycol (MIRALAX/GLYCOLAX) Packet 17 g  17 g Oral Daily   17 g at 09/08/19 0800     potassium chloride (KLOR-CON) Packet 20-40 mEq  20-40 mEq Oral or Feeding Tube Q2H PRN         potassium chloride 10 mEq in 100 mL intermittent infusion with 10 mg lidocaine  10 mEq Intravenous Q1H PRN         potassium chloride 10 mEq in 100 mL sterile water intermittent infusion (premix)  10 mEq Intravenous Q1H PRN         potassium chloride 20 mEq in 50 mL intermittent infusion  20 mEq Intravenous Q1H PRN         potassium chloride ER (K-DUR/KLOR-CON M) CR tablet 20-40 mEq  20-40 mEq Oral Q2H PRN   20 mEq at 09/08/19 1458     senna-docusate (SENOKOT-S/PERICOLACE) 8.6-50 MG per tablet 1 tablet  1 tablet Oral BID   1 tablet at 09/08/19 0800    Or     senna-docusate (SENOKOT-S/PERICOLACE) 8.6-50 MG per tablet 2 tablet  2 tablet Oral BID   2 tablet at 09/07/19 1944     senna-docusate (SENOKOT-S/PERICOLACE) 8.6-50 MG per tablet 1 tablet  1 tablet Oral BID PRN        Or     senna-docusate (SENOKOT-S/PERICOLACE) 8.6-50 MG per tablet  "2 tablet  2 tablet Oral BID PRN         simethicone (MYLICON) chewable tablet  mg   mg Oral BID PRN         Current Outpatient Medications Ordered in Epic   Medication     bisacodyl (DULCOLAX) 10 MG suppository     mirtazapine (REMERON) 7.5 MG tablet     senna-docusate (SENOKOT-S/PERICOLACE) 8.6-50 MG tablet     senna-docusate (SENOKOT-S/PERICOLACE) 8.6-50 MG tablet         Allergies      Allergies   Allergen Reactions     Dust Mites      Mold      No Known Drug Allergies         Medical Review of Systems     BP (!) 141/63 (BP Location: Left arm)   Pulse 67   Temp 99.5  F (37.5  C) (Oral)   Resp 16   Ht 1.803 m (5' 11\")   Wt 77 kg (169 lb 12.1 oz)   SpO2 95%   BMI 23.68 kg/m    Body mass index is 23.68 kg/m .  A 10-point review of systems was performed by Gene Jarrett MD and is negative, no new findings.      Psychiatric Examination     Appearance Lying in bed, dressed in gown. Appears stated age.   Attitude Cooperative, though somewhat irritable and overwhelmed   Orientation Oriented to person, place, time   Eye Contact Poor   Speech Regular rate, rhythm, volume and tone   Language Normal   Psychomotor Behavior Normal   Mood Depressed, anxious   Affect Flat, irritable   Thought Process  logical and coherent, though perseverates on the negative aspects of his diagnosis   Associations Intact   Thought Content Patient is currently negative for suicidal ideation, negative for plan or intent, able to contract no self harm and identify barriers to suicide.  Negative for obsessions, compulsions or psychosis.  He conveys a bit of pessimism regarding his situation but expresses a desire to live and no intent to harm himself.   Fund of Knowledge Impaired   Insight fair   Judgement  fair   Attention Span & Concentration  diminished   Recent & Remote Memory Impaired   Gait Not tested   Muscle Tone Intact on visual inspection       Labs     Labs reviewed.  Recent Results (from the past 24 hour(s)) "   Lactic acid whole blood    Collection Time: 09/08/19  8:05 AM   Result Value Ref Range    Lactic Acid 0.9 0.7 - 2.0 mmol/L   Blood culture    Collection Time: 09/08/19 10:00 AM   Result Value Ref Range    Specimen Description Blood Port     Special Requests Aerobic and anaerobic bottles received     Culture Micro No growth after 16 hours    Blood culture    Collection Time: 09/08/19 10:23 AM   Result Value Ref Range    Specimen Description Blood Right Arm     Culture Micro No growth after 14 hours    Glucose by meter    Collection Time: 09/08/19 11:13 AM   Result Value Ref Range    Glucose 156 (H) 70 - 99 mg/dL   Glucose by meter    Collection Time: 09/08/19  9:56 PM   Result Value Ref Range    Glucose 185 (H) 70 - 99 mg/dL   Glucose by meter    Collection Time: 09/09/19  2:03 AM   Result Value Ref Range    Glucose 172 (H) 70 - 99 mg/dL   Basic metabolic panel    Collection Time: 09/09/19  6:00 AM   Result Value Ref Range    Sodium 141 133 - 144 mmol/L    Potassium 3.8 3.4 - 5.3 mmol/L    Chloride 110 (H) 94 - 109 mmol/L    Carbon Dioxide 24 20 - 32 mmol/L    Anion Gap 7 3 - 14 mmol/L    Glucose 178 (H) 70 - 99 mg/dL    Urea Nitrogen 6 (L) 7 - 30 mg/dL    Creatinine 0.89 0.66 - 1.25 mg/dL    GFR Estimate 87 >60 mL/min/[1.73_m2]    GFR Estimate If Black >90 >60 mL/min/[1.73_m2]    Calcium 7.9 (L) 8.5 - 10.1 mg/dL   CBC with platelets differential    Collection Time: 09/09/19  6:00 AM   Result Value Ref Range    WBC 7.1 4.0 - 11.0 10e9/L    RBC Count 3.06 (L) 4.4 - 5.9 10e12/L    Hemoglobin 9.7 (L) 13.3 - 17.7 g/dL    Hematocrit 28.8 (L) 40.0 - 53.0 %    MCV 94 78 - 100 fl    MCH 31.7 26.5 - 33.0 pg    MCHC 33.7 31.5 - 36.5 g/dL    RDW 12.7 10.0 - 15.0 %    Platelet Count 205 150 - 450 10e9/L    Diff Method Automated Method     % Neutrophils 55.5 %    % Lymphocytes 23.9 %    % Monocytes 17.7 %    % Eosinophils 2.1 %    % Basophils 0.4 %    % Immature Granulocytes 0.4 %    Nucleated RBCs 0 0 /100    Absolute  Neutrophil 3.9 1.6 - 8.3 10e9/L    Absolute Lymphocytes 1.7 0.8 - 5.3 10e9/L    Absolute Monocytes 1.3 0.0 - 1.3 10e9/L    Absolute Eosinophils 0.2 0.0 - 0.7 10e9/L    Absolute Basophils 0.0 0.0 - 0.2 10e9/L    Abs Immature Granulocytes 0.0 0 - 0.4 10e9/L    Absolute Nucleated RBC 0.0         Impression     This is a 68 year old who presents at [facility] with symptoms of depression and anxiety in the context of stage three pancreatic cancer.  Currently it would appear that he is in a weakened state, he seems a bit resistant to the notion of going to an inpatient psychiatry unit.  Based on what I am saying he might need a transitional care unit, I do not think we have grounds to force inpatient psychiatric care.  His Remeron dose is appropriate at this point and may take time to work though it is helping his sleep     Diagnoses     1. Major depression, recurrent, severe, without psychotic features.  2. Pancreatic cancer.   3. Alcohol use disorder, currently in remission     Plan     1. Explained Side effects, benefits and complications of medications to the patient.   2. Medication changes: continue Remeron at 15mg at bedtime. No other medication changes.   Explore disposition, support needs. He might benefit from a TCU, he is not currentgly enthusiastic about going to  geriatric psychiatry at [facility], no clear evidence to force that issue    Gene Jarrett MD     Patient ID:  Name: Jesús Stock    MRN: [id number]  Admission: 9/6/2019   YOB: 1950

## 2019-09-09 NOTE — PROVIDER NOTIFICATION
MD Notification    Notified Person: MD    Notified Person Name: Dr. Crocker    Notification Date/Time: 9/9/19, 8:31 AM    Notification Interaction: In-person    Purpose of Notification: Positive Blood culture, 9/8/19 at 10AM, Right port growing positive gram cocci in clusters    Orders Received: Stat blood cultures and start vancomycin     Comments:

## 2019-09-09 NOTE — PROGRESS NOTES
[facility]    Medicine Progress Note - Hospitalist Service       Date of Admission:  9/6/2019    Assessment & Plan   Jesús Stock is a 68 year old male with PMH of stage II pancreatic adenocarcinoma with recent initiation of chemotherapy (first round 8/29), insulin dependent type II diabetes mellitus, obesity, and osteoarthritis who presented 9/6/19 with weight loss and failure to thrive.  Fever of unclear etiology  Bacteremia: Tmax 103.2 degrees fahrenheit . WBC WNL; remainder of CBC and BMP stable. UA unremarkable. Pt is without URI sx, SOB, chest pain, rash. No diarrhea, nausea, vomiting. No rashes. Port site on right chest wall C/D/I. CXR negative for acute pathology. Procal 0.84.   -- Blood cultures growing gram-positive cocci, repeat blood cultures ordered, infectious disease following.  Blood cultures 1 out of 2 gram-positive cocci in clusters, from the port.  --Continue vancomycin and Zosyn for now, if blood cultures are continued to be positive the port need to be removed.        Encephalopathy, intermittent: Likely multifactorial (metabolic, infectious, decompensated mental health) in the setting of fevers above and MS Contin rx.   --Patient continues to be occasionally confused  -- Monitor closely      Failure to thrive  Generalized weakness, physical deconditioning  Hyponatremia, resolved  Hypokalemia, resolved: Patient diagnosed with pancreatic cancer in 6/2019, started chemotherapy 8/26/19. Developed nausea and vomiting approx one week ago and has had very poor PO intake since, as well as increased weakness. Reports a weight loss of approx 80lbs over the past year. Appears depressed on interview. Family notes that patient has significant physical deconditioning. Labwork notes decreased albumin 3.4-->2.2, hyponatremia, hypokalemia.  Has home care already but per family and oncologist failing to thrive at home.    - Pt was hydrated with IVF and electrolytes repleted per protocol   -  Psychiatry consulted as depression related to acute illness seems to be playing a major role into the above presentation.  -Scattered recommended transfer to inpatient [facility] psych, patient not interested in the plan.  He is not on hold  - Continue PTA Marinol 10 mg po every day  - PT evaluated, recommending TCU at discharge however at this time focusing on Geripsych placement for more acute treatment of depression       Severe malnutrition in the context of acute illness: Malnutrition related to nausea, vomiting, and poor appetite with pancreatic CA diagnosis as evidenced by fat and muscle loss with diagnosis of severe malnutrition.   -- Nutrition following, regular diet as tolerated, strawberry milkshake at 1000    Normocytic anemia, new  Thrombocytopenia, new: Hgb 8/28/19 was 11.7. Platelets at that time 156. This is secondary to malignancy and chemotherapy. No active signs of bleeding.   - Monitor        Major depression, recurrent, severe, without psychotic features: Patient has made active suicidal ideation to multiple providers, including to oncologist, ED physician. Not coping well with pancreatic cancer diagnosis. In clinic, he held off starting chemotherapy until last week, presumably due to his mental health issues. It appears that this is also contributing to his failure to thrive picture.  - Psychiatry consulted, see formal note by Dr. Vivar. Started patient on Remeron 7.5 mg at bedtime. No order for 72 hour hold is placed; if pt attempts to leave, please contact Psychiatry regarding placement of hold given poor judgement and lack of insight.  Pt has not given any indication of leaving   - PRN ativan      Stage II pancreatic adenocarcinoma  Cancer related pain: Follows with [facility], diagnosed in 6/2019. S/p ERCP x2 and pancreatic stent placement. Started gemcitabine and abraxane around 8/29/2019.  -- MOHPA consulted, see formal note by Dr. Gannon, plan for next dose of chemotherapy  9/11/19   -- Continue MS Contin 15 mg po BID, Oxycodone 5 mg po q4 hrs PRN, Mylicon 80 mg po BID, Zofran and Compazine PRN     IDDM: Diagnosed in March 2019. A1C on admission 7.7.  - Continue PTA Insulin glargine 12 units at bedtime   - SSI + carb counting while inpatient  - Regular diet okay here given poor PO intake, failure to thrive      Drug induced constipation:   -- Bowel regimen in place while patient is on narcotics.     Hx of alcohol use disorder: In remission.     Diet: Regular Diet Adult  Snacks/Supplements Adult: Other; Strawberry milkshake at 10 am (RD); Between Meals  Advance Diet as Tolerated    DVT Prophylaxis: Ambulate every shift  Huggins Catheter: not present  Code Status: DNR/DNI      Disposition Plan   Expected discharge: Pending Geripsych placement at [facility].  Entered: Cristiana Young MD 09/09/2019, 8:46 AM     The patient's care was discussed with the Attending Physician, Dr. Arriaza, Bedside Nurse and Patient.    Cristiana Young MD  Hospitalist Service  [facility]  ______________________________________________________________________    Interval History   Patient is awake and alert and is communicating well, he has tangential thoughts, he mentions that he does not want to talk to his daughter because she brought him to the hospital.  He denied having any chest pain or shortness of breath.  He is worried whether his port needs to come out or not.  Data reviewed today: I reviewed all medications, new labs and imaging results over the last 24 hours.    Physical Exam   Vital Signs: Temp: 99.3  F (37.4  C) Temp src: Axillary BP: 126/64   Heart Rate: 79 Resp: 16 SpO2: 95 % O2 Device: None (Room air)    Weight: 169 lbs 12.07 oz    Constitutional: Awake, alert, cooperative, no apparent distress, port noted on right chest wall. site is clean  Respiratory: Clear to auscultation bilaterally, no crackles or wheezing  Cardiovascular: Regular rate and rhythm, normal S1 and S2, and no  murmur noted  GI: Normal bowel sounds, soft, non-distended, non-tender  Skin/Integumen: No rashes, no cyanosis, trace edema present bilateral lower extremities  Neuro : moving all 4 extremities, no focal deficit noted   .     Data   Recent Labs   Lab 09/09/19  0600 09/08/19  0624 09/07/19  1348 09/07/19  0635 09/06/19  1320   WBC 7.1 4.7  --  5.5 5.4   HGB 9.7* 10.7*  --  9.1* 9.9*   MCV 94 94  --  93 93    102*  --  71* 63*    138  --  136 132*   POTASSIUM 3.8 3.5 3.5 3.3* 3.2*   CHLORIDE 110* 107  --  104 98   CO2 24 27  --  27 30   BUN 6* 9  --  11 16   CR 0.89 0.81  --  0.90 0.95   ANIONGAP 7 4  --  5 4   MATTHEW 7.9* 7.9*  --  8.0* 8.1*   * 160*  --  101* 167*   ALBUMIN  --  1.9*  --   --  2.2*   PROTTOTAL  --  5.8*  --   --  6.0*   BILITOTAL  --  0.7  --   --  1.0   ALKPHOS  --  300*  --   --  211*   ALT  --  31  --   --  34   AST  --  31  --   --  25   LIPASE  --   --   --   --  <10*     Recent Results (from the past 24 hour(s))   XR Chest 2 Views    Narrative    CHEST TWO VIEWS  9/8/2019 9:19 AM     HISTORY: Fever, unclear etiology.    COMPARISON: 3/13/2019.      Impression    IMPRESSION: Right chest port venous catheter, its tip at the SVC. No  pneumothorax. Mild left base atelectasis. No acute airspace disease  identified. Stable cardiac silhouette.    CIRO POPE MD     Medications     0.9% sodium chloride + KCl 20 mEq/L 75 mL/hr at 09/08/19 6360       dronabinol  10 mg Oral BID     insulin aspart  1-7 Units Subcutaneous TID AC     insulin aspart  1-5 Units Subcutaneous At Bedtime     insulin aspart   Subcutaneous TID w/meals     insulin glargine  12 Units Subcutaneous At Bedtime     mirtazapine  15 mg Oral At Bedtime     morphine  15 mg Oral Q12H     piperacillin-tazobactam  3.375 g Intravenous Q6H     polyethylene glycol  17 g Oral Daily     senna-docusate  1 tablet Oral BID    Or     senna-docusate  2 tablet Oral BID     vancomycin (VANCOCIN) IV  1,500 mg Intravenous Q12H

## 2019-09-09 NOTE — PROGRESS NOTES
"[facility]  Infectious Disease Progress Note          Assessment and Plan:   IMP 1 69 yo male pancreatic CA, worsening overall and now fever, Port vs biliary vs other  2 + BC GPC, await ID, PORT concern  3 Pancreatic CA, chemo, chronic pain  4 DM    REC 1 add vanco to zosyn, await cx info  2 Repeat BC if staph aureus PORt out, if other follow fever/cxs etc and adjust        Interval History:   no new complaints a lot of pain but this is chronic, feels lousy, T down BC + 1/2 GPC verrigene pending               Medications:       dronabinol  10 mg Oral BID     insulin aspart  1-7 Units Subcutaneous TID AC     insulin aspart  1-5 Units Subcutaneous At Bedtime     insulin aspart   Subcutaneous TID w/meals     insulin glargine  12 Units Subcutaneous At Bedtime     mirtazapine  15 mg Oral At Bedtime     morphine  15 mg Oral Q12H     piperacillin-tazobactam  3.375 g Intravenous Q6H     polyethylene glycol  17 g Oral Daily     senna-docusate  1 tablet Oral BID    Or     senna-docusate  2 tablet Oral BID                  Physical Exam:   Blood pressure 126/64, pulse 67, temperature 99.3  F (37.4  C), temperature source Axillary, resp. rate 16, height 1.803 m (5' 11\"), weight 77 kg (169 lb 12.1 oz), SpO2 95 %.  Wt Readings from Last 2 Encounters:   09/09/19 77 kg (169 lb 12.1 oz)   08/19/19 75.8 kg (167 lb)     Vital Signs with Ranges  Temp:  [96.9  F (36.1  C)-100  F (37.8  C)] 99.3  F (37.4  C)  Heart Rate:  [67-79] 79  Resp:  [16] 16  BP: ()/(46-64) 126/64  SpO2:  [94 %-98 %] 95 %    Constitutional: Awake, alert, cooperative, chronic ill appearance   Lungs: Clear to auscultation bilaterally, no crackles or wheezing   Cardiovascular: Regular rate and rhythm, normal S1 and S2, and no murmur noted   Abdomen: Normal bowel sounds, soft, non-distended, upper tender   Skin: No rashes, no cyanosis, no edema   Other:           Data:   All microbiology laboratory data reviewed.  Recent Labs   Lab Test " 09/09/19  0600 09/08/19  0624 09/07/19  0635   WBC 7.1 4.7 5.5   HGB 9.7* 10.7* 9.1*   HCT 28.8* 31.2* 25.6*   MCV 94 94 93    102* 71*     Recent Labs   Lab Test 09/09/19  0600 09/08/19  0624 09/07/19  0635   CR 0.89 0.81 0.90     No lab results found.  Recent Labs   Lab Test 09/08/19  1023 09/08/19  1000   CULT No growth after 14 hours Cultured on the 1st day of incubation:  Gram positive cocci in clusters  *  Critical Value/Significant Value, preliminary result only, called to and read back by  Susannah Marcelo RN/S88 0819 AM CS

## 2019-09-09 NOTE — PLAN OF CARE
"PT:  Attempted to see x 2 during scheduled PT session and again later in AM.  Patient receiving nursing cares during first attempt and during second attempt stated he was awaiting meds and food.  Stated he would not participate today as \"I am a dieing cause,\" and went on to explain his diagnosis.  Did state he is weak.  Was educated in the role of PT; still declined at this time.   "

## 2019-09-09 NOTE — PLAN OF CARE
A&Ox3-4, forgetful and word finding difficulty. Soft BP's T max 99.3. K 3.8 remains on high protocol replaced and recheck in am.  C/o pain in am improved once given MS contin. Refused lunch but did finish all of breakfast food. LS clear. Up 1A GB and walker Regular diet. Blood cultures positive from 9/8/19 ID notified, started on vanco and repeated blood cultures.  Discharge pending medically stable to psych VS TCU.

## 2019-09-09 NOTE — PHARMACY-VANCOMYCIN DOSING SERVICE
Pharmacy Vancomycin Initial Note  Date of Service 2019  Patient's  1950  68 year old, male    Indication: Bacteremia    Current estimated CrCl = Estimated Creatinine Clearance: 85.3 mL/min (based on SCr of 0.89 mg/dL).    Creatinine for last 3 days  2019:  1:20 PM Creatinine 0.95 mg/dL  2019:  6:35 AM Creatinine 0.90 mg/dL  2019:  6:24 AM Creatinine 0.81 mg/dL  2019:  6:00 AM Creatinine 0.89 mg/dL    Recent Vancomycin Level(s) for last 3 days  No results found for requested labs within last 72 hours.      Vancomycin IV Administrations (past 72 hours)      No vancomycin orders with administrations in past 72 hours.                Nephrotoxins and other renal medications (From now, onward)    Start     Dose/Rate Route Frequency Ordered Stop    19 0900  vancomycin 1500 mg in 0.9% NaCl 250 ml intermittent infusion 1,500 mg      1,500 mg  over 90 Minutes Intravenous EVERY 12 HOURS 19 0836      19 1030  piperacillin-tazobactam (ZOSYN) 3.375 g vial to attach to  mL bag     Note to Pharmacy:  Pharmacy can adjust dose based on renal function.    3.375 g  over 30 Minutes Intravenous EVERY 6 HOURS 19 1021            Contrast Orders - past 72 hours (72h ago, onward)    None                Plan:  1.  Start vancomycin  1500 mg IV q12h.   2.  Goal Trough Level: 15-20 mg/L   3.  Pharmacy will check trough levels as appropriate in 1-3 Days.    4. Serum creatinine levels will be ordered daily for the first week of therapy and at least twice weekly for subsequent weeks.    5. [place] method utilized to dose vancomycin therapy: Method 2    Stephen Jerome [facility]

## 2019-09-09 NOTE — PLAN OF CARE
A&Ox3-4, forgetful and word finding difficulty. VSS on RA ex T max 99.5. Denies pain. Refused dinner and night time medications. LS clear. Up SBA. Regular diet. 3 soft BMs overnight. Discharge pending - SW involved.

## 2019-09-10 NOTE — PLAN OF CARE
7a-7p (Head to toe completed by orientee; agree with assessment): Tmax 99.5. All other VSS. Confused at times but cooperative. C/o generalized pain this AM; controlled by scheduled MS Contin. No appetite; had only bites. Potassium and magnesium replaced. Recheck in AM. IV antibiotic changed to ancef. Port infusing. Ambulating with SBA to bathroom. Plan for possible port removal and discharge to [facility] vs TCU. Continue to monitor.

## 2019-09-10 NOTE — PROGRESS NOTES
"[facility]  Infectious Disease Progress Note          Assessment and Plan:   IMP 1 69 yo male pancreatic CA, worsening overall and now fever, Port vs biliary vs other  2 + BC GPC, await ID, PORT concern  3 Pancreatic CA, chemo, chronic pain  4 DM    REC 1 BC is MSSA to ancef, a lot of pain but not new, no obvious secondary sites  2 Repeat BC pending but  with staph aureus safe approach is PORT removal , could attempt to tx thru if all Follow-up neg but sig failure risk and further chemo delay  3 Will need extended IV, soc sx check        Interval History:   no new complaints a lot of pain but this is chronic, feels lousy, T down BC + 1/2 GPC MSSA               Medications:       ceFAZolin  2 g Intravenous Q8H     dronabinol  10 mg Oral BID     insulin aspart  1-7 Units Subcutaneous TID AC     insulin aspart  1-5 Units Subcutaneous At Bedtime     insulin aspart   Subcutaneous TID w/meals     insulin glargine  12 Units Subcutaneous At Bedtime     mirtazapine  15 mg Oral At Bedtime     morphine  15 mg Oral Q12H     polyethylene glycol  17 g Oral Daily     senna-docusate  1 tablet Oral BID    Or     senna-docusate  2 tablet Oral BID                  Physical Exam:   Blood pressure 126/59, pulse 67, temperature 96.8  F (36  C), temperature source Oral, resp. rate 16, height 1.803 m (5' 11\"), weight 76.3 kg (168 lb 3.4 oz), SpO2 91 %.  Wt Readings from Last 2 Encounters:   09/10/19 76.3 kg (168 lb 3.4 oz)   08/19/19 75.8 kg (167 lb)     Vital Signs with Ranges  Temp:  [96.8  F (36  C)-99.1  F (37.3  C)] 96.8  F (36  C)  Heart Rate:  [71-75] 75  Resp:  [14-16] 16  BP: ()/(38-59) 126/59  SpO2:  [91 %-93 %] 91 %    Constitutional: Awake, alert, cooperative, chronic ill appearance   Lungs: Clear to auscultation bilaterally, no crackles or wheezing   Cardiovascular: Regular rate and rhythm, normal S1 and S2, and no murmur noted   Abdomen: Normal bowel sounds, soft, non-distended, upper tender "   Skin: No rashes, no cyanosis, no edema no emboli PORT site looks OK   Other:           Data:   All microbiology laboratory data reviewed.  Recent Labs   Lab Test 09/10/19  0610 09/09/19  0600 09/08/19  0624   WBC 6.8 7.1 4.7   HGB 9.4* 9.7* 10.7*   HCT 27.5* 28.8* 31.2*   MCV 94 94 94    205 102*     Recent Labs   Lab Test 09/10/19  0610 09/09/19  0600 09/08/19  0624   CR 0.96 0.89 0.81     No lab results found.  Recent Labs   Lab Test 09/09/19  0920 09/09/19  0842 09/08/19  1023 09/08/19  1000   CULT No growth after 17 hours No growth after 17 hours No growth after 2 days Cultured on the 1st day of incubation:  Gram positive cocci in clusters  Susceptibility testing in progress  *  Critical Value/Significant Value, preliminary result only, called to and read back by  Alethea Parker RN/S88 0819 AM CS    (Note)  POSITIVE for STAPHYLOCOCCUS AUREUS and NEGATIVE for the mecA gene  (not MRSA) by FastCalligene multiplex nucleic acid test. The mecA gene was  not detected. Final identification and antimicrobial susceptibility  testing will be verified by standard methods.    Specimen tested with Verigene multiplex, gram-positive blood culture  nucleic acid test for the following targets: Staph aureus, Staph  epidermidis, Staph lugdunensis, other Staph species, Enterococcus  faecalis, Enterococcus faecium, Streptococcus species, S. agalactiae,  S. anginosus grp., S. pneumoniae, S. pyogenes, Listeria sp., mecA  (methicillin resistance) and Anna/B (vancomycin resistance).    Critical Value/Significant Value called to and read back by alethea parker rn @1107 9/9/19. ct

## 2019-09-10 NOTE — PROGRESS NOTES
[facility]  Patient is currently open to home care services with [facility]. The patient is currently receiving RN services. [facility]  and team have been notified of patient admission. [facility] liaison will continue to follow patient during stay. If appropriate provide orders to resume home care at time of discharge.

## 2019-09-10 NOTE — PLAN OF CARE
Pt is A&O3-4, very lethargic,confused and word finding difficulty. VSS afebrile. K replaced yesterday for high protocol- recheck this AM. Denies pain. Having very low appetite, refused all night time medications. BS checks. Up A1 GB to BR. Reg diet. BC + from port, getting vanco and zosyn, ID following. R port infusing. Discharge pending medically stable after abx course, talked about psych vs TCU- see notes. Slept between cares.

## 2019-09-10 NOTE — PROGRESS NOTES
Per ID, patient will need long term IV abx at discharge.  Sent referral to [facility] for insurance check for home IV abx coverage.  Per Hailey at [facility], Pt will not have coverage for IV ABX in the home through the [employer] Advantage plan. [facility] are estimating $31.49 per day for drugs and supplies. If pt is not homebound, $155 per nurse visit. Patient may have coverage through a short term TCU or in an outpt setting.   PT recommending TCU.  Informed SW that patient will need IV abx at TCU.

## 2019-09-10 NOTE — PLAN OF CARE
PT: Attempted to see pt for PT. Pt sleeping but awakes to voice. Pt declining any participation in exercise or mobility despite encouragement. Pt endorses feeling weak, educated on role of PT in progressing mobility, strength, and activity tolerance and potential impact of prolonged immobility. Pt verbalized understanding, tangential in conversation, becoming irritable with continued encouragement and education. Pt eventually rolling away from writer and shutting his eyes.

## 2019-09-10 NOTE — PROGRESS NOTES
[facility]    Medicine Progress Note - Hospitalist Service       Date of Admission:  9/6/2019    Assessment & Plan   Jesús Stock is a 68 year old male with PMH of stage II pancreatic adenocarcinoma with recent initiation of chemotherapy (first round 8/29), insulin dependent type II diabetes mellitus, obesity, and osteoarthritis who presented 9/6/19 with weight loss and failure to thrive.  Fever of unclear etiology  Bacteremia: Tmax 103.2 degrees fahrenheit . WBC WNL; remainder of CBC and BMP stable. UA unremarkable. Pt is without URI sx, SOB, chest pain, rash. No diarrhea, nausea, vomiting. No rashes. Port site on right chest wall C/D/I. CXR negative for acute pathology. Procal 0.84.   -- Blood cultures growing mssa , repeat blood cultures ordered, infectious disease following.  Blood cultures mssa , antibiotics  Changed to cefazolin on 9/10.  --started on  vancomycin and Zosyn .stopped 9/10, if blood cultures are continued to be positive the port need to be removed.        Encephalopathy, intermittent: Likely multifactorial (metabolic, infectious, decompensated mental health) in the setting of fevers above and MS Contin rx.   --Patient continues to be occasionally confused  -- Monitor closely      Failure to thrive  Generalized weakness, physical deconditioning  Hyponatremia, resolved  Hypokalemia, resolved: Patient diagnosed with pancreatic cancer in 6/2019, started chemotherapy 8/26/19. Developed nausea and vomiting approx one week ago and has had very poor PO intake since, as well as increased weakness. Reports a weight loss of approx 80lbs over the past year. Appears depressed on interview. Family notes that patient has significant physical deconditioning. Labwork notes decreased albumin 3.4-->2.2, hyponatremia, hypokalemia.  Has home care already but per family and oncologist failing to thrive at home.    - Pt was hydrated with IVF and electrolytes repleted per protocol   - Psychiatry  consulted as depression related to acute illness seems to be playing a major role into the above presentation.  -psychiatry recommended transfer to inpatient [facility], patient not interested in the plan.  He is not on hold  - Continue PTA Marinol 10 mg po every day  - PT evaluated, recommending TCU at discharge however at this time focusing on Geripsych placement for more acute treatment of depression       Severe malnutrition in the context of acute illness: Malnutrition related to nausea, vomiting, and poor appetite with pancreatic CA diagnosis as evidenced by fat and muscle loss with diagnosis of severe malnutrition.   -- Nutrition following, regular diet as tolerated, strawberry milkshake at 1000    Normocytic anemia, new  Thrombocytopenia, new: Hgb 8/28/19 was 11.7. Platelets at that time 156. This is secondary to malignancy and chemotherapy. No active signs of bleeding.   - Monitor        Major depression, recurrent, severe, without psychotic features: Patient has made active suicidal ideation to multiple providers, including to oncologist, ED physician. Not coping well with pancreatic cancer diagnosis. In clinic, he held off starting chemotherapy until last week, presumably due to his mental health issues. It appears that this is also contributing to his failure to thrive picture.  - Psychiatry consulted, see formal note by Dr. Vivar. Started patient on Remeron 7.5 mg at bedtime. No order for 72 hour hold is placed; if pt attempts to leave, please contact Psychiatry regarding placement of hold given poor judgement and lack of insight.  Pt has not given any indication of leaving   - PRN ativan      Stage II pancreatic adenocarcinoma  Cancer related pain: Follows with [facility], diagnosed in 6/2019. S/p ERCP x2 and pancreatic stent placement. Started gemcitabine and abraxane around 8/29/2019.  -- MOHPA consulted, see formal note by Dr. Gannon, plan for next dose of chemotherapy 9/11/19   --  Continue MS Contin 15 mg po BID, Oxycodone 5 mg po q4 hrs PRN, Mylicon 80 mg po BID, Zofran and Compazine PRN     IDDM: Diagnosed in March 2019. A1C on admission 7.7.  - Continue PTA Insulin glargine 12 units at bedtime   - SSI + carb counting while inpatient  - Regular diet okay here given poor PO intake, failure to thrive  -Blood sugars currently well controlled      Drug induced constipation:   -- Bowel regimen in place while patient is on narcotics.     Hx of alcohol use disorder: In remission.     Diet: Regular Diet Adult  Snacks/Supplements Adult: Other; Strawberry milkshake at 10 am (RD); Between Meals  Advance Diet as Tolerated  Room Service    DVT Prophylaxis: Ambulate every shift  Huggins Catheter: not present  Code Status: DNR/DNI      Disposition Plan   Expected discharge: Pending Geripsych placement at [facility] TCU placement, and depends on clearing the blood cultures and  depending on duration of antibiotics and  removal of port.  Entered: Cristiana Young MD 09/10/2019, 8:53 AM     The patient's care was discussed with the Attending Physician, Dr. Arriaza, Bedside Nurse and Patient.    Cristiana Young MD  Hospitalist Service  [facility]  ______________________________________________________________________    Interval History   Patient is alert and communicating well, he appears to be occasionally forgetful, he is tolerating diet well denied any new complaints, felt better, slept well at night.  Data reviewed today: I reviewed all medications, new labs and imaging results over the last 24 hours.    Physical Exam   Vital Signs: Temp: 99.5  F (37.5  C) Temp src: Oral BP: 129/66 Pulse: 77 Heart Rate: 75 Resp: 16 SpO2: 91 % O2 Device: None (Room air)    Weight: 168 lbs 3.38 oz    Constitutional: Awake, alert, cooperative, no apparent distress, port noted on right chest wall. site is clean  Respiratory: Clear to auscultation bilaterally, no crackles or wheezing  Cardiovascular: Regular  rate and rhythm, normal S1 and S2, and no murmur noted  GI: Normal bowel sounds, soft, non-distended, non-tender  Skin/Integumen: No rashes, no cyanosis, trace edema present bilateral lower extremities  Neuro : moving all 4 extremities, no focal deficit noted   .     Data   Recent Labs   Lab 09/10/19  0610 09/09/19  0600 09/08/19  0624  09/07/19  0635 09/06/19  1320   WBC 6.8 7.1 4.7  --  5.5 5.4   HGB 9.4* 9.7* 10.7*  --  9.1* 9.9*   MCV 94 94 94  --  93 93    205 102*  --  71* 63*   NA  --  141 138  --  136 132*   POTASSIUM 3.7 3.8 3.5   < > 3.3* 3.2*   CHLORIDE  --  110* 107  --  104 98   CO2  --  24 27  --  27 30   BUN  --  6* 9  --  11 16   CR 0.96 0.89 0.81  --  0.90 0.95   ANIONGAP  --  7 4  --  5 4   MATTHEW  --  7.9* 7.9*  --  8.0* 8.1*   GLC  --  178* 160*  --  101* 167*   ALBUMIN  --   --  1.9*  --   --  2.2*   PROTTOTAL  --   --  5.8*  --   --  6.0*   BILITOTAL  --   --  0.7  --   --  1.0   ALKPHOS  --   --  300*  --   --  211*   ALT  --   --  31  --   --  34   AST  --   --  31  --   --  25   LIPASE  --   --   --   --   --  <10*    < > = values in this interval not displayed.     No results found for this or any previous visit (from the past 24 hour(s)).  Medications     0.9% sodium chloride + KCl 20 mEq/L 75 mL/hr at 09/09/19 1614       ceFAZolin  2 g Intravenous Q8H     dronabinol  10 mg Oral BID     insulin aspart  1-7 Units Subcutaneous TID AC     insulin aspart  1-5 Units Subcutaneous At Bedtime     insulin aspart   Subcutaneous TID w/meals     insulin glargine  12 Units Subcutaneous At Bedtime     mirtazapine  15 mg Oral At Bedtime     morphine  15 mg Oral Q12H     polyethylene glycol  17 g Oral Daily     senna-docusate  1 tablet Oral BID    Or     senna-docusate  2 tablet Oral BID

## 2019-09-10 NOTE — PROGRESS NOTES
"CLINICAL NUTRITION SERVICES - REASSESSMENT NOTE      Recommendations Ordered by Registered Dietitian (RD):     Will discontinue the shake order at 10am - pt declines offer of alternate supplements     Future/Additional Recommendations:     If unable to increase po intake, and plan is for aggressive cares, may need to consider alternate means of nutrition (has had wt loss and poor po intake)     Malnutrition: (9/7)  % Weight Loss:  > 7.5% in 3 months (severe malnutrition)  % Intake:  </= 75% for >/= 1 month (severe malnutrition)  Subcutaneous Fat Loss:  Orbital region moderate depletion and Upper arm region severe depletion  Muscle Loss:  Temporal region moderate depletion  Fluid Retention:  None noted     Malnutrition Diagnosis: Severe malnutrition  In Context of:  Acute illness or injury  Chronic illness or disease        EVALUATION OF PROGRESS TOWARD GOALS   Diet:    Regular  10am: strawberry PLUS2 shake      Intake/Tolerance:    Visited with pt this morning  Seems a bit confused - \"I have been drinking wine and even the wine doesn't taste good\"  He notes that he has no appetite or interest in eating  \"I just want to die.\"  He hasn't eaten yet today  Yesterday only ordered breakfast - hasn't been ordering 3 meals per day  Dislikes the shakes and doesn't want them, or any other offer of supplements      Previous Goals (9/7):   Patient will consume >/= 75% meals TID and supplement once daily  Evaluation: Not met    Previous Nutrition Diagnosis (9/7):   Malnutrition related to nausea, vomiting, and poor appetite with pancreatic CA diagnosis as evidenced by fat and muscle loss with diagnosis of severe malnutrition   Evaluation: No change, modified below        CURRENT NUTRITION DIAGNOSIS  Inadequate oral intake related to poor appetite and no interest in eating as evidenced by pt refusing meals and supplements    INTERVENTIONS  Recommendations / Nutrition Prescription  Regular diet    If unable to increase po intake, " and plan is for aggressive cares, may need to consider alternate means of nutrition (has had wt loss and poor po intake)    Implementation  Will discontinue the shake order at 10am - pt declines offer of alternate supplements    Goals  Pt to consume 50% meals TID      MONITORING AND EVALUATION:  Progress towards goals will be monitored and evaluated per protocol and Practice Guidelines

## 2019-09-10 NOTE — CONSULTS
"Care Transition Initial Assessment - ELOISE     Met with: FAMILY   Active Problems:    At risk for healthcare associated infection    Failure to thrive in adult    Fever       DATA  Lives With: child(red), adult   Living Arrangements: condominium  Quality of Family Relationships: supportive, involved  Description of Support System: Supportive, Involved  Who is your support system?: Children  Support Assessment: Adequate family and caregiver support.   Identified issues/concerns regarding health management: ELOISE following for discharge needs   Resources List: Assisted Living  Quality of Family Relationships: supportive, involved  Transportation Anticipated: other (see comments)    ASSESSMENT  Cognitive Status:  Alert and oriented X 3-4  Concerns to be addressed: Patient is a 68 year old male who was admitted to the hospital for dehydration, failure to thrive, depression, along with an inability to care for himself. Prior to hospitalization, patient was living at home with his adult daughter. Patient made multiple comments in the ED stating he wishes he was dead. He also shares that he purposely does not eating hoping that he could \"not eat himself to death.\" Psychiatry met with patient on 9/9 and did not feel he had grounds to force inpatient psychiatric care.  Both OT and PT have assessed patient on 9/7 and have recommended patient discharge to TCU. Rosa has since been declining to work with therapy. ELOISE met with patient's daughter Oksana and provided her information regarding guardianship as she feels patient is not able to properly make his own legal decisions. SW provided VOA info.  Per CC Note, patient will require long term IV antibx at discharge. Patient does not have coverage for this service and will require TCU for this service as well. ELOISE will discuss with rosa and daughter.      PLAN  Financial costs for the patient includes   Patient given options and choices for discharge   Patient/family is agreeable to " the plan?    Transportation/person available to transport on day of discharge  is  and have they been notified/set up   Patient Goals and Preferences: TBD  Patient anticipates discharging to: TBD    BROOKE Whitaker, LGSW  *[phone]

## 2019-09-10 NOTE — PROGRESS NOTES
Therapy:IV ABX  Insurance:[facility] Medicare Advantage(Medicare replacement plan that follows Medicare guidelines)     IV ABX are not covered in the home(Pt would have coverage for short term TCU or IC)Below is what pt would be responsible for if pt wanted to proceed with [facility]    -Drug would go to Part-D(pt would be responsible fo the co-pay per dispense).  -Pt would have to self pay for the daily per darryn charge.  -If pt is NOT Homeboud, nursing would also be self pay per visit.     Please contact Intake with any questions, [phone] or In Basket pool, [facility] ([phone]).    [facility]-In reference to admission on 09/06/2019 to check on IV ABX coverage

## 2019-09-11 NOTE — PROGRESS NOTES
"[facility]  Infectious Disease Progress Note          Assessment and Plan:   IMP 1 69 yo male pancreatic CA, worsening overall and now fever, Port vs biliary vs other  2 + BC GPC, await ID, PORT concern  3 Pancreatic CA, chemo, chronic pain  4 DM    REC 1 BC is MSSA, on  ancef, a lot of pain but not new, no obvious secondary sites  2 Repeat BC neg but  with staph aureus safe approach is PORT removal , could attempt to tx thru if all Follow-up neg but sig failure risk and further chemo delay, now further fever PORT should be removed  3 Recheck BC follow fever post PORT out, if not resolving further investigate for second infection, staph complication etc  3 Will need extended IV, soc sx check noted likely rehab        Interval History:   no new complaints a lot of pain but this is chronic, feels lousy, BC + 1/2 GPC MSSA  Follow-up neg, T 102.7 now              Medications:       ceFAZolin  2 g Intravenous Q8H     dronabinol  10 mg Oral BID     insulin aspart  1-7 Units Subcutaneous TID AC     insulin aspart  1-5 Units Subcutaneous At Bedtime     insulin aspart   Subcutaneous TID w/meals     insulin glargine  12 Units Subcutaneous At Bedtime     mirtazapine  15 mg Oral At Bedtime     morphine  15 mg Oral Q12H     polyethylene glycol  17 g Oral Daily     senna-docusate  1 tablet Oral BID    Or     senna-docusate  2 tablet Oral BID                  Physical Exam:   Blood pressure 100/45, pulse 77, temperature 101  F (38.3  C), resp. rate 20, height 1.803 m (5' 11\"), weight 75.1 kg (165 lb 9.1 oz), SpO2 91 %.  Wt Readings from Last 2 Encounters:   09/11/19 75.1 kg (165 lb 9.1 oz)   08/19/19 75.8 kg (167 lb)     Vital Signs with Ranges  Temp:  [95.3  F (35.2  C)-102.7  F (39.3  C)] 101  F (38.3  C)  Heart Rate:  [71-92] 92  Resp:  [18-20] 20  BP: (100-127)/(39-60) 100/45  SpO2:  [91 %-93 %] 91 %    Constitutional: Awake, alert, cooperative, chronic ill appearance   Lungs: Clear to auscultation " bilaterally, no crackles or wheezing   Cardiovascular: Regular rate and rhythm, normal S1 and S2, and no murmur noted   Abdomen: Normal bowel sounds, soft, non-distended, upper tender   Skin: No rashes, no cyanosis, no edema no emboli PORT site looks OK   Other:           Data:   All microbiology laboratory data reviewed.  Recent Labs   Lab Test 09/10/19  0610 09/09/19  0600 09/08/19  0624   WBC 6.8 7.1 4.7   HGB 9.4* 9.7* 10.7*   HCT 27.5* 28.8* 31.2*   MCV 94 94 94    205 102*     Recent Labs   Lab Test 09/11/19  0549 09/10/19  0610 09/09/19  0600   CR 1.11 0.96 0.89     No lab results found.  Recent Labs   Lab Test 09/10/19  0920 09/10/19  0919 09/09/19  0920 09/09/19  0842 09/08/19  1023 09/08/19  1000   CULT No growth after 17 hours No growth after 17 hours No growth after 2 days No growth after 2 days No growth after 3 days Cultured on the 1st day of incubation:  Staphylococcus aureus  *  Critical Value/Significant Value, preliminary result only, called to and read back by  Alethea Parker RN/S88 0819 AM CS    (Note)  POSITIVE for STAPHYLOCOCCUS AUREUS and NEGATIVE for the mecA gene  (not MRSA) by Verigene multiplex nucleic acid test. The mecA gene was  not detected. Final identification and antimicrobial susceptibility  testing will be verified by standard methods.    Specimen tested with Verigene multiplex, gram-positive blood culture  nucleic acid test for the following targets: Staph aureus, Staph  epidermidis, Staph lugdunensis, other Staph species, Enterococcus  faecalis, Enterococcus faecium, Streptococcus species, S. agalactiae,  S. anginosus grp., S. pneumoniae, S. pyogenes, Listeria sp., mecA  (methicillin resistance) and Anna/B (vancomycin resistance).    Critical Value/Significant Value called to and read back by alethea parker rn @1107 9/9/19. ct

## 2019-09-11 NOTE — PLAN OF CARE
Discharge Planner PT   Patient plan for discharge: Did not discuss.  Current status: Pt agreeable to PT interventions. Pt noted to be confused and oriented to self only. Supine>sit: SBA. Pt refused to have the gait belt on and stood with CGA. Upon standing, pt lost balance backwards and required min-mod assist x 1 to regain standing balance. Pt ambulated to the bathroom ~ 10 ft x 2 using IV pole for UE support and CGA for safety. Pt noted with impaired safety awareness and insight into deficits. Upon standing, pt c/o cold and noted shivering/shaking. RN was present in room and pt was assisted back to bed with SBA. Vitals stable post PT. Pt is left in the care of RN.   Barriers to return to prior living situation: Stairs to access, Alone during the day, Fall risk, Cognition.  Recommendations for discharge:TCU  Rationale for recommendations: Pt will need continued skilled PT at a TCU to achieve PLOF and independence with mobility.        Entered by: Aranza Young 09/11/2019 3:44 PM

## 2019-09-11 NOTE — PLAN OF CARE
Pt is A&Ox3- confused and word finding difficulty. VSS ex tmax 102.7. PRN tylenol given x1. K & mag rechecks this AM- high protocols. Denies pain. BS checks. Up A1 GB to BR. Reg diet- low appetite. BC + from port- ID following and changed abx to ancef yesterday. Plan for possible port removal- ID to decide. Discharge pending medically stable and safe discharge disposition w/ abx course- possible TCU. Slept between cares.

## 2019-09-11 NOTE — PRE-PROCEDURE
GENERAL PRE-PROCEDURE:     Risks and benefits: Risks, benefits and alternatives were discussed    Patient states understanding of procedure being performed: Yes    Patient's understanding of procedure matches consent: Yes    Procedure consent matches procedure scheduled: Yes    Appropriately NPO:  Yes  Lungs:  Lungs clear with good breath sounds bilaterally  Heart:  Normal heart sounds and rate  History & Physical reviewed:  History and physical reviewed and no updates needed  Statement of review:  I have reviewed the lab findings, diagnostic data, medications, and the plan for sedation

## 2019-09-11 NOTE — PRE-PROCEDURE
GENERAL PRE-PROCEDURE:   Procedure:  Port a catheter removal with IV moderate sedation  Date/Time:  9/11/2019 1:31 PM    Written consent obtained?: Yes    Risks and benefits: Risks, benefits and alternatives were discussed    Consent given by:  Patient  Patient states understanding of procedure being performed: Yes    Patient's understanding of procedure matches consent: Yes    Procedure consent matches procedure scheduled: Yes    Expected level of sedation:  Moderate  Appropriately NPO:  Yes  ASA Class:  Class 3- Severe systemic disease, definite functional limitations  Mallampati  :  Grade 1- soft palate, uvula, tonsillar pillars, and posterior pharyngeal wall visible  Lungs:  Lungs clear with good breath sounds bilaterally  Heart:  Normal heart sounds and rate  History & Physical reviewed:  History and physical reviewed and no updates needed  Statement of review:  I have reviewed the lab findings, diagnostic data, medications, and the plan for sedation

## 2019-09-11 NOTE — PROGRESS NOTES
[facility]    Medicine Progress Note - Hospitalist Service       Date of Admission:  9/6/2019    Assessment & Plan   Jesús Stock is a 68 year old male with PMH of stage II pancreatic adenocarcinoma with recent initiation of chemotherapy (first round 8/29), insulin dependent type II diabetes mellitus, obesity, and osteoarthritis who presented 9/6/19 with weight loss and failure to thrive.  Fever of unclear etiology  Bacteremia: Tmax 103.2 degrees fahrenheit . WBC WNL; remainder of CBC and BMP stable. UA unremarkable. Pt is without URI sx, SOB, chest pain, rash. No diarrhea, nausea, vomiting. No rashes. Port site on right chest wall C/D/I. CXR negative for acute pathology. Procal 0.84.   -- Blood cultures growing mssa , repeat blood cultures ordered, infectious disease following.  Blood cultures mssa , antibiotics  Changed to cefazolin on 9/10.  --started on  vancomycin and Zosyn  On admission.stopped 9/10  --Patient continues to spike fever 101F, discussed with infectious disease-Dr. Metcalf and he suggested removing the port, IR consult placed for port removal, patient had few questions regarding that and was answered.  --Requested port tip culture.      Encephalopathy, intermittent: Likely multifactorial (metabolic, infectious, decompensated mental health) in the setting of fevers above and MS Contin rx.   --Patient continues to be occasionally confused  -- Monitor closely , improving     Failure to thrive  Generalized weakness, physical deconditioning  Hyponatremia, resolved  Hypokalemia, resolved: Patient diagnosed with pancreatic cancer in 6/2019, started chemotherapy 8/26/19. Developed nausea and vomiting approx one week ago and has had very poor PO intake since, as well as increased weakness. Reports a weight loss of approx 80lbs over the past year. Appears depressed on interview. Family notes that patient has significant physical deconditioning. Labwork notes decreased albumin 3.4-->2.2,  hyponatremia, hypokalemia.  Has home care already but per family and oncologist failing to thrive at home.    - Pt was hydrated with IVF and electrolytes repleted per protocol   - Psychiatry consulted as depression related to acute illness seems to be playing a major role into the above presentation.  -psychiatry recommended transfer to inpatient [facility], patient not interested in the plan.  He is not on hold  - Continue PTA Marinol 10 mg po every day  - PT evaluated, recommending TCU at discharge. Patient  Does not want to go into  [facility].    Severe malnutrition in the context of acute illness: Malnutrition related to nausea, vomiting, and poor appetite with pancreatic CA diagnosis as evidenced by fat and muscle loss with diagnosis of severe malnutrition.   -- Nutrition following, regular diet as tolerated, strawberry milkshake at 1000    Normocytic anemia, new  Thrombocytopenia, new: Hgb 8/28/19 was 11.7. Platelets at that time 156. This is secondary to malignancy and chemotherapy. No active signs of bleeding.   - Monitor        Major depression, recurrent, severe, without psychotic features: Patient has made active suicidal ideation to multiple providers, including to oncologist, ED physician. Not coping well with pancreatic cancer diagnosis. In clinic, he held off starting chemotherapy until last week, presumably due to his mental health issues. It appears that this is also contributing to his failure to thrive picture.  - Psychiatry consulted. Started patient on Remeron 7.5 mg at bedtime. It seems patient  Is not hold able.  - PRN ativan      Stage II pancreatic adenocarcinoma  Cancer related pain: Follows with [facility], diagnosed in 6/2019. S/p ERCP x2 and pancreatic stent placement. Started gemcitabine and abraxane around 8/29/2019.  -- MOHPA consulted, see formal note by Dr. Gannon, plan for next dose of chemotherapy 9/11/19   -- Continue MS Contin 15 mg po BID, Oxycodone 5 mg po q4 hrs  PRN, Mylicon 80 mg po BID, Zofran and Compazine PRN     IDDM: Diagnosed in March 2019. A1C on admission 7.7.  - Continue PTA Insulin glargine 12 units at bedtime   - SSI + carb counting while inpatient  - Regular diet okay here given poor PO intake, failure to thrive  -Blood sugars currently well controlled      Drug induced constipation:   -- Bowel regimen in place while patient is on narcotics.     Hx of alcohol use disorder: In remission.     Diet: Advance Diet as Tolerated  Room Service  NPO for Medical/Clinical Reasons Except for: Other; Specify: 4 hours prior to procedure.    DVT Prophylaxis: Ambulate every shift  Huggins Catheter: not present  Code Status: DNR/DNI      Disposition Plan   Expected discharge:  TCU placement, and depends on clearing the blood cultures and  depending on duration of antibiotics and  removal of port.  Entered: Cristiana Young MD 09/11/2019, 12:43 PM     The patient's care was discussed with the Attending Physician, Dr. Arriaza, Bedside Nurse and Patient.    Cristiana Young MD  Hospitalist Service  [facility]  ______________________________________________________________________    Interval History     Patient continues to have temp spikes, his T-max was 101, discussed with infectious disease and they recommended removing the port, since no other source of infection identified so far.  Discussed with the patient and nursing, will request IR to remove the port today.  Tip of the port to be sent for culture.  Data reviewed today: I reviewed all medications, new labs and imaging results over the last 24 hours.    Physical Exam   Vital Signs: Temp: 101  F (38.3  C) Temp src: Oral BP: 100/45   Heart Rate: 92 Resp: 20 SpO2: 91 % O2 Device: None (Room air)    Weight: 165 lbs 9.05 oz    Constitutional: Awake, alert, cooperative, no apparent distress, port noted on right chest wall. site is clean  Respiratory: Clear to auscultation bilaterally, no crackles or  wheezing  Cardiovascular: Regular rate and rhythm, normal S1 and S2, and no murmur noted  GI: Normal bowel sounds, soft, non-distended, non-tender  Skin/Integumen: No rashes, no cyanosis, trace edema present bilateral lower extremities  Neuro : moving all 4 extremities, no focal deficit noted   .     Data   Recent Labs   Lab 09/11/19  1050 09/11/19  0549 09/10/19  0610 09/09/19  0600 09/08/19  0624  09/06/19  1320   WBC  --   --  6.8 7.1 4.7   < > 5.4   HGB  --   --  9.4* 9.7* 10.7*   < > 9.9*   MCV  --   --  94 94 94   < > 93   PLT  --   --  263 205 102*   < > 63*   INR 1.77*  --   --   --   --   --   --    NA  --  137  --  141 138   < > 132*   POTASSIUM  --  3.7 3.7 3.8 3.5   < > 3.2*   CHLORIDE  --  107  --  110* 107   < > 98   CO2  --  26  --  24 27   < > 30   BUN  --  5*  --  6* 9   < > 16   CR  --  1.11 0.96 0.89 0.81   < > 0.95   ANIONGAP  --  4  --  7 4   < > 4   MATTHEW  --  7.7*  --  7.9* 7.9*   < > 8.1*   GLC  --  86  --  178* 160*   < > 167*   ALBUMIN  --   --   --   --  1.9*  --  2.2*   PROTTOTAL  --   --   --   --  5.8*  --  6.0*   BILITOTAL  --   --   --   --  0.7  --  1.0   ALKPHOS  --   --   --   --  300*  --  211*   ALT  --   --   --   --  31  --  34   AST  --   --   --   --  31  --  25   LIPASE  --   --   --   --   --   --  <10*    < > = values in this interval not displayed.     No results found for this or any previous visit (from the past 24 hour(s)).  Medications     0.9% sodium chloride + KCl 20 mEq/L 75 mL/hr at 09/11/19 0016     - MEDICATION INSTRUCTIONS -         lidocaine         ceFAZolin  2 g Intravenous Q8H     dronabinol  10 mg Oral BID     insulin aspart  1-7 Units Subcutaneous TID AC     insulin aspart  1-5 Units Subcutaneous At Bedtime     insulin aspart   Subcutaneous TID w/meals     insulin glargine  12 Units Subcutaneous At Bedtime     mirtazapine  15 mg Oral At Bedtime     morphine  15 mg Oral Q12H     polyethylene glycol  17 g Oral Daily     senna-docusate  1 tablet Oral BID    Or      senna-docusate  2 tablet Oral BID     sodium chloride (PF)  3 mL Intracatheter Q8H

## 2019-09-11 NOTE — PLAN OF CARE
A/O x 3-4 with forgetfulness and occasional confusion, up with one assist and gait belt, appetite very poor, encourage po intake, patient on abx for infection, the port was taken out by IR this afternoon, dressing CDI.

## 2019-09-12 NOTE — PLAN OF CARE
Pt A&Ox1 (self). VSS. Denies pain. Regular diet; no appetite. Denies nausea. Up with one assist; refuses GB + walker. Using urinal at bedside; adequate UO. K+ 3.7; replaced with 20mEq this evening per orders. Paged MD regarding low BG; 8 units of Lantus given vs. 12 this evening. PIV infusing NS + K+ @ 75mL/hr.

## 2019-09-12 NOTE — PROGRESS NOTES
"[facility]  Infectious Disease Progress Note          Assessment and Plan:   IMP 1 69 yo male pancreatic CA, worsening overall and now fever, Port vs biliary vs other  2 + BC GPC, await ID, PORT concern  3 Pancreatic CA, chemo, chronic pain  4 DM    REC 1 BC is MSSA, on  ancef, a lot of abd pain but not new, no obvious secondary sites  2 Repeat BC neg but  with staph aureus safe approach is PORT removal , could attempt to tx thru if all Follow-up neg but sig failure risk and further chemo delay, now 9/11 further fever PORT  removed  3 Recheck BC pending follow fever post PORT out, if not resolving further investigate for second infection, staph complication etc  3 Will need extended IV, soc sx check noted likely rehab  4 If T down and Follow-up cxs neg new line tomorrow and disposition OK, No fever but chills currently        Interval History:   no new complaints a lot of pain but this is chronic, feels lousy, BC + 1/2 GPC MSSA  Follow-up all remain neg, T 102.7 9/11 down since spike, line out              Medications:       ceFAZolin  2 g Intravenous Q8H     dronabinol  10 mg Oral BID     insulin aspart  1-7 Units Subcutaneous TID AC     insulin aspart  1-5 Units Subcutaneous At Bedtime     insulin aspart   Subcutaneous TID w/meals     insulin glargine  12 Units Subcutaneous At Bedtime     mirtazapine  15 mg Oral At Bedtime     morphine  15 mg Oral Q12H     polyethylene glycol  17 g Oral Daily     senna-docusate  1 tablet Oral BID    Or     senna-docusate  2 tablet Oral BID                  Physical Exam:   Blood pressure 105/52, pulse 70, temperature 99.2  F (37.3  C), temperature source Oral, resp. rate 16, height 1.803 m (5' 11\"), weight 76.4 kg (168 lb 6.9 oz), SpO2 91 %.  Wt Readings from Last 2 Encounters:   09/12/19 76.4 kg (168 lb 6.9 oz)   08/19/19 75.8 kg (167 lb)     Vital Signs with Ranges  Temp:  [98.8  F (37.1  C)-99.2  F (37.3  C)] 99.2  F (37.3  C)  Pulse:  [68-72] 70  Heart " Rate:  [70-87] 76  Resp:  [9-18] 16  BP: (102-144)/(52-74) 105/52  SpO2:  [88 %-97 %] 91 %    Constitutional: Awake, alert, cooperative, chronic ill appearance some chills ? now   Lungs: Clear to auscultation bilaterally, no crackles or wheezing   Cardiovascular: Regular rate and rhythm, normal S1 and S2, and no murmur noted   Abdomen: Normal bowel sounds, soft, non-distended, upper tender   Skin: No rashes, no cyanosis, no edema no emboli PORT site looks OK line out   Other:           Data:   All microbiology laboratory data reviewed.  Recent Labs   Lab Test 09/12/19  0659 09/10/19  0610 09/09/19  0600   WBC 10.4 6.8 7.1   HGB 8.5* 9.4* 9.7*   HCT 25.4* 27.5* 28.8*   MCV 95 94 94    263 205     Recent Labs   Lab Test 09/12/19  0659 09/11/19  0549 09/10/19  0610   CR 1.07 1.11 0.96     No lab results found.  Recent Labs   Lab Test 09/11/19  1351 09/11/19  0935 09/10/19  0920 09/10/19  0919 09/09/19  0920 09/09/19  0842 09/08/19  1023 09/08/19  1000   CULT Canceled, Test credited  Test reordered as correct code   see miscc [id number]   No growth after 20 hours No growth after 2 days No growth after 2 days No growth after 3 days No growth after 3 days No growth after 4 days Cultured on the 1st day of incubation:  Staphylococcus aureus  *  Critical Value/Significant Value, preliminary result only, called to and read back by  Susannah Marcelo RN/S88 0819 AM CS    (Note)  POSITIVE for STAPHYLOCOCCUS AUREUS and NEGATIVE for the mecA gene  (not MRSA) by [employer] multiplex nucleic acid test. The mecA gene was  not detected. Final identification and antimicrobial susceptibility  testing will be verified by standard methods.    Specimen tested with GoCardlessigene multiplex, gram-positive blood culture  nucleic acid test for the following targets: Staph aureus, Staph  epidermidis, Staph lugdunensis, other Staph species, Enterococcus  faecalis, Enterococcus faecium, Streptococcus species, S. agalactiae,  S. anginosus grp., S.  pneumoniae, S. pyogenes, Listeria sp., mecA  (methicillin resistance) and Anna/B (vancomycin resistance).    Critical Value/Significant Value called to and read back by alethea parker rn @7217 9/9/19. ct

## 2019-09-12 NOTE — PLAN OF CARE
A&O, was confused in the early part of the shift while having high fever, on RA O2 saturation at 91%, LS clear, BS auible and active, complained of nausea managed with zofran Iv, CM intact, up with A1 using GB and walker, denies pain.

## 2019-09-12 NOTE — PROGRESS NOTES
[facility] Progress Note  [facility]    Visit with pt, per his four-day length of hospital stay.  He briefly talked about his diagnosis, as well as his historic [facility] in [facility] [employer].  He declined my offer of a visit from our , and hopes to discharge soon.  Provided reflective listening and support, and will be available per additional need or request.                                                                                                                                                 Leilani Estrada M.A.  Staff   Pager [phone]  Phone [phone]

## 2019-09-12 NOTE — PROGRESS NOTES
[facility]    Medicine Progress Note - Hospitalist Service       Date of Admission:  9/6/2019    Assessment & Plan   Jesús Stock is a 68 year old male with PMH of stage II pancreatic adenocarcinoma with recent initiation of chemotherapy (first round 8/29), insulin dependent type II diabetes mellitus, obesity, and osteoarthritis who presented 9/6/19 with weight loss and failure to thrive.  Fever of unclear etiology  Bacteremia: Tmax 103.2 degrees fahrenheit . WBC WNL; remainder of CBC and BMP stable. UA unremarkable. Pt is without URI sx, SOB, chest pain, rash. No diarrhea, nausea, vomiting. No rashes. Port site on right chest wall C/D/I. CXR negative for acute pathology. Procal 0.84.   -- Blood cultures growing mssa , repeat blood cultures NTD, infectious disease following.  Blood cultures mssa , antibiotics  Changed to cefazolin on 9/10.  --started on  vancomycin and Zosyn  On admission.stopped 9/10  --Patient continues to spike fever t max 103F, port removed on 9/11, tip sent for culture.  Patient continues to have fever. this with was discussed with the nurse and they will notify infectious disease.  At this point he might need further evaluation regarding his staph bacteremia.?chris, mri spine?  Could have a fever from his malignancy as well.  --Requested port tip culture.      Encephalopathy, intermittent: Likely multifactorial (metabolic, infectious, decompensated mental health) in the setting of fevers above and MS Contin rx.   --Patient continues to be occasionally confused  -- Monitor closely , improving     Failure to thrive  Generalized weakness, physical deconditioning  Hyponatremia, resolved  Hypokalemia, resolved: Patient diagnosed with pancreatic cancer in 6/2019, started chemotherapy 8/26/19. Developed nausea and vomiting approx one week ago and has had very poor PO intake since, as well as increased weakness. Reports a weight loss of approx 80lbs over the past year. Appears  depressed on interview. Family notes that patient has significant physical deconditioning. Labwork notes decreased albumin 3.4-->2.2, hyponatremia, hypokalemia.  Has home care already but per family and oncologist failing to thrive at home.    - Pt was hydrated with IVF and electrolytes repleted per protocol   - Psychiatry consulted as depression related to acute illness seems to be playing a major role into the above presentation.  -psychiatry recommended transfer to inpatient [facility], patient not interested in the plan.  He is not on hold  - Continue PTA Marinol 10 mg po every day  - PT evaluated, recommending TCU at discharge. Patient  Does not want to go into  [facility].    Severe malnutrition in the context of acute illness: Malnutrition related to nausea, vomiting, and poor appetite with pancreatic CA diagnosis as evidenced by fat and muscle loss with diagnosis of severe malnutrition.   -- Nutrition following, regular diet as tolerated, strawberry milkshake at 1000    Normocytic anemia, new  Thrombocytopenia, new: Hgb 8/28/19 was 11.7. Platelets at that time 156. This is secondary to malignancy and chemotherapy. No active signs of bleeding.   - Monitor        Major depression, recurrent, severe, without psychotic features: Patient has made active suicidal ideation to multiple providers, including to oncologist, ED physician. Not coping well with pancreatic cancer diagnosis. In clinic, he held off starting chemotherapy until last week, presumably due to his mental health issues. It appears that this is also contributing to his failure to thrive picture.  - Psychiatry consulted. Started patient on Remeron 7.5 mg at bedtime. It seems patient  Is not hold able.  - PRN ativan      Stage II pancreatic adenocarcinoma  Cancer related pain: Follows with [facility], diagnosed in 6/2019. S/p ERCP x2 and pancreatic stent placement. Started gemcitabine and abraxane around 8/29/2019.  -- ALEJANDRO consulted, see  formal note by Dr. Gannon, plan for next dose of chemotherapy 9/11/19   -- Continue MS Contin 15 mg po BID, Oxycodone 5 mg po q4 hrs PRN, Mylicon 80 mg po BID, Zofran and Compazine PRN     IDDM: Diagnosed in March 2019. A1C on admission 7.7.  - Continue PTA Insulin glargine 12 units at bedtime   - SSI + carb counting while inpatient  - Regular diet okay here given poor PO intake, failure to thrive  -Blood sugars currently well controlled      Drug induced constipation:   -- Bowel regimen in place while patient is on narcotics.     Hx of alcohol use disorder: In remission.     Diet: Advance Diet as Tolerated  Room Service  Advance Diet as Tolerated: Regular Diet Adult    DVT Prophylaxis: Ambulate every shift  Huggins Catheter: not present  Code Status: DNR/DNI      Disposition Plan   Expected discharge:  TCU placement, and depends on clearing the blood cultures and  depending on duration of antibiotics and  removal of port.  Entered: Cristiana Young MD 09/12/2019, 1:54 PM     The patient's care was discussed with the Attending Physician, Dr. Arriaza, Bedside Nurse and Patient.    Cristiana Young MD  Hospitalist Service  [facility]  ______________________________________________________________________    Interval History     She continues to have fever spikes, today morning he was 103 Fahrenheit, with the chills and Reiger.  He is upset that he continues to have fever.  Client was removed 9/11, he is wondering why he still have fever, he denies pain anywhere else, no shortness of breath noted.  Data reviewed today: I reviewed all medications, new labs and imaging results over the last 24 hours.    Physical Exam   Vital Signs: Temp: 99.9  F (37.7  C) Temp src: Axillary BP: 95/47 Pulse: 90 Heart Rate: 76 Resp: 16 SpO2: 90 % O2 Device: None (Room air)    Weight: 168 lbs 6.9 oz    Constitutional: Alert and communicating, oriented x2.  Respiratory: Clear to auscultation bilaterally, no crackles or  wheezing  Cardiovascular: Regular rate and rhythm, normal S1 and S2, and no murmur noted  GI: Normal bowel sounds, soft, non-distended, non-tender  Skin/Integumen: No rashes, no cyanosis, trace edema present bilateral lower extremities  Neuro : moving all 4 extremities, no focal deficit noted   .     Data   Recent Labs   Lab 09/12/19  0659 09/11/19  1050 09/11/19  0549 09/10/19  0610 09/09/19  0600 09/08/19  0624  09/06/19  1320   WBC 10.4  --   --  6.8 7.1 4.7   < > 5.4   HGB 8.5*  --   --  9.4* 9.7* 10.7*   < > 9.9*   MCV 95  --   --  94 94 94   < > 93     --   --  263 205 102*   < > 63*   INR  --  1.77*  --   --   --   --   --   --      --  137  --  141 138   < > 132*   POTASSIUM 4.1  --  3.7 3.7 3.8 3.5   < > 3.2*   CHLORIDE 110*  --  107  --  110* 107   < > 98   CO2 25  --  26  --  24 27   < > 30   BUN 5*  --  5*  --  6* 9   < > 16   CR 1.07  --  1.11 0.96 0.89 0.81   < > 0.95   ANIONGAP 4  --  4  --  7 4   < > 4   MATTHEW 7.7*  --  7.7*  --  7.9* 7.9*   < > 8.1*   *  --  86  --  178* 160*   < > 167*   ALBUMIN  --   --   --   --   --  1.9*  --  2.2*   PROTTOTAL  --   --   --   --   --  5.8*  --  6.0*   BILITOTAL  --   --   --   --   --  0.7  --  1.0   ALKPHOS  --   --   --   --   --  300*  --  211*   ALT  --   --   --   --   --  31  --  34   AST  --   --   --   --   --  31  --  25   LIPASE  --   --   --   --   --   --   --  <10*    < > = values in this interval not displayed.     Recent Results (from the past 24 hour(s))   IR Port Removal Right    Narrative    PORT REMOVAL 9/11/2019 2:00 PM    HISTORY:  Completed chemotherapy, port no longer needed    COMPARISON:    9/8/2019    DESCRIPTION OF PROCEDURE:    After obtaining informed consent, the  patient was placed in a supine position on the fluoroscopy table. The  skin overlying the port was prepped and draped in the usual sterile  manner. 1% lidocaine was injected for local anesthesia. An incision  was made over the previous port incision. The  port was then dissected  out of the pocket. The port and its catheter were removed completely.  The pocket was washed with antibiotic laden saline. A portion of the  fibrous capsule which had formed around the port was also removed. The  port incision was closed with 3 deep interrupted stitches using 3-0  Vicryl. This was supplemented with Dermabond and Steri-Strips.    A fluoroscopic image was saved before and after port removal to  document that all portions of the port and catheter had been removed  completely.     I determined this patient to be an appropriate candidate for the  planned sedation and procedure and reassessed the patient immediately  prior to sedation and procedure. Moderate intravenous conscious  sedation was supervised by me. The patient was independently monitored  by a registered nurse assigned to the Department of radiology using  automated blood pressure, EKG and pulse oximetry. The patient  tolerated the procedure well. There were no immediate postprocedure  complications. The patient's vital signs were monitored by radiology  nursing staff under my supervision and remained stable throughout the  study. Radiation dose for this scan was reduced using automated  exposure control, adjustment of the mA and/or kV according to patient  size, or iterative reconstruction technique.    MEDICATIONS:    2 mg Versed, 100 mcg fentanyl    FLUOROSCOPY TIME: 0.1 minute    Total fluoroscopy dose: 0.05 mGy Air Kerma    Number of spot fluoroscopic images: 2    SEDATION TIME: 17 minutes      Impression    IMPRESSION:    Port removed as above.    OCTAVIANO BULLOCK MD     Medications     0.9% sodium chloride + KCl 20 mEq/L 75 mL/hr at 09/12/19 0454       ceFAZolin  2 g Intravenous Q8H     dronabinol  10 mg Oral BID     insulin aspart  1-7 Units Subcutaneous TID AC     insulin aspart  1-5 Units Subcutaneous At Bedtime     insulin aspart   Subcutaneous TID w/meals     insulin glargine  12 Units Subcutaneous At  Bedtime     mirtazapine  15 mg Oral At Bedtime     morphine  15 mg Oral Q12H     polyethylene glycol  17 g Oral Daily     senna-docusate  1 tablet Oral BID    Or     senna-docusate  2 tablet Oral BID

## 2019-09-12 NOTE — PLAN OF CARE
A&Ox 2-3 (self and place).  Denies pain.  Regular diet, low intake.  No nausea per patient.  Continent bowel/bladder.  Assist x 1 with GB/walker.  Continent of bowel/bladder, assist with urinal at bedside.  BG checks; at 0200 - 63, given PO glucose, apple juice and toast/peanut butter, recheck: 74.  PIV infusing NS with 20KCl at 75mL/hr and intermittent antibiotics.  Patient's port was removed yesterday, R upper chest, dressing CDI.

## 2019-09-12 NOTE — PROGRESS NOTES
ELOISE  I: SW met with patient's daughter, Oksana, to discuss discharge planning needs. Oksana is now aware that TCU is being recommended for discharge. Oksana shares that her and her family are concerned that patient is intentionally not eating and wonders if he would be more appropriate for hospice care. SW suggested having a conversation with MD to discuss this further and they can work out a plan. SW will follow up after MD speaks with daughter.    P: SW will continue to follow and assist as needed.    Alberta Bell, BROOKE, LGSW  *[phone]

## 2019-09-13 NOTE — PLAN OF CARE
A&Ox 2-3 (self and place). VSS. Denies pain. Regular diet, low intake. Denies nausea. Assist x 1 with GB/walker. Continent of bowel/bladder. BM x1 this evening. PIV infusing NS with 20KCl at 75mL/hr and intermittent antibiotics. Patient's port was removed yesterday, R upper chest, dressing CDI. Pt had CT of pelvis this afternoon.

## 2019-09-13 NOTE — PLAN OF CARE
A&Ox 2-3 (self and place). VSS. Generalized pain controlled with scheduled morphine. Regular diet, poor appetite. Denies nausea. Assist x 1 with GB/walker. Continent of bowel/bladder. PIV infusing NS with 20KCl at 75mL/hr and intermittent antibiotics. Patient's port was removed yesterday, R upper chest, dressing CDI. BS ACHS, not requiring coverage. Plan pending.

## 2019-09-13 NOTE — PROGRESS NOTES
[facility]    Medicine Progress Note - Hospitalist Service       Date of Admission:  9/6/2019    Assessment & Plan   Jesús Stock is a 68 year old male with PMH of stage II pancreatic adenocarcinoma with recent initiation of chemotherapy (first round 8/29), insulin dependent type II diabetes mellitus, obesity, and osteoarthritis who presented 9/6/19 with weight loss and failure to thrive.  Fever of unclear etiology  Bacteremia  Liver abscess   Tmax 103.2 degrees fahrenheit . WBC WNL; remainder of CBC and BMP stable. UA unremarkable. Pt is without URI sx, SOB, chest pain, rash. No diarrhea, nausea, vomiting. No rashes. Port site on right chest wall C/D/I. CXR negative for acute pathology. Procal 0.84.   -- Blood cultures growing mssa , repeat blood cultures NTD, infectious disease following.  Blood cultures mssa , antibiotics  Changed to cefazolin on 9/10.  --started on  vancomycin and Zosyn  On admission.stopped 9/10  --Patient continues to spike fever t max 103F, port removed on 9/11, tip sent for culture.  Patient continues to have fever.  CT abdomen was obtained on 9/12 to evaluate for the extent of malignancy so that the family can decide whether they want to opt for hospice.  CT done 9/12 evening .  --The CT scan showed liver abscess, ID aware, plan is to continue Ancef and consult IR for drain placement, I had already consulted surgery.  Pending procedure, will need culture results on the fluid.  --Discussed with palliative care as per family request, so far the plan is to continue treatment  --Poor tip culture negative so far.  --Temporary n.p.o. orders placed for possible procedure later today.      Encephalopathy, intermittent: Likely multifactorial (metabolic, infectious, decompensated mental health) in the setting of fevers above and MS Contin rx.   --Patient continues to be occasionally confused  -- Monitor closely , improving     Failure to thrive  Generalized weakness, physical  deconditioning  Hyponatremia, resolved  Hypokalemia, resolved: Patient diagnosed with pancreatic cancer in 6/2019, started chemotherapy 8/26/19. Developed nausea and vomiting approx one week ago and has had very poor PO intake since, as well as increased weakness. Reports a weight loss of approx 80lbs over the past year. Appears depressed on interview. Family notes that patient has significant physical deconditioning. Labwork notes decreased albumin 3.4-->2.2, hyponatremia, hypokalemia.  Has home care already but per family and oncologist failing to thrive at home.    - Pt was hydrated with IVF and electrolytes repleted per protocol   - Psychiatry consulted as depression related to acute illness seems to be playing a major role into the above presentation.  -psychiatry recommended transfer to inpatient [facility], patient not interested in the plan.  He is not on hold  - Continue PTA Marinol 10 mg po every day  - PT evaluated, recommending TCU at discharge. Patient  Does not want to go into  [facility].  -Patient does have episodes in which he gets quite depressed and confused.    Severe malnutrition in the context of acute illness: Malnutrition related to nausea, vomiting, and poor appetite with pancreatic CA diagnosis as evidenced by fat and muscle loss with diagnosis of severe malnutrition.   -- Nutrition following, regular diet as tolerated, strawberry milkshake at 1000    Normocytic anemia, new  Thrombocytopenia, new: Hgb 8/28/19 was 11.7. Platelets at that time 156. This is secondary to malignancy and chemotherapy. No active signs of bleeding.   - Monitor        Major depression, recurrent, severe, without psychotic features: Patient has made active suicidal ideation to multiple providers, including to oncologist, ED physician. Not coping well with pancreatic cancer diagnosis. In clinic, he held off starting chemotherapy until last week, presumably due to his mental health issues. It appears that this is also  contributing to his failure to thrive picture.  - Psychiatry consulted. Started patient on Remeron 7.5 mg at bedtime. It seems patient  Is not hold able.  - PRN ativan      Stage II pancreatic adenocarcinoma  Cancer related pain: Follows with [facility], diagnosed in 6/2019. S/p ERCP x2 and pancreatic stent placement. Started gemcitabine and abraxane around 8/29/2019.  -- MOHPA consulted, see formal note by Dr. Gannon, plan for next dose of chemotherapy 9/11/19   -- Continue MS Contin 15 mg po BID, Oxycodone 5 mg po q4 hrs PRN, Mylicon 80 mg po BID, Zofran and Compazine PRN     IDDM: Diagnosed in March 2019. A1C on admission 7.7.  - Continue PTA Insulin glargine 12 units at bedtime   - SSI + carb counting while inpatient  - Regular diet okay here given poor PO intake, failure to thrive  -Blood sugars currently well controlled      Drug induced constipation:   -- Bowel regimen in place while patient is on narcotics.     Hx of alcohol use disorder: In remission.   Updated daughter Lindy and her sister and near bedside, all questions answered.  Diet: Advance Diet as Tolerated  Room Service  NPO per Anesthesia Guidelines for Procedure/Surgery Except for: Meds    DVT Prophylaxis: Ambulate every shift  Huggins Catheter: not present  Code Status: DNR/DNI      Disposition Plan   Expected discharge:  TCU placement, further work-up regarding the new diagnosis of liver abscess 9/13 entered: Cristiana Young MD 09/13/2019, 2:42 PM     The patient's care was discussed with the Attending Physician, Dr. Arriaza, Bedside Nurse and Patient.    Cristiana Young MD  Hospitalist Service  [facility]  ______________________________________________________________________    Interval History     Continues to have low-grade fever, no temporary spikes noted since yesterday, CT results discussed with the patient, his CT shows liver abscess.  Palliative care was consulted as per family request, discussed with them  about future plans, the plan is to meet with the daughter later today.  Patient gets so occasionally confused, he does not seem to be best of   Historian,Or not in a position to make appropriate decision at this point.  He does have chronic abdominal pain  Data reviewed today: I reviewed all medications, new labs and imaging results over the last 24 hours.    Physical Exam   Vital Signs: Temp: 99.6  F (37.6  C) Temp src: Oral BP: 102/51 Pulse: 66 Heart Rate: 72 Resp: 16 SpO2: (!) 87 % O2 Device: None (Room air)    Weight: 165 lbs 9.05 oz    Constitutional: Alert and communicating, oriented x2.  Respiratory: Clear to auscultation bilaterally, no crackles or wheezing  Cardiovascular: Regular rate and rhythm, normal S1 and S2, and no murmur noted  GI: Normal bowel sounds, soft, non-distended, non-tender  Skin/Integumen: No rashes, no cyanosis, trace edema present bilateral lower extremities  Neuro : moving all 4 extremities, no focal deficit noted   .     Data   Recent Labs   Lab 09/13/19  0703 09/12/19  0659 09/11/19  1050 09/11/19  0549 09/10/19  0610 09/09/19  0600 09/08/19  0624   WBC  --  10.4  --   --  6.8 7.1 4.7   HGB  --  8.5*  --   --  9.4* 9.7* 10.7*   MCV  --  95  --   --  94 94 94   PLT  --  321  --   --  263 205 102*   INR  --   --  1.77*  --   --   --   --    NA  --  139  --  137  --  141 138   POTASSIUM  --  4.1  --  3.7 3.7 3.8 3.5   CHLORIDE  --  110*  --  107  --  110* 107   CO2  --  25  --  26  --  24 27   BUN  --  5*  --  5*  --  6* 9   CR 0.93 1.07  --  1.11 0.96 0.89 0.81   ANIONGAP  --  4  --  4  --  7 4   MATTHEW  --  7.7*  --  7.7*  --  7.9* 7.9*   GLC  --  125*  --  86  --  178* 160*   ALBUMIN  --   --   --   --   --   --  1.9*   PROTTOTAL  --   --   --   --   --   --  5.8*   BILITOTAL  --   --   --   --   --   --  0.7   ALKPHOS  --   --   --   --   --   --  300*   ALT  --   --   --   --   --   --  31   AST  --   --   --   --   --   --  31     Recent Results (from the past 24 hour(s))   CT  Abdomen Pelvis w Contrast    Narrative    CT ABDOMEN AND PELVIS WITH CONTRAST 9/12/2019 5:24 PM     HISTORY: Abdominal distension.    COMPARISON: [facility], 2019    TECHNIQUE: Volumetric helical acquisition of CT images from the lung  bases through the symphysis pubis after the administration of 84 mL  Isovue-370 intravenous contrast. Radiation dose for this scan was  reduced using automated exposure control, adjustment of the mA and/or  kV according to patient size, or iterative reconstruction technique.    FINDINGS: 7.6 x 8.4 cm air and fluid collection in the liver  concerning for abscess. Pneumobilia noted. Metallic biliary stent  evident. Pancreas mass less well seen due to motion artifact. Adrenal  glands, spleen, kidneys grossly unremarkable. No bowel obstruction.  Small amount of free fluid. No definite free intraperitoneal air.  Minimal pleural fluid bilaterally at the lung bases. No frankly  destructive bony lesions.      Impression    IMPRESSION: Suspected liver abscess.     VANESSA CUNHA MD     Medications     0.9% sodium chloride + KCl 20 mEq/L 75 mL/hr at 09/13/19 1124       ceFAZolin  2 g Intravenous Q8H     dronabinol  10 mg Oral BID     insulin aspart  1-7 Units Subcutaneous TID AC     insulin aspart  1-5 Units Subcutaneous At Bedtime     insulin aspart   Subcutaneous TID w/meals     insulin glargine  12 Units Subcutaneous At Bedtime     mirtazapine  15 mg Oral At Bedtime     morphine  15 mg Oral Q12H     polyethylene glycol  17 g Oral Daily     senna-docusate  1 tablet Oral BID    Or     senna-docusate  2 tablet Oral BID

## 2019-09-13 NOTE — PLAN OF CARE
A&Ox2 (self/place). VSS, RA.  Denies pain.  Tolerating regular diet without difficulty, low PO intake.  Assist x 1 with GB/walker.  Continent of bowel/bladder.  PIV infusing; NS with 20 KCl at 75mL/hr, intermittent antibiotics.  R upper chest dressing, CDI.

## 2019-09-13 NOTE — PROGRESS NOTES
CLINICAL NUTRITION SERVICES - REASSESSMENT NOTE      Future/Additional Recommendations: If aggressive cares to consider, patient would benefit from nutrition support (TPN/TF)   Malnutrition: (9/7)  % Weight Loss:  > 7.5% in 3 months (severe malnutrition)  % Intake:  </= 75% for >/= 1 month (severe malnutrition)  Subcutaneous Fat Loss:  Orbital region moderate depletion and Upper arm region severe depletion  Muscle Loss:  Temporal region moderate depletion  Fluid Retention:  None noted     Malnutrition Diagnosis: Severe malnutrition  In Context of:  Acute illness or injury  Chronic illness or disease        EVALUATION OF PROGRESS TOWARD GOALS   Diet:  Regular diet     Intake/Tolerance:  Attempted to visit with patient this afternoon but he was sleeping, did not wake.  Lunch tray sitting at bedside untouched:  Burger, fries, fruit cup, apple pie, and cola.  Noted in flowsheets and RN documentation that patient is taking bites of food/minimal intake.  Flowsheets indicate that intake has been 25% of meals at best.       NEW FINDINGS:   Meeting with palliative pending - daughter would like more information on Hospice     Previous Goals (9/10):   Pt to consume 50% meals TID  Evaluation: Not met    Previous Nutrition Diagnosis (9/10):   Inadequate oral intake related to poor appetite and no interest in eating as evidenced by pt refusing meals and supplements  Evaluation: No change      CURRENT NUTRITION DIAGNOSIS  Inadequate oral intake related to poor appetite and no interest in eating as evidenced by pt refusing meals and supplements    INTERVENTIONS  Recommendations / Nutrition Prescription  Continue regular diet as tolerated    If aggressive cares to consider, patient would benefit from nutrition support (TPN/TF)    Implementation  None     Goals  Intake will improve within the next 2-3 days    MONITORING AND EVALUATION:  Progress towards goals will be monitored and evaluated per protocol and Practice  Guidelines    Reentta Julio RD, LD, CNSC   Clinical Dietitian - [facility]

## 2019-09-13 NOTE — PROGRESS NOTES
"[facility]  Infectious Disease Progress Note          Assessment and Plan:   IMP 1 67 yo male pancreatic CA, worsening overall and now fever, Port vs biliary vs other  2 + BC GPC, await ID, PORT concern  3 Pancreatic CA, chemo, chronic pain  4 DM  5 CT with liver abscess    REC 1 BC is MSSA, on  ancef, a lot of abd pain but not new,  2 Repeat BC neg but  with staph aureus safe approach is PORT removal , could attempt to tx thru if all Follow-up neg but sig failure risk and further chemo delay, now 9/11 further fever PORT  removed  3 Recheck BC neg follow fever post PORT out, if not resolving further investigate for second infection, staph complication etc  3 Will need extended IV, soc sx check noted likely rehab  4 CT with liver collection / abscess, needs biopsy/drain, for n ancef only , await cx of abscess , expand ABX if other growyh, this is a disease of ID/IR, no surgery consult needed        Interval History:   no new complaints a lot of pain but this is chronic, feels lousy, BC + 1/2 GPC MSSA  Follow-up all remain neg, T 102.7 9/11 down since spike, line out Now CT suggests liver abscess              Medications:       dronabinol  10 mg Oral BID     insulin aspart  1-7 Units Subcutaneous TID AC     insulin aspart  1-5 Units Subcutaneous At Bedtime     insulin aspart   Subcutaneous TID w/meals     insulin glargine  12 Units Subcutaneous At Bedtime     mirtazapine  15 mg Oral At Bedtime     morphine  15 mg Oral Q12H     piperacillin-tazobactam  3.375 g Intravenous Q6H     polyethylene glycol  17 g Oral Daily     senna-docusate  1 tablet Oral BID    Or     senna-docusate  2 tablet Oral BID                  Physical Exam:   Blood pressure 102/51, pulse 66, temperature 99.6  F (37.6  C), temperature source Oral, resp. rate 16, height 1.803 m (5' 11\"), weight 75.1 kg (165 lb 9.1 oz), SpO2 (!) 87 %.  Wt Readings from Last 2 Encounters:   09/13/19 75.1 kg (165 lb 9.1 oz)   08/19/19 75.8 kg " (167 lb)     Vital Signs with Ranges  Temp:  [96.3  F (35.7  C)-99.6  F (37.6  C)] 99.6  F (37.6  C)  Pulse:  [66] 66  Heart Rate:  [64-72] 72  Resp:  [16-18] 16  BP: ()/(51-56) 102/51  SpO2:  [87 %-91 %] 87 %    Constitutional: Awake, alert, cooperative, chronic ill appearance some chills ? now   Lungs: Clear to auscultation bilaterally, no crackles or wheezing   Cardiovascular: Regular rate and rhythm, normal S1 and S2, and no murmur noted   Abdomen: Normal bowel sounds, soft, non-distended, upper tender   Skin: No rashes, no cyanosis, no edema no emboli PORT site looks OK line out   Other:           Data:   All microbiology laboratory data reviewed.  Recent Labs   Lab Test 09/12/19  0659 09/10/19  0610 09/09/19  0600   WBC 10.4 6.8 7.1   HGB 8.5* 9.4* 9.7*   HCT 25.4* 27.5* 28.8*   MCV 95 94 94    263 205     Recent Labs   Lab Test 09/13/19  0703 09/12/19  0659 09/11/19  0549   CR 0.93 1.07 1.11     No lab results found.  Recent Labs   Lab Test 09/11/19  1351 09/11/19  0935 09/10/19  0920 09/10/19  0919 09/09/19  0920 09/09/19  0842 09/08/19  1023 09/08/19  1000   CULT Culture negative monitoring continues  Canceled, Test credited  Test reordered as correct code   see miscc [id number]   No growth after 2 days No growth after 3 days No growth after 3 days No growth after 4 days No growth after 4 days No growth after 5 days Cultured on the 1st day of incubation:  Staphylococcus aureus  *  Critical Value/Significant Value, preliminary result only, called to and read back by  Susannah Marcelo RN/S88 0819 AM CS    (Note)  POSITIVE for STAPHYLOCOCCUS AUREUS and NEGATIVE for the mecA gene  (not MRSA) by [employer] nucleic acid test. The mecA gene was  not detected. Final identification and antimicrobial susceptibility  testing will be verified by standard methods.    Specimen tested with zeroboundigene multiplex, gram-positive blood culture  nucleic acid test for the following targets: Staph aureus,  Staph  epidermidis, Staph lugdunensis, other Staph species, Enterococcus  faecalis, Enterococcus faecium, Streptococcus species, S. agalactiae,  S. anginosus grp., S. pneumoniae, S. pyogenes, Listeria sp., mecA  (methicillin resistance) and Anna/B (vancomycin resistance).    Critical Value/Significant Value called to and read back by alethea parker rn @1102 9/9/19. ct

## 2019-09-13 NOTE — PLAN OF CARE
"PT: Pt sleeping soundly upon arrival of therapist, declined participation in PT stating, \"I can't today, I'm exhausted.\"   "

## 2019-09-13 NOTE — PROVIDER NOTIFICATION
MD Notification    Notified Person: MD    Notified Person Name: Dr. Yu    Notification Date/Time: 9/12 @ 7:45pm    Notification Interaction: Page    Purpose of Notification: Critical lab value - procalcitonin 12.68    Orders Received: Pt already receiving antibiotics; no further action needed at this point.

## 2019-09-13 NOTE — CODE/RAPID RESPONSE
[facility]    Sepsis RRT Note      Date of Service: 09/13/2019    I was called to see Jesús CARRILLO Montrell due to abnormal vital signs triggering the Sepsis SIRS screening alert. He is known to have an infection. BC positive for MSSA, liver abscess drained on 9/13. Repeat cultures are negative, ID following, covered with cefazolin. This abscess all occurred following 1st round initiation of chemo for stage II pancreatic adenocarcinoma. Port tip sent for culture (NGTD).    On initial exam, the patient has recently returned from ultrasound where his liver abscess was drained. Uneventful procedure. Skin is hot, temp 103 orally - treated with rectal tylenol. He is experiencing rigors, appears altered but answers questions and follows commands. There is no respiratory distress. Fortunately BP remains stable, HR is elevated to 120s, improved with tylenol and IVF bolus to ~100-105. Abdomen is soft, non tender. No signs of edema. Unknown urine output due to incontinence, but nursing notes decreased oral intake. Is orthostatic on exam, BP decreased 30 points from lying to sitting.     Physical Exam    Vital Signs:  Temp: 103.2  F (39.6  C) Temp src: Axillary BP: 128/75 Pulse: 94 Heart Rate: 72 Resp: 23 SpO2: (!) 89 % O2 Device: None (Room air)      Lab:  Lactic Acid   Date Value Ref Range Status   09/08/2019 0.9 0.7 - 2.0 mmol/L Final     Lactate for Sepsis Protocol   Date Value Ref Range Status   09/13/2019 5.3 (HH) 0.7 - 2.0 mmol/L Final     Comment:     Critical Value called to and read back by  NAHEED SUAREZ ON 88 AT 1648 AN       qSOFA score: 2    The patient has signs of altered level of consciousness suspicious for sepsis.      Physical Exam    Constitutional: Awake to voice, cooperative, mild-mod distress.  HEENT: profoundly dry mucous membranes, normal dentition.  Respiratory: Clear to auscultation bilaterally, no crackles or wheezing.  Cardiovascular: Irregular rate and reg rhythm, normal S1 and S2,  and no murmur noted.  GI: Soft, non-distended,  Mildly tender near drain site  Skin: hot, dry. Drain site CDI  Neurologic: confused, A x O x 1   Psychiatric: flat affect     Code Status: DNR / DNI    Assessment and Plan    The SIRS and exam findings are likely due to   severe sepsis. (sepsis + 2 or more organ failure)     ID: The patient is currently on the following antibiotics:  Anti-infectives (From now, onward)    Start     Dose/Rate Route Frequency Ordered Stop    09/13/19 1600  ceFAZolin (ANCEF) intermittent infusion 2 g in 100 mL dextrose PRE-MIX      2 g  200 mL/hr over 30 Minutes Intravenous EVERY 8 HOURS 09/13/19 1320          Current antibiotic coverage is appropriate for source of infection.    Fluid: Fluid bolus ordered.    Lab: Repeat lactic acid ordered for 2 hours from now.    Type A Lactic Acidosis: impaired tissue oxygenation 2/2 sepsis.     Plan:  -- Close monitoring of vitals signs every 15 minutes for 2 hours  -- Additional IV fluid resuscitation of 1500 cc LR   -- Repeat lactic acid at 1830  -- add on BMP to evaluate potassium, renal function  -- stop k+ IVF, start LR MIVF  -- strict & intake, condom catheter     Disposition: The patient will remain on the current unit. We will continue to monitor this patient closely.      CARMINE Danielson [facility]  Hospitalist Service  House Officer  Pager: [phone] (9p - 6p)

## 2019-09-13 NOTE — PROGRESS NOTES
"Patient here for US guided liver drain placement.  Able to place drain without using conscious sedation.  Patient denies pain, last bp is 109/39, P 94, T 103.3 Axillary, sats 90% RA.  Patient states he is \"freezing\" and shivering/rigors.  Abdominal site is CDI without swelling or hematoma.  No orders for irrigation of drain at this time.  Report called to [facility] charge RN.  Monitor.  "

## 2019-09-13 NOTE — CONSULTS
[facility]  Palliative Care Consultation Note    Patient: Jesús Stock  Date of Admission:  9/6/2019    Requesting Clinician / Team: Hospitalist  Reason for consult: Pain management  Symptom management  Goals of care  Patient and family support    Recommendations/Discussion    Although there is not a copy of his advance directive in the chart, his daughter Iliana who he lives with tells me that she was appointed as his healthcare agent when he recently started his cancer treatment.  She will bring in this notarized form from home.    His biggest issue at the moment is treatment decisions regarding his newly diagnosed liver abscess.  His daughters will help him with this decision making.    At the time of my visit today Jesús did not demonstrate capacity to process complex medical information independently, and will continue to rely on his healthcare agent/daughter Iliana and other family members when needed.    At this time daughters hope to move ahead with treatment of liver abscess as per clinician's recommendations.    Daughters feel that his meld mental illness problems (bipolar disorder depression anxiety) have been magnified by his clinical illness and hope his psychiatric illness can continue to be addressed, as they feel he would better tolerate cancer treatment if he felt better.    Family is disease understanding at this time is that his cancer is potentially curable with a Whipple procedure and chemoradiation.    No active symptom management needs at this time.    These recommendations have been discussed with Dr. Cristiana Young, family.      Thank you for the opportunity to participate in the care of this patient and family. Our team: will continue to follow.     During regular M-F work hours -- if you are not sure who specifically to contact -- please contact us by sending a text page to our team consult pager at [phone].    After regular work hours and on weekends/holidays,  "you can call our answering service at [phone]. Also, who's on call for us is available in [employer].   Micheal Monahan MD  Palliative Medicine Consult Team  Pager: [phone]   TT: 81 minutes, with > 50% spent in C/C/E patient/family/care teams re: GOC, POC, Sx management. [id number]        Assessments:  Jesús Stock is a 68 year old male with PMH of stage II pancreatic adenocarcinoma with recent initiation of chemotherapy (first round 8/29), insulin dependent type II diabetes mellitus, obesity, and osteoarthritis who presented 9/6/19 with weight loss and failure to thrive. He was admitted for dehydration/sepsis, now with + BC MSSA, and newly-discovered liver abscess.  Port has been removed as well as possible contributor to sepsis.     Today, the patient was seen for:  Pancreatic cancer, symptom assessment, goals of care planning, family support    Prognosis, Goals, & Planning:      Functional Status just prior to hospitalization: 3 (Capable of only limited self-care; needs help with ADLs; in bed/chair >50% of waking hours)      Prognosis, Goals, and/or Advance Care Planning were addressed today: Yes        Summary/Comments: No ACD, unable to name who he might want as HCA      Patient's decision making preferences: unable to assess Pt and family members report some degree of \"difficult relationships\" over the years, yet all remain involved.          Patient has decision-making capacity today for complex decisions: Not at the time I saw hime            I have concerns about the patient/family's health literacy today: No           Patient has a completed Health Care Directive: No.       Code status: DNR/DNI    Coping, Meaning, & Spirituality:   Mood, coping, and/or meaning in the context of serious illness were addressed today: Yes  Summary/Comments: Relatively isolated person, notes that [employer] is important to him, struggles with psychiatric illness including diagnosis of bipolar disorder.    Social: " "  Retired, , socially isolated, was by report about to become homeless when he moved in with daughter Cori.     History of Present Illness:  Recent history as above.  Now found to have liver abscess as possible source of + BC  He was making comments about \"euthanize me\" yesterday to Dr Young/RN, but states he has no actual intention of harming himself.   Daughter/HCA Cori also has had questions about best pathway forward, including consideration of hospice care, although at this time patient and family hope to continue current treatment plan    Key Palliative Symptom Data:  # Pain severity the last 12 hours: low  # Dyspnea severity the last 12 hours: low  # Nausea severity the last 12 hours: low  # Anxiety severity the last 12 hours: low    Patient is on opioids: assessed and bowels ok/no needed changes to plan of care today.    ROS:  Comprehensive ROS is reviewed and is negative except as here & per HPI:   + mental status changes when compared to baseline.    Pain effectively managed with MSContin 20 mg q 12; has prn oxy IR but not using  Feels Marinol has had some degree of positive impact on appetite.  + Stool 9/10, 9/12     Past Medical History:  Past Medical History:   Diagnosis Date     Alcohol dependence in remission sober since 1984 (H)     sober since 8/25/84     Backache, unspecified      Colon polyps 5/24/11    2 colon polyps removed at colonoscopy     Diverticulosis of colon 5/24/11    mild pan-colonic diverticulosis seen as incidental finding on colonoscopy     Major depressive disorder, single episode, mild (H)      Obesity (BMI 30.0-34.9) 08/28/2017    BMI 31.4     Osteoarthrosis, unspecified whether generalized or localized, unspecified site      Pancreatic cancer (H)      Type 2 diabetes mellitus without complication, with long-term current use of insulin (H) 03/13/2019    glucose 395        Past Surgical History:  Past Surgical History:   Procedure Laterality Date     APPENDECTOMY   "     COLONOSCOPY  5/24/11    2 polyps, mild pan-colonic diverticulosis     COLONOSCOPY N/A 9/7/2017    Procedure: COMBINED COLONOSCOPY, SINGLE OR MULTIPLE BIOPSY/POLYPECTOMY BY BIOPSY;;  Surgeon: Gene Grimes MD;  Location: [facility]     ENDOSCOPIC RETROGRADE CHOLANGIOPANCREATOGRAM N/A 6/5/2019    Procedure: ENDOSCOPIC RETROGRADE CHOLANGIOPANCREATOGRAPHY, ENDOSCOPIC UTRASOUND ESOPHAGOSCOPY WITH STENT PLACEMENT. ;  Surgeon: Jana Nelson MD;  Location: [facility] OR     ENDOSCOPIC RETROGRADE CHOLANGIOPANCREATOGRAPHY, EXCHANGE TUBE/STENT N/A 7/18/2019    Procedure: ENDOSCOPIC RETROGRADE CHOLANGIOPANCREATOGRAPHY, WITH STENT EXCHANGE;  Surgeon: Jana Nelson MD;  Location: [facility] OR     ESOPHAGOSCOPY, GASTROSCOPY, DUODENOSCOPY (EGD), COMBINED N/A 6/5/2019    Procedure: Esophagoscopy, gastroscopy, duodenoscopy (EGD), combined, pancreatic needle biopsies;  Surgeon: Jana Nelson MD;  Location: [facility] OR     HERNIA REPAIR      X3     IR CHEST PORT PLACEMENT > 5 YRS OF AGE  8/28/2019     IR PORT REMOVAL RIGHT  9/11/2019     TONSILLECTOMY           Family History:  Family History   Problem Relation Age of Onset     Diabetes Father         b:1926     Hypertension Mother         b:1925     Family History Negative Brother         b:1948     Family History Negative Brother         b:1951  deferred tremor     Family History Negative Sister         b:1953     Family History Negative Brother         b:1956     Family History Negative Brother         b:1958        Allergies:  Allergies   Allergen Reactions     Dust Mites      Mold      No Known Drug Allergies         Medications:  MSContin 15 mg BID  Oxy IR 5 mg prn; none in past 24 hours  Marinol 10 BID  Senna 1 tab BID  Miralax 1 dose daily  Zofran  Insulin    Physical Exam:  Vital Signs: Temp: 99.6  F (37.6  C) Temp src: Oral BP: 102/51 Pulse: 66 Heart Rate: 72 Resp: 18 SpO2: (!) 87 % O2 Device: None (Room air)    Weight: 165 lbs 9.05 oz  GEN: Awake, nods off  intermittently, interactive, chronically ill appearing  EYES: Sclera non-icteric  EARS: Eumorphic bilaterally  NOSE: No significant nasal drainage  MOUTH: Oral mucosa moist, no evidence of thrush  LUNGS: No increased WOB or accessory muscle use  CV: Regular radial pulse  ABD: Full, mild diffuse tenderness  EXTR: 1+LE edema  SKIN: Warm, dry  NEUROPSYCH: Engaged, limited attention span    Data reviewed:  ROUTINE ICU LABS (Last four results)  CMP  Recent Labs   Lab 09/13/19  0703 09/12/19  0659 09/11/19  0549 09/10/19  0610 09/09/19  0600 09/08/19  0624  09/07/19  0635 09/06/19  1320   NA  --  139 137  --  141 138  --  136 132*   POTASSIUM  --  4.1 3.7 3.7 3.8 3.5   < > 3.3* 3.2*   CHLORIDE  --  110* 107  --  110* 107  --  104 98   CO2  --  25 26  --  24 27  --  27 30   ANIONGAP  --  4 4  --  7 4  --  5 4   GLC  --  125* 86  --  178* 160*  --  101* 167*   BUN  --  5* 5*  --  6* 9  --  11 16   CR 0.93 1.07 1.11 0.96 0.89 0.81  --  0.90 0.95   GFRESTIMATED 83 70 67 80 87 >90  --  86 81   GFRESTBLACK >90 81 78 >90 >90 >90  --  >90 >90   MATTHEW  --  7.7* 7.7*  --  7.9* 7.9*  --  8.0* 8.1*   MAG  --   --  2.2 1.8  --  2.0  --  1.8  --    PROTTOTAL  --   --   --   --   --  5.8*  --   --  6.0*   ALBUMIN  --   --   --   --   --  1.9*  --   --  2.2*   BILITOTAL  --   --   --   --   --  0.7  --   --  1.0   ALKPHOS  --   --   --   --   --  300*  --   --  211*   AST  --   --   --   --   --  31  --   --  25   ALT  --   --   --   --   --  31  --   --  34    < > = values in this interval not displayed.     CBC  Recent Labs   Lab 09/12/19  0659 09/10/19  0610 09/09/19  0600 09/08/19  0624   WBC 10.4 6.8 7.1 4.7   RBC 2.68* 2.94* 3.06* 3.32*   HGB 8.5* 9.4* 9.7* 10.7*   HCT 25.4* 27.5* 28.8* 31.2*   MCV 95 94 94 94   MCH 31.7 32.0 31.7 32.2   MCHC 33.5 34.2 33.7 34.3   RDW 13.1 12.7 12.7 12.5    263 205 102*     INR  Recent Labs   Lab 09/11/19  1050   INR 1.77*     Arterial Blood GasNo lab results found in last 7 days.

## 2019-09-13 NOTE — PROGRESS NOTES
[facility]    Oncology Progress Note    Date of Service (when I saw the patient): 09/13/2019     Assessment & Plan   Jesús Stock is a 68 year old male who was admitted on 9/6/2019.     Pancreatic Cancer  -followed by Dr. Farrell  -presented 6/2019 with hepatic dysfunction and 2.7 cm pancreatic mass with biliary obstruction  -clinical T2N0M0 stage II adenocarcinoma, potentially resectable  -met with pancreatic surgeon and was felt to have potentially resectable disease  -neoadjuvant chemotherapy followed by consideration for resection was recommneded  -S/P sphincterotomy with stent placement  -began neoadjuvant gemcitabine (D1,8,15) & abraxane (D1,8,15)/28 day cycle on 8/28/19  -received c1d1 of treatment on 8/28 and has been off treatment since then  -CT A/P 9/12 pancreatic mass not well visualized.   -outpatient follow up with Dr Farrell for consideration to resume chemotherapy once infectious issues resolve  -discussed above plan with daughter who was available at the bedside.       Normocytic anemia   -stable and continue to follow     Liver abscess/bacteremia    -MSSA bacteremia    -CT abd/pelvis showing 8.4 cms liver abscess    -plan for drainage IR versus surgery    -ancef per ID    Severe malnutrition    -related to pancreatic cancer, nausea, poor appetite    -per nutrition    DVT Prophylaxis: Enoxaparin (Lovenox) SQ  Code Status: DNR/DNI    Gt Esteban    Interval History   Continues to have fever spikes.      Physical Exam   Temp: 99.6  F (37.6  C) Temp src: Oral BP: 102/51 Pulse: 66 Heart Rate: 72 Resp: 16 SpO2: (!) 87 % O2 Device: None (Room air)    Vitals:    09/11/19 0655 09/12/19 0616 09/13/19 0600   Weight: 75.1 kg (165 lb 9.1 oz) 76.4 kg (168 lb 6.9 oz) 75.1 kg (165 lb 9.1 oz)     Vital Signs with Ranges  Temp:  [96.3  F (35.7  C)-99.6  F (37.6  C)] 99.6  F (37.6  C)  Pulse:  [66] 66  Heart Rate:  [64-72] 72  Resp:  [16-18] 16  BP: ()/(51-56) 102/51  SpO2:  [87 %-91 %] 87  %  I/O last 3 completed shifts:  In: 1075 [I.V.:1075]  Out: -     Constitutional: awake, cooperative, no acute distress  Hematologic / Lymphatic: no cervical lymphadenopathy  GI: soft, non-distended, non-tender, no masses palpated  Skin: no bruising or bleeding  Musculoskeletal: no pedal edema    Medications     0.9% sodium chloride + KCl 20 mEq/L 75 mL/hr at 09/13/19 1124       ceFAZolin  2 g Intravenous Q8H     dronabinol  10 mg Oral BID     insulin aspart  1-7 Units Subcutaneous TID AC     insulin aspart  1-5 Units Subcutaneous At Bedtime     insulin aspart   Subcutaneous TID w/meals     insulin glargine  12 Units Subcutaneous At Bedtime     mirtazapine  15 mg Oral At Bedtime     morphine  15 mg Oral Q12H     polyethylene glycol  17 g Oral Daily     senna-docusate  1 tablet Oral BID    Or     senna-docusate  2 tablet Oral BID       Data   Results for orders placed or performed during the hospital encounter of 09/06/19 (from the past 24 hour(s))   Glucose by meter   Result Value Ref Range    Glucose 164 (H) 70 - 99 mg/dL   CT Abdomen Pelvis w Contrast    Narrative    CT ABDOMEN AND PELVIS WITH CONTRAST 9/12/2019 5:24 PM     HISTORY: Abdominal distension.    COMPARISON: [facility], 2019    TECHNIQUE: Volumetric helical acquisition of CT images from the lung  bases through the symphysis pubis after the administration of 84 mL  Isovue-370 intravenous contrast. Radiation dose for this scan was  reduced using automated exposure control, adjustment of the mA and/or  kV according to patient size, or iterative reconstruction technique.    FINDINGS: 7.6 x 8.4 cm air and fluid collection in the liver  concerning for abscess. Pneumobilia noted. Metallic biliary stent  evident. Pancreas mass less well seen due to motion artifact. Adrenal  glands, spleen, kidneys grossly unremarkable. No bowel obstruction.  Small amount of free fluid. No definite free intraperitoneal air.  Minimal pleural fluid bilaterally at the lung bases. No  frankly  destructive bony lesions.      Impression    IMPRESSION: Suspected liver abscess.     VANESSA CUNHA MD   Procalcitonin   Result Value Ref Range    Procalcitonin 12.68 (HH) ng/ml   Glucose by meter   Result Value Ref Range    Glucose 115 (H) 70 - 99 mg/dL   Glucose by meter   Result Value Ref Range    Glucose 91 70 - 99 mg/dL   Creatinine   Result Value Ref Range    Creatinine 0.93 0.66 - 1.25 mg/dL    GFR Estimate 83 >60 mL/min/[1.73_m2]    GFR Estimate If Black >90 >60 mL/min/[1.73_m2]   Glucose by meter   Result Value Ref Range    Glucose 55 (L) 70 - 99 mg/dL   Glucose by meter   Result Value Ref Range    Glucose 93 70 - 99 mg/dL   Glucose by meter   Result Value Ref Range    Glucose 76 70 - 99 mg/dL

## 2019-09-13 NOTE — PROGRESS NOTES
[facility]    Medicine Progress Note - Hospitalist Service       Date of Admission:  9/6/2019    Assessment & Plan   Jesús Stock is a 68 year old male with PMH of stage II pancreatic adenocarcinoma with recent initiation of chemotherapy (first round 8/29), insulin dependent type II diabetes mellitus, obesity, and osteoarthritis who presented 9/6/19 with weight loss and failure to thrive.  Fever of unclear etiology  Bacteremia  Liver abscess   Tmax 103.2 degrees fahrenheit . WBC WNL; remainder of CBC and BMP stable. UA unremarkable. Pt is without URI sx, SOB, chest pain, rash. No diarrhea, nausea, vomiting. No rashes. Port site on right chest wall C/D/I. CXR negative for acute pathology. Procal 0.84.   -- Blood cultures growing mssa , repeat blood cultures NTD, infectious disease following.  Blood cultures mssa , antibiotics  Changed to cefazolin on 9/10.  --started on  vancomycin and Zosyn  On admission.stopped 9/10  --Patient continues to spike fever t max 103F, port removed on 9/11, tip sent for culture.  Patient continues to have fever.  CT abdomen was obtained on 9/12 to evaluate for the extent of malignancy so that the family can decide whether they want to opt for hospice.  CT done 9/12 evening .  --The CT scan showed liver abscess, ID aware, plan is to continue Ancef and consult IR for drain placement, I had already consulted surgery.  Pending procedure, will need culture results on the fluid.  --Discussed with palliative care as per family request, so far the plan is to continue treatment  --Poor tip culture negative so far.  --Temporary n.p.o. orders placed for possible procedure later today.      Encephalopathy, intermittent: Likely multifactorial (metabolic, infectious, decompensated mental health) in the setting of fevers above and MS Contin rx.   --Patient continues to be occasionally confused  -- Monitor closely , improving     Failure to thrive  Generalized weakness, physical  deconditioning  Hyponatremia, resolved  Hypokalemia, resolved: Patient diagnosed with pancreatic cancer in 6/2019, started chemotherapy 8/26/19. Developed nausea and vomiting approx one week ago and has had very poor PO intake since, as well as increased weakness. Reports a weight loss of approx 80lbs over the past year. Appears depressed on interview. Family notes that patient has significant physical deconditioning. Labwork notes decreased albumin 3.4-->2.2, hyponatremia, hypokalemia.  Has home care already but per family and oncologist failing to thrive at home.    - Pt was hydrated with IVF and electrolytes repleted per protocol   - Psychiatry consulted as depression related to acute illness seems to be playing a major role into the above presentation.  -psychiatry recommended transfer to inpatient [facility], patient not interested in the plan.  He is not on hold  - Continue PTA Marinol 10 mg po every day  - PT evaluated, recommending TCU at discharge. Patient  Does not want to go into  [facility].  -Patient does have episodes in which he gets quite depressed and confused.    Severe malnutrition in the context of acute illness: Malnutrition related to nausea, vomiting, and poor appetite with pancreatic CA diagnosis as evidenced by fat and muscle loss with diagnosis of severe malnutrition.   -- Nutrition following, regular diet as tolerated, strawberry milkshake at 1000    Normocytic anemia, new  Thrombocytopenia, new: Hgb 8/28/19 was 11.7. Platelets at that time 156. This is secondary to malignancy and chemotherapy. No active signs of bleeding.   - Monitor        Major depression, recurrent, severe, without psychotic features: Patient has made active suicidal ideation to multiple providers, including to oncologist, ED physician. Not coping well with pancreatic cancer diagnosis. In clinic, he held off starting chemotherapy until last week, presumably due to his mental health issues. It appears that this is also  contributing to his failure to thrive picture.  - Psychiatry consulted. Started patient on Remeron 7.5 mg at bedtime. It seems patient  Is not hold able.  - PRN ativan      Stage II pancreatic adenocarcinoma  Cancer related pain: Follows with [facility], diagnosed in 6/2019. S/p ERCP x2 and pancreatic stent placement. Started gemcitabine and abraxane around 8/29/2019.  -- MOHPA consulted, see formal note by Dr. Gannon, plan for next dose of chemotherapy 9/11/19   -- Continue MS Contin 15 mg po BID, Oxycodone 5 mg po q4 hrs PRN, Mylicon 80 mg po BID, Zofran and Compazine PRN     IDDM: Diagnosed in March 2019. A1C on admission 7.7.  - Continue PTA Insulin glargine 12 units at bedtime   - SSI + carb counting while inpatient  - Regular diet okay here given poor PO intake, failure to thrive  -Blood sugars currently well controlled      Drug induced constipation:   -- Bowel regimen in place while patient is on narcotics.     Hx of alcohol use disorder: In remission.     Diet: Advance Diet as Tolerated  Room Service  Advance Diet as Tolerated: Regular Diet Adult    DVT Prophylaxis: Ambulate every shift  Huggins Catheter: not present  Code Status: DNR/DNI      Disposition Plan   Expected discharge:  TCU placement, further work-up regarding the new diagnosis of liver abscess 9/13 entered: Cristiana Young MD 09/13/2019, 9:33 AM     The patient's care was discussed with the Attending Physician, Dr. Arriaza, Bedside Nurse and Patient.    Cristiana Young MD  Hospitalist Service  [facility]  ______________________________________________________________________    Interval History     Continues to have low-grade fever, no temporary spikes noted since yesterday, CT results discussed with the patient, his CT shows liver abscess.  Palliative care was consulted as per family request, discussed with them about future plans, the plan is to meet with the daughter later today.  Patient gets so occasionally  confused, he does not seem to be best of   Historian,Or not in a position to make appropriate decision at this point.  He does have chronic abdominal pain  Data reviewed today: I reviewed all medications, new labs and imaging results over the last 24 hours.    Physical Exam   Vital Signs: Temp: 99.6  F (37.6  C) Temp src: Oral BP: 102/51 Pulse: 66 Heart Rate: 72 Resp: 18 SpO2: (!) 87 % O2 Device: None (Room air)    Weight: 165 lbs 9.05 oz    Constitutional: Alert and communicating, oriented x2.  Respiratory: Clear to auscultation bilaterally, no crackles or wheezing  Cardiovascular: Regular rate and rhythm, normal S1 and S2, and no murmur noted  GI: Normal bowel sounds, soft, non-distended, non-tender  Skin/Integumen: No rashes, no cyanosis, trace edema present bilateral lower extremities  Neuro : moving all 4 extremities, no focal deficit noted   .     Data   Recent Labs   Lab 09/13/19  0703 09/12/19  0659 09/11/19  1050 09/11/19  0549 09/10/19  0610 09/09/19  0600 09/08/19  0624  09/06/19  1320   WBC  --  10.4  --   --  6.8 7.1 4.7   < > 5.4   HGB  --  8.5*  --   --  9.4* 9.7* 10.7*   < > 9.9*   MCV  --  95  --   --  94 94 94   < > 93   PLT  --  321  --   --  263 205 102*   < > 63*   INR  --   --  1.77*  --   --   --   --   --   --    NA  --  139  --  137  --  141 138   < > 132*   POTASSIUM  --  4.1  --  3.7 3.7 3.8 3.5   < > 3.2*   CHLORIDE  --  110*  --  107  --  110* 107   < > 98   CO2  --  25  --  26  --  24 27   < > 30   BUN  --  5*  --  5*  --  6* 9   < > 16   CR 0.93 1.07  --  1.11 0.96 0.89 0.81   < > 0.95   ANIONGAP  --  4  --  4  --  7 4   < > 4   MATTHEW  --  7.7*  --  7.7*  --  7.9* 7.9*   < > 8.1*   GLC  --  125*  --  86  --  178* 160*   < > 167*   ALBUMIN  --   --   --   --   --   --  1.9*  --  2.2*   PROTTOTAL  --   --   --   --   --   --  5.8*  --  6.0*   BILITOTAL  --   --   --   --   --   --  0.7  --  1.0   ALKPHOS  --   --   --   --   --   --  300*  --  211*   ALT  --   --   --   --   --   --  31   --  34   AST  --   --   --   --   --   --  31  --  25   LIPASE  --   --   --   --   --   --   --   --  <10*    < > = values in this interval not displayed.     Recent Results (from the past 24 hour(s))   CT Abdomen Pelvis w Contrast    Narrative    CT ABDOMEN AND PELVIS WITH CONTRAST 9/12/2019 5:24 PM     HISTORY: Abdominal distension.    COMPARISON: [facility] 3, 2019    TECHNIQUE: Volumetric helical acquisition of CT images from the lung  bases through the symphysis pubis after the administration of 84 mL  Isovue-370 intravenous contrast. Radiation dose for this scan was  reduced using automated exposure control, adjustment of the mA and/or  kV according to patient size, or iterative reconstruction technique.    FINDINGS: 7.6 x 8.4 cm air and fluid collection in the liver  concerning for abscess. Pneumobilia noted. Metallic biliary stent  evident. Pancreas mass less well seen due to motion artifact. Adrenal  glands, spleen, kidneys grossly unremarkable. No bowel obstruction.  Small amount of free fluid. No definite free intraperitoneal air.  Minimal pleural fluid bilaterally at the lung bases. No frankly  destructive bony lesions.      Impression    IMPRESSION: Suspected liver abscess.     VANESSA CUNHA MD     Medications     0.9% sodium chloride + KCl 20 mEq/L 75 mL/hr at 09/12/19 2007       dronabinol  10 mg Oral BID     insulin aspart  1-7 Units Subcutaneous TID AC     insulin aspart  1-5 Units Subcutaneous At Bedtime     insulin aspart   Subcutaneous TID w/meals     insulin glargine  12 Units Subcutaneous At Bedtime     mirtazapine  15 mg Oral At Bedtime     morphine  15 mg Oral Q12H     piperacillin-tazobactam  3.375 g Intravenous Q6H     polyethylene glycol  17 g Oral Daily     senna-docusate  1 tablet Oral BID    Or     senna-docusate  2 tablet Oral BID

## 2019-09-13 NOTE — IR NOTE
US DRAIN OF SEROMA/HEMATOMA/ABSCESS/CYST  9/13/2019 4:22 PM     HISTORY:  A shunt has a fluid collection in the abdomen. Clinically showing signs of infection.    COMPARISON: CT dated 9/12/2019    FINDINGS: After obtaining informed consent, the patient was placed in a supine position on the fluoroscopy table. The skin overlying the liver was prepped and draped in the usual sterile manner. 1% lidocaine was injected for local anesthesia. Under ultrasound guidance, using a blunt Rendon needle, access into a intravenous hypoechoic fluid collection in the left lobe of the liver was obtained. Wire was curled in the collection cavity. Over the wire, a 10 Yi locking pigtail catheter was placed. Approximately 20 cc of purulent appearing fluid was aspirated out. Some of this was submitted for Gram stain and culture. The catheter was sutured to the patient's skin and hooked to a bag for external drainage.    I determined this patient to be an appropriate candidate for the planned sedation and procedure and reassessed the patient immediately prior to sedation and procedure. Moderate intravenous conscious sedation was supervised by me. The patient was independently monitored by a registered nurse assigned to the Department of radiology using automated blood pressure, EKG and pulse oximetry. The patient tolerated the procedure well. There were no immediate postprocedure complications. The patient's vital signs were monitored by radiology nursing staff under my supervision and remained stable throughout the study. Radiation dose for this scan was reduced using automated exposure control, adjustment of the mA and/or kV according to patient size, or iterative reconstruction technique.    MEDICATIONS: None    IMPRESSION: Ultrasound-guided liver fluid collection drainage performed as above. Patient's outputs will be monitored. The patient's should be monitored for signs and symptoms of sepsis post drain placement.

## 2019-09-14 NOTE — PLAN OF CARE
Pt arrived back on unit from  liver abscess drainage @1615.  Shivering, rigors, O2 sats 80's and temp 103 upon arrival.  Sepsis protocol fired, LA 5.3-MD notified, RRT called.  1500 mL bolus given, IV abx continued. LA redraw 1.9.  BP continued to be soft, MD notified, 1,000 additional LR bolus ordered.  VSS now stable on 2L NC except BP still soft.  BS @2200 was 46--25mL dextrose IV given, recheck 100.  Denies pain.  Condom catheter placed to monitor strict I/O, pt with adequate UOP but needs to stand and be reminded to void. No appetite, daughter is requesting additional input from MD as to how to stimulate appetite.  Discharge pending clinical improvement, nursing continue to monitor.

## 2019-09-14 NOTE — PLAN OF CARE
Pt returned to baseline orientation,  disoriented to time and situation at times. VSS on RA. Afebrile. BP WNL.  Denied pain/nausea. PIV infusing LR. Adequate UOP from condom catheter- on strict I &Os. /125, post breakfast carb count coverage (3 units). R upper chest drsg CDI. JUNO drain to bulb suction- small yellow serous drainage, dressing CDI. Initially appetite poor but improving, daughter is requesting additional input from MD as to how to stimulate appetite. Up A1 GB&W. Plan to continue monitoring, watch for soft BP. discharge to TCU TBD.

## 2019-09-14 NOTE — PLAN OF CARE
Pt has become more oriented throughout shift- now A&O x2-3, disoriented to time and situation at times. VSS on 3 L O2. Afebrile. BP WNL after albumin infusion, FPC through albumin infusion, IV came out, no infiltration. Denied pain/nausea. PIV infusing LR. Adequate UOP from condom catheter- on strict I &Os. Lantus held on eves- , 83- given applesauce, crackers, and pudding. R upper chest drsg CDI. JUNO drain to suction- minimal yellow serous drainage, dressing CDI. No appetite. Up A2 until BP WNL/stronger. Plan to continue monitoring- wean O2 as able/trend BP.

## 2019-09-14 NOTE — PROGRESS NOTES
[facility]  Hospitalist Progress Note for 9/14/2019:  Date of admission: 9/6/2019          Assessment and Plan:   Jesús Stock is a 68 year old male with PMH of stage II pancreatic adenocarcinoma with recent initiation of chemotherapy (first round 8/29), insulin dependent type II diabetes mellitus, obesity, and osteoarthritis who presented 9/6/19 with weight loss and failure to thrive.    Fever - ongoing:  MSSA Bacteremia:  Liver abscess, s/p  US drainage 9/13:  On admission -Tmax 103.2 degrees fahrenheit . WBC WNL; remainder of CBC and BMP stable. UA unremarkable. Pt is without URI sx, SOB, chest pain, rash. No diarrhea, nausea, vomiting. No rashes. Port site on right chest wall C/D/I. CXR negative for acute pathology. Procal 0.84.  - Initially started started on vancomycin and Zosyn & stopped 9/10 & changed to Cefazolin as +ve BC with MSSA on 9/8.  - Patient continued to spike fever t max 103F, port removed on 9/11, tip sent for culture- NTD.  Patient continues to have fever. CT abdomen was obtained on 9/12 to evaluate for the extent of malignancy so that the family can decide whether they want to opt for hospice.  CT done 9/12 evening .  --The CT scan showed liver abscess. S/P US guided drained 9/13th, cult so far mod GN rods so far  - Fever cont 103 last evening. WBC worsening 14.6 today  - ID following, AB per ID   - surgery were consulted.   --Discussed with palliative care as per family request, so far the plan is to continue treatment  --Poor tip culture negative so far.     Encephalopathy, intermittent: Likely multifactorial (metabolic, infectious, decompensated mental health) in the setting of fevers above and MS Contin rx.   --Patient continues to be occasionally confused  -- Monitor closely , improving     Failure to thrive  Generalized weakness, physical deconditioning  Hyponatremia, resolved  Hypokalemia, resolved: Patient diagnosed with pancreatic cancer in 6/2019, started  chemotherapy 8/26/19. Developed nausea and vomiting approx one week ago and has had very poor PO intake since, as well as increased weakness. Reports a weight loss of approx 80lbs over the past year. Appears depressed on interview. Family notes that patient has significant physical deconditioning. Labwork notes decreased albumin 3.4-->2.2, hyponatremia, hypokalemia.  Has home care already but per family and oncologist failing to thrive at home.    - Pt was hydrated with IVF and electrolytes repleted per protocol   - Psychiatry consulted as depression related to acute illness seems to be playing a major role into the above presentation.  -psychiatry recommended transfer to inpatient [facility], patient not interested in the plan.  He is not on hold  - Continue PTA Marinol 10 mg po every day  - PT evaluated, recommending TCU at discharge. Patient  Does not want to go into  [facility].  -Patient does have episodes in which he gets quite depressed and confused.     Severe malnutrition in the context of acute illness: Malnutrition related to nausea, vomiting, and poor appetite with pancreatic CA diagnosis as evidenced by fat and muscle loss with diagnosis of severe malnutrition.   -- Nutrition following, regular diet as tolerated, strawberry milkshake at 1000     Normocytic anemia, new  Thrombocytopenia, new: Hgb 8/28/19 was 11.7. Platelets at that time 156. This is secondary to malignancy and chemotherapy. No active signs of bleeding.   - Monitor      Major depression, recurrent, severe, without psychotic features: Patient has made active suicidal ideation to multiple providers, including to oncologist, ED physician. Not coping well with pancreatic cancer diagnosis. In clinic, he held off starting chemotherapy until last week, presumably due to his mental health issues. It appears that this is also contributing to his failure to thrive picture.  - Psychiatry consulted. Started patient on Remeron 7.5 mg at bedtime. It  seems patient  Is not hold able.  - PRN ativan      Stage II pancreatic adenocarcinoma  Cancer related pain: Follows with [facility], diagnosed in 6/2019. S/p ERCP x2 and pancreatic stent placement. Started gemcitabine and abraxane around 8/29/2019.  -- MIKEPA consulted, see formal note by Dr. Gannon, plan for next dose of chemotherapy.  -- Continue MS Contin 15 mg po BID, Oxycodone 5 mg po q4 hrs PRN, Mylicon 80 mg po BID, Zofran and Compazine PRN     IDDM: Diagnosed in March 2019. A1C on admission 7.7.  - Continue PTA Insulin glargine 12 units at bedtime   - SSI + carb counting while inpatient  - Regular diet okay here given poor PO intake, failure to thrive  -Blood sugars currently well controlled     Drug induced constipation:   -- Bowel regimen in place while patient is on narcotics.      Hx of alcohol use disorder: In remission.   Updated daughter Lindy and her sister and near bedside, all questions answered.  Diet: Advance Diet as Tolerated  Room Service  NPO per Anesthesia Guidelines for Procedure/Surgery Except for: Meds    DVT Prophylaxis: Ambulate every shift  Huggins Catheter: not present  Code Status: DNR/DNI          Disposition Plan: TBSHAKILA Spicer MD.  Hospitalist [phone] (7am -6 pm)                 Interval History:   no new complaints and doing well; no cp, sob, n/v/d, or abd pain.              Medications:       ceFAZolin  2 g Intravenous Q8H     dronabinol  10 mg Oral BID     insulin aspart  1-7 Units Subcutaneous TID AC     insulin aspart  1-5 Units Subcutaneous At Bedtime     insulin aspart   Subcutaneous TID w/meals     insulin glargine  12 Units Subcutaneous At Bedtime     mirtazapine  15 mg Oral At Bedtime     morphine  15 mg Oral Q12H     polyethylene glycol  17 g Oral Daily     senna-docusate  1 tablet Oral BID    Or     senna-docusate  2 tablet Oral BID     acetaminophen, acetaminophen, alpha-d-galactosidase, bisacodyl, glucose **OR** dextrose **OR** glucagon,  "LORazepam, magnesium sulfate, magnesium sulfate, melatonin, naloxone, ondansetron **OR** ondansetron, oxyCODONE, potassium chloride, potassium chloride with lidocaine, potassium chloride, potassium chloride, potassium chloride, senna-docusate **OR** senna-docusate, simethicone               Physical Exam:   Blood pressure 99/52, pulse 68, temperature 96.3  F (35.7  C), temperature source Oral, resp. rate 18, height 1.803 m (5' 11\"), weight 75.1 kg (165 lb 9.1 oz), SpO2 96 %.  Wt Readings from Last 4 Encounters:   19 75.1 kg (165 lb 9.1 oz)   19 75.8 kg (167 lb)   19 74.4 kg (164 lb)   19 76.4 kg (168 lb 6.4 oz)         Vital Sign Ranges  Temperature Temp  Av.5  F (37.5  C)  Min: 96.3  F (35.7  C)  Max: 103.9  F (39.9  C)   Blood pressure Systolic (24hrs), Av , Min:85 , Max:129        Diastolic (24hrs), Av, Min:39, Max:82      Pulse Pulse  Av  Min: 55  Max: 94   Respirations Resp  Av.3  Min: 16  Max: 24   Pulse oximetry SpO2  Av.6 %  Min: 89 %  Max: 97 %         Intake/Output Summary (Last 24 hours) at 2019 1315  Last data filed at 2019 0900  Gross per 24 hour   Intake 3308 ml   Output 1420 ml   Net 1888 ml       Constitutional: Cachetic, awake, alert, cooperative, no apparent distress   Lungs: Clear to auscultation bilaterally, no crackles or wheezing   Cardiovascular: Regular rate and rhythm, normal S1 and S2, and no murmur noted   Abdomen: Normal bowel sounds, soft, non-distended.   Skin: No rashes, no cyanosis, no edema               Data:   All laboratory data reviewed    "

## 2019-09-14 NOTE — PROGRESS NOTES
"[facility]  Infectious Disease Progress Note          Assessment and Plan:   IMP   1 69 yo male pancreatic CA, worsening overall and now fever, Port vs biliary vs other  2 + BC GPC, MSSA, PORT concern, s/p removal of the PORT.   3 Pancreatic CA, chemo, chronic pain  4 DM  5 CT with liver abscess    REC   1 BC is MSSA, on  ancef, a lot of abd pain but not new,  2  PORT  removed  3 CT with liver collection / abscess, needs biopsy/drain, for n ancef only , await cx of abscess , expand ABX if other growyh, this is a disease of ID/IR, no surgery consult needed  4 Will need extended IV, soc sx check noted likely rehab          Interval History:   no new complaints a lot of pain but this is chronic, feels lousy, BC + 1/2 GPC MSSA  Follow-up all remain neg, T 102.7 9/11 down since spike, line out Now CT suggests liver abscess. S/p I R drain placement               Medications:       ceFAZolin  2 g Intravenous Q8H     dronabinol  10 mg Oral BID     insulin aspart  1-7 Units Subcutaneous TID AC     insulin aspart  1-5 Units Subcutaneous At Bedtime     insulin aspart   Subcutaneous TID w/meals     insulin glargine  12 Units Subcutaneous At Bedtime     mirtazapine  15 mg Oral At Bedtime     morphine  15 mg Oral Q12H     polyethylene glycol  17 g Oral Daily     senna-docusate  1 tablet Oral BID    Or     senna-docusate  2 tablet Oral BID                  Physical Exam:   Blood pressure 99/52, pulse 68, temperature 96.3  F (35.7  C), temperature source Oral, resp. rate 18, height 1.803 m (5' 11\"), weight 75.1 kg (165 lb 9.1 oz), SpO2 96 %.  Wt Readings from Last 2 Encounters:   09/13/19 75.1 kg (165 lb 9.1 oz)   08/19/19 75.8 kg (167 lb)     Vital Signs with Ranges  Temp:  [96.3  F (35.7  C)-103.9  F (39.9  C)] 96.3  F (35.7  C)  Pulse:  [55-94] 68  Heart Rate:  [] 96.9  Resp:  [16-24] 18  BP: ()/(39-82) 99/52  SpO2:  [89 %-97 %] 96 %    Constitutional: Awake, alert, cooperative, chronic ill " appearance some chills ? now   Lungs: Clear to auscultation bilaterally, no crackles or wheezing   Cardiovascular: Regular rate and rhythm, normal S1 and S2, and no murmur noted   Abdomen: Normal bowel sounds, soft, non-distended, upper tender   Skin: No rashes, no cyanosis, no edema no emboli PORT site looks OK line out   Other:           Data:   All microbiology laboratory data reviewed.  Recent Labs   Lab Test 09/14/19  0740 09/12/19  0659 09/10/19  0610   WBC 14.6* 10.4 6.8   HGB 8.8* 8.5* 9.4*   HCT 26.3* 25.4* 27.5*   MCV 95 95 94    321 263     Recent Labs   Lab Test 09/14/19  0740 09/13/19  1632 09/13/19  0703   CR 0.92 1.06 0.93     No lab results found.  Recent Labs   Lab Test 09/11/19  1351 09/11/19  0935 09/10/19  0920 09/10/19  0919 09/09/19  0920 09/09/19  0842 09/08/19  1023 09/08/19  1000   CULT Culture negative monitoring continues  Canceled, Test credited  Test reordered as correct code   see miscc [id number]   No growth after 3 days No growth after 4 days No growth after 4 days No growth after 5 days No growth after 5 days No growth Cultured on the 1st day of incubation:  Staphylococcus aureus  *  Critical Value/Significant Value, preliminary result only, called to and read back by  Susannah Marcelo RN/S88 0819 AM CS    (Note)  POSITIVE for STAPHYLOCOCCUS AUREUS and NEGATIVE for the mecA gene  (not MRSA) by Verigene multiplex nucleic acid test. The mecA gene was  not detected. Final identification and antimicrobial susceptibility  testing will be verified by standard methods.    Specimen tested with Verigene multiplex, gram-positive blood culture  nucleic acid test for the following targets: Staph aureus, Staph  epidermidis, Staph lugdunensis, other Staph species, Enterococcus  faecalis, Enterococcus faecium, Streptococcus species, S. agalactiae,  S. anginosus grp., S. pneumoniae, S. pyogenes, Listeria sp., mecA  (methicillin resistance) and Anna/B (vancomycin resistance).    Critical  Value/Significant Value called to and read back by alethea parker rn @1107 9/9/19. ct

## 2019-09-14 NOTE — PROVIDER NOTIFICATION
MD Notification    Notified Person: MD    Notified Person Name: Yu    Notification Date/Time: 9/13/19 2000    Notification Interaction: telephone page    Purpose of Notification: BP 88/47 after 1500 mL bolus, very low urine output    Orders Received: Give another 1,000 mL bolus    Comments:

## 2019-09-14 NOTE — PROGRESS NOTES
[facility] Progress Note          Assessment and Plan:   Mr. Jesús Stokc is a 68 year old man who was admitted on 9/6/2019.      1. Pancreatic Cancer  -followed by Dr. Farrell  -presented 6/2019 with hepatic dysfunction and 2.7 cm pancreatic mass with biliary obstruction  -clinical T2N0M0 stage II adenocarcinoma, potentially resectable  -met with pancreatic surgeon and was felt to have potentially resectable disease  -neoadjuvant chemotherapy followed by consideration for resection was recommneded  -S/P sphincterotomy with stent placement  -began neoadjuvant gemcitabine (D1,8,15) & abraxane (D1,8,15)/28 day cycle on 8/28/19  -received c1d1 of treatment on 8/28 and has been off treatment since then  -CT A/P 9/12 pancreatic mass not well visualized.   -outpatient follow up with Dr Farrell for consideration to resume chemotherapy once infectious issues resolve  -discussed above plan with patient      2. Normocytic anemia   -stable and continue to follow      3. Liver abscess/bacteremia    -MSSA bacteremia    -CT abd/pelvis showing 8.4 cms liver abscess    -drainage tube placed by IR yesterday    -continue cefazolin per ID     4. Severe malnutrition    -related to pancreatic cancer, nausea, poor appetite    -per nutrition    5. Depression  - started on mirtazapine     DVT Prophylaxis: Enoxaparin (Lovenox) SQ  Code Status: DNR/DNI             Interval History:   Patient reports feeling comfortable. No complaints of nausea or vomiting.              Medications:       ceFAZolin  2 g Intravenous Q8H     dronabinol  10 mg Oral BID     insulin aspart  1-7 Units Subcutaneous TID AC     insulin aspart  1-5 Units Subcutaneous At Bedtime     insulin aspart   Subcutaneous TID w/meals     insulin glargine  12 Units Subcutaneous At Bedtime     mirtazapine  15 mg Oral At Bedtime     morphine  15 mg Oral Q12H     polyethylene glycol  17 g Oral Daily     senna-docusate  1 tablet Oral BID    Or     senna-docusate  2  "tablet Oral BID     acetaminophen, acetaminophen, alpha-d-galactosidase, bisacodyl, glucose **OR** dextrose **OR** glucagon, LORazepam, magnesium sulfate, magnesium sulfate, melatonin, naloxone, ondansetron **OR** ondansetron, oxyCODONE, potassium chloride, potassium chloride with lidocaine, potassium chloride, potassium chloride, potassium chloride, senna-docusate **OR** senna-docusate, simethicone               Physical Exam:   Blood pressure 95/52, pulse 55, temperature 97.6  F (36.4  C), temperature source Oral, resp. rate 18, height 1.803 m (5' 11\"), weight 75.1 kg (165 lb 9.1 oz), SpO2 95 %.  Wt Readings from Last 4 Encounters:   19 75.1 kg (165 lb 9.1 oz)   19 75.8 kg (167 lb)   19 74.4 kg (164 lb)   19 76.4 kg (168 lb 6.4 oz)         Vital Sign Ranges  Temperature Temp  Av.8  F (37.7  C)  Min: 96.9  F (36.1  C)  Max: 103.9  F (39.9  C)   Blood pressure Systolic (24hrs), Av , Min:85 , Max:129        Diastolic (24hrs), Av, Min:39, Max:82      Pulse Pulse  Av.3  Min: 55  Max: 94   Respirations Resp  Av.5  Min: 16  Max: 24   Pulse oximetry SpO2  Av.4 %  Min: 89 %  Max: 97 %         Intake/Output Summary (Last 24 hours) at 2019 0855  Last data filed at 2019 0656  Gross per 24 hour   Intake 3068 ml   Output 1420 ml   Net 1648 ml       Constitutional: Awake, alert, cooperative, no apparent distress   Lungs: Clear to auscultation bilaterally, no crackles or wheezing   Cardiovascular: Regular rate and rhythm, normal S1 and S2, and no murmur noted   Abdomen: Quiet bowel sounds, soft, non-distended, non-tender   Skin: No rashes, no cyanosis, no upper or lower extremity edema   Other: Percutaneous drain in upper abdomen          Data:   Laboratory:  [facility]  Recent Labs   Lab 19  0740 19  1632 19  0703 19  0659 19  0549 09/10/19  0610  19  0624    138  --  139 137  --    < > 138   POTASSIUM 4.2 4.2  --  4.1 3.7 3.7   < > " 3.5   CHLORIDE 110* 107  --  110* 107  --    < > 107   CO2 27 23  --  25 26  --    < > 27   ANIONGAP 3 8  --  4 4  --    < > 4   * 73  --  125* 86  --    < > 160*   BUN 7 6*  --  5* 5*  --    < > 9   CR 0.92 1.06 0.93 1.07 1.11 0.96   < > 0.81   GFRESTIMATED 84 71 83 70 67 80   < > >90   GFRESTBLACK >90 82 >90 81 78 >90   < > >90   MATTHEW 7.5* 7.7*  --  7.7* 7.7*  --    < > 7.9*   MAG  --   --   --   --  2.2 1.8  --  2.0   PROTTOTAL 5.6*  --   --   --   --   --   --  5.8*   ALBUMIN 1.7*  --   --   --   --   --   --  1.9*   BILITOTAL 0.5  --   --   --   --   --   --  0.7   ALKPHOS 356*  --   --   --   --   --   --  300*   AST 53*  --   --   --   --   --   --  31   ALT 18  --   --   --   --   --   --  31    < > = values in this interval not displayed.     CBC  Recent Labs   Lab 09/14/19  0740 09/12/19  0659 09/10/19  0610 09/09/19  0600   WBC 14.6* 10.4 6.8 7.1   RBC 2.76* 2.68* 2.94* 3.06*   HGB 8.8* 8.5* 9.4* 9.7*   HCT 26.3* 25.4* 27.5* 28.8*   MCV 95 95 94 94   MCH 31.9 31.7 32.0 31.7   MCHC 33.5 33.5 34.2 33.7   RDW 13.5 13.1 12.7 12.7    321 263 205     INR  Recent Labs   Lab 09/11/19  1050   INR 1.77*       Imaging data:  Results for orders placed or performed during the hospital encounter of 09/06/19   XR Chest 2 Views    Narrative    CHEST TWO VIEWS  9/8/2019 9:19 AM     HISTORY: Fever, unclear etiology.    COMPARISON: 3/13/2019.      Impression    IMPRESSION: Right chest port venous catheter, its tip at the SVC. No  pneumothorax. Mild left base atelectasis. No acute airspace disease  identified. Stable cardiac silhouette.    CIRO POPE MD   IR Port Removal Right    Narrative    PORT REMOVAL 9/11/2019 2:00 PM    HISTORY:  Completed chemotherapy, port no longer needed    COMPARISON:    9/8/2019    DESCRIPTION OF PROCEDURE:    After obtaining informed consent, the  patient was placed in a supine position on the fluoroscopy table. The  skin overlying the port was prepped and draped in the usual  sterile  manner. 1% lidocaine was injected for local anesthesia. An incision  was made over the previous port incision. The port was then dissected  out of the pocket. The port and its catheter were removed completely.  The pocket was washed with antibiotic laden saline. A portion of the  fibrous capsule which had formed around the port was also removed. The  port incision was closed with 3 deep interrupted stitches using 3-0  Vicryl. This was supplemented with Dermabond and Steri-Strips.    A fluoroscopic image was saved before and after port removal to  document that all portions of the port and catheter had been removed  completely.     I determined this patient to be an appropriate candidate for the  planned sedation and procedure and reassessed the patient immediately  prior to sedation and procedure. Moderate intravenous conscious  sedation was supervised by me. The patient was independently monitored  by a registered nurse assigned to the Department of radiology using  automated blood pressure, EKG and pulse oximetry. The patient  tolerated the procedure well. There were no immediate postprocedure  complications. The patient's vital signs were monitored by radiology  nursing staff under my supervision and remained stable throughout the  study. Radiation dose for this scan was reduced using automated  exposure control, adjustment of the mA and/or kV according to patient  size, or iterative reconstruction technique.    MEDICATIONS:    2 mg Versed, 100 mcg fentanyl    FLUOROSCOPY TIME: 0.1 minute    Total fluoroscopy dose: 0.05 mGy Air Kerma    Number of spot fluoroscopic images: 2    SEDATION TIME: 17 minutes      Impression    IMPRESSION:    Port removed as above.    OCTAVIANO BULLOCK MD   CT Abdomen Pelvis w Contrast    Narrative    CT ABDOMEN AND PELVIS WITH CONTRAST 9/12/2019 5:24 PM     HISTORY: Abdominal distension.    COMPARISON: [facility] 3, 2019    TECHNIQUE: Volumetric helical acquisition of CT images from  the lung  bases through the symphysis pubis after the administration of 84 mL  Isovue-370 intravenous contrast. Radiation dose for this scan was  reduced using automated exposure control, adjustment of the mA and/or  kV according to patient size, or iterative reconstruction technique.    FINDINGS: 7.6 x 8.4 cm air and fluid collection in the liver  concerning for abscess. Pneumobilia noted. Metallic biliary stent  evident. Pancreas mass less well seen due to motion artifact. Adrenal  glands, spleen, kidneys grossly unremarkable. No bowel obstruction.  Small amount of free fluid. No definite free intraperitoneal air.  Minimal pleural fluid bilaterally at the lung bases. No frankly  destructive bony lesions.      Impression    IMPRESSION: Suspected liver abscess.     VANESSA CUNHA MD   US Drainage Seroma/Hematoma Abscess/Cyst    Narrative    US DRAIN OF SEROMA/HEMATOMA/ABSCESS/CYST  9/13/2019 4:22 PM     HISTORY:  A shunt has a fluid collection in the abdomen. Clinically  showing signs of infection.    COMPARISON: CT dated 9/12/2019    FINDINGS: After obtaining informed consent, the patient was placed in  a supine position on the fluoroscopy table. The skin overlying the  liver was prepped and draped in the usual sterile manner. 1% lidocaine  was injected for local anesthesia. Under ultrasound guidance, using a  blunt Rendon needle, access into a intravenous hypoechoic fluid  collection in the left lobe of the liver was obtained. Wire was curled  in the collection cavity. Over the wire, a 10 Latvian locking pigtail  catheter was placed. Approximately 20 cc of purulent appearing fluid  was aspirated out. Some of this was submitted for Gram stain and  culture. The catheter was sutured to the patient's skin and hooked to  a bag for external drainage.    I determined this patient to be an appropriate candidate for the  planned sedation and procedure and reassessed the patient immediately  prior to sedation and procedure.  Moderate intravenous conscious  sedation was supervised by me. The patient was independently monitored  by a registered nurse assigned to the Department of radiology using  automated blood pressure, EKG and pulse oximetry. The patient  tolerated the procedure well. There were no immediate postprocedure  complications. The patient's vital signs were monitored by radiology  nursing staff under my supervision and remained stable throughout the  study. Radiation dose for this scan was reduced using automated  exposure control, adjustment of the mA and/or kV according to patient  size, or iterative reconstruction technique.    MEDICATIONS: None      Impression    IMPRESSION: Ultrasound-guided liver fluid collection drainage  performed as above. Patient's outputs will be monitored. The patient's  should be monitored for signs and symptoms of sepsis post drain  placement.    MD Raul WALTER MD

## 2019-09-14 NOTE — PROGRESS NOTES
SW:    D/A: SW met with pt and pt's dtr Oksana to discuss discharge planning. Therapies are recommending TCU at discharge. Pt & dtr Oksana agree, would like referrals sent to [facility], [facility], and [facility]. Pt prefers private rm and aware of [facility] fee/day. Discharge date TBD possible tomorrow 9/15 or Monday 9/16.  P: SW sent referrals to facilities via [facility], SW continue to follow for discharge planning and placement.    BEATRIS Massey

## 2019-09-14 NOTE — PROVIDER NOTIFICATION
MD Notification    Notified Person: MD    Notified Person Name: Dr. Price    Notification Date/Time: 9/13/19 8702    Notification Interaction: on call paged with call back    Purpose of Notification: SBP under 90 after boluses this evening    Orders Received: Will order albumin, if no response, call RRT    Comments:

## 2019-09-15 NOTE — PLAN OF CARE
Pt A&O ex situation at times- impulsive- bed alarm on. VSS on 2 L O2 for sleep. Denied pain. No appetite. BG WNL- bedtime lantus held. JUNO with minimal serous yellow output. LR infusing @ 75 ml/hr. Pt leaking urine around condom catheter- removed and using urinal for strict I&Os. Adequate UOP. No BM. Up A1, walker. Discharge to TCU pending infection improvement.

## 2019-09-15 NOTE — PROGRESS NOTES
[facility]  Hospitalist Progress Note for 9/14/2019:  Date of admission: 9/6/2019          Assessment and Plan:   Jesús Stock is a 68 year old male with PMH of stage II pancreatic adenocarcinoma with recent initiation of chemotherapy (first round 8/29), insulin dependent type II diabetes mellitus, obesity, and osteoarthritis who presented 9/6/19 with weight loss and failure to thrive.    Fever - ongoing:  MSSA Bacteremia:  Liver abscess, s/p  US drainage 9/13:  On admission -Tmax 103.2 degrees fahrenheit . WBC WNL; remainder of CBC and BMP stable. UA unremarkable. Pt is without URI sx, SOB, chest pain, rash. No diarrhea, nausea, vomiting. No rashes. Port site on right chest wall C/D/I. CXR negative for acute pathology. Procal 0.84.  - Initially started started on vancomycin and Zosyn & stopped 9/10 & changed to Cefazolin as +ve BC with MSSA on 9/8.  - Patient continued to spike fever t max 103F, port removed on 9/11, tip sent for culture- NTD.  Patient continues to have fever. CT abdomen was obtained on 9/12 to evaluate for the extent of malignancy so that the family can decide whether they want to opt for hospice.  CT done 9/12 evening .  --The CT scan showed liver abscess. S/P US guided drained 9/13th  - ID following, AB per ID   -Surgery consult was discontinued after patient seen by ID, IR was consulted and drain was placed on 9/13, cultures growing gram-negative bacilli. Might  need to change antibiotics for better coverage, infectious disease following.wbc is high .  Cefazolin have no coverage for Proteus, Enterobacter and Serratia  --Discussed with palliative care as per family request, so far the plan is to continue treatment  --Port  tip culture negative so far.     Encephalopathy, intermittent: Likely multifactorial (metabolic, infectious, decompensated mental health) in the setting of fevers above and MS Contin rx.   --Patient continues to be occasionally confused  -- Monitor closely ,  improving     Failure to thrive  Generalized weakness, physical deconditioning  Hyponatremia, resolved  Hypokalemia, resolved: Patient diagnosed with pancreatic cancer in 6/2019, started chemotherapy 8/26/19. Developed nausea and vomiting approx one week ago and has had very poor PO intake since, as well as increased weakness. Reports a weight loss of approx 80lbs over the past year. Appears depressed on interview. Family notes that patient has significant physical deconditioning. Labwork notes decreased albumin 3.4-->2.2, hyponatremia, hypokalemia.  Has home care already but per family and oncologist failing to thrive at home.    - Pt was hydrated with IVF and electrolytes repleted per protocol   - Psychiatry consulted as depression related to acute illness seems to be playing a major role into the above presentation.  -psychiatry recommended transfer to inpatient [facility], patient not interested in the plan.  He is not on hold  - Continue PTA Marinol 10 mg po every day  - PT evaluated, recommending TCU at discharge. Patient  Does not want to go into  [facility].  -Patient does have episodes in which he gets quite depressed and confused.     Severe malnutrition in the context of acute illness: Malnutrition related to nausea, vomiting, and poor appetite with pancreatic CA diagnosis as evidenced by fat and muscle loss with diagnosis of severe malnutrition.   -- Nutrition following, regular diet as tolerated, strawberry milkshake at 1000     Normocytic anemia, new  Thrombocytopenia, new: Hgb 8/28/19 was 11.7. Platelets at that time 156. This is secondary to malignancy and chemotherapy. No active signs of bleeding.   - Monitor      Major depression, recurrent, severe, without psychotic features: Patient has made active suicidal ideation to multiple providers, including to oncologist, ED physician. Not coping well with pancreatic cancer diagnosis. In clinic, he held off starting chemotherapy until last week,  presumably due to his mental health issues. It appears that this is also contributing to his failure to thrive picture.  - Psychiatry consulted. Started patient on Remeron 7.5 mg at bedtime. It seems patient  Is not hold able.  - PRN ativan      Stage II pancreatic adenocarcinoma  Cancer related pain: Follows with [facility], diagnosed in 6/2019. S/p ERCP x2 and pancreatic stent placement. Started gemcitabine and abraxane around 8/29/2019.  -- MOHPA consulted, see formal note by Dr. Gannon, plan for next dose of chemotherapy.  -- Continue MS Contin 15 mg po BID, Oxycodone 5 mg po q4 hrs PRN, Mylicon 80 mg po BID, Zofran and Compazine PRN  --Plan to restart chemo once infection is cleared.     IDDM: Diagnosed in March 2019. A1C on admission 7.7.  - Continue PTA Insulin glargine 12 units at bedtime   - SSI + carb counting while inpatient  - Regular diet okay here given poor PO intake, failure to thrive  -Blood sugars currently well controlled     Drug induced constipation:   -- Bowel regimen in place while patient is on narcotics.      Hx of alcohol use disorder: In remission.   Updated daughter Lindy  near bedside, all questions answered.  Diet: Advance Diet as Tolerated  DVT Prophylaxis: Ambulate every shift  Huggins Catheter: not present  Code Status: DNR/DNI          Disposition Plan: 1 to 2 days to TCU once antibiotic plan is finalized and decision made regarding the abscess drain.     Cristiana Young MD                   Interval History:   He is doing better today, afebrile, his culture from the hepatic abscess is growing gram-negative bacilli, currently he is only on cefazolin, there is serosanguineous drainage noted from the drain, denies any pain.  No shortness of breath or chest pain.  His appetite continues to be poor.           Medications:       ceFAZolin  2 g Intravenous Q8H     dronabinol  10 mg Oral BID     insulin aspart  1-7 Units Subcutaneous TID AC     insulin aspart  1-5 Units  "Subcutaneous At Bedtime     insulin aspart   Subcutaneous TID w/meals     insulin glargine  12 Units Subcutaneous At Bedtime     mirtazapine  15 mg Oral At Bedtime     morphine  15 mg Oral Q12H     polyethylene glycol  17 g Oral Daily     senna-docusate  1 tablet Oral BID    Or     senna-docusate  2 tablet Oral BID     acetaminophen, acetaminophen, alpha-d-galactosidase, bisacodyl, glucose **OR** dextrose **OR** glucagon, LORazepam, magnesium sulfate, magnesium sulfate, melatonin, naloxone, ondansetron **OR** ondansetron, oxyCODONE, potassium chloride, potassium chloride with lidocaine, potassium chloride, potassium chloride, potassium chloride, senna-docusate **OR** senna-docusate, simethicone               Physical Exam:   Blood pressure 100/51, pulse 72, temperature 98.9  F (37.2  C), temperature source Oral, resp. rate 18, height 1.803 m (5' 11\"), weight 80.2 kg (176 lb 11.2 oz), SpO2 93 %.  Wt Readings from Last 4 Encounters:   09/15/19 80.2 kg (176 lb 11.2 oz)   19 75.8 kg (167 lb)   19 74.4 kg (164 lb)   19 76.4 kg (168 lb 6.4 oz)         Vital Sign Ranges  Temperature Temp  Av.5  F (37.5  C)  Min: 96.3  F (35.7  C)  Max: 103.9  F (39.9  C)   Blood pressure Systolic (24hrs), Av , Min:85 , Max:129        Diastolic (24hrs), Av, Min:39, Max:82      Pulse Pulse  Av  Min: 55  Max: 94   Respirations Resp  Av.3  Min: 16  Max: 24   Pulse oximetry SpO2  Av.6 %  Min: 89 %  Max: 97 %         Intake/Output Summary (Last 24 hours) at 2019 1315  Last data filed at 2019 0900  Gross per 24 hour   Intake 3308 ml   Output 1420 ml   Net 1888 ml       Constitutional: Cachetic, awake, alert, cooperative, no apparent distress   Lungs: Clear to auscultation bilaterally, no crackles or wheezing   Cardiovascular: Regular rate and rhythm, normal S1 and S2, and no murmur noted   Abdomen: Normal bowel sounds, soft, non-distended.   Skin: No rashes, no cyanosis, no edema            "    Data:   All laboratory data reviewed

## 2019-09-15 NOTE — PROVIDER NOTIFICATION
MD Notification    Notified Person: MD    Notified Person Name: Dr. Akers    Notification Date/Time: 9/14/19 at 2230    Notification Interaction: phone    Purpose of Notification: blood glucose 93, should we hold lantus for tonight?    Orders Received: order to hold lantus, may give it if blood glucose is above 150 overnight.    Comments:

## 2019-09-15 NOTE — PROGRESS NOTES
"[facility]  Infectious Disease Progress Note          Assessment and Plan:   IMP   1 69 yo male pancreatic CA, worsening overall and now fever, Port vs biliary vs other  2 + BC GPC, MSSA, PORT concern, s/p removal of the PORT.   3 Pancreatic CA, chemo, chronic pain  4 DM  5 CT with liver abscess    REC   1 BC is MSSA, on  ancef  2  PORT  removed  3 CT with liver collection / abscess, s/p /drain, and now with 2 GNR In the abscesses cultures, will be stopping ancef switching to zosyn till NAHOMY On GNR are back.   4 Will need extended IV, soc sx check noted likely rehab          Interval History:   no new complaints a lot of pain but this is chronic, feels lousy, BC + 1/2 GPC MSSA  Follow-up all remain neg, T 102.7 9/11 down since spike, line out Now CT suggests liver abscess. S/p I R drain placement               Medications:       dronabinol  10 mg Oral BID     insulin aspart  1-7 Units Subcutaneous TID AC     insulin aspart  1-5 Units Subcutaneous At Bedtime     insulin aspart   Subcutaneous TID w/meals     insulin glargine  12 Units Subcutaneous At Bedtime     mirtazapine  15 mg Oral At Bedtime     morphine  15 mg Oral Q12H     piperacillin-tazobactam  3.375 g Intravenous Q6H     polyethylene glycol  17 g Oral Daily     senna-docusate  1 tablet Oral BID    Or     senna-docusate  2 tablet Oral BID                  Physical Exam:   Blood pressure 112/55, pulse 75, temperature 99.1  F (37.3  C), temperature source Oral, resp. rate 18, height 1.803 m (5' 11\"), weight 80.2 kg (176 lb 11.2 oz), SpO2 92 %.  Wt Readings from Last 2 Encounters:   09/15/19 80.2 kg (176 lb 11.2 oz)   08/19/19 75.8 kg (167 lb)     Vital Signs with Ranges  Temp:  [98.9  F (37.2  C)-99.1  F (37.3  C)] 99.1  F (37.3  C)  Pulse:  [68-75] 75  Resp:  [16-18] 18  BP: ()/(47-55) 112/55  SpO2:  [89 %-93 %] 92 %    Constitutional: Awake, alert, cooperative, chronic ill appearance some chills ? now   Lungs: Clear to " auscultation bilaterally, no crackles or wheezing   Cardiovascular: Regular rate and rhythm, normal S1 and S2, and no murmur noted   Abdomen: Normal bowel sounds, soft, non-distended, upper tender   Skin: No rashes, no cyanosis, no edema no emboli PORT site looks OK line out   Other:           Data:   All microbiology laboratory data reviewed.  Recent Labs   Lab Test 09/14/19  0740 09/12/19  0659 09/10/19  0610   WBC 14.6* 10.4 6.8   HGB 8.8* 8.5* 9.4*   HCT 26.3* 25.4* 27.5*   MCV 95 95 94    321 263     Recent Labs   Lab Test 09/15/19  0721 09/14/19  0740 09/13/19  1632   CR 0.88 0.92 1.06     No lab results found.  Recent Labs   Lab Test 09/13/19  1606 09/11/19  1351 09/11/19  0935 09/10/19  0920 09/10/19  0919 09/09/19  0920 09/09/19  0842 09/08/19  1023 09/08/19  1000   CULT Moderate growth  Klebsiella variicola  *  Light growth  Serratia marcescens  *  Susceptibility testing in progress Culture negative monitoring continues  Canceled, Test credited  Test reordered as correct code   see miscc [id number]   No growth after 3 days No growth after 4 days No growth after 4 days No growth after 5 days No growth after 5 days No growth Cultured on the 1st day of incubation:  Staphylococcus aureus  *  Critical Value/Significant Value, preliminary result only, called to and read back by  Susannah Marcelo RN/S88 0819 AM CS    (Note)  POSITIVE for STAPHYLOCOCCUS AUREUS and NEGATIVE for the mecA gene  (not MRSA) by Tegoigene multiplex nucleic acid test. The mecA gene was  not detected. Final identification and antimicrobial susceptibility  testing will be verified by standard methods.    Specimen tested with Verigene multiplex, gram-positive blood culture  nucleic acid test for the following targets: Staph aureus, Staph  epidermidis, Staph lugdunensis, other Staph species, Enterococcus  faecalis, Enterococcus faecium, Streptococcus species, S. agalactiae,  S. anginosus grp., S. pneumoniae, S. pyogenes, Listeria sp.,  mecA  (methicillin resistance) and Anna/B (vancomycin resistance).    Critical Value/Significant Value called to and read back by alethea parker rn @2990 9/9/19. ct

## 2019-09-15 NOTE — PROVIDER NOTIFICATION
MD Notification    Notified Person: MD    Notified Person Name: Dr. Price    Notification Date/Time: 9/15/19 0245    Notification Interaction: call    Purpose of Notification: Fluids automatically discontinued after 24 hours    Orders Received: Keep Lr at 75 ml/hr    Comments:

## 2019-09-15 NOTE — CONSULTS
"[facility] General Surgery Consultation    Jesús Stock MRN# [id number]   YOB: 1950 Age: 68 year old      Date of Admission:  9/6/2019  Date of Consult: 9/15/2019         Assessment and Plan:   Patient is a 68 year old male with PMH s/f pancreatic cancer on neoadjuvant chemotherapy who was found to have an hepatic abscess s/p drain placement by IR on 9/13/2019. Cultures growing klebsiella and serratia. Pt has been afebrile since drain placement. He has chronic, stable abdominal pain. Drain with decresing output over past two days, 65ml yesterday, 30 since midnight.     PLAN:  Continue IR drain, consider repeat imaging early this week to assess for resolution and possible drain removal.          Requesting Physician:      Dr. Young        Chief Complaint:     Chief Complaint   Patient presents with     Dehydration          History of Present Illness:   Jesús Stock is a 68 year old male who was seen in consultation at the request of Dr. Young who presented with a hepatic abscess s/p drain placement by IR with improving fever curve. WBC pending for today. Drain outputs decreasing. As above, pt with h/o stage II pancreatic adenocarcinoma on chemotherapy. Was febrile on admission to 103.2, WBC yesterday 14.7. Pt reports stable chronic abdominal pain. Some discomfort at drain site. No real right upper quadrant pain. No nausea or emesis.           Physical Exam:   Blood pressure 112/55, pulse 75, temperature 99.1  F (37.3  C), temperature source Oral, resp. rate 18, height 1.803 m (5' 11\"), weight 80.2 kg (176 lb 11.2 oz), SpO2 92 %.  176 lbs 11.2 oz  General: lying comfortably in bed  Psych: Alert and Oriented.  Normal affect  Neurological: grossly intact  Eyes: Sclera clear  Respiratory:  nonlabored breathing  Cardiovascular:  Regular Rate and Rhythm   GI: soft, JUNO drain in epigastrium with serous fluid in JUNO. Mildly tender to palpation.    Lymphatic/Hematologic/Immune:  No femoral " or cervical lymphadenopathy.  Integumentary:  No rashes         Past Medical History:     Past Medical History:   Diagnosis Date     Alcohol dependence in remission sober since 1984 (H)     sober since 8/25/84     Backache, unspecified      Colon polyps 5/24/11    2 colon polyps removed at colonoscopy     Diverticulosis of colon 5/24/11    mild pan-colonic diverticulosis seen as incidental finding on colonoscopy     Major depressive disorder, single episode, mild (H)      Obesity (BMI 30.0-34.9) 08/28/2017    BMI 31.4     Osteoarthrosis, unspecified whether generalized or localized, unspecified site      Pancreatic cancer (H)      Type 2 diabetes mellitus without complication, with long-term current use of insulin (H) 03/13/2019    glucose 395            Past Surgical History:     Past Surgical History:   Procedure Laterality Date     APPENDECTOMY       COLONOSCOPY  5/24/11    2 polyps, mild pan-colonic diverticulosis     COLONOSCOPY N/A 9/7/2017    Procedure: COMBINED COLONOSCOPY, SINGLE OR MULTIPLE BIOPSY/POLYPECTOMY BY BIOPSY;;  Surgeon: Gene Grimes MD;  Location:  GI     ENDOSCOPIC RETROGRADE CHOLANGIOPANCREATOGRAM N/A 6/5/2019    Procedure: ENDOSCOPIC RETROGRADE CHOLANGIOPANCREATOGRAPHY, ENDOSCOPIC UTRASOUND ESOPHAGOSCOPY WITH STENT PLACEMENT. ;  Surgeon: Jana Nelson MD;  Location:  OR     ENDOSCOPIC RETROGRADE CHOLANGIOPANCREATOGRAPHY, EXCHANGE TUBE/STENT N/A 7/18/2019    Procedure: ENDOSCOPIC RETROGRADE CHOLANGIOPANCREATOGRAPHY, WITH STENT EXCHANGE;  Surgeon: Jana Nelson MD;  Location:  OR     ESOPHAGOSCOPY, GASTROSCOPY, DUODENOSCOPY (EGD), COMBINED N/A 6/5/2019    Procedure: Esophagoscopy, gastroscopy, duodenoscopy (EGD), combined, pancreatic needle biopsies;  Surgeon: Jana Nelson MD;  Location:  OR     HERNIA REPAIR      X3     IR CHEST PORT PLACEMENT > 5 YRS OF AGE  8/28/2019     IR PORT REMOVAL RIGHT  9/11/2019     TONSILLECTOMY              Current  Medications:           ceFAZolin  2 g Intravenous Q8H     dronabinol  10 mg Oral BID     insulin aspart  1-7 Units Subcutaneous TID AC     insulin aspart  1-5 Units Subcutaneous At Bedtime     insulin aspart   Subcutaneous TID w/meals     insulin glargine  12 Units Subcutaneous At Bedtime     mirtazapine  15 mg Oral At Bedtime     morphine  15 mg Oral Q12H     polyethylene glycol  17 g Oral Daily     senna-docusate  1 tablet Oral BID    Or     senna-docusate  2 tablet Oral BID       acetaminophen, acetaminophen, alpha-d-galactosidase, bisacodyl, glucose **OR** dextrose **OR** glucagon, LORazepam, magnesium sulfate, magnesium sulfate, melatonin, naloxone, ondansetron **OR** ondansetron, oxyCODONE, potassium chloride, potassium chloride with lidocaine, potassium chloride, potassium chloride, potassium chloride, senna-docusate **OR** senna-docusate, simethicone         Home Medications:     Prior to Admission medications    Medication Sig Last Dose Taking? Auth Provider   bisacodyl (DULCOLAX) 10 MG suppository Place 1 suppository (10 mg) rectally daily as needed for constipation  Yes Jayashree Fernandez PA-C   dronabinol (MARINOL) 10 MG capsule Take 10 mg by mouth 2 times daily 9/5/2019 at Unknown time Yes Unknown, Entered By History   insulin glargine (LANTUS SOLOSTAR PEN) 100 UNIT/ML pen Inject 12 Units Subcutaneous At Bedtime 9/6/2019 at 0300 Yes Leoncio Roberson MD   mirtazapine (REMERON) 7.5 MG tablet Take 1 tablet (7.5 mg) by mouth At Bedtime  Yes Jayashree Fernandez PA-C   morphine (MS CONTIN) 15 MG CR tablet Take 15 mg by mouth every 12 hours 9/6/2019 at 0300 Yes Reported, Patient   ondansetron (ZOFRAN) 4 MG tablet Take 1 tablet (4 mg) by mouth every 8 hours as needed for nausea 9/6/2019 at 0300 Yes Chirag Robert MD   polyethylene glycol (MIRALAX/GLYCOLAX) powder Take 1 capful by mouth daily 9/5/2019 at Unknown time Yes Reported, Patient   senna-docusate (SENOKOT-S/PERICOLACE) 8.6-50 MG tablet  Take 1-2 tablets by mouth 2 times daily  Yes Jayashree Fernandez PA-C   senna-docusate (SENOKOT-S/PERICOLACE) 8.6-50 MG tablet Take 1-2 tablets by mouth 2 times daily as needed for constipation  Yes Jayashree Fenrandez PA-C   acetaminophen (TYLENOL) 500 MG tablet Take 1-2 tablets by mouth every 6 hours as needed for pain. prn  Tan Jaramillo MD   alcohol swab prep pads Use to swab area of injection/valerie as directed.   Tan Jaramillo MD   alpha-d-galactosidase (BEANO) tablet Take 1 tablet by mouth 3 times daily as needed for intestinal gas prn at Unknown time  Unknown, Entered By History   blood glucose (NO BRAND SPECIFIED) test strip Use to test blood sugar 3 times daily or as directed. To accompany: Blood Glucose Monitor Brands: per insurance.   Tan Jaramillo MD   blood glucose calibration (NO BRAND SPECIFIED) solution Check once per month To accompany: Blood Glucose Monitor Brands: per insurance.   Tan Jaramillo MD   blood glucose monitoring (NO BRAND SPECIFIED) meter device kit Use to test blood sugar as directed. Preferred blood glucose meter OR supplies to accompany: Blood Glucose Monitor Brands: per insurance.   Tan Jaramillo MD   Continuous Blood Gluc  (FREESTYLE GIOVANY 14 DAY READER) SHAWN 1 Act every 14 days   Chirag Robert MD   Continuous Blood Gluc Sensor (FREESTYLE GIOVANY 14 DAY SENSOR) MISC 1 Act every 14 days   Chirag Robert MD   insulin pen needle (B-D U/F) 31G X 5 MM miscellaneous USE DAILY AS DIRECTED   Tan Jaramillo MD   LORazepam (ATIVAN) 0.5 MG tablet Take 0.5 mg by mouth every 6 hours as needed for anxiety prn  Reported, Patient   oxyCODONE HCl (OXAYDO) 5 MG TABA Take 5 mg by mouth 6 times daily prn  Reported, Patient   prochlorperazine (COMPAZINE) 5 MG tablet Take 1 tablet (5 mg) by mouth every 6 hours as needed for nausea or vomiting prn  Chirag Robert MD   sildenafil (REVATIO) 20 MG tablet Take  20 mg by mouth as needed More than a month at Unknown time  Reported, Patient   simethicone (MYLICON) 125 MG chewable tablet Take 125 mg by mouth 2 times daily as needed (abdominal pain) prn  Unknown, Entered By History   thin (NO BRAND SPECIFIED) lancets Check glucose 3-4 times per day; Use with lanceting device. To accompany: Blood Glucose Monitor Brands: per insurance.   Tan Jaramillo MD   Triprolidine-Pseudoephedrine 2.5-60 MG TABS Take 0.5 tablets by mouth every 6 hours as needed (allergies) prn  Unknown, Entered By History            Allergies:     Allergies   Allergen Reactions     Dust Mites      Mold      No Known Drug Allergies             Family History:     Family History   Problem Relation Age of Onset     Diabetes Father         b:1926     Hypertension Mother         b:1925     Family History Negative Brother         b:1948     Family History Negative Brother         b:1951  deferred tremor     Family History Negative Sister         b:1953     Family History Negative Brother         b:1956     Family History Negative Brother         b:1958           Social History:   Jessú Stock  reports that he is a non-smoker but has been exposed to tobacco smoke. He has never used smokeless tobacco. He reports that he does not drink alcohol or use drugs.          Review of Systems:   The 10 point Review of Systems is negative other than noted in the HPI.         Labs/Imaging   All new lab and imaging data was reviewed.   I have personally reviewed the imaging studies including his CT.         Delilah Welch MD

## 2019-09-15 NOTE — PROGRESS NOTES
A&Ox4. Forgetful at times. A-1. Appetite continued to be poor. No N/V/D. On IVF. Pain well controlled. Seen by ID. Ancef switched to Zosyn. No fever. VSS. Awaiting for a TCU placement.

## 2019-09-15 NOTE — PLAN OF CARE
Pt mostly oriented this shift, disoriented to situation at times. VSS on RA, denies pain.  No appetite for dinner, blood sugar at bedtime 93-lantus held per MD.  Condom catheter in place, pt stands at bedside to urinate.  Low rustam output.  JUNO with small amt of yellow drainage.  Discharge pending, nursing continue to monitor.

## 2019-09-15 NOTE — PROGRESS NOTES
SW:    D/A: SW received voicemail from [place], reason for decline is chemo treatment concerns. Stated if pt is still here mon-tues re-send referral for another look. SW met with pt and pt's dtr, gave 2 more TCU choices [facility] and [facility]. SW sent referrals via DOD.  P: Continue to follow for placement.    Monika Hawley, LSW    ADDENDUM    SW left voicemail's to both [facility] & [facility] to f/u on referrals sent for TCU placement. Will wait for call back.

## 2019-09-15 NOTE — PROGRESS NOTES
[facility] Progress Note          Assessment and Plan:   Mr. Jesús Stock is a 68 year old man who was admitted on 9/6/2019.      1. Pancreatic Cancer  -followed by Dr. Farrell  -presented 6/2019 with hepatic dysfunction and 2.7 cm pancreatic mass with biliary obstruction  -clinical T2N0M0 stage II adenocarcinoma, potentially resectable  -met with pancreatic surgeon and was felt to have potentially resectable disease  -neoadjuvant chemotherapy followed by consideration for resection was recommneded  -S/P sphincterotomy with stent placement  -began neoadjuvant gemcitabine (D1,8,15) & abraxane (D1,8,15)/28 day cycle on 8/28/19  -received c1d1 of treatment on 8/28 and has been off treatment since then  -CT A/P 9/12 pancreatic mass not well visualized.   -outpatient follow up with Dr Farrell for consideration to resume chemotherapy once infectious issues resolve  -discussed above plan with patient and his daughter. Case also discussed briefly with Dr. Welch      2. Normocytic anemia   -stable and continue to follow      3. Liver abscess/bacteremia    -MSSA bacteremia    -CT abd/pelvis showing 8.4 cms liver abscess    -drainage tube placed by IR yesterday    -continue cefazolin per ID     4. Severe malnutrition    -related to pancreatic cancer, nausea, poor appetite    -per nutrition     5. Depression  - started on mirtazapine     DVT Prophylaxis: Enoxaparin (Lovenox) SQ  Code Status: DNR/DNI             Interval History:   Patient appears weak but comfortable. He relates pain in abdomen              Medications:       ceFAZolin  2 g Intravenous Q8H     dronabinol  10 mg Oral BID     insulin aspart  1-7 Units Subcutaneous TID AC     insulin aspart  1-5 Units Subcutaneous At Bedtime     insulin aspart   Subcutaneous TID w/meals     insulin glargine  12 Units Subcutaneous At Bedtime     mirtazapine  15 mg Oral At Bedtime     morphine  15 mg Oral Q12H     polyethylene glycol  17 g Oral Daily      "senna-docusate  1 tablet Oral BID    Or     senna-docusate  2 tablet Oral BID     acetaminophen, acetaminophen, alpha-d-galactosidase, bisacodyl, glucose **OR** dextrose **OR** glucagon, LORazepam, magnesium sulfate, magnesium sulfate, melatonin, naloxone, ondansetron **OR** ondansetron, oxyCODONE, potassium chloride, potassium chloride with lidocaine, potassium chloride, potassium chloride, potassium chloride, senna-docusate **OR** senna-docusate, simethicone               Physical Exam:   Blood pressure 112/55, pulse 75, temperature 99.1  F (37.3  C), temperature source Oral, resp. rate 18, height 1.803 m (5' 11\"), weight 80.2 kg (176 lb 11.2 oz), SpO2 92 %.  Wt Readings from Last 4 Encounters:   09/15/19 80.2 kg (176 lb 11.2 oz)   19 75.8 kg (167 lb)   19 74.4 kg (164 lb)   19 76.4 kg (168 lb 6.4 oz)         Vital Sign Ranges  Temperature Temp  Av.3  F (36.8  C)  Min: 96.3  F (35.7  C)  Max: 99.1  F (37.3  C)   Blood pressure Systolic (24hrs), Av , Min:99 , Max:112        Diastolic (24hrs), Av, Min:47, Max:55      Pulse Pulse  Av.8  Min: 68  Max: 75   Respirations Resp  Av.4  Min: 16  Max: 18   Pulse oximetry SpO2  Av.6 %  Min: 89 %  Max: 96 %         Intake/Output Summary (Last 24 hours) at 9/15/2019 1034  Last data filed at 9/15/2019 0600  Gross per 24 hour   Intake 1569 ml   Output 1220 ml   Net 349 ml       Constitutional: Awake, alert, cooperative, no apparent distress   Lungs: Clear to auscultation bilaterally, no crackles or wheezing   Cardiovascular: Regular rate and rhythm, normal S1 and S2, and no murmur noted   Abdomen: Normal bowel sounds, soft, non-distended, non-tender   Skin: No rashes, no cyanosic   Other: Affect flat. Drain tube in mid abdomen          Data:   Laboratory:  [facility]  Recent Labs   Lab 09/15/19  0721 19  0740 19  1632 19  0703 19  0659 19  0549 09/10/19  0610   NA  --  140 138  --  139 137  --    POTASSIUM  --  " 4.2 4.2  --  4.1 3.7 3.7   CHLORIDE  --  110* 107  --  110* 107  --    CO2  --  27 23  --  25 26  --    ANIONGAP  --  3 8  --  4 4  --    GLC  --  139* 73  --  125* 86  --    BUN  --  7 6*  --  5* 5*  --    CR 0.88 0.92 1.06 0.93 1.07 1.11 0.96   GFRESTIMATED 87 84 71 83 70 67 80   GFRESTBLACK >90 >90 82 >90 81 78 >90   MATTHEW  --  7.5* 7.7*  --  7.7* 7.7*  --    MAG  --   --   --   --   --  2.2 1.8   PROTTOTAL  --  5.6*  --   --   --   --   --    ALBUMIN  --  1.7*  --   --   --   --   --    BILITOTAL  --  0.5  --   --   --   --   --    ALKPHOS  --  356*  --   --   --   --   --    AST  --  53*  --   --   --   --   --    ALT  --  18  --   --   --   --   --      CBC  Recent Labs   Lab 09/14/19  0740 09/12/19  0659 09/10/19  0610 09/09/19  0600   WBC 14.6* 10.4 6.8 7.1   RBC 2.76* 2.68* 2.94* 3.06*   HGB 8.8* 8.5* 9.4* 9.7*   HCT 26.3* 25.4* 27.5* 28.8*   MCV 95 95 94 94   MCH 31.9 31.7 32.0 31.7   MCHC 33.5 33.5 34.2 33.7   RDW 13.5 13.1 12.7 12.7    321 263 205     INR  Recent Labs   Lab 09/11/19  1050   INR 1.77*       Imaging data:  No results found for this or any previous visit (from the past 24 hour(s)).      Raul Wiggins MD

## 2019-09-15 NOTE — PROGRESS NOTES
SW:  D/A: SW placed call to [facility] to inquire on referral, facility has declined pt due to chemo treatments. ELOISE left message with [facility] to f/u on referral. [facility] has declined due to previous suicidal ideation. ELOISE left message for pt's daughter Oksana with update on referrals and requesting more facilities to send referrals to for TCU placement.   P: Continue to follow and assist with placement.     BEATRIS Massey

## 2019-09-16 NOTE — PROGRESS NOTES
[facility] Progress Note     Primary Oncologist/Hematologist:  Dr. Farrell          Assessment and Plan:     Mr. Jesús Stock is a 68 year old man who was admitted on 9/6/2019.      1. Pancreatic Cancer  -followed by Dr. Farrell  -presented 6/2019 with hepatic dysfunction and 2.7 cm pancreatic mass with biliary obstruction  -clinical T2N0M0 stage II adenocarcinoma, potentially resectable  -met with pancreatic surgeon and was felt to have potentially resectable disease  -neoadjuvant chemotherapy followed by consideration for resection was recommneded  -S/P sphincterotomy with stent placement  -began neoadjuvant gemcitabine (D1,8,15) & abraxane (D1,8,15)/28 day cycle on 8/28/19  -received c1d1 of treatment on 8/28 and has been off treatment since then  -CT A/P 9/12 pancreatic mass not well visualized.   -outpatient follow up with Dr Farrell for consideration to resume chemotherapy once infectious issues resolve       2. Normocytic anemia   -stable and continue to follow      3. Liver abscess/bacteremia    - GPC, MSSA bacteremia s/p daniel-cath removal on 9/11/19    -CT abd/pelvis showing 8.4 cms liver abscess    -drainage tube placed by IR     -continue zosyn per ID     4. Severe malnutrition    -related to pancreatic cancer, nausea, poor appetite    -per nutrition     5. Depression  - started on mirtazapine     DVT Prophylaxis: Enoxaparin (Lovenox) SQ  Code Status: DNR/DNI            Interval History:   Feeling discouraged. Does not feel much better than admission.  He's not sure how much longer he wants to fight.               Review of Systems:   The 5 point Review of Systems is negative other than noted in the HPI             Medications:   Scheduled Medications    dronabinol  10 mg Oral BID     insulin aspart  1-7 Units Subcutaneous TID AC     insulin aspart  1-5 Units Subcutaneous At Bedtime     insulin aspart   Subcutaneous TID w/meals     insulin glargine  12 Units Subcutaneous At Bedtime      "mirtazapine  15 mg Oral At Bedtime     morphine  15 mg Oral Q12H     piperacillin-tazobactam  3.375 g Intravenous Q6H     polyethylene glycol  17 g Oral Daily     senna-docusate  1 tablet Oral BID    Or     senna-docusate  2 tablet Oral BID     PRN Medications  acetaminophen, acetaminophen, alpha-d-galactosidase, bisacodyl, glucose **OR** dextrose **OR** glucagon, LORazepam, magnesium sulfate, magnesium sulfate, melatonin, naloxone, ondansetron **OR** ondansetron, oxyCODONE, potassium chloride, potassium chloride with lidocaine, potassium chloride, potassium chloride, potassium chloride, senna-docusate **OR** senna-docusate, simethicone               Physical Exam:   Vitals were reviewed  Blood pressure 137/65, pulse 75, temperature 99.5  F (37.5  C), temperature source Oral, resp. rate 17, height 1.803 m (5' 11\"), weight 80.2 kg (176 lb 11.2 oz), SpO2 92 %.  Wt Readings from Last 4 Encounters:   09/15/19 80.2 kg (176 lb 11.2 oz)   08/19/19 75.8 kg (167 lb)   08/16/19 74.4 kg (164 lb)   07/29/19 76.4 kg (168 lb 6.4 oz)       I/O last 3 completed shifts:  In: 1493 [P.O.:120; I.V.:1373]  Out: 545 [Urine:500; Drains:45]      Constitutional: Awake, alert, cooperative, no apparent distress   Lungs: Clear to auscultation bilaterally, no crackles or wheezing   Cardiovascular: Regular rate and rhythm, normal S1 and S2, and no murmur noted   Abdomen: Normal bowel sounds, soft, non-distended, non-tender   Skin: No rashes, no cyanosis, no edema   Other: Abdominal drainage tube with scant clear brown drainage.              Data:   All laboratory data and imaging studies reviewed.    CMP  Recent Labs   Lab 09/16/19  0705 09/15/19  0721 09/14/19  0740 09/13/19  1632  09/12/19  0659 09/11/19  0549 09/10/19  0610   NA  --   --  140 138  --  139 137  --    POTASSIUM  --   --  4.2 4.2  --  4.1 3.7 3.7   CHLORIDE  --   --  110* 107  --  110* 107  --    CO2  --   --  27 23  --  25 26  --    ANIONGAP  --   --  3 8  --  4 4  --    GLC  " --   --  139* 73  --  125* 86  --    BUN  --   --  7 6*  --  5* 5*  --    CR 0.89 0.88 0.92 1.06   < > 1.07 1.11 0.96   GFRESTIMATED 87 87 84 71   < > 70 67 80   GFRESTBLACK >90 >90 >90 82   < > 81 78 >90   MATTHEW  --   --  7.5* 7.7*  --  7.7* 7.7*  --    MAG  --   --   --   --   --   --  2.2 1.8   PROTTOTAL  --   --  5.6*  --   --   --   --   --    ALBUMIN  --   --  1.7*  --   --   --   --   --    BILITOTAL  --   --  0.5  --   --   --   --   --    ALKPHOS  --   --  356*  --   --   --   --   --    AST  --   --  53*  --   --   --   --   --    ALT  --   --  18  --   --   --   --   --     < > = values in this interval not displayed.     CBC  Recent Labs   Lab 09/16/19  0705 09/14/19  0740 09/12/19  0659 09/10/19  0610   WBC 12.3* 14.6* 10.4 6.8   RBC 2.74* 2.76* 2.68* 2.94*   HGB 8.6* 8.8* 8.5* 9.4*   HCT 25.6* 26.3* 25.4* 27.5*   MCV 93 95 95 94   MCH 31.4 31.9 31.7 32.0   MCHC 33.6 33.5 33.5 34.2   RDW 13.4 13.5 13.1 12.7    278 321 263     INR  Recent Labs   Lab 09/11/19  1050   INR 1.77*           Liya Marc CNP  Nurse Practitioner  [facility]  [phone]

## 2019-09-16 NOTE — PROGRESS NOTES
Chart checked  No new recommendations  Cont ir drain  If failure to improve, recommend referral to hepatobiliary surgeon

## 2019-09-16 NOTE — PLAN OF CARE
PT:  Attempted to see during scheduled PT session.  Patient lying in bed; declining to participating, stating he hasn't eaten much the last few days.  Encouraged in the benefits of activity; even a little but still flatly declined. Cancel PT session.

## 2019-09-16 NOTE — PROGRESS NOTES
"CLINICAL NUTRITION SERVICES - REASSESSMENT NOTE    Recommendations Ordered by Registered Dietitian (RD):   - Ongoing encouragement of PO (protein-focus, though whatever pt requests/tolerates).   - No supplements ordered at this time as pt has declined multiple times. OK for PRN if he is ever agreeable.    Malnutrition:   % Weight Loss:  > 7.5% in 3 months (severe malnutrition)  % Intake:  </= 75% for >/= 1 month (severe malnutrition)  Subcutaneous Fat Loss:  Orbital region moderate depletion and Upper arm region severe depletion  Muscle Loss:  Temporal region moderate depletion, Clavicle bone region moderate depletion, Dorsal hand region mild-moderate depletion, Anterior thigh region moderate depletion and Posterior calf region moderate depletion  Fluid Retention:  None noted    Malnutrition Diagnosis: Severe malnutrition  In Context of:  Acute illness or injury  Chronic illness or disease     EVALUATION OF PROGRESS TOWARD GOALS   Diet: Regular    Intake/Tolerance:  Continues to eat very minimally. Discussed appetite and intakes with Jesús this afternoon. He seems disoriented, made some comments that made sense, and many that didn't. Noted that all food is \"just nauseating\" and declined all offers of food. Later agrees to a half-portion of yogurt. When offered various foods and supplements pt noted \"that's just a bunch of sugar\" and then declined even the low sugar options \"I think I'll pass. Started to explain specific barriers to intake \"I have this pancreatic cancer\" but left his thought unfinished. Pt verbalized his difficulty word-finding.  He is taking Marinol \"that marijuana medication\", and actually thinks it has made a difference in appetite --> pt unable to explain this thought, instead moves on to talk about something else when asked to explain how Marinol has helped.  0-25% intakes recorded most of the time, occasionally closer to 75% for smaller meals.   Had dry cereal and milk at bedside, unopened. "     ASSESSED NUTRITION NEEDS:  Dosing Weight 74.4 kg   Estimated Energy Needs: 4989-1610 kcals (30-35 Kcal/Kg)  Justification: underweight  Estimated Protein Needs: 110-135 grams protein (1.5-1.8 g pro/Kg)  Justification: repletion  Estimated Fluid Needs: 3231-3992 mL (1 mL/Kcal)  Justification: maintenance    NEW FINDINGS:   - Pt not participating in therapies, placement pending.   - Hair loss -- pt picking at hair on pillow during visit.   - Labs reviewed  - Stooling: Last BM 9/16. Last had been 9/12.  - Wt up today ?fluid-related - 80.2 kg  - Meds: marinol BID, MSSI, Remeron 15 mg HS, scheduled bowel program, LR @ 75 mL/hr    Previous Goals:   Intake will improve within the next 2-3 days  Evaluation: Not met    Previous Nutrition Diagnosis:   Inadequate oral intake related to poor appetite and no interest in eating as evidenced by pt refusing meals and supplements  Evaluation: No change    MALNUTRITION  % Weight Loss:  > 7.5% in 3 months (severe malnutrition)  % Intake:  </= 75% for >/= 1 month (severe malnutrition)  Subcutaneous Fat Loss:  Orbital region moderate depletion and Upper arm region severe depletion  Muscle Loss:  Temporal region moderate depletion, Clavicle bone region moderate depletion, Dorsal hand region mild-moderate depletion, Anterior thigh region moderate depletion and Posterior calf region moderate depletion  Fluid Retention:  None noted    Malnutrition Diagnosis: Severe malnutrition  In Context of:  Acute illness or injury  Chronic illness or disease    CURRENT NUTRITION DIAGNOSIS  Inadequate oral intake related to lack of appetite, nausea r/t pancreatic cancer as evidenced by intakes 0-25% of meals since admission x8 days, PTA weight loss of 16% in 4 months, e/o fat and muscle wasting, coding for severe malnutrition.     INTERVENTIONS  Recommendations / Nutrition Prescription  Diet per MD    Supplements d/c'd earlier in admission per pt request, though they are available PRN should pt be  agreeable.     Ongoing encouragement of oral intakes with emphasis on protein, though any food that pt tolerates/desires is appropriate.     Implementation  Nutrition Education: attempted to encourage intakes, pt not agreeable at this time.     Goals  Intake of at least 25% meals TID to show improvement.   No weight loss beyond 74 kg.      MONITORING AND EVALUATION:  Progress towards goals will be monitored and evaluated per protocol and Practice Guidelines    Harleen Topete RD, LD  [facility] [place], 65, 54, [facility]   Pager: [phone]  Weekend Pager: [phone]

## 2019-09-16 NOTE — PLAN OF CARE
A+Ox2-3, VSS on RA and denies pain. Refusing night meds-spoke with daughter and pharmacy and rescheduled PM Marinol and Remeron doses for earlier in evening when pt is more likely to take meds.  No appetite at dinner.  Up with A1 to restroom, urgency and incontinence at times. Discharge pending, nursing continue to monitor.

## 2019-09-16 NOTE — PROGRESS NOTES
[facility]  Hospitalist Progress Note for 9/14/2019:  Date of admission: 9/6/2019          Assessment and Plan:   Jesús Stock is a 68 year old male with PMH of stage II pancreatic adenocarcinoma with recent initiation of chemotherapy (first round 8/29), insulin dependent type II diabetes mellitus, obesity, and osteoarthritis who presented 9/6/19 with weight loss and failure to thrive.    Fever - ongoing:  MSSA Bacteremia:  Liver abscess, s/p  US drainage 9/13:  On admission -Tmax 103.2 degrees fahrenheit . WBC WNL; remainder of CBC and BMP stable. UA unremarkable. Pt is without URI sx, SOB, chest pain, rash. No diarrhea, nausea, vomiting. No rashes. Port site on right chest wall C/D/I. CXR negative for acute pathology. Procal 0.84.  - Initially started started on vancomycin and Zosyn & stopped 9/10 & changed to Cefazolin as +ve BC with MSSA on 9/8.  - Patient continued to spike fever t max 103F, port removed on 9/11, tip sent for culture- NTD.  Patient continues to have fever. CT abdomen was obtained on 9/12 to evaluate for the extent of malignancy so that the family can decide whether they want to opt for hospice.  CT done 9/12 evening .  --The CT scan showed liver abscess. S/P US guided drained 9/13th  - ID following, AB per ID   -Surgery consult was discontinued after patient seen by ID, IR was consulted and drain was placed on 9/13, cultures growing gram-negative bacilli. Might  need to change antibiotics for better coverage, infectious disease following.wbc is high .  Cefazolin have no coverage for Proteus, Enterobacter and Serratia.  --Antibiotics switched from cefazolin to Zosyn on 9/15  --Discussed with palliative care as per family request, so far the plan is to continue treatment.  Discussed with the daughter again today 9/16 as the patient was not participating in PT and OT whether TCU discharge would not be an option.  --Patient also developed suicidal ideations today, psychiatry  reconsulted.  --Port  tip culture negative so far.  --Drainage is significantly reduced in the drain, will repeat CT possibly tomorrow to evaluate the abscess.     Encephalopathy, intermittent: Likely multifactorial (metabolic, infectious, decompensated mental health) in the setting of fevers above and MS Contin rx.   --Patient continues to be occasionally confused  -- Monitor closely , improving     Failure to thrive  Generalized weakness, physical deconditioning  Hyponatremia, resolved  Hypokalemia, resolved: Patient diagnosed with pancreatic cancer in 6/2019, started chemotherapy 8/26/19. Developed nausea and vomiting approx one week ago and has had very poor PO intake since, as well as increased weakness. Reports a weight loss of approx 80lbs over the past year. Appears depressed on interview. Family notes that patient has significant physical deconditioning. Labwork notes decreased albumin 3.4-->2.2, hyponatremia, hypokalemia.  Has home care already but per family and oncologist failing to thrive at home.    - Pt was hydrated with IVF and electrolytes repleted per protocol   - Psychiatry consulted as depression related to acute illness seems to be playing a major role into the above presentation.  -psychiatry recommended transfer to inpatient [facility], patient not interested in the plan.  He is not on hold  -PTA Marinol changed to 10 mg twice daily  - PT evaluated, recommending TCU at discharge. Patient  Does not want to go into  [facility].  -Patient does have episodes in which he gets quite depressed and confused.     Severe malnutrition in the context of acute illness: Malnutrition related to nausea, vomiting, and poor appetite with pancreatic CA diagnosis as evidenced by fat and muscle loss with diagnosis of severe malnutrition.   -- Nutrition following, regular diet as tolerated, strawberry milkshake at 1000     Normocytic anemia, new  Thrombocytopenia, new: Hgb 8/28/19 was 11.7. Platelets at that time  156. This is secondary to malignancy and chemotherapy. No active signs of bleeding.   - Monitor      Major depression, recurrent, severe, without psychotic features: Patient has made active suicidal ideation to multiple providers, including to oncologist, ED physician. Not coping well with pancreatic cancer diagnosis. In clinic, he held off starting chemotherapy until last week, presumably due to his mental health issues. It appears that this is also contributing to his failure to thrive picture.  - Psychiatry consulted. Started patient on Remeron 7.5 mg at bedtime. It seems patient  Is not hold able.  --Patient expressed to suicidal ideations, psychiatry consulted, placed on suicide precautions,.  For now we will closely monitor.  - PRN ativan      Stage II pancreatic adenocarcinoma  Cancer related pain: Follows with [facility], diagnosed in 6/2019. S/p ERCP x2 and pancreatic stent placement. Started gemcitabine and abraxane around 8/29/2019.  -- MOHPA consulted, see formal note by Dr. Gannon, plan for next dose of chemotherapy.  -- Continue MS Contin 15 mg po BID, Oxycodone 5 mg po q4 hrs PRN, Mylicon 80 mg po BID, Zofran and Compazine PRN  --Plan to restart chemo once infection is cleared.     IDDM: Diagnosed in March 2019. A1C on admission 7.7.  - Continue PTA Insulin glargine 12 units at bedtime   - SSI + carb counting while inpatient  - Regular diet okay here given poor PO intake, failure to thrive  -He started having hypoglycemia, 9/16 his insulin- Lantus was cut down to 6 units at bedtime     Drug induced constipation:   -- Bowel regimen in place while patient is on narcotics.      Hx of alcohol use disorder: In remission.   Updated daughter Lindy, all questions answered, total times spent greater than 35 minutes.  Diet: Advance Diet as Tolerated  DVT Prophylaxis: Ambulate every shift  Huggins Catheter: not present  Code Status: DNR/DNI          Disposition Plan: Patient would possibly be ready for  "discharge in 1 to 2 days once a repeat CT scan is done and the abscess size is reduced and drain removed.  Discussed with the daughter that the patient is not participating in PT and OT and TCU placement could be an issue, will encourage patient to participate in PT and OT.     Cristiana Young MD                   Interval History:   Patient slept well, no new issues, afebrile, he was feeling repeat in the morning, but later by afternoon he had expressed suicidal ideation to the nursing staff, pain  well controlled, tolerating diet but poor appetite present.           Medications:       dronabinol  10 mg Oral BID     insulin aspart  1-7 Units Subcutaneous TID AC     insulin aspart  1-5 Units Subcutaneous At Bedtime     insulin aspart   Subcutaneous TID w/meals     insulin glargine  12 Units Subcutaneous At Bedtime     mirtazapine  15 mg Oral At Bedtime     morphine  15 mg Oral Q12H     piperacillin-tazobactam  3.375 g Intravenous Q6H     polyethylene glycol  17 g Oral Daily     senna-docusate  1 tablet Oral BID    Or     senna-docusate  2 tablet Oral BID     acetaminophen, acetaminophen, alpha-d-galactosidase, bisacodyl, glucose **OR** dextrose **OR** glucagon, LORazepam, magnesium sulfate, magnesium sulfate, melatonin, naloxone, ondansetron **OR** ondansetron, oxyCODONE, potassium chloride, potassium chloride with lidocaine, potassium chloride, potassium chloride, potassium chloride, senna-docusate **OR** senna-docusate, simethicone               Physical Exam:   Blood pressure 137/65, pulse 75, temperature 99.5  F (37.5  C), temperature source Oral, resp. rate 17, height 1.803 m (5' 11\"), weight 80.2 kg (176 lb 11.2 oz), SpO2 92 %.  Wt Readings from Last 4 Encounters:   09/15/19 80.2 kg (176 lb 11.2 oz)   08/19/19 75.8 kg (167 lb)   08/16/19 74.4 kg (164 lb)   07/29/19 76.4 kg (168 lb 6.4 oz)               Intake/Output Summary (Last 24 hours) at 9/14/2019 1315  Last data filed at 9/14/2019 0900  Gross per 24 hour "   Intake 3308 ml   Output 1420 ml   Net 1888 ml       Constitutional: Awake, alert, cooperative, no apparent distress  Respiratory: Clear to auscultation bilaterally, no crackles or wheezing  Cardiovascular: Regular rate and rhythm, normal S1 and S2, and no murmur noted  GI: Normal bowel sounds, soft, non-distended, non-tender, drain noted, small amount of drainage noted, yet yellow in color  Skin/Integumen: No rashes, no cyanosis, no edema  Neuro : moving all 4 extremities, no focal deficit noted               Data:   All laboratory data reviewed

## 2019-09-16 NOTE — PLAN OF CARE
"Disoriented to situation. Frustrated and refusing some cares stating \"I just want to die\". MD made aware.  C/o abdominal pain/burning/nausea. Scheduled mscontin and marinol given. Patient declined other intervention. Up with A1W GB, impulsive at times. No appetite. BS 58; IV dextrose given as pt refused juice. Recovered to 106. MD paged to change lantus dosing. JUNO drain patent. IVF infusing. Continues on IV zosyn. Awaiting plan for drain and TCU placement.   "

## 2019-09-16 NOTE — PROGRESS NOTES
"[facility]  Infectious Disease Progress Note          Assessment and Plan:   IMP   1 67 yo male pancreatic CA  2 + BC GPC, MSSA, PORT concern, s/p removal of the PORT.   3 DM  4  liver abscess.  S/p perc drainage.  Culture growing Klebs and Serratia    REC   1 Cont Zosyn for now.  Covering all cultures organisms.  4 Will need extended IV, soc sx check noted likely rehab.  At time of discharge probably to [facility] plus levaquin.          Interval History:   Afebrile since percutaneous drainage of hepatic abscess.  Liver cx as above.  Now on Zosyn.  Main c/o is fatigue and poor appetite.              Medications:       dronabinol  10 mg Oral BID     insulin aspart  1-7 Units Subcutaneous TID AC     insulin aspart  1-5 Units Subcutaneous At Bedtime     insulin aspart   Subcutaneous TID w/meals     insulin glargine  12 Units Subcutaneous At Bedtime     mirtazapine  15 mg Oral At Bedtime     morphine  15 mg Oral Q12H     piperacillin-tazobactam  3.375 g Intravenous Q6H     polyethylene glycol  17 g Oral Daily     senna-docusate  1 tablet Oral BID    Or     senna-docusate  2 tablet Oral BID                  Physical Exam:   Blood pressure 129/65, pulse 75, temperature 97.2  F (36.2  C), temperature source Oral, resp. rate 16, height 1.803 m (5' 11\"), weight 80.2 kg (176 lb 11.2 oz), SpO2 92 %.  Wt Readings from Last 2 Encounters:   09/15/19 80.2 kg (176 lb 11.2 oz)   08/19/19 75.8 kg (167 lb)     Vital Signs with Ranges  Temp:  [97.2  F (36.2  C)-99.1  F (37.3  C)] 97.2  F (36.2  C)  Pulse:  [75] 75  Heart Rate:  [76-80] 80  Resp:  [16-18] 16  BP: (112-129)/(55-68) 129/65  SpO2:  [92 %] 92 %    Constitutional: Awake, alert, cooperative, chronic ill appearance some chills ? now   Lungs: Clear to auscultation bilaterally, no crackles or wheezing   Cardiovascular: Regular rate and rhythm, normal S1 and S2, and no murmur noted   Abdomen: Normal bowel sounds, soft, non-distended, upper tender   Skin: No " rashes, no cyanosis, no edema no emboli PORT site looks OK line out   Other: Drain in abd          Data:   All microbiology laboratory data reviewed.  Recent Labs   Lab Test 09/16/19  0705 09/14/19  0740 09/12/19  0659   WBC 12.3* 14.6* 10.4   HGB 8.6* 8.8* 8.5*   HCT 25.6* 26.3* 25.4*   MCV 93 95 95    278 321     Recent Labs   Lab Test 09/15/19  0721 09/14/19  0740 09/13/19  1632   CR 0.88 0.92 1.06     No lab results found.  Recent Labs   Lab Test 09/13/19  1606 09/11/19  1351 09/11/19  0935 09/10/19  0920 09/10/19  0919 09/09/19  0920 09/09/19  0842 09/08/19  1023 09/08/19  1000   CULT Moderate growth  Klebsiella variicola  *  Light growth  Serratia marcescens  Piperacillin/Tazobactam susceptibilities in progress  09.16.19  *  Susceptibility testing in progress No growth  Canceled, Test credited  Test reordered as correct code   see miscc [id number]   No growth after 5 days No growth No growth No growth No growth No growth Cultured on the 1st day of incubation:  Staphylococcus aureus  *  Critical Value/Significant Value, preliminary result only, called to and read back by  Alethea Parker RN/S88 0819 AM CS    (Note)  POSITIVE for STAPHYLOCOCCUS AUREUS and NEGATIVE for the mecA gene  (not MRSA) by Pongrigene multiplex nucleic acid test. The mecA gene was  not detected. Final identification and antimicrobial susceptibility  testing will be verified by standard methods.    Specimen tested with Verigene multiplex, gram-positive blood culture  nucleic acid test for the following targets: Staph aureus, Staph  epidermidis, Staph lugdunensis, other Staph species, Enterococcus  faecalis, Enterococcus faecium, Streptococcus species, S. agalactiae,  S. anginosus grp., S. pneumoniae, S. pyogenes, Listeria sp., mecA  (methicillin resistance) and Anna/B (vancomycin resistance).    Critical Value/Significant Value called to and read back by alethea parker rn @1107 9/9/19. ct

## 2019-09-16 NOTE — PROGRESS NOTES
"SW  I: SW called patient's daughter to update her as to why patient is being declined from TCU's. Patient has not participated in therapy since 9/11 and continues to make statements such as \"I want to die.\" Daughter is aware of this and would like to have a conversation with her father and SW regarding discharge plan. Oksana is planning to be at [facility] shortly and will discuss further at that time.     ADDENDUM  I: ELOISE spoke with patient and daughter Oksana in patient's room. ELOISE and Oksana tried explaining to patient that he needs to work with therapy to be able to get into TCU. Patient seemed to be quite confused during interaction. ELOISE will send referral to [facility]. Oksana would like to be present for PT assessment. SW will call in am to update on therapy time.     -[facility] declined patient due to not participating in therapy and overall high medical complexities.    P: SW will continue to follow and assist as needed.    Alberta Bell, MSW, LGSW  *[phone]    "

## 2019-09-16 NOTE — PLAN OF CARE
Disoriented to time and situation. VSS. LS clear. +BS, +flatus. Voiding adequately. JUNO patent with serous output. Impulsive. Up with assist x1 gb and walker. IVF. Tolerating diet, but has poor appetite.

## 2019-09-17 NOTE — PLAN OF CARE
Alert to self only, up with 1 + GB/walker- impulsive, bed alarm on. Incontinent at times. Regular diet, pt refusing meals- writer ordered meal for pt and he ate bites of it. BGM, hypoglycemia BS 49- IV Dextrose given x1, recheck 120. IVF D5 @ 50ml/hr started, intermittent Zosyn. Psych saw pt, plan to continue Remeron. VSS on RA, denies pain. Liver abscess drain with serous output, 40ml out on shift. Discharge date pending to TCU.

## 2019-09-17 NOTE — PROGRESS NOTES
ELOISE  I: ELOISE followed upw with both [facility] and [facility]. Both facilities need to complete assessments and will update SW. SW will update MD and daughter once a facility has accepted.     P: ELOISE will continue to follow and assist as needed.    Alberta Bell, MSW, LGSW  *[phone]

## 2019-09-17 NOTE — PROGRESS NOTES
States that he feels well  Tolerating diet  Minimal pain  Af vss  abd soft  Drain mostly serous  Wbc now normal    A/p  Post drainage of liver abscess  No new recommendations   Could consider re image to assess for drain removal once less than 30 ml , will defer to ir service  No plans for surgery  Will sign off

## 2019-09-17 NOTE — PLAN OF CARE
Disoriented to situation + time. VSS. Regular diet; tolerated a few snacks this evening. C/o intermittent nausea; declines interventions. Up with A1W GB, impulsive at times. JUNO drain patent. Continues on IV zosyn. Pt pulled out IV this evening; new IV placed with sleeve. IVF infusing.

## 2019-09-17 NOTE — CONSULTS
[facility] Psychiatric Consult Follow-up Note      TIME SPENT IN PSYCHIATRY CONSULT: 15 MINUTES.     Interim History     The patient's care was discussed, patient seen and chart notes were reviewed. Per nursing notes, the patient was disoriented to time and situation along with scattered thoughts/speech last night. Pt examined on 9/17/19 by Dr. Vivar for a follow-up psychiatric consutlation. Today, the patient denies being suicidal and notes that once he gets his strength back and returns back to normal, he'll feel less discouraged. In addition to this, the patient notes he has been sleeping better in comparison to previous days (currently on Remeron 15mg). Dr. Vivar will not make any medication adjustments today. No psychiatric intervention needed at this time. Continue with TCU recommendation.      Medications     Current Facility-Administered Medications Ordered in Epic   Medication Dose Route Frequency Last Rate Last Dose     acetaminophen (TYLENOL) Suppository 650 mg  650 mg Rectal Q4H PRN   650 mg at 09/13/19 1638     acetaminophen (TYLENOL) tablet 650 mg  650 mg Oral Q4H PRN   650 mg at 09/11/19 0532     alpha-d-galactosidase (BEANO) tablet 1 tablet  1 tablet Oral TID PRN         bisacodyl (DULCOLAX) Suppository 10 mg  10 mg Rectal Daily PRN         dextrose 5% infusion   Intravenous Continuous 50 mL/hr at 09/17/19 0954       glucose gel 15-30 g  15-30 g Oral Q15 Min PRN   15 g at 09/12/19 0215    Or     dextrose 50 % injection 25-50 mL  25-50 mL Intravenous Q15 Min PRN   50 mL at 09/17/19 0855    Or     glucagon injection 1 mg  1 mg Subcutaneous Q15 Min PRN         dronabinol (MARINOL) capsule 10 mg  10 mg Oral BID   10 mg at 09/17/19 0812     insulin aspart (NovoLOG) inj (RAPID ACTING)  1-7 Units Subcutaneous TID AC   1 Units at 09/12/19 1740     insulin aspart (NovoLOG) inj (RAPID ACTING)  1-5 Units Subcutaneous At Bedtime   1 Units at 09/07/19 2155     insulin aspart (NovoLOG) inj (RAPID  ACTING)   Subcutaneous TID w/meals   3 Units at 09/14/19 0933     lactated ringers infusion   Intravenous Continuous   Stopped at 09/17/19 0945     LORazepam (ATIVAN) tablet 0.5 mg  0.5 mg Oral Q6H PRN   0.5 mg at 09/09/19 0847     magnesium sulfate 2 g in water intermittent infusion  2 g Intravenous Daily PRN   2 g at 09/10/19 1207     magnesium sulfate 4 g in 100 mL sterile water (premade)  4 g Intravenous Q4H PRN         melatonin tablet 1 mg  1 mg Oral At Bedtime PRN         mirtazapine (REMERON) tablet 15 mg  15 mg Oral At Bedtime   15 mg at 09/16/19 1831     morphine (MS CONTIN) 12 hr tablet 15 mg  15 mg Oral Q12H   15 mg at 09/17/19 0814     naloxone (NARCAN) injection 0.1-0.4 mg  0.1-0.4 mg Intravenous Q2 Min PRN         ondansetron (ZOFRAN-ODT) ODT tab 4 mg  4 mg Oral Q6H PRN        Or     ondansetron (ZOFRAN) injection 4 mg  4 mg Intravenous Q6H PRN   4 mg at 09/12/19 1201     oxyCODONE (ROXICODONE) tablet 5 mg  5 mg Oral Q3H PRN         piperacillin-tazobactam (ZOSYN) 3.375 g vial to attach to  mL bag  3.375 g Intravenous Q6H 200 mL/hr at 09/17/19 0544 3.375 g at 09/17/19 0544     polyethylene glycol (MIRALAX/GLYCOLAX) Packet 17 g  17 g Oral Daily   17 g at 09/17/19 0812     potassium chloride (KLOR-CON) Packet 20-40 mEq  20-40 mEq Oral or Feeding Tube Q2H PRN         potassium chloride 10 mEq in 100 mL intermittent infusion with 10 mg lidocaine  10 mEq Intravenous Q1H PRN         potassium chloride 10 mEq in 100 mL sterile water intermittent infusion (premix)  10 mEq Intravenous Q1H PRN         potassium chloride 20 mEq in 50 mL intermittent infusion  20 mEq Intravenous Q1H PRN         potassium chloride ER (K-DUR/KLOR-CON M) CR tablet 20-40 mEq  20-40 mEq Oral Q2H PRN   20 mEq at 09/11/19 1800     senna-docusate (SENOKOT-S/PERICOLACE) 8.6-50 MG per tablet 1 tablet  1 tablet Oral BID   1 tablet at 09/16/19 2119    Or     senna-docusate (SENOKOT-S/PERICOLACE) 8.6-50 MG per tablet 2 tablet  2 tablet  "Oral BID   2 tablet at 09/17/19 0812     senna-docusate (SENOKOT-S/PERICOLACE) 8.6-50 MG per tablet 1 tablet  1 tablet Oral BID PRN        Or     senna-docusate (SENOKOT-S/PERICOLACE) 8.6-50 MG per tablet 2 tablet  2 tablet Oral BID PRN         simethicone (MYLICON) chewable tablet  mg   mg Oral BID PRN         Current Outpatient Medications Ordered in Epic   Medication     bisacodyl (DULCOLAX) 10 MG suppository     mirtazapine (REMERON) 7.5 MG tablet     senna-docusate (SENOKOT-S/PERICOLACE) 8.6-50 MG tablet     senna-docusate (SENOKOT-S/PERICOLACE) 8.6-50 MG tablet         Allergies      Allergies   Allergen Reactions     Dust Mites      Mold      No Known Drug Allergies         Medical Review of Systems     /55 (BP Location: Left arm)   Pulse 65   Temp 97.4  F (36.3  C) (Oral)   Resp 16   Ht 1.803 m (5' 11\")   Wt 80.2 kg (176 lb 11.2 oz)   SpO2 92%   BMI 24.64 kg/m    Body mass index is 24.64 kg/m .  A 10-point review of systems was performed by Hector Vivar MD and is negative, no new findings.      Psychiatric Examination     Appearance Lying in bed, dressed in gown. Appears stated age.   Attitude Cooperative   Orientation Oriented to person, place, time   Eye Contact Fair    Speech Regular rate, rhythm, volume and tone   Language Normal   Psychomotor Behavior Normal   Mood Good   Affect Less anxious, less agitated    Thought Process Goal Oriented, Intact   Associations Intact   Thought Content Patient is currently negative for suicidal ideation, negative for plan or intent, able to contract no self harm and identify barriers to suicide.  Negative for obsessions, compulsions or psychosis.     Fund of Knowledge Intact    Insight Fair    Judgement Fair   Attention Span & Concentration Diminished    Recent & Remote Memory Impaired   Gait Not tested    Muscle Tone Intact on visual inspection       Labs     Labs reviewed.  Recent Results (from the past 24 hour(s))   Glucose by meter    " Collection Time: 09/16/19 12:11 PM   Result Value Ref Range    Glucose 58 (L) 70 - 99 mg/dL   Glucose by meter    Collection Time: 09/16/19 12:37 PM   Result Value Ref Range    Glucose 60 (L) 70 - 99 mg/dL   Glucose by meter    Collection Time: 09/16/19  1:03 PM   Result Value Ref Range    Glucose 106 (H) 70 - 99 mg/dL   Glucose by meter    Collection Time: 09/16/19  5:44 PM   Result Value Ref Range    Glucose 74 70 - 99 mg/dL   Glucose by meter    Collection Time: 09/16/19  8:47 PM   Result Value Ref Range    Glucose 105 (H) 70 - 99 mg/dL   Glucose by meter    Collection Time: 09/17/19  2:18 AM   Result Value Ref Range    Glucose 82 70 - 99 mg/dL   Basic metabolic panel    Collection Time: 09/17/19  7:36 AM   Result Value Ref Range    Sodium 145 (H) 133 - 144 mmol/L    Potassium 3.5 3.4 - 5.3 mmol/L    Chloride 113 (H) 94 - 109 mmol/L    Carbon Dioxide 28 20 - 32 mmol/L    Anion Gap 4 3 - 14 mmol/L    Glucose 65 (L) 70 - 99 mg/dL    Urea Nitrogen 5 (L) 7 - 30 mg/dL    Creatinine 0.98 0.66 - 1.25 mg/dL    GFR Estimate 78 >60 mL/min/[1.73_m2]    GFR Estimate If Black 90 >60 mL/min/[1.73_m2]    Calcium 7.3 (L) 8.5 - 10.1 mg/dL   CBC with platelets    Collection Time: 09/17/19  7:36 AM   Result Value Ref Range    WBC 7.9 4.0 - 11.0 10e9/L    RBC Count 2.57 (L) 4.4 - 5.9 10e12/L    Hemoglobin 8.0 (L) 13.3 - 17.7 g/dL    Hematocrit 24.3 (L) 40.0 - 53.0 %    MCV 95 78 - 100 fl    MCH 31.1 26.5 - 33.0 pg    MCHC 32.9 31.5 - 36.5 g/dL    RDW 13.5 10.0 - 15.0 %    Platelet Count 320 150 - 450 10e9/L   Glucose by meter    Collection Time: 09/17/19  8:11 AM   Result Value Ref Range    Glucose 58 (L) 70 - 99 mg/dL   Glucose by meter    Collection Time: 09/17/19  8:41 AM   Result Value Ref Range    Glucose 49 (LL) 70 - 99 mg/dL   Glucose by meter    Collection Time: 09/17/19  9:20 AM   Result Value Ref Range    Glucose 120 (H) 70 - 99 mg/dL        Impression   This is a 68 year old who presented at [facility] with  symptoms of depression and anxiety in the context of stage three pancreatic cancer. While meeting with Dr. Vivar this afternoon for a follow-up psychiatric consultation, the patient was less agitated, less anxious, and less irritable in comparison to previous days. He demonstrated appropriate and much more motivation to get better and denied desire to hurt himself. He confirmed that he will agree to TCU placement. Aside from this, the patient denied any issues or complications with his current medications, specifically his Remeron medication. Dr. Vivar did not make any medication adjustments today, rather continue medication regimen as is. Continue with TCU recommendation.      Diagnoses   1. Major depression, recurrent, severe, without psychotic features.  2. Pancreatic cancer.   3. Alcohol use disorder, currently in remission     Plan     1. Explained Side effects, benefits and complications of medications to the patient.   2. Medication changes: None   3. Discussed treatment plan with patient and team.  4. Re-consult Psychiatry as needed.  5. Continue with TCU recommendation       TIME SPENT IN PSYCHIATRY CONSULT: 15 MINUTES.    Attestation:   I, Olive De La Rosa, am serving as a scribe on 9/17/2019 to document services personally performed by Hector Vivar MD based on my observations and the provider's statements to me.      Patient ID:  Name: Jesús Stock    MRN: [id number]  Admission: 9/6/2019   YOB: 1950

## 2019-09-17 NOTE — PROGRESS NOTES
[facility]  Hospitalist Progress Note for 9/14/2019:  Date of admission: 9/6/2019          Assessment and Plan:   Jesús Stock is a 68 year old male with PMH of stage II pancreatic adenocarcinoma with recent initiation of chemotherapy (first round 8/29), insulin dependent type II diabetes mellitus, obesity, and osteoarthritis who presented 9/6/19 with weight loss and failure to thrive.    Fever - ongoing:  MSSA Bacteremia:  Liver abscess, s/p  US drainage 9/13:  On admission -Tmax 103.2 degrees fahrenheit . WBC WNL; remainder of CBC and BMP stable. UA unremarkable. Pt is without URI sx, SOB, chest pain, rash. No diarrhea, nausea, vomiting. No rashes. Port site on right chest wall C/D/I. CXR negative for acute pathology. Procal 0.84.  - Initially started started on vancomycin and Zosyn & stopped 9/10 & changed to Cefazolin as +ve BC with MSSA on 9/8.  - Patient continued to spike fever t max 103F, port removed on 9/11, tip sent for culture- NTD.  Patient continues to have fever. CT abdomen was obtained on 9/12 to evaluate for the extent of malignancy so that the family can decide whether they want to opt for hospice.  CT done 9/12 evening .  --The CT scan showed liver abscess. S/P US guided drained 9/13th  - ID following, AB per ID   -Surgery consult was discontinued after patient seen by ID, IR was consulted and drain was placed on 9/13, cultures growing gram-negative bacilli. Might  need to change antibiotics for better coverage, infectious disease following.wbc is high .  Cefazolin have no coverage for Proteus, Enterobacter and Serratia.  --Antibiotics switched from cefazolin to Zosyn on 9/15  --Discussed with palliative care as per family request, so far the plan is to continue treatment.    --Patient also developed suicidal ideations 9/16, psychiatry reconsulted. No change in medications   --Port  tip culture negative so far.  --Drainage was >45 ml last 24 hours, will wait for that to come  down to order repeat imaging, IR placed the drain.  Discussed with the daughter  9/16 as the patient was not participating in PT and OT whether TCU discharge would not be an option.   --discussed with  Social work  About discharge plans, discharge in 1-2 days .    Encephalopathy, intermittent: Likely multifactorial (metabolic, infectious, decompensated mental health) in the setting of fevers above and MS Contin rx.   --Patient continues to be occasionally confused  -- Monitor closely , improving     Failure to thrive  Generalized weakness, physical deconditioning  Hyponatremia, resolved  Hypokalemia, resolved: Patient diagnosed with pancreatic cancer in 6/2019, started chemotherapy 8/26/19. Developed nausea and vomiting approx one week ago and has had very poor PO intake since, as well as increased weakness. Reports a weight loss of approx 80lbs over the past year. Appears depressed on interview. Family notes that patient has significant physical deconditioning. Labwork notes decreased albumin 3.4-->2.2, hyponatremia, hypokalemia.  Has home care already but per family and oncologist failing to thrive at home.    - Pt was hydrated with IVF and electrolytes repleted per protocol   - Psychiatry consulted as depression related to acute illness seems to be playing a major role into the above presentation.  -psychiatry recommended transfer to inpatient [facility], patient not interested in the plan.  He is not on hold  -PTA Marinol changed to 10 mg twice daily  - PT evaluated, recommending TCU at discharge. Patient  Does not want to go into  [facility].  -Patient does have episodes in which he gets quite depressed and confused.  -He was reconsulted due to few suicidal comments the patient made, they did not make any changes in his medications, plan to transfer to TCU when medically stable.     Severe malnutrition in the context of acute illness: Malnutrition related to nausea, vomiting, and poor appetite with pancreatic  CA diagnosis as evidenced by fat and muscle loss with diagnosis of severe malnutrition.   -- Nutrition following, regular diet as tolerated, strawberry milkshake at 1000     Normocytic anemia, new  Thrombocytopenia, new: Hgb 8/28/19 was 11.7. Platelets at that time 156. This is secondary to malignancy and chemotherapy. No active signs of bleeding.   - Monitor      Major depression, recurrent, severe, without psychotic features: Patient has made active suicidal ideation to multiple providers, including to oncologist, ED physician. Not coping well with pancreatic cancer diagnosis. In clinic, he held off starting chemotherapy until last week, presumably due to his mental health issues. It appears that this is also contributing to his failure to thrive picture.  - Psychiatry consulted. Started patient on Remeron 7.5 mg at bedtime. It seems patient  Is not hold able.  --Patient expressed to suicidal ideations, psychiatry consulted, placed on suicide precautions,.  For now we will closely monitor.  - PRN ativan      Stage II pancreatic adenocarcinoma  Cancer related pain: Follows with [facility], diagnosed in 6/2019. S/p ERCP x2 and pancreatic stent placement. Started gemcitabine and abraxane around 8/29/2019.  -- MOHPA consulted, see formal note by Dr. Gannon, plan for next dose of chemotherapy.  -- Continue MS Contin 15 mg po BID, Oxycodone 5 mg po q4 hrs PRN, Mylicon 80 mg po BID, Zofran and Compazine PRN  --Plan to restart chemo once infection is cleared.     IDDM: Diagnosed in March 2019. A1C on admission 7.7.  - Continue PTA Insulin glargine 12 units at bedtime   - SSI + carb counting while inpatient  - Regular diet okay here given poor PO intake, failure to thrive  -He started having hypoglycemia, 9/16 his insulin- Lantus was cut down to 6 units at bedtime  --Due to ongoing hypoglycemia I stopped his Lantus.  9/17     Drug induced constipation:   -- Bowel regimen in place while patient is on narcotics.  "     Hx of alcohol use disorder: In remission.     Diet: Advance Diet as Tolerated  DVT Prophylaxis: Ambulate every shift  Huggins Catheter: not present  Code Status: DNR/DNI          Disposition Plan: Patient would possibly be ready for discharge in 1 to 2 days once a repeat CT scan is done and the abscess size is reduced and drain removed.  Discussed with the daughter that the patient is not participating in PT and OT and TCU placement could be an issue, will encourage patient to participate in PT and OT.     Cristiana Young MD                   Interval History:   Patient resting afebrile no new issues, his drain has more than 45 mL discharge over last 24 hours, pain well controlled, no nausea, poor appetite.  Discussed with the patient about increasing his activity level.           Medications:       dronabinol  10 mg Oral BID     insulin aspart  1-7 Units Subcutaneous TID AC     insulin aspart  1-5 Units Subcutaneous At Bedtime     insulin aspart   Subcutaneous TID w/meals     mirtazapine  15 mg Oral At Bedtime     morphine  15 mg Oral Q12H     piperacillin-tazobactam  3.375 g Intravenous Q6H     polyethylene glycol  17 g Oral Daily     senna-docusate  1 tablet Oral BID    Or     senna-docusate  2 tablet Oral BID     acetaminophen, acetaminophen, alpha-d-galactosidase, bisacodyl, glucose **OR** dextrose **OR** glucagon, LORazepam, magnesium sulfate, magnesium sulfate, melatonin, naloxone, ondansetron **OR** ondansetron, oxyCODONE, potassium chloride, potassium chloride with lidocaine, potassium chloride, potassium chloride, potassium chloride, senna-docusate **OR** senna-docusate, simethicone               Physical Exam:   Blood pressure 100/55, pulse 65, temperature 97.4  F (36.3  C), temperature source Oral, resp. rate 16, height 1.803 m (5' 11\"), weight 80.2 kg (176 lb 11.2 oz), SpO2 92 %.  Wt Readings from Last 4 Encounters:   09/15/19 80.2 kg (176 lb 11.2 oz)   08/19/19 75.8 kg (167 lb)   08/16/19 74.4 kg " (164 lb)   07/29/19 76.4 kg (168 lb 6.4 oz)               Intake/Output Summary (Last 24 hours) at 9/14/2019 1315  Last data filed at 9/14/2019 0900  Gross per 24 hour   Intake 3308 ml   Output 1420 ml   Net 1888 ml       Constitutional: Awake, alert, cooperative, no apparent distress  Respiratory: Clear to auscultation bilaterally, no crackles or wheezing  Cardiovascular: Regular rate and rhythm, normal S1 and S2, and no murmur noted  GI: Normal bowel sounds, soft, non-distended, non-tender, drain noted, small amount of drainage noted, yet yellow in color  Skin/Integumen: No rashes, no cyanosis, no edema  Neuro : moving all 4 extremities, no focal deficit noted               Data:   All laboratory data reviewed

## 2019-09-17 NOTE — PLAN OF CARE
Alert to self, forgetful and has very scattered thoughts/speech. VSS on RA. Regular diet, c/o nausea and has very poor appetite, BG 82 overnight.  Liver drain patent, dressing CDI with 15 mL serous/yellow output. Up with SBA. Denies pain. Pt scoring green on the Aggression Stop Light Tool. K and Mg pending. Discharge to TCU pending on participation in therapy.

## 2019-09-17 NOTE — PROGRESS NOTES
Jesús Stock is a 68 year old male with a significant history of pancreatic cancer diagnosed in 6/2019 s/p ERCP x 2 and pancreatic stent placement who started on chemotherapy 8/29/19 and was admitted 9/6/19 with depression, weight loss and failure to thrive. He started running fevers and increased wbc 9/10/19. His port was removed on 9/11 but continued with fevers.  He had a CT scan on 9/12/19 and a 7.6 x 8.4 cm hepatic abscess was seen. A drain was placed in the abscess on Friday 9/13 with initial outputs at 170 mL, now at 45 mL yesterday. Wbcs have normalized, he has been afebrile and doing better from an infectious picture.     He is being seen in IR follow up today.     S: No pain. I'm so tired. I'm eating ok. I had 3 CTs yesterday. How many are safe to do?  I might be able to leave tomorrow.     O: Temp:  [96.1  F (35.6  C)-97.8  F (36.6  C)] 97.4  F (36.3  C)  Pulse:  [65-68] 65  Heart Rate:  [65-69] 65  Resp:  [14-20] 16  BP: (100-110)/(53-55) 100/55  SpO2:  [92 %-93 %] 92 %    Labs, notes, imaging, IOs and VSs reviewed    ROUTINE ICU LABS (Last four results)  CMP  Recent Labs   Lab 09/17/19  0736 09/16/19  0705 09/15/19  0721 09/14/19  0740 09/13/19  1632  09/12/19  0659 09/11/19  0549   *  --   --  140 138  --  139 137   POTASSIUM 3.5  --   --  4.2 4.2  --  4.1 3.7   CHLORIDE 113*  --   --  110* 107  --  110* 107   CO2 28  --   --  27 23  --  25 26   ANIONGAP 4  --   --  3 8  --  4 4   GLC 65*  --   --  139* 73  --  125* 86   BUN 5*  --   --  7 6*  --  5* 5*   CR 0.98 0.89 0.88 0.92 1.06   < > 1.07 1.11   GFRESTIMATED 78 87 87 84 71   < > 70 67   GFRESTBLACK 90 >90 >90 >90 82   < > 81 78   MATTHEW 7.3*  --   --  7.5* 7.7*  --  7.7* 7.7*   MAG  --   --   --   --   --   --   --  2.2   PROTTOTAL  --   --   --  5.6*  --   --   --   --    ALBUMIN  --   --   --  1.7*  --   --   --   --    BILITOTAL  --   --   --  0.5  --   --   --   --    ALKPHOS  --   --   --  356*  --   --   --   --    AST  --   --   --  53*   --   --   --   --    ALT  --   --   --  18  --   --   --   --     < > = values in this interval not displayed.     CBC  Recent Labs   Lab 09/17/19  0736 09/16/19  0705 09/14/19  0740 09/12/19  0659   WBC 7.9 12.3* 14.6* 10.4   RBC 2.57* 2.74* 2.76* 2.68*   HGB 8.0* 8.6* 8.8* 8.5*   HCT 24.3* 25.6* 26.3* 25.4*   MCV 95 93 95 95   MCH 31.1 31.4 31.9 31.7   MCHC 32.9 33.6 33.5 33.5   RDW 13.5 13.4 13.5 13.1    294 278 321     INR  Recent Labs   Lab 09/11/19  1050   INR 1.77*     Arterial Blood GasNo lab results found in last 7 days.    General-Sleepy, appropriate male in NAD, lying in bed and sleeping mainly.   Right mid abdominal drain-Dressing CD&I. Outputs appear serous with pieces of debris.   Flushed and aspirated what was flushed in without pain or leaking a total of 20 mL. More debris aspirated out.   Culture: Klebsiella and Serratia    A/P: Jesús Stock is a 68 year old male with a significant history of pancreatic cancer diagnosed in 6/2019 s/p ERCP x 2 and pancreatic stent placement who started on chemotherapy 8/29/19 and was admitted 9/6/19 with depression, weight loss and failure to thrive. He started running fevers and increased wbc 9/10/19. His port was removed 9/11 but continued with fevers.  He had a CT scan on 9/12/19 and a 7.6 x 8.4 cm hepatic abscess was seen. A drain was placed in the abscess on Friday 9/13 with initial outputs at 170 mL, now at 45 mL yesterday. Wbcs have normalized, he has been afebrile and doing better from an infectious picture.    Improving clinically from infection after antibiotics and drainage of liver abscess. Outputs are decreasing. He does seem slightly confused and not engaged with conversation.     Recommend a CT Abd/pelvis with IV contrast in the next couple days to evaluate the abscess size and determine if the drain can be removed.     Will follow.     Thanks [facility] Interventional Radiology CNP ([phone]) (phone [phone])

## 2019-09-17 NOTE — PROGRESS NOTES
[facility] Hematology Progress Note     Primary Oncologist/Hematologist:  Dr. Farrell [place]          Assessment and Plan:   Mr. Jesús Stock is a 68 year old man who was admitted on 9/6/2019.      1. Pancreatic Cancer  -followed by Dr. Farrell  -presented 6/2019 with hepatic dysfunction and 2.7 cm pancreatic mass with biliary obstruction  -clinical T2N0M0 stage II adenocarcinoma, potentially resectable  -met with pancreatic surgeon and was felt to have potentially resectable disease  -neoadjuvant chemotherapy followed by consideration for resection was recommneded  -S/P sphincterotomy with stent placement  -began neoadjuvant gemcitabine (D1,8,15) & abraxane (D1,8,15)/28 day cycle on 8/28/19  -received c1d1 of treatment on 8/28 and has been off treatment since then  -CT A/P 9/12 pancreatic mass not well visualized.   -outpatient follow up with Dr Farrell for consideration to resume chemotherapy once infectious issues resolve      2. Normocytic anemia   - hgb 8.0.  continue to follow      3. Liver abscess/bacteremia    - GPC, MSSA bacteremia s/p daniel-cath removal on 9/11/19    -CT abd/pelvis showing 8.4 cms liver abscess    -drainage tube placed by IR     -continue zosyn per ID  - per IR, plan to repeat CT in next couple of days to assess abscess size and to determine if drain can be removed.      4. Severe malnutrition    -related to pancreatic cancer, nausea, poor appetite    -per nutrition     5. Depression  - started on mirtazapine     DVT Prophylaxis: Enoxaparin (Lovenox) SQ  Code Status: DNR/DNI             Interval History:   Notes no changes in symptoms              Review of Systems:   The 5 point Review of Systems is negative other than noted in the HPI             Medications:   Scheduled Medications    dronabinol  10 mg Oral BID     insulin aspart  1-7 Units Subcutaneous TID AC     insulin aspart  1-5 Units Subcutaneous At Bedtime     insulin aspart   Subcutaneous TID w/meals     mirtazapine  15  "mg Oral At Bedtime     morphine  15 mg Oral Q12H     piperacillin-tazobactam  3.375 g Intravenous Q6H     polyethylene glycol  17 g Oral Daily     senna-docusate  1 tablet Oral BID    Or     senna-docusate  2 tablet Oral BID     PRN Medications  acetaminophen, acetaminophen, alpha-d-galactosidase, bisacodyl, glucose **OR** dextrose **OR** glucagon, LORazepam, magnesium sulfate, magnesium sulfate, melatonin, naloxone, ondansetron **OR** ondansetron, oxyCODONE, potassium chloride, potassium chloride with lidocaine, potassium chloride, potassium chloride, potassium chloride, senna-docusate **OR** senna-docusate, simethicone               Physical Exam:   Vitals were reviewed  Blood pressure 100/55, pulse 65, temperature 97.4  F (36.3  C), temperature source Oral, resp. rate 16, height 1.803 m (5' 11\"), weight 80.2 kg (176 lb 11.2 oz), SpO2 92 %.  Wt Readings from Last 4 Encounters:   09/15/19 80.2 kg (176 lb 11.2 oz)   08/19/19 75.8 kg (167 lb)   08/16/19 74.4 kg (164 lb)   07/29/19 76.4 kg (168 lb 6.4 oz)       I/O last 3 completed shifts:  In: 640 [P.O.:60; I.V.:580]  Out: 745 [Urine:700; Drains:45]      Constitutional: Sleepy, no apparent distress   Lungs: Clear to auscultation bilaterally, no crackles or wheezing   Cardiovascular: Regular rate and rhythm, normal S1 and S2, and no murmur noted   Abdomen: Normal bowel sounds, soft, non-distended, non-tender   Skin: No rashes, no cyanosis, no edema   Other:               Data:   All laboratory data and imaging studies reviewed.    CMP  Recent Labs   Lab 09/17/19  0736 09/16/19  0705 09/15/19  0721 09/14/19  0740 09/13/19  1632  09/12/19  0659 09/11/19  0549   *  --   --  140 138  --  139 137   POTASSIUM 3.5  --   --  4.2 4.2  --  4.1 3.7   CHLORIDE 113*  --   --  110* 107  --  110* 107   CO2 28  --   --  27 23  --  25 26   ANIONGAP 4  --   --  3 8  --  4 4   GLC 65*  --   --  139* 73  --  125* 86   BUN 5*  --   --  7 6*  --  5* 5*   CR 0.98 0.89 0.88 0.92 1.06   " < > 1.07 1.11   GFRESTIMATED 78 87 87 84 71   < > 70 67   GFRESTBLACK 90 >90 >90 >90 82   < > 81 78   MATTHEW 7.3*  --   --  7.5* 7.7*  --  7.7* 7.7*   MAG  --   --   --   --   --   --   --  2.2   PROTTOTAL  --   --   --  5.6*  --   --   --   --    ALBUMIN  --   --   --  1.7*  --   --   --   --    BILITOTAL  --   --   --  0.5  --   --   --   --    ALKPHOS  --   --   --  356*  --   --   --   --    AST  --   --   --  53*  --   --   --   --    ALT  --   --   --  18  --   --   --   --     < > = values in this interval not displayed.     CBC  Recent Labs   Lab 09/17/19  0736 09/16/19  0705 09/14/19  0740 09/12/19  0659   WBC 7.9 12.3* 14.6* 10.4   RBC 2.57* 2.74* 2.76* 2.68*   HGB 8.0* 8.6* 8.8* 8.5*   HCT 24.3* 25.6* 26.3* 25.4*   MCV 95 93 95 95   MCH 31.1 31.4 31.9 31.7   MCHC 32.9 33.6 33.5 33.5   RDW 13.5 13.4 13.5 13.1    294 278 321     INR  Recent Labs   Lab 09/11/19  1050   INR 1.77*           Liya Marc CNP  Nurse Practitioner  [facility]  [phone]

## 2019-09-17 NOTE — PROGRESS NOTES
"[facility]  Infectious Disease Progress Note          Assessment and Plan:   IMP   1 67 yo male pancreatic CA  2 MSSA bacteremia.  PORT concern, s/p removal of the PORT.   3 DM  4  liver abscess.  S/p perc drainage.  Culture growing Klebs and Serratia    REC   1 Cont Zosyn for now.  Covering all cultures organisms.  2 Will need extended IV, soc sx check noted likely rehab.  At time of discharge probably to Ancef plus levaquin.          Interval History:   Afebrile since percutaneous drainage of hepatic abscess.  Liver cx as above.  Drain output 45cc.              Medications:       dronabinol  10 mg Oral BID     insulin aspart  1-7 Units Subcutaneous TID AC     insulin aspart  1-5 Units Subcutaneous At Bedtime     insulin aspart   Subcutaneous TID w/meals     insulin glargine  6 Units Subcutaneous At Bedtime     mirtazapine  15 mg Oral At Bedtime     morphine  15 mg Oral Q12H     piperacillin-tazobactam  3.375 g Intravenous Q6H     polyethylene glycol  17 g Oral Daily     senna-docusate  1 tablet Oral BID    Or     senna-docusate  2 tablet Oral BID                  Physical Exam:   Blood pressure 110/54, pulse 68, temperature 97.8  F (36.6  C), temperature source Oral, resp. rate 14, height 1.803 m (5' 11\"), weight 80.2 kg (176 lb 11.2 oz), SpO2 92 %.  Wt Readings from Last 2 Encounters:   09/15/19 80.2 kg (176 lb 11.2 oz)   08/19/19 75.8 kg (167 lb)     Vital Signs with Ranges  Temp:  [96.1  F (35.6  C)-99.5  F (37.5  C)] 97.8  F (36.6  C)  Pulse:  [68] 68  Heart Rate:  [65-72] 65  Resp:  [14-20] 14  BP: (104-137)/(53-65) 110/54  SpO2:  [92 %-93 %] 92 %    Constitutional: Awake, alert, cooperative, chronic ill appearance some chills ? now   Lungs: Clear to auscultation bilaterally, no crackles or wheezing   Cardiovascular: Regular rate and rhythm, normal S1 and S2, and no murmur noted   Abdomen: Normal bowel sounds, soft, non-distended, upper tender   Skin: No rashes, no cyanosis, no edema " no emboli PORT site looks OK line out   Other: Drain in abd          Data:   All microbiology laboratory data reviewed.  Recent Labs   Lab Test 09/16/19  0705 09/14/19  0740 09/12/19  0659   WBC 12.3* 14.6* 10.4   HGB 8.6* 8.8* 8.5*   HCT 25.6* 26.3* 25.4*   MCV 93 95 95    278 321     Recent Labs   Lab Test 09/16/19  0705 09/15/19  0721 09/14/19  0740   CR 0.89 0.88 0.92     No lab results found.  Recent Labs   Lab Test 09/13/19  1606 09/11/19  1351 09/11/19  0935 09/10/19  0920 09/10/19  0919 09/09/19  0920 09/09/19  0842 09/08/19  1023 09/08/19  1000   CULT Moderate growth  Klebsiella variicola  *  Light growth  Serratia marcescens  * No growth  Canceled, Test credited  Test reordered as correct code   see miscc [id number]   No growth No growth No growth No growth No growth No growth Cultured on the 1st day of incubation:  Staphylococcus aureus  *  Critical Value/Significant Value, preliminary result only, called to and read back by  Alethea Parker RN/S88 0819 AM CS    (Note)  POSITIVE for STAPHYLOCOCCUS AUREUS and NEGATIVE for the mecA gene  (not MRSA) by Verigene multiplex nucleic acid test. The mecA gene was  not detected. Final identification and antimicrobial susceptibility  testing will be verified by standard methods.    Specimen tested with Verigene multiplex, gram-positive blood culture  nucleic acid test for the following targets: Staph aureus, Staph  epidermidis, Staph lugdunensis, other Staph species, Enterococcus  faecalis, Enterococcus faecium, Streptococcus species, S. agalactiae,  S. anginosus grp., S. pneumoniae, S. pyogenes, Listeria sp., mecA  (methicillin resistance) and Anna/B (vancomycin resistance).    Critical Value/Significant Value called to and read back by alethea parker rn @1107 9/9/19. ct

## 2019-09-17 NOTE — PLAN OF CARE
Discharge Planner PT   Patient plan for discharge: TCU  Current status: Pt supine in bed upon arrival; daughter present. Agreeable to therapy. Performed supine <> sit using log roll technique with Min A and cues. STS transfer to FWW completed with CGA and safety cues. Ambulated 150 ft x 2 with FWW and CGA. Seated rest break x 3 minutes between trials. Cues needed for walker management/proximity. Went up/down 5 stairs x 1 reciprocally with single rail and CGA; tolerated well. Returned to bed at end of session. Min A for repositioning. Discussed discharge recs with daughter.   Barriers to return to prior living situation: Stairs to access, Alone during the day, Fall risk, Cognition.  Recommendations for discharge:TCU per plan established by the PT.  Rationale for recommendations: Pt will need continued skilled PT at a TCU to achieve PLOF and independence with mobility.        Entered by: Renetta Cruz 09/17/2019 11:20 AM

## 2019-09-17 NOTE — PROGRESS NOTES
"ELOISE  I: SW spoke with patient's daughter, Oksana, to update her on pt's therapy scheduled time. Oksana asked what the next steps are going to look like. SW explained that if patient's doesn't participate with therapy, he may not get accepted. SW suggested looking into LTC and explained that LTC would be private pay. Oksana shares that patient has a \"large inheritance\" but worries that he will run through all of that at LTC. SW will follow up with Oksana after therapy assessment today. Psych was re consulted.     SW sent referral to [facility] , as they accept patient's with mental illness with rehab needs. SW will update daughter when she arrives to [facility].    P: SW will continue to follow and assist as needed.    Alberta Bell, MSW, LGSW  *[phone]    "

## 2019-09-18 NOTE — PROGRESS NOTES
ELOISE  I: [facility] as long as patient continues to work with therapy. Daughter Oksana is aware that [facility] will cover as long as patient participates in therapy. Oksana is aware that patient could potentially have to private pay if patient stops working with therapy. Oksana would like to transport patient. SW will update when patient's discharge date is confirmed and will also update facility.     ADDENDUM  I: SW updated that patient would be able to discharge after picc line gets placed. Patient's daughter will plan to transport later in the evening, around 1600/1700. Daughter will await a c/b from the RN once picc line is placed. SW called and updated facility about timing. SW will fax orders/Scripts when complete.    PAS-RR    D: Per DHS regulation, SW completed and submitted PAS-RR to [place] [employer] Direct Connect via the Senior LinkAge Line.  PAS-RR confirmation # is : [id number]      I: SW spoke with dtr and they are aware a PAS-RR has been submitted.  SW reviewed with dtr that they may be contacted for a follow up appointment within 10 days of hospital discharge if their SNF stay is < 30 days.  Contact information for Senior LinkAge Line was also provided.    A: dtr verbalized understanding.    P: Further questions may be directed to [facility] LinkAge Line at #[phone], option #4 for PAS-RR staff.      P: SW will continue to follow and assist as needed.    Alberta Bell, MSW, LGSW  *[phone]

## 2019-09-18 NOTE — PLAN OF CARE
Discharge Planner PT   Patient plan for discharge: TCU  Current status: Pt sleeping upon arrival. Initially refusing to open eyes. Daughter arrived and with encouragement agreeable to therapy. Currently on 2L via NC. Performed supine <> sit with SBA. STS transfer to FWW completed with CGA and safety cues. Ambulated 80 ft x 1 with FWW and SBA. Declined further distance. Returned to bed at end of session. SpO2 post activity = 88% which recovered to mid 90's with < 5 sec of PLB.   Barriers to return to prior living situation: Stairs to access, Alone during the day, Fall risk, Cognition.  Recommendations for discharge:TCU per plan established by the PT.  Rationale for recommendations: Pt will need continued skilled PT at a TCU to achieve PLOF and independence with mobility.        Entered by: Renetta Cruz 09/18/2019 8:34 AM

## 2019-09-18 NOTE — PROGRESS NOTES
"[facility]  Infectious Disease Progress Note          Assessment and Plan:   IMP   1 67 yo male pancreatic CA  2 MSSA bacteremia.  PORT concern, s/p removal of the PORT.   3 DM  4  liver abscess.  S/p perc drainage.  Culture growing Klebs and Serratia    REC   1 will switch to IV Ancef and po levaquin.  Orders entered for three more weeks of IV ancef.  At discharge please give three more weeks of po levaquin.  2. Pt should f/u with Dr. Crocker in office in about two wks after discharge.          Interval History:   Afebrile since percutaneous drainage of hepatic abscess.  Liver cx as above.                Medications:       dronabinol  10 mg Oral BID     insulin aspart  1-7 Units Subcutaneous TID AC     insulin aspart  1-5 Units Subcutaneous At Bedtime     insulin aspart   Subcutaneous TID w/meals     mirtazapine  15 mg Oral At Bedtime     morphine  15 mg Oral Q12H     piperacillin-tazobactam  3.375 g Intravenous Q6H     polyethylene glycol  17 g Oral Daily     senna-docusate  1 tablet Oral BID    Or     senna-docusate  2 tablet Oral BID                  Physical Exam:   Blood pressure 130/70, pulse 65, temperature 97.7  F (36.5  C), temperature source Oral, resp. rate 16, height 1.803 m (5' 11\"), weight 80.2 kg (176 lb 11.2 oz), SpO2 96 %.  Wt Readings from Last 2 Encounters:   09/15/19 80.2 kg (176 lb 11.2 oz)   08/19/19 75.8 kg (167 lb)     Vital Signs with Ranges  Temp:  [96.2  F (35.7  C)-98.7  F (37.1  C)] 97.7  F (36.5  C)  Pulse:  [65] 65  Heart Rate:  [62-80] 62  Resp:  [16] 16  BP: (100-130)/(55-71) 130/70  SpO2:  [89 %-97 %] 96 %    Constitutional: Awake, alert, cooperative, chronic ill appearance some chills ? now   Lungs: Clear to auscultation bilaterally, no crackles or wheezing   Cardiovascular: Regular rate and rhythm, normal S1 and S2, and no murmur noted   Abdomen: Normal bowel sounds, soft, non-distended, upper tender   Skin: No rashes, no cyanosis, no edema no emboli PORT " site looks OK line out   Other: Drain in abd          Data:   All microbiology laboratory data reviewed.  Recent Labs   Lab Test 09/17/19  0736 09/16/19  0705 09/14/19  0740   WBC 7.9 12.3* 14.6*   HGB 8.0* 8.6* 8.8*   HCT 24.3* 25.6* 26.3*   MCV 95 93 95    294 278     Recent Labs   Lab Test 09/17/19  0736 09/16/19  0705 09/15/19  0721   CR 0.98 0.89 0.88     No lab results found.  Recent Labs   Lab Test 09/13/19  1606 09/11/19  1351 09/11/19  0935 09/10/19  0920 09/10/19  0919 09/09/19  0920 09/09/19  0842 09/08/19  1023 09/08/19  1000   CULT Moderate growth  Klebsiella variicola  *  Light growth  Serratia marcescens  * No growth  Canceled, Test credited  Test re[facility] as correct code   [id number]   No growth No growth No growth No growth No growth No growth Cultured on the 1st day of incubation:  Staphylococcus aureus  *  Critical Value/Significant Value, preliminary result only, called to and read back by  Alethea Parker RN/S88 0819 AM CS    (Note)  POSITIVE for STAPHYLOCOCCUS AUREUS and NEGATIVE for the mecA gene  (not MRSA) by Verigene multiplex nucleic acid test. The mecA gene was  not detected. Final identification and antimicrobial susceptibility  testing will be verified by standard methods.    Specimen tested with Verigene multiplex, gram-positive blood culture  nucleic acid test for the following targets: Staph aureus, Staph  epidermidis, Staph lugdunensis, other Staph species, Enterococcus  faecalis, Enterococcus faecium, Streptococcus species, S. agalactiae,  S. anginosus grp., S. pneumoniae, S. pyogenes, Listeria sp., mecA  (methicillin resistance) and Anna/B (vancomycin resistance).    Critical Value/Significant Value called to and read back by alethea parker rn @1107 9/9/19. ct

## 2019-09-18 NOTE — PROGRESS NOTES
Progress Note     Primary Oncologist/Hematologist:  Dr. Farrell          Assessment and Plan:     Mr. Jesús Stock is a 68 year old man who was admitted on 9/6/2019.      1. Pancreatic Cancer  -followed by Dr. Farrell  -presented 6/2019 with hepatic dysfunction and 2.7 cm pancreatic mass with biliary obstruction  -clinical T2N0M0 stage II adenocarcinoma, potentially resectable  -met with pancreatic surgeon and was felt to have potentially resectable disease  -neoadjuvant chemotherapy followed by consideration for resection was recommneded  -S/P sphincterotomy with stent placement  -began neoadjuvant gemcitabine (D1,8,15) & abraxane (D1,8,15)/28 day cycle on 8/28/19  -received c1d1 of treatment on 8/28 and has been off treatment since then  -CT A/P 9/12 pancreatic mass not well visualized.   -outpatient follow up with Dr Farrell for consideration to resume chemotherapy once infectious issues resolve      2. Normocytic anemia   - Likely from chemotherapy and malignancy  - Periodically montior     3. Liver abscess/bacteremia  - GPC, MSSA bacteremia s/p daniel-cath removal on 9/11/19  -empiric cefazolin (started 9/10; off 9/16-9/17, but resume 9/18); piperacillin/tazobactam (9/9-9/10, then 9/13, then 9/15-9/18); levofloxacin started 9/18 (planning 3 weeks)  -CT abd/pelvis showing 8.4 cms liver abscess  -drainage tube placed by IR 9/13  -abscess cultured; growing k. variicola and serratia marcescens  -ID following  -per IR, plan to repeat CT in next couple of days to assess abscess size and to determine if drain can be removed.   -afebrile     4. Severe malnutrition/anorexia/weight loss   -related to pancreatic cancer, nausea, poor appetite, medications  -albumin 1.7 on 9/14  -dietician following  -weight looks stable through hospital stay  -on mirtazapine and dronabinol  -discussed possibility of G tube if not able to eat     5. Depression  -started on mirtazapine  -mood still flat    6. Edema  -mild swelling in the  "lower extremities; left more than right  -likely related to third spacing from low albumin  -US LLE to evaluate for DVT    Aidan LOU, CNP  [facility]  [phone]; [phone] (cell)        Interval History:     No new concerns.              Review of Systems:     The 5 point Review of Systems is negative other than noted in the HPI             Medications:   Scheduled Medications    ceFAZolin  2 g Intravenous Q8H     dronabinol  10 mg Oral BID     insulin aspart  1-7 Units Subcutaneous TID AC     insulin aspart  1-5 Units Subcutaneous At Bedtime     insulin aspart   Subcutaneous TID w/meals     levofloxacin  500 mg Oral Daily     mirtazapine  15 mg Oral At Bedtime     morphine  15 mg Oral Q12H     polyethylene glycol  17 g Oral Daily     senna-docusate  1 tablet Oral BID    Or     senna-docusate  2 tablet Oral BID     PRN Medications  acetaminophen, acetaminophen, alpha-d-galactosidase, bisacodyl, glucose **OR** dextrose **OR** glucagon, LORazepam, magnesium sulfate, magnesium sulfate, melatonin, naloxone, ondansetron **OR** ondansetron, oxyCODONE, potassium chloride, potassium chloride with lidocaine, potassium chloride, potassium chloride, potassium chloride, senna-docusate **OR** senna-docusate, simethicone               Physical Exam:   Vitals were reviewed  Blood pressure 126/68, pulse 65, temperature 98.4  F (36.9  C), temperature source Oral, resp. rate 14, height 1.803 m (5' 11\"), weight 80.2 kg (176 lb 11.2 oz), SpO2 94 %.  Wt Readings from Last 4 Encounters:   09/15/19 80.2 kg (176 lb 11.2 oz)   08/19/19 75.8 kg (167 lb)   08/16/19 74.4 kg (164 lb)   07/29/19 76.4 kg (168 lb 6.4 oz)       I/O last 3 completed shifts:  In: 504 [P.O.:240; I.V.:264]  Out: 535 [Urine:475; Drains:60]      Constitutional: Awake, alert, flat, cooperative, no apparent distress     Lungs: Clear to auscultation bilaterally, no crackles or wheezing   Cardiovascular: Regular rate and rhythm, normal S1 and S2, and " no murmur noted   Abdomen: Normal bowel sounds, soft, non-distended, non-tender   Skin: No rashes, no cyanosis. Pale. Mild lower extremity edema; left > right.   Other:               Data:   All laboratory data and imaging studies reviewed.    CMP  Recent Labs   Lab 09/18/19  0743 09/17/19  0736 09/16/19  0705 09/15/19  0721 09/14/19  0740 09/13/19  1632  09/12/19  0659   NA  --  145*  --   --  140 138  --  139   POTASSIUM  --  3.5  --   --  4.2 4.2  --  4.1   CHLORIDE  --  113*  --   --  110* 107  --  110*   CO2  --  28  --   --  27 23  --  25   ANIONGAP  --  4  --   --  3 8  --  4   GLC  --  65*  --   --  139* 73  --  125*   BUN  --  5*  --   --  7 6*  --  5*   CR 1.01 0.98 0.89 0.88 0.92 1.06   < > 1.07   GFRESTIMATED 75 78 87 87 84 71   < > 70   GFRESTBLACK 87 90 >90 >90 >90 82   < > 81   MATTHEW  --  7.3*  --   --  7.5* 7.7*  --  7.7*   PROTTOTAL  --   --   --   --  5.6*  --   --   --    ALBUMIN  --   --   --   --  1.7*  --   --   --    BILITOTAL  --   --   --   --  0.5  --   --   --    ALKPHOS  --   --   --   --  356*  --   --   --    AST  --   --   --   --  53*  --   --   --    ALT  --   --   --   --  18  --   --   --     < > = values in this interval not displayed.     CBC  Recent Labs   Lab 09/17/19  0736 09/16/19  0705 09/14/19  0740 09/12/19  0659   WBC 7.9 12.3* 14.6* 10.4   RBC 2.57* 2.74* 2.76* 2.68*   HGB 8.0* 8.6* 8.8* 8.5*   HCT 24.3* 25.6* 26.3* 25.4*   MCV 95 93 95 95   MCH 31.1 31.4 31.9 31.7   MCHC 32.9 33.6 33.5 33.5   RDW 13.5 13.4 13.5 13.1    294 278 321

## 2019-09-18 NOTE — DISCHARGE SUMMARY
[facility]    Discharge Summary  Hospitalist    Date of Admission:  9/6/2019  Date of Discharge:  9/18/2019  Discharging Provider: Darian Bravo MD  Date of Service (when I saw the patient): 09/18/19    Discharge Diagnoses   Fever  MSSA Bacteremia, possibly related to port infection, did not meet sepsis criteria  Liver abscess, s/p  US drainage 9/13  Encephalopathy, intermittent  Failure to thrive  Generalized weakness, physical deconditioning  Hyponatremia, resolved  Hypokalemia, resolved  Severe malnutrition in the context of acute illness  Normocytic anemia  Thrombocytopenia  Major depression, recurrent, severe, without psychotic features  Stage II pancreatic adenocarcinoma  Cancer related pain  Diabetes Mellitus, Type 2  Drug induced constipation  Hx of alcohol use disorder    History of Present Illness   Jesús Stock is a 68 year old male with PMH of stage II pancreatic adenocarcinoma with recent initiation of chemotherapy (first round 8/29), insulin dependent type II diabetes mellitus, obesity, and osteoarthritis who presented 9/6/19 with weight loss and failure to thrive.    Hospital Course   Jesús Stock was admitted on 9/6/2019.  The following problems were addressed during his hospitalization:    Fever  MSSA Bacteremia, possibly related to port infection, did not meet sepsis criteria  Liver abscess, s/p  US drainage 9/13  On admission -Tmax 103.2 degrees fahrenheit. WBC WNL, remainder of CBC and BMP stable. UA unremarkable. Patient was without URI sx, SOB, chest pain, rash. No diarrhea, nausea, vomiting. No rashes. Port site on right chest wall C/D/I. CXR negative for acute pathology. Procal 0.84.  - Initially started started on vancomycin and Zosyn & stopped 9/10 & changed to Cefazolin as +ve BC with MSSA on 9/8.  - Patient continued to spike fever t max 103F, port removed on 9/11, tip sent for culture - negative. Patient continues to have fever. CT abdomen was obtained on 9/12  to evaluate for the extent of malignancy so that the family can decide whether they want to opt for hospice.  CT done 9/12 evening.  - The CT scan showed liver abscess. S/P US guided drainage and drain placement on 9/13/19.  - ID following, antibiotics per ID.  - Culture grew Klebsiella and Serratia.  - Currently on IV cefazolin and oral levaquin. Plan to continue both for another three weeks.  - Patient should follow-up with Dr. Crocker about two weeks after discharge.  - Discussed with palliative care as per family request, so far the plan is to continue treatment.    - Patient also developed suicidal ideations 9/16, psychiatry reconsulted. No change in medications recommended.  - Repeat CT scan on 9/18/19 showed improvement in the size of the liver abscess. IR recommends continuing drain for now. Repeat CT abdomen/pelvis when output drops to less than 20 ml per day and consider drain removal then.  - Discharge to short term car facility today.     Encephalopathy, intermittent  Likely multifactorial (metabolic, infectious, decompensated mental health) in the setting of fevers above and MS Contin.   - Patient continues to be occasionally confused.  - Monitor at short term care facility.     Failure to thrive  Generalized weakness, physical deconditioning  Hyponatremia, resolved  Hypokalemia, resolved  Patient diagnosed with pancreatic cancer in 6/2019, started chemotherapy 8/26/19. Developed nausea and vomiting approx one week prior to admission and has had very poor PO intake since, as well as increased weakness. Reports a weight loss of approx 80lbs over the past year. Appears depressed on interview. Family notes that patient has significant physical deconditioning. Labwork notes decreased albumin 3.4-->2.2, hyponatremia, hypokalemia.  Has home care already but per family and oncologist failing to thrive at home.    - Pt was hydrated with IVF and electrolytes repleted per protocol.  - Psychiatry consulted as  depression related to acute illness seems to be playing a major role into the above presentation.  - Psychiatry recommended transfer to inpatient [facility], patient not interested in the plan.  He was not holdable.  - Continue PTA Marinol.  - PT evaluated, recommending TCU at discharge. Patient does not want to go to [facility].  - Patient does have episodes in which he gets quite depressed and confused.   - Psychiatry was reconsulted due to few suicidal comments the patient made, they did not make any changes in his medications, plan to transfer to TCU when medically stable.     Severe malnutrition in the context of acute illness  Malnutrition related to nausea, vomiting, and poor appetite with pancreatic CA diagnosis as evidenced by fat and muscle loss with diagnosis of severe malnutrition.   - Nutrition followed, regular diet as tolerated, strawberry milkshake at 1000.  - Nutrition should continue to follow in the short term care facility.     Normocytic anemia, new  Thrombocytopenia, new  Hgb 8/28/19 was 11.7. Platelets at that time 156. This is secondary to malignancy and chemotherapy. No active signs of bleeding.   - Can be monitored periodically at the short term care facility.     Major depression, recurrent, severe, without psychotic features  Patient made active suicidal ideation to multiple providers, including to oncologist, ED physician. Not coping well with pancreatic cancer diagnosis. In clinic, he held off starting chemotherapy until last week, presumably due to his mental health issues. It appears that this is also contributing to his failure to thrive picture.  - Psychiatry consulted. Started patient on Remeron 15 mg at bedtime, continue the same upon discharge.     Stage II pancreatic adenocarcinoma  Cancer related pain  Follows with [facility], diagnosed in 6/2019. S/p ERCP x2 and pancreatic stent placement. Started gemcitabine and abraxane around 8/29/2019.  - Follow-up with Dr. Farrell  after discharge from short term [facility] facility to discuss further treatment after infection is treated.  - Continue MS Contin 15 mg po BID, Oxycodone 5 mg po q4 hrs PRN, Mylicon 80 mg po BID, Zofran and Compazine PRN     Diabetes Mellitus, Type 2  Diagnosed in March 2019. A1C on admission 7.7.  - PTA lantus discontinued during this admission due to hypolgycemia.  - Continue accu-checks and medium dose sliding scale insulin at the short term [facility] facility.  - Regular diet okay here given poor PO intake, failure to thrive.     Drug induced constipation:   - Bowel regimen in place while patient is on narcotics.      Hx of alcohol use disorder  - In remission.     Darian Bravo MD    Significant Results and Procedures   US guided drain placement in liver abscess on 9/13/19    Code Status   DNR / DNI       Primary Care Physician   Chirag Robert    Physical Exam   Temp: 98.4  F (36.9  C) Temp src: Oral BP: 126/68   Heart Rate: 68 Resp: 14 SpO2: 94 % O2 Device: Nasal cannula Oxygen Delivery: 2 LPM  Vitals:    09/12/19 0616 09/13/19 0600 09/15/19 0445   Weight: 76.4 kg (168 lb 6.9 oz) 75.1 kg (165 lb 9.1 oz) 80.2 kg (176 lb 11.2 oz)     Vital Signs with Ranges  Temp:  [96.2  F (35.7  C)-98.7  F (37.1  C)] 98.4  F (36.9  C)  Heart Rate:  [62-80] 68  Resp:  [14-16] 14  BP: (123-130)/(62-71) 126/68  SpO2:  [89 %-97 %] 94 %  I/O last 3 completed shifts:  In: 504 [P.O.:240; I.V.:264]  Out: 535 [Urine:475; Drains:60]    Constitutional: awake, alert, cooperative, no apparent distress  Respiratory: clear to auscultation bilaterally, no crackles or wheezing  Cardiovascular: regular rate and rhythm, normal S1 and S2, and no murmur noted  GI: normal bowel sounds, soft, non-distended, non-tender  Skin: warm, dry  Neurologic: awake, alert, seemed a little confused at times    Discharge Disposition   Discharged to short-term [facility] facility  Condition at discharge: Stable    Consultations This Hospital Stay   SOCIAL WORK IP  CONSULT  PHYSICAL THERAPY ADULT IP CONSULT  OCCUPATIONAL THERAPY ADULT IP CONSULT  NUTRITION SERVICES ADULT IP CONSULT  PSYCHIATRY IP CONSULT  HEMATOLOGY & ONCOLOGY IP CONSULT  PHYSICAL THERAPY ADULT IP CONSULT  OCCUPATIONAL THERAPY ADULT IP CONSULT  NUTRITION SERVICES ADULT IP CONSULT  PSYCHIATRY IP CONSULT  INFECTIOUS DISEASES IP CONSULT  PSYCHIATRY IP CONSULT  PHARMACY TO DOSE VANCO  SOCIAL WORK IP CONSULT  PALLIATIVE CARE ADULT IP CONSULT  SURGERY GENERAL IP CONSULT  PSYCHIATRY IP CONSULT  VASCULAR ACCESS ADULT IP CONSULT  VASCULAR ACCESS ADULT IP CONSULT    Time Spent on this Encounter   I, Darian Bravo MD, personally saw the patient today and spent greater than 30 minutes discharging this patient.    Discharge Orders      Intake and output    Every shift     Daily weights    Call Provider for weight gain of more than 2 pounds per day or 5 pounds per week.     Activity - Up with nursing assistance     Encourage PO fluids     Discharge Instructions     IV access    PICC line placed 9/18/19.  Please flush per facility protocol.     Follow Up and recommended labs and tests    Please have TCU staff call [facility] to schedule follow up with Dr. Farrell following discharge from TCU to home     Reason for your hospital stay    You were hospitalized at [facility] for further evaluation of generalized weakness and dehydration with findings of low sodium and low potassium in your lab work. Also assessed by Psychiatry for decompensated mental health in the context of your acute illness where further psychiatric care was recommended.  You were found to have a liver abscess and had a drain placed in the abscess. You were treated with antibiotics and will need to continue the antibiotics for the next three weeks.     Follow Up and recommended labs and tests    Follow up with primary care provider and Psychiatry for ongoing recommendations outpatient once acute inpatient stay has been completed.     General info  for SNF    Length of Stay Estimate: Short Term Care: Estimated # of Days <30  Condition at Discharge: Improving  Level of care:skilled   Rehabilitation Potential: Good  Admission H&P remains valid and up-to-date: Yes  Recent Chemotherapy: Date: 8/28/19  Use Nursing Home Standing Orders: Yes     Mantoux instructions    Give two-step Mantoux (PPD) Per Facility Policy Yes     Follow Up and recommended labs and tests    Follow up with Nursing home physician within one week.     DNR/DNI    Discussed on admission by Dr. Wright.     Physical Therapy Adult Consult    Evaluate and treat as clinically indicated.    Reason:  Physical deconditioning, weakness     Occupational Therapy Adult Consult    Evaluate and treat as clinically indicated.    Reason:  Physical deconditioning, weakness     Nutrition Services Adult IP Consult    Reason:  Severe malnutrition in the context of acute illness.     Fall precautions     Advance Diet as Tolerated    Follow this diet upon discharge: Orders Placed This Encounter      Snacks/Supplements Adult: Other; Strawberry milkshake at 10 am (RD); Between Meals      Regular Diet Adult     Discharge Medications   Current Discharge Medication List      START taking these medications    Details   bisacodyl (DULCOLAX) 10 MG suppository Place 1 suppository (10 mg) rectally daily as needed for constipation    Associated Diagnoses: Drug induced constipation      ceFAZolin (ANCEF) 1 GM vial Inject 2 g into the vein every 8 hours for 21 days CBC with differential, creatinine, SGOT weekly while on this medication to be faxed to Dr. Crocker office.  Qty: 1 each, Refills: 1    Associated Diagnoses: Staphylococcus aureus bacteremia      !! insulin aspart (NOVOLOG PEN) 100 UNIT/ML pen Inject 1-7 Units Subcutaneous 3 times daily (before meals) Correction Scale - MEDIUM INSULIN RESISTANCE DOSING     Do Not give Correction Insulin if Pre-Meal BG less than 140.   For Pre-Meal  - 189 give 1 unit.   For Pre-Meal   - 239 give 2 units.   For Pre-Meal  - 289 give 3 units.   For Pre-Meal  - 339 give 4 units.   For Pre-Meal - 399 give 5 units.   For Pre-Meal -449 give 6 units  For Pre-Meal BG greater than or equal to 450 give 7 units.   To be given with prandial insulin, and based on pre-meal blood glucose.    Notify provider if glucose greater than or equal to 350 mg/dL after administration of correction dose. If given at mealtime, administer within 30 minutes of start of meal    Associated Diagnoses: Type 2 diabetes mellitus without complication, with long-term current use of insulin (H)      !! insulin aspart (NOVOLOG PEN) 100 UNIT/ML pen Inject 1-5 Units Subcutaneous At Bedtime MEDIUM INSULIN RESISTANCE DOSING    Do Not give Bedtime Correction Insulin if BG less than  200.   For  - 249 give 1 units.   For  - 299 give 2 units.   For  - 349 give 3 units.   For  -399 give 4 units.   For BG greater than or equal to 400 give 5 units.  Notify provider if glucose greater than or equal to 350 mg/dL after administration of correction dose. If given at mealtime, administer within 30 minutes of start of meal    Associated Diagnoses: Type 2 diabetes mellitus without complication, with long-term current use of insulin (H)      levofloxacin (LEVAQUIN) 500 MG tablet Take 1 tablet (500 mg) by mouth daily for 21 days    Associated Diagnoses: Liver abscess      mirtazapine (REMERON) 15 MG tablet Take 1 tablet (15 mg) by mouth At Bedtime    Associated Diagnoses: Depression, unspecified depression type; Anxiety      oxyCODONE (ROXICODONE) 5 MG tablet Take 1 tablet (5 mg) by mouth every 6 hours as needed for moderate to severe pain  Qty: 10 tablet, Refills: 0    Associated Diagnoses: Other chronic pain      !! senna-docusate (SENOKOT-S/PERICOLACE) 8.6-50 MG tablet Take 1-2 tablets by mouth 2 times daily    Associated Diagnoses: Drug induced constipation      !! senna-docusate  (SENOKOT-S/PERICOLACE) 8.6-50 MG tablet Take 1-2 tablets by mouth 2 times daily as needed for constipation    Associated Diagnoses: Drug induced constipation       !! - Potential duplicate medications found. Please discuss with provider.      CONTINUE these medications which have CHANGED    Details   LORazepam (ATIVAN) 0.5 MG tablet Take 1 tablet (0.5 mg) by mouth every 6 hours as needed for anxiety  Qty: 10 tablet, Refills: 0    Associated Diagnoses: Anxiety      morphine (MS CONTIN) 15 MG CR tablet Take 1 tablet (15 mg) by mouth every 12 hours  Qty: 14 tablet, Refills: 0    Associated Diagnoses: Other chronic pain         CONTINUE these medications which have NOT CHANGED    Details   dronabinol (MARINOL) 10 MG capsule Take 10 mg by mouth 2 times daily      ondansetron (ZOFRAN) 4 MG tablet Take 1 tablet (4 mg) by mouth every 8 hours as needed for nausea  Qty: 30 tablet, Refills: 1    Associated Diagnoses: Pancreatic mass      polyethylene glycol (MIRALAX/GLYCOLAX) powder Take 1 capful by mouth daily      acetaminophen (TYLENOL) 500 MG tablet Take 1-2 tablets by mouth every 6 hours as needed for pain.      alcohol swab prep pads Use to swab area of injection/valerie as directed.  Qty: 100 each, Refills: 3    Associated Diagnoses: Type 2 diabetes mellitus without complication, with long-term current use of insulin (H)      alpha-d-galactosidase (BEANO) tablet Take 1 tablet by mouth 3 times daily as needed for intestinal gas      blood glucose (NO BRAND SPECIFIED) test strip Use to test blood sugar 3 times daily or as directed. To accompany: Blood Glucose Monitor Brands: per insurance.  Qty: 100 strip, Refills: 6    Comments: REASON FOR MORE FREQUENT TESTING: new onset type 2 diabetes, large HYPERLYCEMIA, ATTEMPTING LIFESTYLE CHANGES, MEDICATION ADJUSTMENTS  Associated Diagnoses: Type 2 diabetes mellitus without complication, with long-term current use of insulin (H)      blood glucose calibration (NO BRAND SPECIFIED)  solution Check once per month To accompany: Blood Glucose Monitor Brands: per insurance.  Qty: 1 Bottle, Refills: 3    Associated Diagnoses: Type 2 diabetes mellitus without complication, with long-term current use of insulin (H)      blood glucose monitoring (NO BRAND SPECIFIED) meter device kit Use to test blood sugar as directed. Preferred blood glucose meter OR supplies to accompany: Blood Glucose Monitor Brands: per insurance.  Qty: 1 kit, Refills: 0    Associated Diagnoses: Type 2 diabetes mellitus without complication, with long-term current use of insulin (H)      Continuous Blood Gluc  (FREESTYLE GIOVANY 14 DAY READER) SHAWN 1 Act every 14 days  Qty: 4 Device, Refills: 0    Associated Diagnoses: Type 2 diabetes mellitus without complication, with long-term current use of insulin (H)      Continuous Blood Gluc Sensor (FREESTYLE GIOVANY 14 DAY SENSOR) MISC 1 Act every 14 days  Qty: 4 each, Refills: 3    Associated Diagnoses: Type 2 diabetes mellitus without complication, with long-term current use of insulin (H)      insulin pen needle (B-D U/F) 31G X 5 MM miscellaneous USE DAILY AS DIRECTED  Qty: 100 each, Refills: 1    Associated Diagnoses: Type 2 diabetes mellitus without complication, with long-term current use of insulin (H)      prochlorperazine (COMPAZINE) 5 MG tablet Take 1 tablet (5 mg) by mouth every 6 hours as needed for nausea or vomiting  Qty: 60 tablet, Refills: 1    Associated Diagnoses: Pancreatic mass      sildenafil (REVATIO) 20 MG tablet Take 20 mg by mouth as needed      simethicone (MYLICON) 125 MG chewable tablet Take 125 mg by mouth 2 times daily as needed (abdominal pain)      thin (NO BRAND SPECIFIED) lancets Check glucose 3-4 times per day; Use with lanceting device. To accompany: Blood Glucose Monitor Brands: per insurance.  Qty: 100 each, Refills: 6    Comments: REASON FOR MORE FREQUENT TESTING: new onset type 2 diabetes, large HYPERLYCEMIA, ATTEMPTING LIFESTYLE CHANGES,  MEDICATION ADJUSTMENTS  Associated Diagnoses: Type 2 diabetes mellitus without complication, with long-term current use of insulin (H)      Triprolidine-Pseudoephedrine 2.5-60 MG TABS Take 0.5 tablets by mouth every 6 hours as needed (allergies)         STOP taking these medications       insulin glargine (LANTUS SOLOSTAR PEN) 100 UNIT/ML pen Comments:   Reason for Stopping:         oxyCODONE HCl (OXAYDO) 5 MG TABA Comments:   Reason for Stopping:             Allergies   Allergies   Allergen Reactions     Dust Mites      Mold      No Known Drug Allergies      Data   Most Recent 3 CBC's:  Recent Labs   Lab Test 09/17/19  0736 09/16/19  0705 09/14/19  0740   WBC 7.9 12.3* 14.6*   HGB 8.0* 8.6* 8.8*   MCV 95 93 95    294 278      Most Recent 3 BMP's:  Recent Labs   Lab Test 09/18/19  0743 09/17/19  0736 09/16/19  0705  09/14/19  0740 09/13/19  1632   NA  --  145*  --   --  140 138   POTASSIUM  --  3.5  --   --  4.2 4.2   CHLORIDE  --  113*  --   --  110* 107   CO2  --  28  --   --  27 23   BUN  --  5*  --   --  7 6*   CR 1.01 0.98 0.89   < > 0.92 1.06   ANIONGAP  --  4  --   --  3 8   MATTHEW  --  7.3*  --   --  7.5* 7.7*   GLC  --  65*  --   --  139* 73    < > = values in this interval not displayed.     Most Recent 2 LFT's:  Recent Labs   Lab Test 09/14/19  0740 09/08/19  0624   AST 53* 31   ALT 18 31   ALKPHOS 356* 300*   BILITOTAL 0.5 0.7     Most Recent INR's and Anticoagulation Dosing History:  Anticoagulation Dose History     Recent Dosing and Labs Latest Ref Rng & Units 6/3/2019 6/4/2019 6/5/2019 9/11/2019    INR 0.86 - 1.14 1.05 1.17(H) 1.13 1.77(H)        Most Recent 6 Bacteria Isolates From Any Culture (See EPIC Reports for Culture Details):  Recent Labs   Lab Test 09/13/19  1606 09/11/19  1351 09/11/19  0935 09/10/19  0920 09/10/19  0919 09/09/19  0920   CULT Moderate growth  Klebsiella variicola  *  Light growth  Serratia marcescens  * No growth  Canceled, Test credited  Test reordered as correct  code   [id number]   No growth No growth No growth No growth     Most Recent TSH, T4 and A1c Labs:  Recent Labs   Lab Test 09/06/19  1320  03/13/19  0833   TSH  --   --  1.75   A1C 7.7*   < > 14.0*    < > = values in this interval not displayed.     Results for orders placed or performed during the hospital encounter of 09/06/19   XR Chest 2 Views    Narrative    CHEST TWO VIEWS  9/8/2019 9:19 AM     HISTORY: Fever, unclear etiology.    COMPARISON: 3/13/2019.      Impression    IMPRESSION: Right chest port venous catheter, its tip at the SVC. No  pneumothorax. Mild left base atelectasis. No acute airspace disease  identified. Stable cardiac silhouette.    CIRO POPE MD   IR Port Removal Right    Narrative    PORT REMOVAL 9/11/2019 2:00 PM    HISTORY:  Completed chemotherapy, port no longer needed    COMPARISON:    9/8/2019    DESCRIPTION OF PROCEDURE:    After obtaining informed consent, the  patient was placed in a supine position on the fluoroscopy table. The  skin overlying the port was prepped and draped in the usual sterile  manner. 1% lidocaine was injected for local anesthesia. An incision  was made over the previous port incision. The port was then dissected  out of the pocket. The port and its catheter were removed completely.  The pocket was washed with antibiotic laden saline. A portion of the  fibrous capsule which had formed around the port was also removed. The  port incision was closed with 3 deep interrupted stitches using 3-0  Vicryl. This was supplemented with Dermabond and Steri-Strips.    A fluoroscopic image was saved before and after port removal to  document that all portions of the port and catheter had been removed  completely.     I determined this patient to be an appropriate candidate for the  planned sedation and procedure and reassessed the patient immediately  prior to sedation and procedure. Moderate intravenous conscious  sedation was supervised by me. The patient was  independently monitored  by a registered nurse assigned to the Department of radiology using  automated blood pressure, EKG and pulse oximetry. The patient  tolerated the procedure well. There were no immediate postprocedure  complications. The patient's vital signs were monitored by radiology  nursing staff under my supervision and remained stable throughout the  study. Radiation dose for this scan was reduced using automated  exposure control, adjustment of the mA and/or kV according to patient  size, or iterative reconstruction technique.    MEDICATIONS:    2 mg Versed, 100 mcg fentanyl    FLUOROSCOPY TIME: 0.1 minute    Total fluoroscopy dose: 0.05 mGy Air Kerma    Number of spot fluoroscopic images: 2    SEDATION TIME: 17 minutes      Impression    IMPRESSION:    Port removed as above.    OCTAVIANO BULLOCK MD   CT Abdomen Pelvis w Contrast    Narrative    CT ABDOMEN AND PELVIS WITH CONTRAST 9/12/2019 5:24 PM     HISTORY: Abdominal distension.    COMPARISON: [facility], 2019    TECHNIQUE: Volumetric helical acquisition of CT images from the lung  bases through the symphysis pubis after the administration of 84 mL  Isovue-370 intravenous contrast. Radiation dose for this scan was  reduced using automated exposure control, adjustment of the mA and/or  kV according to patient size, or iterative reconstruction technique.    FINDINGS: 7.6 x 8.4 cm air and fluid collection in the liver  concerning for abscess. Pneumobilia noted. Metallic biliary stent  evident. Pancreas mass less well seen due to motion artifact. Adrenal  glands, spleen, kidneys grossly unremarkable. No bowel obstruction.  Small amount of free fluid. No definite free intraperitoneal air.  Minimal pleural fluid bilaterally at the lung bases. No frankly  destructive bony lesions.      Impression    IMPRESSION: Suspected liver abscess.     VANESSA CUNHA MD   US Drainage Seroma/Hematoma Abscess/Cyst    Narrative    US DRAIN OF SEROMA/HEMATOMA/ABSCESS/CYST   9/13/2019 4:22 PM     HISTORY:  A shunt has a fluid collection in the abdomen. Clinically  showing signs of infection.    COMPARISON: CT dated 9/12/2019    FINDINGS: After obtaining informed consent, the patient was placed in  a supine position on the fluoroscopy table. The skin overlying the  liver was prepped and draped in the usual sterile manner. 1% lidocaine  was injected for local anesthesia. Under ultrasound guidance, using a  blunt Rendon needle, access into a intravenous hypoechoic fluid  collection in the left lobe of the liver was obtained. Wire was curled  in the collection cavity. Over the wire, a 10 Taiwanese locking pigtail  catheter was placed. Approximately 20 cc of purulent appearing fluid  was aspirated out. Some of this was submitted for Gram stain and  culture. The catheter was sutured to the patient's skin and hooked to  a bag for external drainage.    I determined this patient to be an appropriate candidate for the  planned sedation and procedure and reassessed the patient immediately  prior to sedation and procedure. Moderate intravenous conscious  sedation was supervised by me. The patient was independently monitored  by a registered nurse assigned to the Department of radiology using  automated blood pressure, EKG and pulse oximetry. The patient  tolerated the procedure well. There were no immediate postprocedure  complications. The patient's vital signs were monitored by radiology  nursing staff under my supervision and remained stable throughout the  study. Radiation dose for this scan was reduced using automated  exposure control, adjustment of the mA and/or kV according to patient  size, or iterative reconstruction technique.    MEDICATIONS: None      Impression    IMPRESSION: Ultrasound-guided liver fluid collection drainage  performed as above. Patient's outputs will be monitored. The patient's  should be monitored for signs and symptoms of sepsis post drain  placement.    OCTAVIANO  MD ARA   CT Abdomen Pelvis w Contrast    Narrative    CT ABDOMEN AND PELVIS WITH CONTRAST   9/18/2019 12:39 PM     HISTORY: Abdominal pain, fever, abscess suspected. Reassess liver  abscess.    TECHNIQUE: CT abdomen and pelvis with  89 mL Isovue-370 IV. Radiation  dose for this scan was reduced using automated exposure control,  adjustment of the mA and/or kV according to patient size, or iterative  reconstruction technique.     COMPARISON: 9/12/2019    FINDINGS:   Lung bases: Small bilateral pleural effusions, slightly increased when  compared to the prior study.    Abdomen: Interval placement of a hepatic abscess drain. There is a  persistent hepatic abscess measuring 6.0 x 6.3 x 4.6 cm, previously  7.6 x 8.4 x 5.3 cm. Pneumobilia is again noted. Metallic biliary stent  is in place. Trace ascites is noted along the anterior liver.  Pancreatic mass is not well visualized. Hypodensity in the inferior  spleen is noted, may represent a cyst. The adrenal glands, and kidneys  show no focal abnormality. No intraperitoneal free air. No dilated  loops of small or large bowel. Diverticulosis without evidence of  diverticulitis. No abdominal aortic aneurysm. No abdominal or pelvic  lymphadenopathy. Bladder is normal.    Bones: No suspicious bony lesions.      Impression    IMPRESSION:  1. Interval placement of a hepatic abscess drain with minimally  decreased size of the hepatic abscess. The abscess measures 6.0 x 6.3  x 4.6 cm, previously 7.6 x 8.4 x 5.3 cm. The drain is in good  position.  2. Small bilateral pleural effusions, slightly increased when compared  to the prior exam.  3. Trace ascites anterior to the liver, new from the prior exam.  4. Diverticulosis without evidence of diverticulitis.   US Lower Extremity Venous Duplex Bilateral    Narrative    PROCEDURE:  Venous Doppler ultrasound of the bilateral lower extremities    DATE OF PROCEDURE:  9/18/2019 12:58 PM    CLINICAL HISTORY/INDICATION:   Edema.  Pancreatic cancer. Evaluate for DVT.    COMPARISON:   None relevant    TECHNIQUE:   Grayscale, color-flow, and spectral waveform analysis were performed  of the deep veins of the bilateral lower extremities    FINDINGS:   The right common femoral vein, superficial femoral vein and popliteal  vein demonstrate normal compressibility, spectral waveform, color flow  and augmentation.    The left common femoral vein, superficial femoral vein and popliteal  vein demonstrate normal compressibility, spectral waveform, color flow  and augmentation.    The right posterior tibial vein, peroneal vein, and greater saphenous  vein are compressible.    The left posterior tibial vein, peroneal vein, and greater saphenous  vein are compressible      Impression    IMPRESSION:   1. No evidence of deep venous thrombosis in the right lower extremity  2. No evidence of deep venous thrombosis in the left lower extremity    GAGAN CARRERA MD

## 2019-09-18 NOTE — PROGRESS NOTES
PICC line placed this afternoon by vascular acces RN in preparation for discharge. Patient discharged at 1730. AVS paperwork reviewed with patient and daughter Oksana. All questions answered. Discharge paperwork/packet given to daughter to give to staff at facility. No medications filled at hospital but scripts sent with patient in packet. Dressing changed to JUNO drain as instructed by IR. Patient denied pain. Patient's daughter Oksana transporting patient to TCU. All belongings/valuables with patient at time of departure. Patient left station 88 via wheelchair and transport aid accompanied by daughter.

## 2019-09-18 NOTE — PLAN OF CARE
Alert to self only. VSS on 2L NC. Up with assist of 1. Denies any pain/sob. Regular diet. L JUNO in place, minimal drainage. Will continue to monitor.

## 2019-09-18 NOTE — DISCHARGE INSTRUCTIONS
RUQ liver abscess drain instructions    1) Change the gauze and tape dressing every 2-3 days. Wash the skin with soap and water and allow to air dry before re dressing  -Please cover with plastic (saran wrap) prior to showering and change it the dressing gets wet.     2) Flush the drain with 5 mL Normal Saline (NS) once daily in the morning. .   -Empty, measure and record the outputs daily. Subtract the NS flush amount from the total    Once the outputs drop to < 20 mL daily, we recommend a repeat CT scan and possible drain removal.       Please call Beth BILLINGS RN clinician at  or Rhoda BILLINGS NP at  for questions or concerns about the tube. You can leave a voicemail. We work Monday through Friday [phone] or 5.

## 2019-09-18 NOTE — PLAN OF CARE
Alert to self. VSS. Up with 1 + GB/walker; impulsive. Incontinent at times. Regular diet; low appetite. IVF D5 @ 50ml/hr infusing, intermittent Zosyn. Psych saw pt, plan to continue Remeron. VSS on RA, denies pain. Liver abscess drain with serous output. Discharge to TCU pending; determined by JUNO drain output and PT participation.

## 2019-09-18 NOTE — PROGRESS NOTES
CT abd/pelvis done today. The hepatic abscess is decreased significantly in size but enough to remove the drain yet. We recommend keeping it in for now and continuing on antibiotics per ID. Reviewed imaging with Dr Gloria Garcia.    Discharge instructions entered in the AVS for the Rehab center (and are below).    Please change the stay fix dressing to a gauze and tape dressing prior to discharge.     Thanks     [facility] Interventional Radiology CNP ([phone]) (phone [phone])       RUQ liver abscess drain instructions    1) Change the gauze and tape dressing every 2-3 days. Wash the skin with soap and water and allow to air dry before re dressing  -Please cover with plastic (saran wrap) prior to showering and change it the dressing gets wet.     2) Flush the drain with 5 mL Normal Saline (NS) once daily in the morning. .   -Empty, measure and record the outputs daily. Subtract the NS flush amount from the total    Once the outputs drop to < 20 mL daily, we recommend a repeat CT scan and possible drain removal.       Please call Beth BILLINGS RN clinician at  or Rhoda BILLINGS NP at  for questions or concerns about the tube. You can leave a voicemail. We work Monday through Friday [phone] or [phone].

## 2019-09-19 NOTE — PROGRESS NOTES
Clinic Care Coordination Contact  Care Coordination Transition Communication    Clinical Data: Patient was hospitalized at [facility]  from 9/6 to 9/18 with diagnosis of MSSA bacteremia, liver abscess, s/p US drainage, FTT, generalized weakness, malnutrition. Patient also developed suicidal ideations 9/16, psychiatry reconsulted. Considering regis/psych once medically stable.    Patient discharged to TCU for continued nursing care, IV abx, and therapies.    At baseline, patient resides with daughter, Oksana.    Transition to Facility:              [facility] Name: [facility]              Contact name and phone number/fax: [employer]    Fax:[phone]    Plan: RN/SW Care Coordinator will await notification from facility staff informing RN/SW Care Coordinator of patient's discharge plans/needs. RN/SW Care Coordinator will review chart and outreach to facility staff every 4 weeks and as needed.       Rosemarie Mann RN  Care Coordination  Phone:  [phone]  Email: [email or web address]

## 2019-09-19 NOTE — PLAN OF CARE
Physical Therapy Discharge Summary    Reason for therapy discharge:    Discharged to transitional care facility.    Progress towards therapy goal(s). See goals on Care Plan in [facility] electronic health record for goal details.  Goals not met.  Barriers to achieving goals:   change in med status and level of ability.    Therapy recommendation(s):    Continued therapy is recommended.  Rationale/Recommendations:  to maximize functional mob I and safety.

## 2019-09-19 NOTE — PROGRESS NOTES
ELOISE  I: ELOISE received a message from patient's daughter Oksana who reports that [facility] is not an acceptable facility for her patient's father to be in. Oksana requested a call back. ELOISE called Oksana and updated her that she will need to discuss concerns with facility SW. Oksana was in agreement with this plan and will follow up with facility.    Alberta Bell, BROOKE, LGSW  *[phone]

## 2019-09-19 NOTE — LETTER
To:             Please give to facility    From:   Rosemarie Mann RN-BSN      Care Coordination  Phone:  [phone]  Email: [email or web address]    Patient Name:  Jesús Stock Date of Birth: [birth year]   Admit date: 9/18/19      *Information Needed:  Please contact me when the patient will discharge (or if they will move to long term care)- include the discharge date, disposition, and main diagnosis.  I would also appreciate being notified of any care conferences being held.   - If the patient is discharged with home care services, please provide the name of the agency                           Please include: Phone, Fax or Email with information                                                      THANK YOU!

## 2019-09-20 NOTE — ED PROVIDER NOTES
History     Chief Complaint:  Social Work Services    The history is provided by the patient and a relative.      Jesús Stock is a 68 year old male with malignant neoplasm of pancreas and type 2 diabetes who presents for social work services. The patient was recently discharged into TCU at [facility]. The patient reports that he has been having a lot of anxiety since being there. The patients daughter states that they have not been filling his prescriptions and not returning phone calls. She wanted to do a 24 hour home health care however staff there did not seem to know much about what was going on. She states that they told her to bring him here to be checked out first and to work with the social work liaison. The patient reports some abdominal pain. His daughter denies any fevers since being discharge. He did have a drop in blood sugar last night because the patient has not been eating. He states that he does not have any strength.     Allergies:  No known drug allergies.     Medications:    Bisacodyl (dulcolax) 10 mg suppository  Cefazolin (ancef) 1 gm vial  Dronabinol (marinol) 10 mg capsule  Insulin aspart (novolog pen) 100 unit/ml pen  Levofloxacin (levaquin) 500 mg tablet  Lorazepam (ativan) 0.5 mg tablet  Mirtazapine (remeron) 15 mg tablet  Morphine (ms contin) 15 mg cr tablet  Ondansetron (zofran) 4 mg tablet  Oxycodone (roxicodone) 5 mg tablet  Prochlorperazine (compazine) 5 mg tablet  Senna-docusate (senokot-s/pericolace) 8.6-50 mg tablet  Sildenafil (revatio) 20 mg tablet  Simethicone (mylicon) 125 mg chewable tablet  Triprolidine-pseudoephedrine 2.5-60 mg tabs    Past Medical History:    Alcohol dependence in remission sober since 1984   Backache, unspecified   Colon polyps   Diverticulosis of colon   Major depressive disorder, single episode, mild   Obesity (BMI 30.0-34.9)   Osteoarthrosis, unspecified whether generalized or localized, unspecified site   Pancreatic cancer   Type 2 diabetes  "mellitus without complication, with long-term current use of insulin   Pancreatic mass  Transaminitis  Malignant neoplasm of head of pancreas   Fever     Past Surgical History:    Appendectomy  Hernia repair x3  Chest port placement  IR port removal   Tonsillectomy    Family History:    Diabetes  Hypertension    Social History:  Patient is single  Tobacco Use: No  Alcohol Use: Sober 27 years  PCP: Chirag Robert     Review of Systems   Constitutional: Positive for appetite change. Negative for fever.   Gastrointestinal: Positive for abdominal pain.   Neurological: Negative for weakness.   Psychiatric/Behavioral: The patient is nervous/anxious.    All other systems reviewed and are negative.    Physical Exam   First Vitals:  Patient Vitals for the past 24 hrs:   BP Temp Temp src Pulse Resp SpO2 Height Weight   09/20/19 1549 117/75 -- -- 82 18 95 % -- --   09/20/19 1224 131/75 97.5  F (36.4  C) Oral 97 16 95 % 1.803 m (5' 11\") 77.1 kg (170 lb)     Physical Exam  SKIN:  Warm, dry.  No jaundice.  No rash.  Abdominal drain sites has scant erythema around the puncture but no cutaneous discharge associated.  HEMATOLOGIC/IMMUNOLOGIC/LYMPHATIC:  No pallor.  HENT:  Moist oral mucosa.  EYES:  Conjunctivae normal.  Anicteric.  CARDIOVASCULAR:  Regular rate and rhythm.  No murmur.  RESPIRATORY:  No respiratory distress, breath sounds equal and normal.  GASTROINTESTINAL:  Soft, nontender abdomen with active bowel sounds.  Abdominal drain bulb is nearly empty with yellow discharge in the drain tube and bulb.  MUSCULOSKELETAL: Normal body habitus.  NEUROLOGIC:  Alert, conversant.  Memory impairment.  PSYCHIATRIC:  Normal mood.    Emergency Department Course     Interventions:  Ordered: Ancef 2g Infusion IV    Emergency Department Course:  1:01 PM Nursing notes and vitals reviewed.  I performed an exam of the patient as documented above.     3:01 PM I discussed the patient with Mae our .     5:30 PM Unable to " place in a new facility.  Family opted to take patient home.    Impression & Plan      Medical Decision Making:  This patient presents with family concern that he should be placed elsewhere as they were unhappy with his care at his current care facility. The patient has not suffered any new concerning symptoms since discharged from the hospital.  We attempted placement for the patient and the new facility but were unable.  He was administered his scheduled dose of Ancef while here.  Family opted to take the patient home and to pursue ongoing home care.  Advised return if any concerns.    Diagnosis:    ICD-10-CM    1. Malignant neoplasm of pancreas, unspecified location of malignancy (H) C25.9    2. Liver abscess K75.0        Disposition:  Signed out to Dr. Valerio pending disposition    I, Bradley Aasen, am serving as a scribe on 9/20/2019 at 1:00 PM to personally document services performed by Alen Burr MD based on my observations and the provider's statements to me.          Alen Burr MD  09/20/19 9940       Alen Burr MD  09/21/19 1022

## 2019-09-20 NOTE — PROGRESS NOTES
I was asked to assist this patient and his daughter who were at [facility] and they left because of the care.  This patient's daughter Oksana and his ex-wife wanted to take this patient home as he lives with Oksana on a regular basis. His ex-wife changed and then wanted his to go to a TCU.  I tried [facility] and [facility] and they declined.  [facility] was going to accept this patient but Oksana decided she wanted to go home.  I had called [facility] earlier and Una set up [facility] for the ancef 2gms IV q 8 hours. I was to call [facility] with w yes/no if the patient went to a TCU.  Oksana wanted to go home and so I worked with Viki at [facility] to get this patient IV abx tomorrow.  Oksana was aware that this patient would miss a dose and agreed to the private pay of $32 for medication and supplies per day.  Oksana transport this patient home.  HC orders were entered by the ER provider with the face to face.  I faxed them to Viki at [facility] as she couldn't see the orders in [facility].  Viki will have the staff in the morning work with Oksana to get the IN abx and Oksana will learn to infuse them.  The ER provider gave the patient a Remeron script also.  I have completed my assistance with this patient. I updated the ER provider.

## 2019-09-20 NOTE — ED TRIAGE NOTES
In TCU since dehydration/infection admission on 9/6. Daughter brought pt in to change TCU because she found out [facility] Snider has not been giving him his Marinol. Pt currently has PICC line and drain from liver.

## 2019-09-20 NOTE — ED AVS SNAPSHOT
Emergency Department  [address] [place] [id number]  Phone:  [phone]  Fax:  [phone]                                    Jesús Stock   MRN: [id number]    Department:   Emergency Department   Date of Visit:  9/20/2019           After Visit Summary Signature Page    I have received my discharge instructions, and my questions have been answered. I have discussed any challenges I see with this plan with the nurse or doctor.    ..........................................................................................................................................  Patient/Patient Representative Signature      ..........................................................................................................................................  Patient Representative Print Name and Relationship to Patient    ..................................................               ................................................  Date                                   Time    ..........................................................................................................................................  Reviewed by Signature/Title    ...................................................              ..............................................  Date                                               Time          22EPIC Rev 08/18

## 2019-09-23 NOTE — TELEPHONE ENCOUNTER
Jayashree RN with [facility] ([phone]) called. Admited to home care 9/22/19.  Needs order for SW visit to assist patient and family with TCU placement and for SN visit 3x this week and 2 as needed visits.    Family brought patient from TCU to ER Friday 9/20/19 as they were not happy with care he had been receiving.  Oncologist helped with prescribing meds so that he is not going without them.  He received a scheduled Ancef dose while in ER.  They are now looking for another TCU for patient.     Approved orders.  MARIVEL Lniares R.N.

## 2019-09-23 NOTE — TELEPHONE ENCOUNTER
[facility] now requests orders and shares plan of care/discharge summaries for some patients through [employer].  Please REPLY TO THIS MESSAGE OR ROUTE BACK TO THE AUTHOR in order to give authorization for orders when needed.  This is considered a verbal order, you will still receive a faxed copy of orders for signature.  Thank you for your assistance in improving collaboration for our patient    Pt update// risk for dehydration, minimal sips only, unable to push fluids as pt is not interested in drinking more. Family requesting IV hydration with port access.     Please coordinate as appropriate with hospitals at [phone] and send orders for type, quantity, and rate for PRN IV fluid hydration needs.     Thank you,     Jayashree ANN [facility]  [phone]

## 2019-09-23 NOTE — PROGRESS NOTES
This is a recent snapshot of the patient's [facility] medical record.  For current drug dose and complete information and questions, call [phone] or In Basket pool, [facility] home infusion ([id number])  CSN Number:  [id number]

## 2019-09-24 NOTE — PROGRESS NOTES
Subjective     Jesús Stock is a 68 year old male who presents to clinic today for the following health issues:  [facility] 09/06/2019-09/18/2019 staphylococcus aureus, dehydration, [facility] ER 09/20/2019 Malignant neoplasm of pancreas, liver abscess  [facility]       Hospital Follow-up Visit:    Hospital/Nursing Home/IP Rehab Facility: [facility]  Date of Admission: 09/06/2019  Date of Discharge: 09/18/2019  Reason(s) for Admission: dehydration, staphylococcus aureus            Problems taking medications regularly:  None       Medication changes since discharge: None       Problems adhering to non-medication therapy:  None    Summary of hospitalization:  [facility] discharge summary reviewed  Diagnostic Tests/Treatments reviewed.  Follow up needed: none  Other Healthcare Providers Involved in Patient s Care:         Homecare  Update since discharge: improved.     Post Discharge Medication Reconciliation: discharge medications reconciled, continue medications without change.  Plan of care communicated with patient and family     Coding guidelines for this visit:  Type of Medical   Decision Making Face-to-Face Visit       within 7 Days of discharge Face-to-Face Visit        within 14 days of discharge   Moderate Complexity 36596 [id number]   High Complexity 42526 85061          Patient is seen for a follow up visit.    Recently hospitalized for liver abscess, weakness, fever.   Had abscess drain placed. On IV antibiotics. Has had port , removed because of positive blood cultures. Has a midline now , IV antibiotic done by home care.   No fever. Has drainage from the abscess about 25 ml/day. Has 2 more weeks treatment.then needs follow up with ID. Repeated CT, for possible removal of drain.   Has pancreatic cancer, has had one chemotherapy. Follows with oncology. Has weight loss, poor appetite, dry mouth, fatigue, unable to ambulate except for short distances.   Has DM, on insulin PRN, currently  not requiring as glucose is controlled.         Patient Active Problem List   Diagnosis     CARDIOVASCULAR SCREENING; LDL GOAL LESS THAN 130     Mild major depression (H)     Colon polyps     Diverticulosis of colon     Health Care Home     Obesity (BMI 30.0-34.9)     Alcohol dependence in remission sober since 1984 (H)     Type 2 diabetes mellitus without complication, with long-term current use of insulin (H)     Pancreatic mass     Transaminitis     Malignant neoplasm of head of pancreas (H)     Failure to thrive in adult     Fever     At risk for healthcare associated infection     Past Surgical History:   Procedure Laterality Date     APPENDECTOMY       COLONOSCOPY  5/24/11    2 polyps, mild pan-colonic diverticulosis     COLONOSCOPY N/A 9/7/2017    Procedure: COMBINED COLONOSCOPY, SINGLE OR MULTIPLE BIOPSY/POLYPECTOMY BY BIOPSY;;  Surgeon: Gene Grimes MD;  Location:  GI     ENDOSCOPIC RETROGRADE CHOLANGIOPANCREATOGRAM N/A 6/5/2019    Procedure: ENDOSCOPIC RETROGRADE CHOLANGIOPANCREATOGRAPHY, ENDOSCOPIC UTRASOUND ESOPHAGOSCOPY WITH STENT PLACEMENT. ;  Surgeon: Jana Nelson MD;  Location:  OR     ENDOSCOPIC RETROGRADE CHOLANGIOPANCREATOGRAPHY, EXCHANGE TUBE/STENT N/A 7/18/2019    Procedure: ENDOSCOPIC RETROGRADE CHOLANGIOPANCREATOGRAPHY, WITH STENT EXCHANGE;  Surgeon: Jana Nelson MD;  Location:  OR     ESOPHAGOSCOPY, GASTROSCOPY, DUODENOSCOPY (EGD), COMBINED N/A 6/5/2019    Procedure: Esophagoscopy, gastroscopy, duodenoscopy (EGD), combined, pancreatic needle biopsies;  Surgeon: Jana Nelson MD;  Location:  OR     HERNIA REPAIR      X3     IR CHEST PORT PLACEMENT > 5 YRS OF AGE  8/28/2019     IR PORT REMOVAL RIGHT  9/11/2019     TONSILLECTOMY         Social History     Tobacco Use     Smoking status: Passive Smoke Exposure - Never Smoker     Smokeless tobacco: Never Used     Tobacco comment: occasional   Substance Use Topics     Alcohol use: No     Comment:  sober x 27 years     Family History   Problem Relation Age of Onset     Diabetes Father         b:1926     Hypertension Mother         b:1925     Family History Negative Brother         b:1948     Family History Negative Brother         b:1951  deferred tremor     Family History Negative Sister         b:1953     Family History Negative Brother         b:1956     Family History Negative Brother         b:1958         Current Outpatient Medications   Medication Sig Dispense Refill     nystatin (MYCOSTATIN) 555804 UNIT/ML suspension Take 5 mLs (500,000 Units) by mouth 4 times daily 120 mL 0     acetaminophen (TYLENOL) 500 MG tablet Take 1-2 tablets by mouth every 6 hours as needed for pain.       alcohol swab prep pads Use to swab area of injection/valerie as directed. 100 each 3     alpha-d-galactosidase (BEANO) tablet Take 1 tablet by mouth 3 times daily as needed for intestinal gas       bisacodyl (DULCOLAX) 10 MG suppository Place 1 suppository (10 mg) rectally daily as needed for constipation       blood glucose (NO BRAND SPECIFIED) test strip Use to test blood sugar 3 times daily or as directed. To accompany: Blood Glucose Monitor Brands: per insurance. 100 strip 6     blood glucose calibration (NO BRAND SPECIFIED) solution Check once per month To accompany: Blood Glucose Monitor Brands: per insurance. 1 Bottle 3     blood glucose monitoring (NO BRAND SPECIFIED) meter device kit Use to test blood sugar as directed. Preferred blood glucose meter OR supplies to accompany: Blood Glucose Monitor Brands: per insurance. 1 kit 0     ceFAZolin (ANCEF) 1 GM vial Inject 2 g into the vein every 8 hours for 21 days CBC with differential, creatinine, SGOT weekly while on this medication to be faxed to Dr. Crocker office. 1 each 1     Continuous Blood Gluc  (FREESTYLE GIOVANY 14 DAY READER) SHAWN 1 Act every 14 days 4 Device 0     Continuous Blood Gluc Sensor (FREESTYLE GIOVANY 14 DAY SENSOR) MISC 1 Act every 14 days 4 each 3      dronabinol (MARINOL) 10 MG capsule Take 10 mg by mouth 2 times daily       insulin aspart (NOVOLOG PEN) 100 UNIT/ML pen Inject 1-7 Units Subcutaneous 3 times daily (before meals) Correction Scale - MEDIUM INSULIN RESISTANCE DOSING     Do Not give Correction Insulin if Pre-Meal BG less than 140.   For Pre-Meal  - 189 give 1 unit.   For Pre-Meal  - 239 give 2 units.   For Pre-Meal  - 289 give 3 units.   For Pre-Meal  - 339 give 4 units.   For Pre-Meal - 399 give 5 units.   For Pre-Meal -449 give 6 units  For Pre-Meal BG greater than or equal to 450 give 7 units.   To be given with prandial insulin, and based on pre-meal blood glucose.    Notify provider if glucose greater than or equal to 350 mg/dL after administration of correction dose. If given at mealtime, administer within 30 minutes of start of meal       insulin aspart (NOVOLOG PEN) 100 UNIT/ML pen Inject 1-5 Units Subcutaneous At Bedtime MEDIUM INSULIN RESISTANCE DOSING    Do Not give Bedtime Correction Insulin if BG less than  200.   For  - 249 give 1 units.   For  - 299 give 2 units.   For  - 349 give 3 units.   For  -399 give 4 units.   For BG greater than or equal to 400 give 5 units.  Notify provider if glucose greater than or equal to 350 mg/dL after administration of correction dose. If given at mealtime, administer within 30 minutes of start of meal       insulin pen needle (B-D U/F) 31G X 5 MM miscellaneous USE DAILY AS DIRECTED 100 each 1     levofloxacin (LEVAQUIN) 500 MG tablet Take 1 tablet (500 mg) by mouth daily for 21 days       LORazepam (ATIVAN) 0.5 MG tablet Take 1 tablet (0.5 mg) by mouth every 6 hours as needed for anxiety 10 tablet 0     mirtazapine (REMERON) 15 MG tablet Take 1 tablet (15 mg) by mouth At Bedtime 30 tablet 1     mirtazapine (REMERON) 15 MG tablet Take 1 tablet (15 mg) by mouth At Bedtime       morphine (MS CONTIN) 15 MG CR tablet Take 1 tablet (15 mg) by  mouth every 12 hours 14 tablet 0     ondansetron (ZOFRAN) 4 MG tablet Take 1 tablet (4 mg) by mouth every 8 hours as needed for nausea 30 tablet 1     oxyCODONE (ROXICODONE) 5 MG tablet Take 1 tablet (5 mg) by mouth every 6 hours as needed for moderate to severe pain 10 tablet 0     polyethylene glycol (MIRALAX/GLYCOLAX) powder Take 1 capful by mouth daily       prochlorperazine (COMPAZINE) 5 MG tablet Take 1 tablet (5 mg) by mouth every 6 hours as needed for nausea or vomiting 60 tablet 1     senna-docusate (SENOKOT-S/PERICOLACE) 8.6-50 MG tablet Take 1-2 tablets by mouth 2 times daily       senna-docusate (SENOKOT-S/PERICOLACE) 8.6-50 MG tablet Take 1-2 tablets by mouth 2 times daily as needed for constipation       sildenafil (REVATIO) 20 MG tablet Take 20 mg by mouth as needed       simethicone (MYLICON) 125 MG chewable tablet Take 125 mg by mouth 2 times daily as needed (abdominal pain)       thin (NO BRAND SPECIFIED) lancets Check glucose 3-4 times per day; Use with lanceting device. To accompany: Blood Glucose Monitor Brands: per insurance. 100 each 6     Triprolidine-Pseudoephedrine 2.5-60 MG TABS Take 0.5 tablets by mouth every 6 hours as needed (allergies)           Reviewed and updated as needed this visit by Provider         Review of Systems   ROS COMP: Constitutional, HEENT, cardiovascular, pulmonary, GI, , musculoskeletal, neuro, skin, endocrine and psych systems are negative, except as otherwise noted.      Objective    There were no vitals taken for this visit.  There is no height or weight on file to calculate BMI.  Physical Exam   GENERAL: weak, frail, in a wheelchair, alert and no distress  EYES: Eyes grossly normal to inspection, PERRL and conjunctivae and sclerae normal  HENT: ear canals and TM's normal, nose and mouth without ulcers or lesions, dry oral mucosa, tongue with yellow whitish coating   NECK: no adenopathy, no asymmetry, masses, or scars and thyroid normal to palpation  RESP:  lungs clear to auscultation - no rales, rhonchi or wheezes  CV: regular rate and rhythm, normal S1 S2, no S3 or S4, no murmur, click or rub, no peripheral edema and peripheral pulses strong  ABDOMEN: soft, diffusely tender, no hepatosplenomegaly, no masses and bowel sounds normal, mid abdominal catheter/ drain secreting whitish yellow exudate   MS: no gross musculoskeletal defects noted, no edema  SKIN: no suspicious lesions or rashes  NEURO: generalized weakness, mentation intact and speech normal, flat affect     Diagnostic Test Results:  Labs reviewed in Epic        Assessment & Plan   Problem List Items Addressed This Visit     Type 2 diabetes mellitus without complication, with long-term current use of insulin (H)    Malignant neoplasm of head of pancreas (H)    Failure to thrive in adult      Other Visit Diagnoses     Liver abscess    -  Primary    Relevant Orders    INFECTIOUS DISEASE REFERRAL    Thrush        Relevant Medications    nystatin (MYCOSTATIN) 944064 UNIT/ML suspension         Continue antibiotic IV  Encourage PO intake, if unable consider PEG feeding   Home care will dive 1000 ml NS tomorrow  Assess renal function and CBC  Follow up with oncology   Cont psychotropic medications   Once output of liver abscess drain decreased less than 10 ml repeat CT        See Patient Instructions  Return in about 2 weeks (around 10/8/2019).    Chirag Robert MD  [facility]

## 2019-09-24 NOTE — TELEPHONE ENCOUNTER
Marina from  Homecare called to check on orders for IV fluids. Please place order with  Home [facility]fusion ASAP. The appointment is at 8:30 am tomorrow

## 2019-09-24 NOTE — NURSING NOTE
"/79 (BP Location: Left arm, Patient Position: Sitting, Cuff Size: Adult Regular)   Pulse 81   Temp 97.6  F (36.4  C) (Oral)   Resp 16   Ht 1.803 m (5' 11\")   Wt 60.7 kg (133 lb 14.4 oz)   SpO2 99%   BMI 18.68 kg/m    [facility] 09/06/2019-09/18/2019 staphylococcus aureus, dehydration, [facility] 09/20/2019 Malignant neoplasm of pancreas, liver abscess  "

## 2019-09-25 NOTE — PROGRESS NOTES
This is a recent snapshot of the patient's [facility] medical record.  For current drug dose and complete information and questions, call [phone] or In [facility] [place] pool, [facility] home infusion ([id number])  CSN Number:  [id number]

## 2019-09-25 NOTE — TELEPHONE ENCOUNTER
See notes below. For [facility]. They need orders for Type, quantity, and rate for PRN IV Fluid Hydration needs.   For Questions, their ph #[phone]

## 2019-09-26 NOTE — PROGRESS NOTES
Clinic Care Coordination Contact  Care Team Conversations    Received call from [facility] Kristi NAVAS.  She has been working with patient and family to find new TCU.  [facility] has accepted patient for admission tomorrow.  Kristi is asking for assistance with TCU orders.    Rn CC will pend orders for PCP to sign.    Rosemarie Mann RN  Care Coordination  Phone:  [phone]  Email: [email or web address] [place] [facility]

## 2019-09-26 NOTE — PROGRESS NOTES
This is a recent snapshot of the patient's [facility] medical record.  For current drug dose and complete information and questions, call [phone] or In [facility] pool, [facility] home infusion ([id number])  CSN Number:  [id number]

## 2019-09-27 NOTE — TELEPHONE ENCOUNTER
ELOISE Berry [phone] with [facility] calling.  Patient is being admitted to [facility] today.  TCU is in need of hard copies for following medications:    Oxycodone  Lorazepam  Mirtazapine  Marinol    Please fax to [phone] once completed.  Call daughterOksana [phone] when hard copies can be picked up.  She will deliver to TCU.    THANK YOU!    Rosemarie Mann RN  Care Coordination  Phone:  [phone]  Email: [email or web address]  [facility]

## 2019-09-27 NOTE — TELEPHONE ENCOUNTER
Prescriptions faxed to number listed below.    Oksana informed prescriptions were faxed.  If Eugenio FREEMAN needs hard copy can mail to them.  Oksana states she will inform Kristi--ELOISE with [facility] that prescriptions faxed.

## 2019-09-27 NOTE — PROGRESS NOTES
This is a recent snapshot of the patient's [facility] medical record.  For current drug dose and complete information and questions, call [phone] or In [facility] pool, [facility] home infusion ([id number])  CSN Number:  [id number]

## 2019-09-27 NOTE — TELEPHONE ENCOUNTER
Kristi  with [facility] calling to say they also need rx for morphine.  Kristi can be reached at [phone].  Please message from Rosemarie Mann .

## 2019-09-27 NOTE — TELEPHONE ENCOUNTER
Fax received from [facility] 09/25/19 for review and signature.  Put in Dr. Robert's in basket.

## 2019-09-27 NOTE — TELEPHONE ENCOUNTER
See message below.      Prescriptions pended--also needs Morphine and Marinol (not on current medication list).  Received Marinol 10mg capsules BID while in hospital.    Last refill:  Mirtazapine #30 with 1 refill on 9-20-19 (ER discharge)  Lorazepam #10 tabs on 9-18-19 (hospital discharge)  MS Contin #14 tabs on 9-18-19 (hospital discharge)  Oxycodone #10 tabs on 9-18-19 (hospital discharge)    Once authorized, need to fax to number listed below.    Last office visit 9-24-19    Please advise, thanks.

## 2019-09-30 NOTE — LETTER
9/30/2019        RE: Jesús Stock  [address] [facility] [address]        [facility]    PRIMARY CARE PROVIDER AND CLINIC:  Chirag Robert MD, [address] / [place] [address]  Chief Complaint   Patient presents with     Hospital F/U     [facility] Record Number:  [id number]  Place of Service where encounter took place:  [facility] [[id number]]    Jesús Stock  is a 68 year old  (1950), admitted to the above facility from home due to care needs.  Admitted to this facility for  rehab, medical management and nursing care.    HPI:    HPI information obtained from: facility chart records, facility staff, patient report and [facility] chart review.     Brief Summary of Hospital Course:   Patient diagnosed with pancreatic cancer in 06/2019, started on chemotherapy on 8/26. Noted to have approx 80lb weight loss over the past year.   Recently admitted to [facility] 9/6-9/18 due to weight loss and failure to thrive. Noted to have MSSA bacteremia likely r/t port infection (removed 9/11). CT abd 9/12 found liver abscess. S/p US guided drainage of liver abscess, drain placement on 9/13. Cultures + Klebsiall and Serratia. ID following, was started on IV cefazolin and PO levofloxacin x 3 weeks. Repeat CT abd 9/18 found improvement in liver abscess. Psychiatry also consulted for suicidal ideations, declined recommendations for regis psych transfer; was started on remeron. Patient was then discharged to TCU at [facility].   Patient returned to [facility] ED on 9/20 for anxiety, abdominal pain, and SW consult to assist with services to return home.   Patient was seen in clinic by PCP on 9/24. Recommended to repeat CT once liver abscess drain output < 10cc every day. Also recommending PEG placement for tube feeding if ongoing decreased intake.   Patient then transferred from home to [facility] on 9/27.     Updates on Status Since Skilled nursing Admission:      During exam, patient reports physical decline since starting chemotherapy with acute loss of appetite. States prior to chemotherapy had a great appetite and ate most meals. Now reports having very limited appetite. Admits to intermittent nausea w/o emesis. Reports receiving IV antibiotics during the day and states abdominal drain has been having less output every day. Admits to chronic abdominal pain r/t pancreatitic cancer, controlled with MS contin BID and oxycodone prn. Patient does not recall having any follow-up appts with Oncology or ID scheduled. PTA lives with daughter, goal is to get stronger and return back home w/family. Has been using a walker, requires assistance with activity and ADLs. Reports having difficulty with new diagnosis of pancreatic cancer and recent physical decline. Reports seeing therapist in the hospital for depression, denies suicidal ideations.         CODE STATUS/ADVANCE DIRECTIVES DISCUSSION:   DNR / DNI    Patient's living condition: lives with family, daughter (Oksana)   ALLERGIES: Dust mites; Mold; and No known drug allergies  PAST MEDICAL HISTORY:  has a past medical history of Alcohol dependence in remission sober since 1984 (H), Backache, unspecified, Colon polyps (5/24/11), Diverticulosis of colon (5/24/11), Major depressive disorder, single episode, mild (H), Obesity (BMI 30.0-34.9) (08/28/2017), Osteoarthrosis, unspecified whether generalized or localized, unspecified site, Pancreatic cancer (H), and Type 2 diabetes mellitus without complication, with long-term current use of insulin (H) (03/13/2019). He also has no past medical history of Chronic infection, History of blood transfusion, Malignant hyperthermia, or Sleep apnea.  PAST SURGICAL HISTORY:   has a past surgical history that includes Colonoscopy (5/24/11); tonsillectomy; appendectomy; hernia repair; Colonoscopy (N/A, 9/7/2017); Endoscopic retrograde cholangiopancreatogram (N/A, 6/5/2019); Esophagoscopy,  gastroscopy, duodenoscopy (EGD), combined (N/A, 6/5/2019); Endoscopic Retrograde Cholangiopancreatography, Exchange Tube/Stent (N/A, 7/18/2019); IR Chest Port Placement > 5 Yrs of Age (8/28/2019); and IR Port Removal Right (9/11/2019).  FAMILY HISTORY: family history includes Diabetes in his father; Family History Negative in his brother, brother, brother, brother, and sister; Hypertension in his mother.  SOCIAL HISTORY:   reports that he is a non-smoker but has been exposed to tobacco smoke. He has never used smokeless tobacco. He reports that he does not drink alcohol or use drugs.    Post Discharge Medication Reconciliation Status: discharge medications reconciled and changed, per note/orders (see AVS)    Current Outpatient Medications   Medication Sig Dispense Refill     acetaminophen (TYLENOL) 500 MG tablet Take 1-2 tablets by mouth every 6 hours as needed for pain.       alpha-d-galactosidase (BEANO) tablet Take 1 tablet by mouth 3 times daily as needed for intestinal gas       bisacodyl (DULCOLAX) 10 MG suppository Place 1 suppository (10 mg) rectally daily as needed for constipation       ceFAZolin (ANCEF) 1 GM vial Inject 2 g into the vein every 8 hours for 21 days CBC with differential, creatinine, SGOT weekly while on this medication to be faxed to Dr. Crocker office. 1 each 1     dronabinol (MARINOL) 10 MG capsule Take 10 mg by mouth 2 times daily       insulin aspart (NOVOLOG PEN) 100 UNIT/ML pen Inject 1-7 Units Subcutaneous 3 times daily (before meals) Correction Scale - MEDIUM INSULIN RESISTANCE DOSING     Do Not give Correction Insulin if Pre-Meal BG less than 140.   For Pre-Meal  - 189 give 1 unit.   For Pre-Meal  - 239 give 2 units.   For Pre-Meal  - 289 give 3 units.   For Pre-Meal  - 339 give 4 units.   For Pre-Meal - 399 give 5 units.   For Pre-Meal -449 give 6 units  For Pre-Meal BG greater than or equal to 450 give 7 units.   To be given with prandial  insulin, and based on pre-meal blood glucose.    Notify provider if glucose greater than or equal to 350 mg/dL after administration of correction dose. If given at mealtime, administer within 30 minutes of start of meal       levofloxacin (LEVAQUIN) 500 MG tablet Take 1 tablet (500 mg) by mouth daily for 21 days       LORazepam (ATIVAN) 0.5 MG tablet Take 1 tablet (0.5 mg) by mouth every 6 hours as needed for anxiety 30 tablet 0     mirtazapine (REMERON) 15 MG tablet Take 1 tablet (15 mg) by mouth At Bedtime       morphine (MS CONTIN) 15 MG CR tablet Take 1 tablet (15 mg) by mouth every 12 hours 60 tablet 0     nystatin (MYCOSTATIN) 748344 UNIT/ML suspension Take 5 mLs (500,000 Units) by mouth 4 times daily 120 mL 0     ondansetron (ZOFRAN) 4 MG tablet Take 4 mg by mouth 3 times daily       oxyCODONE (ROXICODONE) 5 MG tablet Take 1 tablet (5 mg) by mouth every 6 hours as needed for moderate to severe pain 60 tablet 0     polyethylene glycol (MIRALAX/GLYCOLAX) powder Take 1 capful by mouth daily       prochlorperazine (COMPAZINE) 5 MG tablet Take 1 tablet (5 mg) by mouth every 6 hours as needed for nausea or vomiting 60 tablet 1     senna-docusate (SENOKOT-S/PERICOLACE) 8.6-50 MG tablet Take 1-2 tablets by mouth 2 times daily       senna-docusate (SENOKOT-S/PERICOLACE) 8.6-50 MG tablet Take 1-2 tablets by mouth 2 times daily as needed for constipation       sildenafil (REVATIO) 20 MG tablet Take 20 mg by mouth as needed Hold while at [facility]       simethicone (MYLICON) 125 MG chewable tablet Take 125 mg by mouth 2 times daily as needed (abdominal pain)       Triprolidine-Pseudoephedrine 2.5-60 MG TABS Take 0.5 tablets by mouth every 6 hours as needed (allergies)           ROS:  10 point ROS of systems including Constitutional, Eyes, Respiratory, Cardiovascular, Gastroenterology, Genitourinary, Integumentary, Musculoskeletal, Psychiatric were all negative except for pertinent positives noted in my HPI.      Vitals:  BP  "(!) 152/84   Pulse 73   Temp 97.5  F (36.4  C)   Resp 12   Ht 1.803 m (5' 11\")   Wt 60.3 kg (133 lb)   SpO2 94%   BMI 18.55 kg/m     Exam:  GENERAL APPEARANCE:  Alert, in no distress, oriented, thin, cooperative  ENT:  Mouth and posterior oropharynx normal, moist mucous membranes, normal hearing acuity  EYES:  EOM, conjunctivae, lids, pupils and irises normal, PERRL  NECK:  No adenopathy,masses or thyromegaly  RESP:  respiratory effort and palpation of chest normal, lungs clear to auscultation , no respiratory distress  CV:  Palpation and auscultation of heart done , regular rate and rhythm, no murmur, rub, or gallop, no edema, +2 pedal pulses  ABDOMEN:  normal bowel sounds, soft, nontender, no guarding or rebound. Liver abscess JUNO drain intact w/minimal serous output.   M/S: Gait and station abnormal, uses walker; requires assistance with activity and ADLs. Digits and nails normal  SKIN:  Inspection of skin and subcutaneous tissue baseline, Palpation of skin and subcutaneous tissue baseline  NEURO:   Cranial nerves 2-12 are normal tested and grossly at patient's baseline, Examination of sensation by touch normal  PSYCH:  oriented X 3, insight and judgement impaired, memory impaired, affect and mood normal, cognitive testing to be done in therapy      Lab/Diagnostic data:  Recent labs in [facility] reviewed by me today.           ASSESSMENT/PLAN:    (E43) Severe malnutrition (H)  (primary encounter diagnosis)  Comment: Uncontrolled severe malnutrition r/t pancreatic cancer/chemotherapy. Approx 80lb weight loss over the past year.   Plan:   - Continue IV hydration (5 days), occurring at night since IV abx given during the day.   - Check CBC & BMP 10/1  - Start calorie counts every day x 5 days; monitor intake  - Add scheduled zofran w/meals; change zofran prn to 4mg q6hrs prn for nausea  - Continue marinol, remeron, compazine prn  - Dietician following, plans to start dietary supplements for increased calories and " protein intake  - Reviewed treatment plan with patient and patient's daughter. Recommending tube feeding with G/J tube placement given severe malnutrition with ongoing decreased appetite. Patient's daughter reports wanting to follow-up with Oncology first for recommendations and to discuss potential of starting medical cannabis.     (C25.0) Malignant neoplasm of head of pancreas (H)  (R78.81) MSSA bacteremia  (K75.0) Liver abscess  Comment: Uncontrolled pancreatic cancer with ongoing treatment for MSSA bacteremia and new finding of liver abscess with drain placement on 9/13.   Plan:   - Continue Levofloxacin PO x 21 days  - Continue IV Cefazolin x 21 days  - ID following, weekly labs to be faxed to ID on Wednesdays  - Monitor output from drain qshift, if output < 10cc then consider repeat CT abd (outpatient).   - Per IR notes, staff to flush drain with 5cc NS every day   - IR following for drain management  - Change PICC lines q7 days and prn  - Start PICC line flushes per facility protocol  - Patient to follow-up with ID, Oncology as directed    (E11.9,  Z79.4) Type 2 diabetes mellitus without complication, with long-term current use of insulin (H)  Comment: Controlled, at risk for hypoglycemia given poor intake/malnutrition. Last A1C 7.7% on 9/6.   Plan:   - Continue novolog sliding scale insulin w/meals  - Monitor BG w/meals and at bedtime     (F32.0) Mild major depression (H)  Comment: Uncontrolled depressive symptoms associated with new diagnosis of pancreatic cancer. No suicidal thoughts.   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 No active sx of bleeding.   Plan:   - Monitor Hgb    (R53.81) Physical deconditioning  Comment: Ongoing physical deconditioning associated with severe malnutrition secondary to pancreatic cancer.   Plan:   - Encourage active participation in therapy  session to increase strength and promote independence in activities and ADLs.   - ST to eval/treat cognition  - Cognitive testing to be done in therapy.   - SW following for discharge planning.   - Reviewed POLST with patient, verbalizes wishes to be DNR/DNI.                 Total time spent with patient visit at the skilled nursing facility was 42 minutes including patient visit, review of past records and phone call to patient contact. Greater than 50% of total time spent with counseling and coordinating care due to coordinating care with nurse on admission orders, coordinating care with follow up labs and appointments and counseling patient/resident and family for 25 min minutes on: the reason for their hospitalization and what the treatment is, the plan of SNF stay and projected length of stay, options of code status and their current code status order, current medications (treatments) reconciled from the hospital, recent past lab and imaging results and subsequent treatment plan and current pain control plan and controlled substances ordered and taper plan of care.    Electronically signed by:  CARMINE Mclain CNP                         Sincerely,        CARMINE Mclain CNP

## 2019-09-30 NOTE — PROGRESS NOTES
[place] [facility]    PRIMARY CARE PROVIDER AND CLINIC:  Chirag Robert MD, [address] [facility] [address]  Chief Complaint   Patient presents with     Hospital F/U     [place] Medical Record Number:  [id number]  Place of Service where encounter took place:  [facility] [place] ([facility]) [[id number]]    Jesús Stock  is a 68 year old  (1950), admitted to the above facility from home due to care needs.  Admitted to this facility for  rehab, medical management and nursing care.    HPI:    HPI information obtained from: facility chart records, facility staff, patient report and [facility] chart review.     Brief Summary of Hospital Course:   Patient diagnosed with pancreatic cancer in 06/2019, started on chemotherapy on 8/26. Noted to have approx 80lb weight loss over the past year.   Recently admitted to [facility] 9/6-9/18 due to weight loss and failure to thrive. Noted to have MSSA bacteremia likely r/t port infection (removed 9/11). CT abd 9/12 found liver abscess. S/p US guided drainage of liver abscess, drain placement on 9/13. Cultures + Klebsiall and Serratia. ID following, was started on IV cefazolin and PO levofloxacin x 3 weeks. Repeat CT abd 9/18 found improvement in liver abscess. Psychiatry also consulted for suicidal ideations, declined recommendations for regis psych transfer; was started on remeron. Patient was then discharged to TCU at [facility].   Patient returned to [facility] ED on 9/20 for anxiety, abdominal pain, and SW consult to assist with services to return home.   Patient was seen in clinic by PCP on 9/24. Recommended to repeat CT once liver abscess drain output < 10cc every day. Also recommending PEG placement for tube feeding if ongoing decreased intake.   Patient then transferred from home to [facility] on 9/27.     Updates on Status Since Skilled nursing Admission:     During exam, patient reports physical decline since starting chemotherapy with acute loss  of appetite. States prior to chemotherapy had a great appetite and ate most meals. Now reports having very limited appetite. Admits to intermittent nausea w/o emesis. Reports receiving IV antibiotics during the day and states abdominal drain has been having less output every day. Admits to chronic abdominal pain r/t pancreatitic cancer, controlled with MS contin BID and oxycodone prn. Patient does not recall having any follow-up appts with Oncology or ID scheduled. PTA lives with daughter, goal is to get stronger and return back home w/family. Has been using a walker, requires assistance with activity and ADLs. Reports having difficulty with new diagnosis of pancreatic cancer and recent physical decline. Reports seeing therapist in the hospital for depression, denies suicidal ideations.         CODE STATUS/ADVANCE DIRECTIVES DISCUSSION:   DNR / DNI    Patient's living condition: lives with family, daughter (Oksana)   ALLERGIES: Dust mites; Mold; and No known drug allergies  PAST MEDICAL HISTORY:  has a past medical history of Alcohol dependence in remission sober since 1984 (H), Backache, unspecified, Colon polyps (5/24/11), Diverticulosis of colon (5/24/11), Major depressive disorder, single episode, mild (H), Obesity (BMI 30.0-34.9) (08/28/2017), Osteoarthrosis, unspecified whether generalized or localized, unspecified site, Pancreatic cancer (H), and Type 2 diabetes mellitus without complication, with long-term current use of insulin (H) (03/13/2019). He also has no past medical history of Chronic infection, History of blood transfusion, Malignant hyperthermia, or Sleep apnea.  PAST SURGICAL HISTORY:   has a past surgical history that includes Colonoscopy (5/24/11); tonsillectomy; appendectomy; hernia repair; Colonoscopy (N/A, 9/7/2017); Endoscopic retrograde cholangiopancreatogram (N/A, 6/5/2019); Esophagoscopy, gastroscopy, duodenoscopy (EGD), combined (N/A, 6/5/2019); Endoscopic Retrograde  Cholangiopancreatography, Exchange Tube/Stent (N/A, 7/18/2019); IR Chest Port Placement > 5 Yrs of Age (8/28/2019); and IR Port Removal Right (9/11/2019).  FAMILY HISTORY: family history includes Diabetes in his father; Family History Negative in his brother, brother, brother, brother, and sister; Hypertension in his mother.  SOCIAL HISTORY:   reports that he is a non-smoker but has been exposed to tobacco smoke. He has never used smokeless tobacco. He reports that he does not drink alcohol or use drugs.    Post Discharge Medication Reconciliation Status: discharge medications reconciled and changed, per note/orders (see AVS)    Current Outpatient Medications   Medication Sig Dispense Refill     acetaminophen (TYLENOL) 500 MG tablet Take 1-2 tablets by mouth every 6 hours as needed for pain.       alpha-d-galactosidase (BEANO) tablet Take 1 tablet by mouth 3 times daily as needed for intestinal gas       bisacodyl (DULCOLAX) 10 MG suppository Place 1 suppository (10 mg) rectally daily as needed for constipation       ceFAZolin (ANCEF) 1 GM vial Inject 2 g into the vein every 8 hours for 21 days CBC with differential, creatinine, SGOT weekly while on this medication to be faxed to Dr. Crocker office. 1 each 1     dronabinol (MARINOL) 10 MG capsule Take 10 mg by mouth 2 times daily       insulin aspart (NOVOLOG PEN) 100 UNIT/ML pen Inject 1-7 Units Subcutaneous 3 times daily (before meals) Correction Scale - MEDIUM INSULIN RESISTANCE DOSING     Do Not give Correction Insulin if Pre-Meal BG less than 140.   For Pre-Meal  - 189 give 1 unit.   For Pre-Meal  - 239 give 2 units.   For Pre-Meal  - 289 give 3 units.   For Pre-Meal  - 339 give 4 units.   For Pre-Meal - 399 give 5 units.   For Pre-Meal -449 give 6 units  For Pre-Meal BG greater than or equal to 450 give 7 units.   To be given with prandial insulin, and based on pre-meal blood glucose.    Notify provider if glucose greater  "than or equal to 350 mg/dL after administration of correction dose. If given at mealtime, administer within 30 minutes of start of meal       levofloxacin (LEVAQUIN) 500 MG tablet Take 1 tablet (500 mg) by mouth daily for 21 days       LORazepam (ATIVAN) 0.5 MG tablet Take 1 tablet (0.5 mg) by mouth every 6 hours as needed for anxiety 30 tablet 0     mirtazapine (REMERON) 15 MG tablet Take 1 tablet (15 mg) by mouth At Bedtime       morphine (MS CONTIN) 15 MG CR tablet Take 1 tablet (15 mg) by mouth every 12 hours 60 tablet 0     nystatin (MYCOSTATIN) 114134 UNIT/ML suspension Take 5 mLs (500,000 Units) by mouth 4 times daily 120 mL 0     ondansetron (ZOFRAN) 4 MG tablet Take 4 mg by mouth 3 times daily       oxyCODONE (ROXICODONE) 5 MG tablet Take 1 tablet (5 mg) by mouth every 6 hours as needed for moderate to severe pain 60 tablet 0     polyethylene glycol (MIRALAX/GLYCOLAX) powder Take 1 capful by mouth daily       prochlorperazine (COMPAZINE) 5 MG tablet Take 1 tablet (5 mg) by mouth every 6 hours as needed for nausea or vomiting 60 tablet 1     senna-docusate (SENOKOT-S/PERICOLACE) 8.6-50 MG tablet Take 1-2 tablets by mouth 2 times daily       senna-docusate (SENOKOT-S/PERICOLACE) 8.6-50 MG tablet Take 1-2 tablets by mouth 2 times daily as needed for constipation       sildenafil (REVATIO) 20 MG tablet Take 20 mg by mouth as needed Hold while at [facility]       simethicone (MYLICON) 125 MG chewable tablet Take 125 mg by mouth 2 times daily as needed (abdominal pain)       Triprolidine-Pseudoephedrine 2.5-60 MG TABS Take 0.5 tablets by mouth every 6 hours as needed (allergies)           ROS:  10 point ROS of systems including Constitutional, Eyes, Respiratory, Cardiovascular, Gastroenterology, Genitourinary, Integumentary, Musculoskeletal, Psychiatric were all negative except for pertinent positives noted in my HPI.      Vitals:  BP (!) 152/84   Pulse 73   Temp 97.5  F (36.4  C)   Resp 12   Ht 1.803 m (5' 11\")   " Wt 60.3 kg (133 lb)   SpO2 94%   BMI 18.55 kg/m    Exam:  GENERAL APPEARANCE:  Alert, in no distress, oriented, thin, cooperative  ENT:  Mouth and posterior oropharynx normal, moist mucous membranes, normal hearing acuity  EYES:  EOM, conjunctivae, lids, pupils and irises normal, PERRL  NECK:  No adenopathy,masses or thyromegaly  RESP:  respiratory effort and palpation of chest normal, lungs clear to auscultation , no respiratory distress  CV:  Palpation and auscultation of heart done , regular rate and rhythm, no murmur, rub, or gallop, no edema, +2 pedal pulses  ABDOMEN:  normal bowel sounds, soft, nontender, no guarding or rebound. Liver abscess JUNO drain intact w/minimal serous output.   M/S: Gait and station abnormal, uses walker; requires assistance with activity and ADLs. Digits and nails normal  SKIN:  Inspection of skin and subcutaneous tissue baseline, Palpation of skin and subcutaneous tissue baseline  NEURO:   Cranial nerves 2-12 are normal tested and grossly at patient's baseline, Examination of sensation by touch normal  PSYCH:  oriented X 3, insight and judgement impaired, memory impaired, affect and mood normal, cognitive testing to be done in therapy      Lab/Diagnostic data:  Recent labs in [facility] reviewed by me today.           ASSESSMENT/PLAN:    (E43) Severe malnutrition (H)  (primary encounter diagnosis)  Comment: Uncontrolled severe malnutrition r/t pancreatic cancer/chemotherapy. Approx 80lb weight loss over the past year.   Plan:   - Continue IV hydration (5 days), occurring at night since IV abx given during the day.   - Check CBC & BMP 10/1  - Start calorie counts every day x 5 days; monitor intake  - Add scheduled zofran w/meals; change zofran prn to 4mg q6hrs prn for nausea  - Continue marinol, remeron, compazine prn  - Dietician following, plans to start dietary supplements for increased calories and protein intake  - Reviewed treatment plan with patient and patient's daughter.  Recommending tube feeding with G/J tube placement given severe malnutrition with ongoing decreased appetite. Patient's daughter reports wanting to follow-up with Oncology first for recommendations and to discuss potential of starting medical cannabis.     (C25.0) Malignant neoplasm of head of pancreas (H)  (R78.81) MSSA bacteremia  (K75.0) Liver abscess  Comment: Uncontrolled pancreatic cancer with ongoing treatment for MSSA bacteremia and new finding of liver abscess with drain placement on 9/13.   Plan:   - Continue Levofloxacin PO x 21 days  - Continue IV Cefazolin x 21 days  - ID following, weekly labs to be faxed to ID on Wednesdays  - Monitor output from drain qshift, if output < 10cc then consider repeat CT abd (outpatient).   - Per IR notes, staff to flush drain with 5cc NS every day   - IR following for drain management  - Change PICC lines q7 days and prn  - Start PICC line flushes per facility protocol  - Patient to follow-up with ID, Oncology as directed    (E11.9,  Z79.4) Type 2 diabetes mellitus without complication, with long-term current use of insulin (H)  Comment: Controlled, at risk for hypoglycemia given poor intake/malnutrition. Last A1C 7.7% on 9/6.   Plan:   - Continue novolog sliding scale insulin w/meals  - Monitor BG w/meals and at bedtime     (F32.0) Mild major depression (H)  Comment: Uncontrolled depressive symptoms associated with new diagnosis of pancreatic cancer. No suicidal thoughts.   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 No active sx of bleeding.   Plan:   - Monitor Hgb    (R53.81) Physical deconditioning  Comment: Ongoing physical deconditioning associated with severe malnutrition secondary to pancreatic cancer.   Plan:   - Encourage active participation in therapy session to increase strength and promote independence in activities and ADLs.   -  ST to eval/treat cognition  - Cognitive testing to be done in therapy.   - SW following for discharge planning.   - Reviewed POLST with patient, verbalizes wishes to be DNR/DNI.                 Total time spent with patient visit at the skilled nursing facility was 42 minutes including patient visit, review of past records and phone call to patient contact. Greater than 50% of total time spent with counseling and coordinating care due to coordinating care with nurse on admission orders, coordinating care with follow up labs and appointments and counseling patient/resident and family for 25 min minutes on: the reason for their hospitalization and what the treatment is, the plan of SNF stay and projected length of stay, options of code status and their current code status order, current medications (treatments) reconciled from the hospital, recent past lab and imaging results and subsequent treatment plan and current pain control plan and controlled substances ordered and taper plan of care.    Electronically signed by:  CARMINE Mclain CNP

## 2019-10-02 NOTE — TELEPHONE ENCOUNTER
Spoke with Arlene regarding Jesús's hepatic drain.  She states they just got a order yesterday to flush once a day.  Very little output.  He is still on antibiotics.  I did instruct that once patient has completed the antibiotics and outputs are <10cc then ID should be contacted regarding possible imaging and drain removal.  She verbalized understanding.  Beth Dacosta RN  IR nurse clinician  [phone]

## 2019-10-03 NOTE — TELEPHONE ENCOUNTER
Interventional Radiology, Hepatic abscess management (Late entry from Tuesday 101/19)     Jesús Stock has a hepatic abscess drain, placed on 9/13/19. He had a CT scan done prior to discharge which showed that there was still some fluid in the abscess, the drain was in good position and outputs were still too high to remove at ~ 25 mL daily. There was mention in the discharge summary that Jesús was to follow up with ID in 2 weeks so I called Omkar RN in ID clinic to see if he was scheduled.     Jesús was not scheduled. Omkar checked with Dr Diaz and they are comfortable with IR managing the drain, when it should be re imaged or removed, just communicate the outcome with them.     Jesús was just being admitted to a new TCU and he was seen by CARMINE Sandoval on admission. Beth RN in IR clinic also called and checked with TCU SETH Jacobs on outputs. The outputs have been minimal and he has been afebrile.     Recommend a repeat CT abd/pelvis with IV and probable drain removal at [facility]. Order has been entered and Beth will talk to SETH Jacobs to get the CT scheduled.     Thanks [facility] Interventional Radiology CNP ([phone]) (phone [phone])

## 2019-10-03 NOTE — TELEPHONE ENCOUNTER
Contacted [facility].  Instructed for the patient to have CT scan during normal working hours at [facility].  Order is in and I gave the HUC the number to central scheduling.  Beth Dacosta RN  IR nurse clinician  [phone]

## 2019-10-03 NOTE — PROGRESS NOTES
[place] [facility]  [place] Medical Record Number:  [id number]  Place of Service where encounter took place:  [facility] [place] ([facility]) [[id number]]  Chief Complaint   Patient presents with     Nursing Home Acute       HPI:    Jesús Stock  is a 68 year old (1950), who is being seen today for an episodic care visit.  HPI information obtained from: facility chart records, facility staff, patient report and [facility] chart review.     Per recent TCU provider progress notes:   68 year old male diagnosed with pancreatic cancer in 06/2019, started chemo on 8/26. Noted to have approx 80lb weight loss over the past year.   Recently admitted to [facility] 9/6-9/18 due to weight loss and failure to thrive. Noted to have MSSA bacteremia likely r/t port infection (removed 9/11). CT abd 9/12 found liver abscess. S/P US guided drainage of liver abscess, drain placement on 9/13. Cultures + Klebsiall and Serratia. ID following, was started on IV cefazolin and PO levofloxacin x 3 weeks. Repeat CT abd 9/18 found improvement in liver abscess. Psychiatry also consulted for suicidal ideations, declined recommendations for regis psych transfer; was started on Remeron. Patient was then discharged to TCU at [facility].   Patient returned to [facility] ED on 9/20 for anxiety, abdominal pain, and SW consult to assist with services to return home. Needs repeat CT once liver abscess drain output < 10cc every day (once patient has completed the antibiotics and outputs are <10cc then ID should be contacted regarding possible imaging and drain removal). Also recommending PEG placement for tube feeding if ongoing decreased intake (patient and family want to talk to oncology first). Patient then transferred from home to [facility] on 9/27. PTA lives with daughter. DM on insulin. Depression - sees house psych.     Today's concern is:  Patient seen for episodic TCU visit. He reports he is doing OK, talks disjointedly about his  cancer diagnosis and his Yazidism beliefs. Reports abdominal pain - takes twice daily MS Contin, has not used PRN oxycodone this week although it is available QID PRN. Patient has had a stable weight since admission - only eats about 350 kcal daily per recent dietician note. He is now on Zofran with meals, also ensure TID and Juice supplement TID and protein supplement BID. On review of EMR note SBP range 109-144 and BG range 103-238. Sats are 95% on room air. Per therapies up 100 ft FWW and CGA. Per staff very little JUNO drain output daily and at times fills up with air.     Past Medical and Surgical History reviewed in Epic today.    MEDICATIONS:  Current Outpatient Medications   Medication Sig Dispense Refill     acetaminophen (TYLENOL) 500 MG tablet Take 1-2 tablets by mouth every 6 hours as needed for pain.       alpha-d-galactosidase (BEANO) tablet Take 1 tablet by mouth 3 times daily as needed for intestinal gas       bisacodyl (DULCOLAX) 10 MG suppository Place 1 suppository (10 mg) rectally daily as needed for constipation       ceFAZolin (ANCEF) 1 GM vial Inject 2 g into the vein every 8 hours for 21 days CBC with differential, creatinine, SGOT weekly while on this medication to be faxed to Dr. Crocker office. 1 each 1     dronabinol (MARINOL) 10 MG capsule Take 10 mg by mouth 2 times daily       insulin aspart (NOVOLOG PEN) 100 UNIT/ML pen Inject 1-7 Units Subcutaneous 3 times daily (before meals) Correction Scale - MEDIUM INSULIN RESISTANCE DOSING     Do Not give Correction Insulin if Pre-Meal BG less than 140.   For Pre-Meal  - 189 give 1 unit.   For Pre-Meal  - 239 give 2 units.   For Pre-Meal  - 289 give 3 units.   For Pre-Meal  - 339 give 4 units.   For Pre-Meal - 399 give 5 units.   For Pre-Meal -449 give 6 units  For Pre-Meal BG greater than or equal to 450 give 7 units.   To be given with prandial insulin, and based on pre-meal blood glucose.    Notify provider  "if glucose greater than or equal to 350 mg/dL after administration of correction dose. If given at mealtime, administer within 30 minutes of start of meal       levofloxacin (LEVAQUIN) 500 MG tablet Take 1 tablet (500 mg) by mouth daily for 21 days       LORazepam (ATIVAN) 0.5 MG tablet Take 1 tablet (0.5 mg) by mouth every 6 hours as needed for anxiety 30 tablet 0     mirtazapine (REMERON) 15 MG tablet Take 1 tablet (15 mg) by mouth At Bedtime       morphine (MS CONTIN) 15 MG CR tablet Take 1 tablet (15 mg) by mouth every 12 hours 60 tablet 0     nystatin (MYCOSTATIN) 017785 UNIT/ML suspension Take 5 mLs (500,000 Units) by mouth 4 times daily 120 mL 0     ondansetron (ZOFRAN) 4 MG tablet Take 4 mg by mouth 3 times daily And every 6 hours as needed       oxyCODONE (ROXICODONE) 5 MG tablet Take 1 tablet (5 mg) by mouth every 6 hours as needed for moderate to severe pain 60 tablet 0     polyethylene glycol (MIRALAX/GLYCOLAX) powder Take 17 g by mouth daily        prochlorperazine (COMPAZINE) 5 MG tablet Take 1 tablet (5 mg) by mouth every 6 hours as needed for nausea or vomiting 60 tablet 1     sildenafil (REVATIO) 20 MG tablet Take 20 mg by mouth as needed Hold while at U       simethicone (MYLICON) 125 MG chewable tablet Take 125 mg by mouth 2 times daily as needed (abdominal pain)       Triprolidine-Pseudoephedrine 2.5-60 MG TABS Take 0.5 tablets by mouth every 6 hours as needed (allergies)       REVIEW OF SYSTEMS:  4 point ROS including Respiratory, CV, GI and , other than that noted in the HPI,  is negative    Objective:  /68   Pulse 62   Temp 98.4  F (36.9  C)   Resp 18   Ht 1.803 m (5' 11\")   Wt 66.1 kg (145 lb 11.2 oz)   SpO2 95%   BMI 20.32 kg/m    Exam:  GENERAL APPEARANCE:  Alert, in no distress, pleasant, cooperative, oriented x self, place and recent events  EYES:  EOM, lids, pupils and irises normal, sclera clear and conjunctiva normal, no discharge or mattering on lids or lashes " noted  ENT:  Mouth normal, moist mucous membranes, nose normal without drainage or crusting, external ears without lesions, hearing acuity intact  RESP:  respiratory effort normal, no chest wall tenderness, no respiratory distress, Lung sounds clear, patient is on room air  CV:  Auscultation of heart done, rate and rhythm controlled and regular, no murmur, no rub or gallop. Edema none bilateral lower extremities.   ABDOMEN:  normal bowel sounds, soft, nontender, no palpable masses.  M/S:   Gait and station walks with assist , no tenderness or swelling of the joints; able to move all extremities, digits normal  SKIN:  Inspection and palpation of skin and subcutaneous tissue: skin on extremities warm, dry and intact without rashes  NEURO: cranial nerves 2-12 grossly intact, no facial asymmetry, no speech deficits and able to follow directions, moves all extremities symmetrically  PSYCH:  insight and judgement and memory appears impaired, affect and mood depressed    Labs:     Most Recent 3 CBC's:  Recent Labs   Lab Test 10/02/19  0640 10/01/19  0636 09/25/19  0920   WBC 3.5* 3.5* 5.6   HGB 10.4* 10.2* 10.0*   MCV 96 95 99    195 357     Most Recent 3 BMP's:  Recent Labs   Lab Test 10/07/19  0625 10/02/19  0640 10/01/19  0636 09/25/19  0920     --  143 138   POTASSIUM 3.8  --  3.8 3.5   CHLORIDE 103  --  110* 104   CO2 30  --  29 29   BUN 8  --  6* 8   CR 0.64* 0.63* 0.69 0.77   ANIONGAP 3  --  4 5   MATTHEW 8.6  --  8.4* 8.3*   *  --  98 109*     Most Recent 2 LFT's:  Recent Labs   Lab Test 10/02/19  0640 09/25/19  0920 09/14/19  0740 09/08/19  0624   AST 15 22 53* 31   ALT  --   --  18 31   ALKPHOS  --   --  356* 300*   BILITOTAL  --   --  0.5 0.7       ASSESSMENT/PLAN:  Severe protein-calorie malnutrition (H)  Malignant neoplasm of head of pancreas (H)  MSSA bacteremia  Liver abscess  Acute issues with recent cancer diagnosis. Remains on antibiotics through 10/18 and drain to be in place until  antibiotics completed. Weekly labs to ID. Oncology f/u as recommended. Encourage oral intake, monitor VS and wt and f/u with status.     Type 2 diabetes mellitus without complication, with long-term current use of insulin (H)  Chronic, well managed with current insulin. BG checks QID. F/U with status next week.     Mild major depression (H)  Ongoing issue, meds as above. In House psychology f/u as ordered.     Anemia in neoplastic disease  Chronic and stable last check. Monitor. Oncology f/u as recommended.     Physical deconditioning  Acute on chronic, therapies - f/u with progress and cognitive testing results next week.     Orders written by provider at facility  No new orders today.     Electronically signed by:  CARMINE Faria CNP

## 2019-10-05 NOTE — PROGRESS NOTES
[facility]    PRIMARY CARE PROVIDER AND CLINIC:  Chirag Robert MD, [address] / [facility] [address]        Patient was seen by Dr. Palacios at the [facility] on October 3, 2019, for initial TCU visit.    Patient is a 68 year old  (1950), admitted to the above facility from home due to care needs.  Admitted to this facility for  rehab, medical management and nursing care.        Pt has a history of pancreatic cancer, diagnosed in 06/2019, started on chemotherapy on 8/26. Noted to have approx 80lb weight loss over the past year.     Recently admitted to [facility] 9/6-9/18 due to weight loss and failure to thrive. Noted to have MSSA bacteremia likely r/t port infection (removed 9/11). CT abd 9/12 found liver abscess. S/p US guided drainage of liver abscess, drain placement on 9/13. Cultures + Klebsiall and Serratia. ID following, was started on IV cefazolin and PO levofloxacin x 3 weeks. Repeat CT abd 9/18 found improvement in liver abscess.  Course also complicated by intermittent confusion and depression for which patient was seen by psychiatry.     Patient was  discharged to TCU at [facility].   Patient returned to [facility] ED on 9/20 for anxiety, abdominal pain, and SW consult to assist with services to return home.   Patient was seen in clinic by PCP on 9/24. Recommended to repeat CT once liver abscess drain output < 10cc every day. Also recommending PEG placement for tube feeding if ongoing decreased intake.   Patient then transferred from home to [facility] TCU on 9/27.     Hepatic drain abscess remains in place though with plans to repeat CAT scan of the abdomen with possible discontinuation of the drain if drain outputs remain low, as they have.  This is being scheduled with IR.    Patient states he is comfortable, states abdominal pain is been managed with current pain medication regimen.  Appetite continues to be poor.  He has intermittent nausea without emesis.  He is  walking with a walker.        CODE STATUS/ADVANCE DIRECTIVES DISCUSSION:   DNR / DNI    Patient's living condition: lives with family, daughter (Oksana)   ALLERGIES: Dust mites; Mold; and No known drug allergies  PAST MEDICAL HISTORY:  has a past medical history of Alcohol dependence in remission sober since 1984 (H), Backache, unspecified, Colon polyps (5/24/11), Diverticulosis of colon (5/24/11), Major depressive disorder, single episode, mild (H), Obesity (BMI 30.0-34.9) (08/28/2017), Osteoarthrosis, unspecified whether generalized or localized, unspecified site, Pancreatic cancer (H), and Type 2 diabetes mellitus without complication, with long-term current use of insulin (H) (03/13/2019). He also has no past medical history of Chronic infection, History of blood transfusion, Malignant hyperthermia, or Sleep apnea.  PAST SURGICAL HISTORY:   has a past surgical history that includes Colonoscopy (5/24/11); tonsillectomy; appendectomy; hernia repair; Colonoscopy (N/A, 9/7/2017); Endoscopic retrograde cholangiopancreatogram (N/A, 6/5/2019); Esophagoscopy, gastroscopy, duodenoscopy (EGD), combined (N/A, 6/5/2019); Endoscopic Retrograde Cholangiopancreatography, Exchange Tube/Stent (N/A, 7/18/2019); IR Chest Port Placement > 5 Yrs of Age (8/28/2019); and IR Port Removal Right (9/11/2019).  FAMILY HISTORY: family history includes Diabetes in his father; Family History Negative in his brother, brother, brother, brother, and sister; Hypertension in his mother.  SOCIAL HISTORY:   reports that he is a non-smoker but has been exposed to tobacco smoke. He has never used smokeless tobacco. He reports that he does not drink alcohol or use drugs.      Current Outpatient Medications   Medication Sig Dispense Refill     acetaminophen (TYLENOL) 500 MG tablet Take 1-2 tablets by mouth every 6 hours as needed for pain.       alpha-d-galactosidase (BEANO) tablet Take 1 tablet by mouth 3 times daily as needed for intestinal gas        bisacodyl (DULCOLAX) 10 MG suppository Place 1 suppository (10 mg) rectally daily as needed for constipation       ceFAZolin (ANCEF) 1 GM vial Inject 2 g into the vein every 8 hours for 21 days CBC with differential, creatinine, SGOT weekly while on this medication to be faxed to Dr. Crocker office. 1 each 1     dronabinol (MARINOL) 10 MG capsule Take 10 mg by mouth 2 times daily       insulin aspart (NOVOLOG PEN) 100 UNIT/ML pen Inject 1-7 Units Subcutaneous 3 times daily (before meals) Correction Scale - MEDIUM INSULIN RESISTANCE DOSING     Do Not give Correction Insulin if Pre-Meal BG less than 140.   For Pre-Meal  - 189 give 1 unit.   For Pre-Meal  - 239 give 2 units.   For Pre-Meal  - 289 give 3 units.   For Pre-Meal  - 339 give 4 units.   For Pre-Meal - 399 give 5 units.   For Pre-Meal -449 give 6 units  For Pre-Meal BG greater than or equal to 450 give 7 units.   To be given with prandial insulin, and based on pre-meal blood glucose.    Notify provider if glucose greater than or equal to 350 mg/dL after administration of correction dose. If given at mealtime, administer within 30 minutes of start of meal       levofloxacin (LEVAQUIN) 500 MG tablet Take 1 tablet (500 mg) by mouth daily for 21 days       LORazepam (ATIVAN) 0.5 MG tablet Take 1 tablet (0.5 mg) by mouth every 6 hours as needed for anxiety 30 tablet 0     mirtazapine (REMERON) 15 MG tablet Take 1 tablet (15 mg) by mouth At Bedtime       morphine (MS CONTIN) 15 MG CR tablet Take 1 tablet (15 mg) by mouth every 12 hours 60 tablet 0     nystatin (MYCOSTATIN) 874357 UNIT/ML suspension Take 5 mLs (500,000 Units) by mouth 4 times daily 120 mL 0     ondansetron (ZOFRAN) 4 MG tablet Take 4 mg by mouth 3 times daily And every 6 hours as needed       oxyCODONE (ROXICODONE) 5 MG tablet Take 1 tablet (5 mg) by mouth every 6 hours as needed for moderate to severe pain 60 tablet 0     polyethylene glycol (MIRALAX/GLYCOLAX)  powder Take 17 g by mouth daily        prochlorperazine (COMPAZINE) 5 MG tablet Take 1 tablet (5 mg) by mouth every 6 hours as needed for nausea or vomiting 60 tablet 1     senna-docusate (SENOKOT-S/PERICOLACE) 8.6-50 MG tablet Take 1-2 tablets by mouth 2 times daily       senna-docusate (SENOKOT-S/PERICOLACE) 8.6-50 MG tablet Take 1-2 tablets by mouth 2 times daily as needed for constipation       sildenafil (REVATIO) 20 MG tablet Take 20 mg by mouth as needed Hold while at [facility]       simethicone (MYLICON) 125 MG chewable tablet Take 125 mg by mouth 2 times daily as needed (abdominal pain)       Triprolidine-Pseudoephedrine 2.5-60 MG TABS Take 0.5 tablets by mouth every 6 hours as needed (allergies)           ROS:  10 point ROS negative except as noted above.        Exam:  GENERAL APPEARANCE: Sleepy, in no acute distress, thin appearing.  Pleasant and cooperative.    ENT: Oral mucosa moist  EYES: No eye redness or drainage.  No scleral icterus.  NECK: Supple  RESP: Lungs clear  CV: Regular rate and rhythm without murmurs.  ABDOMEN: Soft, nondistended, nontender.  Drain in place right upper quadrant area.  M/S: No lower extremity edema.  SKIN: No rash  NEURO: Sleepy, easily alerted, oriented to person place time.   No tremor.  No focal weakness.    Pleasant, appears to be in good spirits.            ASSESSMENT/PLAN:    (E43) Severe malnutrition (H)  (primary encounter diagnosis)  Comment: Uncontrolled severe malnutrition r/t pancreatic cancer/chemotherapy. Approx 80lb weight loss over the past year.   Plan: Monitor intake, nutritional status, hydration status.  Continue Zofran, Marinol, Remeron.  Patient family have been considering G-tube placement.  This will be reviewed with oncology.      (C25.0) Malignant neoplasm of head of pancreas (H) status post courses of chemotherapy, currently on hold  (R78.81) MSSA bacteremia  (K75.0) Liver abscess  Plan: Continue IV Ancef and oral Levaquin.  Monitor drain output,  anticipate repeat CT scan with possible drain removal by interventional radiology.  Infectious disease follow-up.  Oncology follow-up.  Unclear if patient will receive further chemotherapy.        (E11.9,  Z79.4) Type 2 diabetes mellitus without complication, with long-term current use of insulin (H)  Managed, with sliding scale insulin coverage.  Plan: Monitor blood glucoses.      (F32.0) Mild major depression (H)  Comment: Worsened in the setting of progressive cancer, pain, malnutrition.  Plan: Continue Remeron and lorazepam.  Psychiatry follow-up.      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      Jasper Palacios MD

## 2019-10-07 PROBLEM — E46 PROTEIN-CALORIE MALNUTRITION (H): Status: ACTIVE | Noted: 2019-01-01

## 2019-10-09 NOTE — PROGRESS NOTES
[facility]    [facility] Record Number:  [id number]  Place of Service where encounter took place:  [facility] ([facility]) [[id number]]  Chief Complaint   Patient presents with     Hypotension     Fall       HPI:    Jesús Stock  is a 68 year old (1950), who is being seen today for an episodic care visit.  HPI information obtained from: facility chart records, facility staff, patient report and [facility] chart review.     Patient diagnosed with pancreatic cancer in 06/2019, started on chemotherapy on 8/26. Noted to have approx 80lb weight loss over the past year.   Recently admitted to [facility] 9/6-9/18 due to weight loss and failure to thrive. Noted to have MSSA bacteremia likely r/t port infection (removed 9/11). CT abd 9/12 found liver abscess. S/p US guided drainage of liver abscess, drain placement on 9/13. Cultures + Klebsiall and Serratia. ID following, was started on IV cefazolin and PO levofloxacin x 3 weeks. Repeat CT abd 9/18 found improvement in liver abscess. Psychiatry also consulted for suicidal ideations, declined recommendations for regis psych transfer; was started on remeron. Patient was then discharged to TCU at [facility].   Patient returned to [facility] ED on 9/20 for anxiety, abdominal pain, and SW consult to assist with services to return home.   Patient was seen in clinic by PCP on 9/24. Recommended to repeat CT once liver abscess drain output < 10cc every day. Also recommending PEG placement for tube feeding if ongoing decreased intake.   Patient then transferred from home to [facility] TCU on 9/27.     Today's concern is:    Nursing staff reported patient had unwitnessed fall, was found near the bathroom. During exam, unclear if patient hit his head. Denies acute pain, bruising, or injuries. RN also reported vital signs unstable. Patient does admit to dizziness, lightheaded. Patient also reports that his head feels foggy and feels more confused today. Does  admit to ongoing decreased appetite and intake w/meals. Has been attempting to drink more water, but does have ongoing nausea. Patient reports has been participating in therapy; however, therapy reports patient has not been participating. Patient reports recently seen by Oncologist last week, but does not recall changes to medications or plan from office visit, states his daughter went with him to appointment. Does have outpatient CT later today to determine if liver abscess is resolved and if drain can be removed. Afebrile.         Past Medical and Surgical History reviewed in Epic today.    MEDICATIONS:  Current Outpatient Medications   Medication Sig Dispense Refill     acetaminophen (TYLENOL) 500 MG tablet Take 1-2 tablets by mouth every 6 hours as needed for pain.       alpha-d-galactosidase (BEANO) tablet Take 1 tablet by mouth 3 times daily as needed for intestinal gas       bisacodyl (DULCOLAX) 10 MG suppository Place 1 suppository (10 mg) rectally daily as needed for constipation       dronabinol (MARINOL) 10 MG capsule Take 10 mg by mouth 2 times daily       insulin aspart (NOVOLOG PEN) 100 UNIT/ML pen Inject 1-7 Units Subcutaneous 3 times daily (before meals) Correction Scale - MEDIUM INSULIN RESISTANCE DOSING     Do Not give Correction Insulin if Pre-Meal BG less than 140.   For Pre-Meal  - 189 give 1 unit.   For Pre-Meal  - 239 give 2 units.   For Pre-Meal  - 289 give 3 units.   For Pre-Meal  - 339 give 4 units.   For Pre-Meal - 399 give 5 units.   For Pre-Meal -449 give 6 units  For Pre-Meal BG greater than or equal to 450 give 7 units.   To be given with prandial insulin, and based on pre-meal blood glucose.    Notify provider if glucose greater than or equal to 350 mg/dL after administration of correction dose. If given at mealtime, administer within 30 minutes of start of meal       levofloxacin (LEVAQUIN) 500 MG tablet Take 1 tablet (500 mg) by mouth daily for 21  "days       mirtazapine (REMERON) 15 MG tablet Take 1 tablet (15 mg) by mouth At Bedtime       morphine (MS CONTIN) 15 MG CR tablet Take 1 tablet (15 mg) by mouth every 12 hours 60 tablet 0     nystatin (MYCOSTATIN) 940797 UNIT/ML suspension Take 5 mLs (500,000 Units) by mouth 4 times daily 120 mL 0     ondansetron (ZOFRAN) 4 MG tablet Take 4 mg by mouth 3 times daily And every 6 hours as needed       oxyCODONE (ROXICODONE) 5 MG tablet Take 1 tablet (5 mg) by mouth every 6 hours as needed for moderate to severe pain 60 tablet 0     polyethylene glycol (MIRALAX/GLYCOLAX) powder Take 17 g by mouth daily        prochlorperazine (COMPAZINE) 5 MG tablet Take 1 tablet (5 mg) by mouth every 6 hours as needed for nausea or vomiting 60 tablet 1     sildenafil (REVATIO) 20 MG tablet Take 20 mg by mouth as needed Hold while at [facility]       simethicone (MYLICON) 125 MG chewable tablet Take 125 mg by mouth 2 times daily as needed (abdominal pain)       sulfamethoxazole-trimethoprim (BACTRIM DS/SEPTRA DS) 800-160 MG tablet Take 1 tablet by mouth 2 times daily       Triprolidine-Pseudoephedrine 2.5-60 MG TABS Take 0.5 tablets by mouth every 6 hours as needed (allergies)             REVIEW OF SYSTEMS:  4 point ROS including Respiratory, CV, GI and , other than that noted in the HPI,  is negative      Objective:  BP (!) 152/95   Pulse 71   Temp 98.5  F (36.9  C)   Resp 18   Ht 1.803 m (5' 11\")   Wt 65.7 kg (144 lb 14.4 oz)   SpO2 95%   BMI 20.21 kg/m    Exam:  GENERAL APPEARANCE:  Alert, in no distress, oriented, thin, cooperative, pale  ENT:  Mouth and posterior oropharynx normal, dry mucous membranes, normal hearing acuity  RESP:  respiratory effort and palpation of chest normal, lungs clear to auscultation , no respiratory distress  CV:  Palpation and auscultation of heart done , regular rate and rhythm, no murmur, rub, or gallop, no edema, +2 pedal pulses  ABDOMEN:  normal bowel sounds, soft, nontender, no guarding or " rebound. Liver abscess drain intact w/o output.   M/S: Gait and station abnormal, uses walker; requires assistance with activity and ADLs. Digits and nails normal  SKIN:  Inspection of skin and subcutaneous tissue baseline, Palpation of skin and subcutaneous tissue baseline  NEURO:   Cranial nerves 2-12 are normal tested and grossly at patient's baseline, Examination of sensation by touch normal  PSYCH:  oriented X 3, insight and judgement impaired, memory impaired, affect and mood normal, cognitive testing not tested; appears more confused      Labs:   Labs [facility] in SNF [facility]. Please refer to them using [employer] Everywhere., Recent labs in EPIC reviewed by me today.  and   Most Recent 3 CBC's:  Recent Labs   Lab Test 10/09/19  0558 10/02/19  0640 10/01/19  0636   WBC 4.3 3.5* 3.5*   HGB 11.3* 10.4* 10.2*   MCV 95 96 95   * 194 195     Most Recent 3 BMP's:  Recent Labs   Lab Test 10/09/19  0558 10/07/19  0625 10/02/19  0640 10/01/19  0636 09/25/19  0920    136  --  143 138   POTASSIUM 3.6 3.8  --  3.8 3.5   CHLORIDE 105 103  --  110* 104   CO2 30 30  --  29 29   BUN 10 8  --  6* 8   CR 0.73 0.64* 0.63* 0.69 0.77   ANIONGAP  --  3  --  4 5   MATTHEW 8.8 8.6  --  8.4* 8.3*   * 156*  --  98 109*            ASSESSMENT/PLAN:    (E43) Severe protein-calorie malnutrition (H)  (primary encounter diagnosis)  (W19.XXXA) Fall, initial encounter  (I95.89,  E86.1) Hypotension due to hypovolemia  Comment: Uncontrolled severe malnutrition r/t pancreatic cancer/chemotherapy. Approx 80lb weight loss over the past year. Continues to have poor intake, approx 700 calories/day. Acute unwitnessed fall w/o no subsequent injuries. Acute hypotension r/t dehydration/malnutrition. Recently completed IV hydration x 5 days last week.   Plan:   - STAT Start IV NS fluids at 200cc/hr  - Monitor intake, continue calorie counts  - Frequent safety checks and vital signs monitoring per facility protocol following fall  -  Dietary following  - Add BMP to labs 10/9  - Check CMP 10/10    (C25.0) Malignant neoplasm of head of pancreas (H)  (K75.0) Liver abscess  (R78.81) MSSA bacteremia  Comment: Uncontrolled pancreatic cancer with ongoing treatment for MSSA bacteremia and new finding of liver abscess with drain placement on 9/13.   Plan:   - Patient has outpatient CT abd today for possible liver drain removal  - Discussed patient status and treatment plan with patient's daughter (Oksana)   - Reviewed recommendations for PEG tube to increase intake with Oksana/patient. States recently seen by Oncologist last week and plans to start medical marijuana. Declined recommendation at this time to explore tube feeding options. Oksana reports appt to review medical marijuana is scheduled for next week.  - Discontinue remeron, patient's daughter feels like this medication is contributing towards increased confusion/hallucinations. Reports patient has been having visual hallucinations, states patient has had this before when he was dehydrated.   - ID recently stopped levaquin and started on bactrim d/t concern for levaquin causing side effects of confusion per daughter; patient to follow-up with ID as directed  - Continue IV cefazolin (course of abx to be completed 10/18), continue bactrim (to be completed on 11/7  - Patient to follow-up with Oncologist as directed (Dr. Farrell)          Total time spent with patient visit at the skilled nursing facility was 42 minutes including patient visit, review of past records and phone call to patient contact (Oksana - daughter). Greater than 50% of total time spent with counseling and coordinating care due to 17 min reviewing recent fall, malnutrition, hypotension, pancreatic cancer, liver abscess and MSSA bacteremia with patient, an additional 13 minutes were spent reviewing patient status (including fall, malnutrition, pancreatic cancer, liver abscess) and subsequent treatment plan including close monitoring  following fall, starting IV fluids with labs 10/10, plan to continue outpatient CT abd today to review liver abscess/extent of pancreatic cancer and follow-up appts with Oncologist; also reviewed potential for tube feeding due to ongoing malnutrition, which patient and daughter both declined at this time.    Electronically signed by:  CARMINE Mclain CNP

## 2019-10-10 NOTE — PROGRESS NOTES
"[place] [facility]    [place] Medical Record Number:  [id number]  Place of Service where encounter took place:  [facility] ([facility]) [[id number]]  Chief Complaint   Patient presents with     RECHECK       HPI:    Jesús Stock  is a 68 year old (1950), who is being seen today for an episodic care visit.  HPI information obtained from: facility chart records, facility staff, patient report and [facility] chart review.       Patient diagnosed with pancreatic cancer in 06/2019, started on chemotherapy on 8/26. Noted to have approx 80lb weight loss over the past year.   Recently admitted to [facility] 9/6-9/18 due to weight loss and failure to thrive. Noted to have MSSA bacteremia likely r/t port infection (removed 9/11). CT abd 9/12 found liver abscess. S/p US guided drainage of liver abscess, drain placement on 9/13. Cultures + Klebsiall and Serratia. ID following, was started on IV cefazolin and PO levofloxacin x 3 weeks. Repeat CT abd 9/18 found improvement in liver abscess. Psychiatry also consulted for suicidal ideations, declined recommendations for regis psych transfer; was started on remeron. Patient was then discharged to TCU at [facility].   Patient returned to [facility] ED on 9/20 for anxiety, abdominal pain, and SW consult to assist with services to return home.   Patient was seen in clinic by PCP on 9/24. Recommended to repeat CT once liver abscess drain output < 10cc every day. Also recommending PEG placement for tube feeding if ongoing decreased intake.   Patient then transferred from home to [facility] TCU on 9/27.        Today's concern is:    During exam, patient reports feeling more alert and \"clear minded\" today since receiving 1L IV fluids. Continues to have poor intake, reports feeling nauseated and has no appetite. States he used to have a good appetite prior to cancer diagnosis and chemotherapy. Denies dizziness, lightheadedness since receiving IV fluids. Patient has chronic " abdominal pain, has been taking ms contin BID and oxycodone prn w/relief. Denies pain near liver abscess drain, denies output from drain. Afebrile.         Past Medical and Surgical History reviewed in Epic today.    MEDICATIONS:  Current Outpatient Medications   Medication Sig Dispense Refill     acetaminophen (TYLENOL) 500 MG tablet Take 1-2 tablets by mouth every 6 hours as needed for pain.       alpha-d-galactosidase (BEANO) tablet Take 1 tablet by mouth 3 times daily as needed for intestinal gas       bisacodyl (DULCOLAX) 10 MG suppository Place 1 suppository (10 mg) rectally daily as needed for constipation       CEFAZOLIN SODIUM IV Inject 2 g into the vein every 8 hours       dronabinol (MARINOL) 10 MG capsule Take 10 mg by mouth 2 times daily       insulin aspart (NOVOLOG PEN) 100 UNIT/ML pen Inject 1-7 Units Subcutaneous 3 times daily (before meals) Correction Scale - MEDIUM INSULIN RESISTANCE DOSING     Do Not give Correction Insulin if Pre-Meal BG less than 140.   For Pre-Meal  - 189 give 1 unit.   For Pre-Meal  - 239 give 2 units.   For Pre-Meal  - 289 give 3 units.   For Pre-Meal  - 339 give 4 units.   For Pre-Meal - 399 give 5 units.   For Pre-Meal -449 give 6 units  For Pre-Meal BG greater than or equal to 450 give 7 units.   To be given with prandial insulin, and based on pre-meal blood glucose.    Notify provider if glucose greater than or equal to 350 mg/dL after administration of correction dose. If given at mealtime, administer within 30 minutes of start of meal       morphine (MS CONTIN) 15 MG CR tablet Take 1 tablet (15 mg) by mouth every 12 hours 60 tablet 0     nystatin (MYCOSTATIN) 574651 UNIT/ML suspension Take 5 mLs (500,000 Units) by mouth 4 times daily 120 mL 0     ondansetron (ZOFRAN) 4 MG tablet Take 4 mg by mouth 3 times daily And every 6 hours as needed       oxyCODONE (ROXICODONE) 5 MG tablet Take 1 tablet (5 mg) by mouth every 6 hours as needed  "for moderate to severe pain 60 tablet 0     polyethylene glycol (MIRALAX/GLYCOLAX) powder Take 17 g by mouth daily        prochlorperazine (COMPAZINE) 5 MG tablet Take 1 tablet (5 mg) by mouth every 6 hours as needed for nausea or vomiting 60 tablet 1     sildenafil (REVATIO) 20 MG tablet Take 20 mg by mouth as needed Hold while at [facility]       simethicone (MYLICON) 125 MG chewable tablet Take 125 mg by mouth 2 times daily as needed (abdominal pain)       sulfamethoxazole-trimethoprim (BACTRIM DS/SEPTRA DS) 800-160 MG tablet Take 1 tablet by mouth 2 times daily       Triprolidine-Pseudoephedrine 2.5-60 MG TABS Take 0.5 tablets by mouth every 6 hours as needed (allergies)       insulin aspart (NOVOLOG VIAL) 100 UNITS/ML vial Inject Subcutaneous 3 times daily (with meals) Inject as per sliding scale: if 150 - 200 = 2 unit; 201 - 250 = 3 units;  251 - 300 = 4 units; 301 - 350 = 5 units; 351 - 400 = 6 units;  401+ = 7 units           REVIEW OF SYSTEMS:  4 point ROS including Respiratory, CV, GI and , other than that noted in the HPI,  is negative      Objective:  /76   Pulse 66   Temp 97.7  F (36.5  C)   Resp 18   Ht 1.803 m (5' 11\")   Wt 65.7 kg (144 lb 14.4 oz)   SpO2 97%   BMI 20.21 kg/m    Exam:  GENERAL APPEARANCE:  Alert, in no distress, oriented, thin, cooperative, pale  ENT:  Mouth and posterior oropharynx normal, dry mucous membranes, normal hearing acuity  RESP:  respiratory effort and palpation of chest normal, lungs clear to auscultation , no respiratory distress  CV:  Palpation and auscultation of heart done , regular rate and rhythm, no murmur, rub, or gallop, no edema, +2 pedal pulses  ABDOMEN:  normal bowel sounds, soft, nontender, no guarding or rebound. Liver abscess drain intact w/o output.   M/S: Gait and station abnormal, uses walker; requires assistance with activity and ADLs. Digits and nails normal  SKIN:  Inspection of skin and subcutaneous tissue baseline, Palpation of skin and " subcutaneous tissue baseline  NEURO:   Cranial nerves 2-12 are normal tested and grossly at patient's baseline, Examination of sensation by touch normal  PSYCH:  oriented X 3, insight and judgement impaired, memory impaired, affect and mood normal      Labs:   Labs done in SNF are in [place] EPIC. Please refer to them using EPIC/Care Everywhere., Recent labs in EPIC reviewed by me today.  and   Most Recent 3 CBC's:  Recent Labs   Lab Test 10/09/19  0558 10/02/19  0640 10/01/19  0636   WBC 4.3 3.5* 3.5*   HGB 11.3* 10.4* 10.2*   MCV 95 96 95   * 194 195     Most Recent 3 BMP's:  Recent Labs   Lab Test 10/10/19  0646 10/09/19  0558 10/07/19  0625  10/01/19  0636    139 136  --  143   POTASSIUM 3.7 3.6 3.8  --  3.8   CHLORIDE 109 105 103  --  110*   CO2 29 30 30  --  29   BUN 11 10 8  --  6*   CR 0.73 0.73 0.64*   < > 0.69   ANIONGAP 3  --  3  --  4   MATTHEW 7.9* 8.8 8.6  --  8.4*   * 135* 156*  --  98    < > = values in this interval not displayed.         ASSESSMENT/PLAN:    (E43) Severe protein-calorie malnutrition (H)  (primary encounter diagnosis)  Comment: Uncontrolled severe malnutrition r/t pancreatic cancer/chemotherapy. Approx 80lb weight loss over the past year. Continues to have poor intake, approx 700 calories/day. Acute unwitnessed fall w/o no subsequent injuries on 10/9. Hypotension resolved, but at risk due to poor intake.   Plan:   - Completed 1L IV fluids 10/9, -130s today. Add IV NS fluids PRN use for hypotension  - Continue marinol; recently dc'd remeron per daughter's request (concern for side effects r/t increased confusion)  - Continue scheduled zofran w/meals and prn, compazine prn  - Monitor intake, continue calorie counts  - Dietary following     (C25.0) Malignant neoplasm of head of pancreas (H)  (K75.0) Liver abscess - Resolved  (R78.81) MSSA bacteremia  Comment: Uncontrolled pancreatic cancer with ongoing treatment for MSSA bacteremia and new finding of liver  abscess with drain placement on 9/13. CT abd 10/9 found liver abscess resolved.   Plan:   - Per IR/Radiologist, CT abd found liver abscess to be resolved and ok to remove JUNO drain; updated charge RN and patient's daughter.   - Discontinue levofloxacin (medication error), was stopped by ID and not transcribed into PCC  - Continue bactrim (course to be completed on 11/7)   - Update from ID, cefazolin to be dc'd and remove PICC line.   - Plan to keep PICC line in place due to using for IV hydration  - Check BMP 10/14.   - Patient to follow-up with ID and Oncologist as directed (Dr. Farrell)    (E11.9, Z79.4) Type 2 diabetes mellitus without complication, with long-term current use of insulin (H)  Comment: Uncontrolled BG r/t variable intake. Last A1C 7.7% on 9/6/19.   Plan:   - Increase novolog sliding scale insulin w/meal parameters  - Monitor BG w/meals and at bedtime; avoid hypoglycemia          Electronically signed by:  CARMINE Mclain CNP

## 2019-10-10 NOTE — PROGRESS NOTES
Reviewed CT scan done on 10/9/2019 with Dr. Garcia.  Patient may hepatic drain removed, collection resolved.  Contact NCP who will remove the tube at the facility.  Instructed to call if concerns or questions.  Beth Dacosta RN  IR nurse clinician  [phone]

## 2019-10-11 NOTE — PROGRESS NOTES
Pt in Care suites to only see Larry SANTOYO who removed Hepatic drain tube.  Did not need Care Suite nurse per Larry as only here to use cart and discharge instructions given by Larry..  Dressing is dry and intact and denies pain at the site.  Home with daughter.

## 2019-10-11 NOTE — PROGRESS NOTES
[place] [facility]    [place] Medical Record Number:  [id number]  Place of Service where encounter took place:  [facility] ([facility] [[id number]]  Chief Complaint   Patient presents with     RECHECK       HPI:    Jesús Stock  is a 68 year old (1950), who is being seen today for an episodic care visit.  HPI information obtained from: facility chart records, facility staff, patient report and [facility] chart review.     Patient diagnosed with pancreatic cancer in 06/2019, started on chemotherapy on 8/26. Noted to have approx 80lb weight loss over the past year.   Recently admitted to [facility] 9/6-9/18 due to weight loss and failure to thrive. Noted to have MSSA bacteremia likely r/t port infection (removed 9/11). CT abd 9/12 found liver abscess. S/p US guided drainage of liver abscess, drain placement on 9/13. Cultures + Klebsiall and Serratia. ID following, was started on IV cefazolin and PO levofloxacin x 3 weeks. Repeat CT abd 9/18 found improvement in liver abscess. Psychiatry also consulted for suicidal ideations, declined recommendations for regis psych transfer; was started on remeron. Patient was then discharged to TCU at [facility].   Patient returned to [facility] ED on 9/20 for anxiety, abdominal pain, and SW consult to assist with services to return home.   Patient was seen in clinic by PCP on 9/24. Recommended to repeat CT once liver abscess drain output < 10cc every day. Also recommending PEG placement for tube feeding if ongoing decreased intake.   Patient then transferred from home to [facility]CU on 9/27.       Today's concern is:    Patient's cognitive status noted to be variable, often related to hypotension. Today, RN reports patient's SBP 80s and patient confused. RN reports patient needs frequent cues to ask for assistance with ADLs or ambulating d/t high fall risk.   During exam, patient is unclear when his JUNO drain was removed, but reports it was not yesterday and must  have been last week sometime. Does admit to confusion and headache today. Has been attempting to drink more fluids, but continues to have poor intake r/t ongoing nausea and abdominal pain. States abdominal pain is controlled with ms contin BID and oxycodone prn. Denies constipation. Patient unclear when his follow-up appts with ID or Oncology are, but reports his daughter would know.         Past Medical and Surgical History reviewed in Epic today.    MEDICATIONS:  Current Outpatient Medications   Medication Sig Dispense Refill     acetaminophen (TYLENOL) 500 MG tablet Take 1-2 tablets by mouth every 6 hours as needed for pain.       alpha-d-galactosidase (BEANO) tablet Take 1 tablet by mouth 3 times daily as needed for intestinal gas       bisacodyl (DULCOLAX) 10 MG suppository Place 1 suppository (10 mg) rectally daily as needed for constipation       dronabinol (MARINOL) 10 MG capsule Take 10 mg by mouth 2 times daily       insulin aspart (NOVOLOG VIAL) 100 UNITS/ML vial Inject Subcutaneous 3 times daily (with meals) Inject as per sliding scale: if 150 - 200 = 2 unit; 201 - 250 = 3 units;  251 - 300 = 4 units; 301 - 350 = 5 units; 351 - 400 = 6 units;  401+ = 7 units       morphine (MS CONTIN) 15 MG CR tablet Take 1 tablet (15 mg) by mouth every 12 hours 60 tablet 0     nystatin (MYCOSTATIN) 152475 UNIT/ML suspension Take 5 mLs (500,000 Units) by mouth 4 times daily 120 mL 0     ondansetron (ZOFRAN) 4 MG tablet Take 4 mg by mouth 3 times daily And every 6 hours as needed       oxyCODONE (ROXICODONE) 5 MG tablet Take 1 tablet (5 mg) by mouth every 6 hours as needed for moderate to severe pain 60 tablet 0     polyethylene glycol (MIRALAX/GLYCOLAX) powder Take 17 g by mouth daily        prochlorperazine (COMPAZINE) 5 MG tablet Take 1 tablet (5 mg) by mouth every 6 hours as needed for nausea or vomiting 60 tablet 1     sildenafil (REVATIO) 20 MG tablet Take 20 mg by mouth as needed Hold while at [facility]       simethicone  "(MYLICON) 125 MG chewable tablet Take 125 mg by mouth 2 times daily as needed (abdominal pain)       sulfamethoxazole-trimethoprim (BACTRIM DS/SEPTRA DS) 800-160 MG tablet Take 1 tablet by mouth 2 times daily       Triprolidine-Pseudoephedrine 2.5-60 MG TABS Take 0.5 tablets by mouth every 6 hours as needed (allergies)             REVIEW OF SYSTEMS:  4 point ROS including Respiratory, CV, GI and , other than that noted in the HPI,  is negative      Objective:  BP (!) 79/57   Pulse 84   Temp 97.7  F (36.5  C)   Resp 16   Ht 1.803 m (5' 11\")   Wt 65.7 kg (144 lb 14.4 oz)   SpO2 96%   BMI 20.21 kg/m    Exam:  GENERAL APPEARANCE:  Alert, in no distress, oriented, thin, cooperative, pale  ENT:  Mouth and posterior oropharynx normal, dry mucous membranes, normal hearing acuity  RESP:  respiratory effort and palpation of chest normal, lungs clear to auscultation , no respiratory distress  CV:  Palpation and auscultation of heart done , regular rate and rhythm, no murmur, rub, or gallop, no edema, +2 pedal pulses  ABDOMEN:  normal bowel sounds, soft, nontender, no guarding or rebound.   M/S: Gait and station abnormal, uses walker; requires assistance with activity and ADLs. Digits and nails normal  SKIN:  Inspection of skin and subcutaneous tissue baseline, Palpation of skin and subcutaneous tissue baseline  NEURO:   Cranial nerves 2-12 are normal tested and grossly at patient's baseline, Examination of sensation by touch normal  PSYCH:  oriented X 2 (variable), insight and judgement impaired, memory impaired, affect and mood normal, SLUMS 2/30    Wt Readings from Last 4 Encounters:   10/11/19 65.7 kg (144 lb 14.4 oz)   10/10/19 65.7 kg (144 lb 14.4 oz)   10/09/19 65.7 kg (144 lb 14.4 oz)   10/03/19 66.1 kg (145 lb 11.2 oz)         Labs:   Labs done in SNF are in [facility]. Please refer to them using EPIC/Care Everywhere., Recent labs in EPIC reviewed by me today.  and   Most Recent 3 CBC's:  Recent Labs   Lab " Test 10/09/19  0558 10/02/19  0640 10/01/19  0636   WBC 4.3 3.5* 3.5*   HGB 11.3* 10.4* 10.2*   MCV 95 96 95   * 194 195     Most Recent 3 BMP's:  Recent Labs   Lab Test 10/10/19  0646 10/09/19  0558 10/07/19  0625  10/01/19  0636    139 136  --  143   POTASSIUM 3.7 3.6 3.8  --  3.8   CHLORIDE 109 105 103  --  110*   CO2 29 30 30  --  29   BUN 11 10 8  --  6*   CR 0.73 0.73 0.64*   < > 0.69   ANIONGAP 3  --  3  --  4   MATTHEW 7.9* 8.8 8.6  --  8.4*   * 135* 156*  --  98    < > = values in this interval not displayed.            ASSESSMENT/PLAN:    (E43) Severe protein-calorie malnutrition (H)  (primary encounter diagnosis)  (E86.0) Dehydration  (I95.89,  E86.1) Hypotension due to hypovolemia  Comment: Acute on chronic dehydration with hypotension secondary to severe malnutrition r/t ongoing poor intake. Body mass index is 20.21 kg/m .  Plan:   - STAT Start IV NS at 100cc/hr every day 10/11-10/13 d/t dehydration/hypotension  - Check BMP 10/14  - Continue marinol; recently dc'd remeron per daughter's request (concern for side effects r/t increased confusion)  - Continue scheduled zofran w/meals and prn, compazine prn  - Monitor intake, continue calorie counts  - Dietary following  - Called [facility] home infusion, home IV fluids not covered by insurance; if patient received outpatient IV hydration, would either need to set up outpatient IV fluids or pay privately at home for IV therapy.   - Re-eval on Monday to determine if PICC line should be dc'd  - Patient's daughter deferring PEG tube placement at this time, reported having upcoming appt to review starting medical marijuana  - Will plan on updating PCP regarding patient status; Patient to follow-up with PCP within 7 days of discharge from TCU.  - Discharge plan currently set for 10/15, plans to discharge home w/24hr home care and [facility] home care RN, PT, OT, HHA    (C25.0) Malignant neoplasm of head of pancreas (H)  (R78.81) MSSA bacteremia  Comment:  Pancreatic cancer with ongoing treatment for MSSA bacteremia. Liver abscess resolved, drain dc'd on 10/10.   Plan:   - Continue bactrim (course to be completed on 11/7); monitor creat function closely r/t poor intake and increased risk for JAVAD  - Check BMP 10/14.   - Patient to follow-up with ID and Oncologist as directed (Dr. Farrell)              Total time spent with patient visit at the [facility] nursing [facility] was 37 minutes including patient visit and review of past records, phone call to [facility] home infusion. Greater than 50% of total time spent (28 min) with counseling and coordinating care due to dehydration/hypotension secondary to malnutrition r/t pancreatic cancer; discharge planning with SW and [facility] home infusion, reviewed outpatient options with [facility] home infusion for IV hydration.    Electronically signed by:  CARMINE Mclain CNP

## 2019-10-14 NOTE — PROGRESS NOTES
[facility] DISCHARGE SUMMARY    PATIENT'S NAME: Jesús Stock  YOB: 1950  MEDICAL RECORD NUMBER:  [id number]  Place of Service where encounter took place:  [facility]) [[id number]]    PRIMARY CARE PROVIDER AND CLINIC RESPONSIBLE AFTER TRANSFER:   Chirag Robert MD, [address] / [facility] [address]    [facility] [place]     Transferring providers: CARMINE Mclain CNP; Jasper Palacios MD  Recent Hospitalization/ED:  Home   Date of SNF Admission: September / 30 / 2019  Date of SNF (anticipated) Discharge: October / 15 / 2019  Discharged to: previous independent home  Cognitive Scores: SLUMS: 2/30  Physical Function: Ambulating 200-300 ft with walker  DME: Walker    CODE STATUS/ADVANCE DIRECTIVES DISCUSSION:  DNR / DNI     ALLERGIES: Dust mites; Mold; and No known drug allergies        DISCHARGE DIAGNOSIS/NURSING FACILITY COURSE:     Patient diagnosed with pancreatic cancer in 06/2019, started on chemotherapy on 8/26. Noted to have approx 80lb weight loss over the past year.   Recently admitted to [facility] 9/6-9/18 due to weight loss and failure to thrive. Noted to have MSSA bacteremia likely r/t port infection (removed 9/11). CT abd 9/12 found liver abscess. S/p US guided drainage of liver abscess, drain placement on 9/13. Cultures + Klebsiall and Serratia. ID following, was started on IV cefazolin and PO levofloxacin x 3 weeks. Repeat CT abd 9/18 found improvement in liver abscess. Psychiatry also consulted for suicidal ideations, declined recommendations for regis psych transfer; was started on remeron. Patient was then discharged to TCU at [facility].   Patient returned to [facility] ED on 9/20 for anxiety, abdominal pain, and SW consult to assist with services to return home.   Patient was seen in clinic by PCP on 9/24. Recommended to repeat CT once liver abscess drain output < 10cc every day. Also recommending PEG placement for tube feeding if  ongoing decreased intake.   Patient then transferred from home to [facility] on 9/27.       Severe protein-calorie malnutrition (H)  Noted to have ongoing malnutrition r/t pancreatic cancer and chemotherapy, has lost approx 80lbs over the past year. Continues to have variable intake. Has required intermittent IV fluids d/t dehydration and hypotension. Currently taking marinol, zofran and compazine prn. PTA remeron was dc'd due to concern for increased confusion. Discussed G/J tube placement with patient's daughter (Oksana) who reports wanting to follow-up with recent referral to medical marijuana clinic from Oncologist.   - Patient will be discharged home with PICC line. Reviewed with PCP team and patient's daughter, who agrees with treatment plan and agrees with private [facility] for  home infusion team to follow. Patient's daughter reports she is comfortable with managing PICC line r/t IV flushes.     Body mass index is 20.53 kg/m .     Wt Readings from Last 4 Encounters:   10/14/19 66.8 kg (147 lb 3.2 oz)   10/11/19 65.7 kg (144 lb 14.4 oz)   10/10/19 65.7 kg (144 lb 14.4 oz)   10/09/19 65.7 kg (144 lb 14.4 oz)         Malignant neoplasm of head of pancreas (H)  MSSA bacteremia  Recent diagnosis of pancreatitic cancer in 06/2019, started chemotherapy on 8/26. Noted to have MSSA bacteremia likely r/t port infection (removed 9/11). CT abd 9/12 found liver abscess. S/p US guided drainage of liver abscess, drain placement on 9/13. Cultures + Klebsiall and Serratia. ID following, completed course of IV cefazoline. Patient was recently seen by ID, levofloxacin was dc'd due to concern r/t increased confusion and was started on course of bactrim (to be completed on 11/7). Repeat CT abd completed on 10/09, which found liver abscess resolved. IR removed JUNO drain on 10/11. Today, patient denies abdominal pain near old drain site. Denies nausea. Does admit to chronic abdominal pain, has been taking chronic MS contin BID and  oxycodone prn.   - Patient to follow-up with Oncologist as directed    Type 2 diabetes mellitus without complication, with long-term current use of insulin (H)  Last A1C was 7.7% on 9/6. Currently taking novolog sliding scale insulin w/meals.   - Continue to check BG w/meals and qHS    Anemia in neoplastic disease  Anemia likely associated with pancreatic cancer. Hgb trending up, last Hgb was 11.3 on 10/9. No active sx of bleeding.     Mild major depression (H)  PTA remeron was discontinued due to family's concern r/t increased confusion. House psychologist following.   - Consider adding anti-depressant medication d/t recent discontinuation of remeron    Physical deconditioning  Cognitive impairment  PTA lives alone. Patient is actively participating in therapy sessions. ambualtes 200-300ft with walker. SLUMS 2/30. High fall risk, patient had unwitnessed fall last week with no reported injuries. SW following for discharge planning. Patient discharging home with 24hr PCA services, as well as  home care services, including home PT, OT, RN, HHA.             Past Medical History:  has a past medical history of Alcohol dependence in remission sober since 1984 (H), Backache, unspecified, Colon polyps (5/24/11), Diverticulosis of colon (5/24/11), Major depressive disorder, single episode, mild (H), Obesity (BMI 30.0-34.9) (08/28/2017), Osteoarthrosis, unspecified whether generalized or localized, unspecified site, Pancreatic cancer (H), and Type 2 diabetes mellitus without complication, with long-term current use of insulin (H) (03/13/2019). He also has no past medical history of Chronic infection, History of blood transfusion, Malignant hyperthermia, or Sleep apnea.    Discharge Medications:  Current Outpatient Medications   Medication Sig Dispense Refill     acetaminophen (TYLENOL) 500 MG tablet Take 1-2 tablets by mouth every 6 hours as needed for pain.       alpha-d-galactosidase (BEANO) tablet Take 1 tablet by mouth  3 times daily as needed for intestinal gas       dronabinol (MARINOL) 10 MG capsule Take 10 mg by mouth 2 times daily       insulin aspart (NOVOLOG VIAL) 100 UNITS/ML vial Inject Subcutaneous 3 times daily (with meals) Inject as per sliding scale: if 150 - 200 = 2 unit; 201 - 250 = 3 units;  251 - 300 = 4 units; 301 - 350 = 5 units; 351 - 400 = 6 units;  401+ = 7 units       morphine (MS CONTIN) 15 MG CR tablet Take 1 tablet (15 mg) by mouth every 12 hours 60 tablet 0     ondansetron (ZOFRAN) 4 MG tablet Take 4 mg by mouth 3 times daily And every 6 hours as needed       oxyCODONE (ROXICODONE) 5 MG tablet Take 1 tablet (5 mg) by mouth every 6 hours as needed for moderate to severe pain 60 tablet 0     polyethylene glycol (MIRALAX/GLYCOLAX) powder Take 17 g by mouth daily        prochlorperazine (COMPAZINE) 5 MG tablet Take 5 mg by mouth every 6 hours as needed for nausea or vomiting       sildenafil (REVATIO) 20 MG tablet Take 20 mg by mouth as needed Hold while at U       simethicone (MYLICON) 125 MG chewable tablet Take 125 mg by mouth 2 times daily as needed (abdominal pain)       sulfamethoxazole-trimethoprim (BACTRIM DS/SEPTRA DS) 800-160 MG tablet Take 1 tablet by mouth 2 times daily       Triprolidine-Pseudoephedrine 2.5-60 MG TABS Take 0.5 tablets by mouth every 6 hours as needed (allergies)           Medication Changes/Rationale:   Discontinued remeron  Completed course of IV Cefazolin  Started on Bactrim through 11/7 (ID)  ID discontinued Levofloxacin    Controlled medications sent with patient:   Medication: MS Contin, 20 tabs given to patient at the time of discharge to take home  Medication: Oxycodone, 10 tabs given to patient at the time of discharge to take home           ROS:   10 point ROS of systems including Constitutional, Eyes, Respiratory, Cardiovascular, Gastroenterology, Genitourinary, Integumentary, Musculoskeletal, Psychiatric were all negative except for pertinent positives noted in my  "HPI.      Physical Exam:   Vitals: /72   Pulse 61   Temp 97  F (36.1  C)   Resp 18   Ht 1.803 m (5' 11\")   Wt 66.8 kg (147 lb 3.2 oz)   SpO2 97%   BMI 20.53 kg/m    BMI= Body mass index is 20.53 kg/m .  GENERAL APPEARANCE:  Alert, in no distress, oriented, thin, cooperative  ENT:  Mouth and posterior oropharynx normal, moist mucous membranes, normal hearing acuity  EYES:  EOM, conjunctivae, lids, pupils and irises normal, PERRL  NECK:  No adenopathy,masses or thyromegaly  RESP:  respiratory effort and palpation of chest normal, lungs clear to auscultation , no respiratory distress  CV:  Palpation and auscultation of heart done , regular rate and rhythm, no murmur, rub, or gallop, no edema, +2 pedal pulses  ABDOMEN:  normal bowel sounds, soft, nontender, no guarding or rebound.   M/S: Gait and station abnormal, uses walker; requires assistance with activity and ADLs. Digits and nails normal  SKIN:  Inspection of skin and subcutaneous tissue baseline, Palpation of skin and subcutaneous tissue baseline  NEURO:   Cranial nerves 2-12 are normal tested and grossly at patient's baseline, Examination of sensation by touch normal  PSYCH:  oriented X 2 (variable), insight and judgement impaired, memory impaired, affect and mood normal, cognitive testing done SLUMS 2/30        SNF labs: Labs done in SNF are in [facility]. Please refer to them using [employer]/Care Everywhere., Recent labs in EPIC reviewed by me today.  and   Most Recent 3 CBC's:  Recent Labs   Lab Test 10/09/19  0558 10/02/19  0640 10/01/19  0636   WBC 4.3 3.5* 3.5*   HGB 11.3* 10.4* 10.2*   MCV 95 96 95   * 194 195     Most Recent 3 BMP's:  Recent Labs   Lab Test 10/14/19  0646 10/10/19  0646 10/09/19  0558 10/07/19  0625    141 139 136   POTASSIUM 4.0 3.7 3.6 3.8   CHLORIDE 107 109 105 103   CO2 27 29 30 30   BUN 11 11 10 8   CR 0.90 0.73 0.73 0.64*   ANIONGAP 6 3  --  3   MATTHEW 8.4* 7.9* 8.8 8.6   * 110* 135* 156* "           DISCHARGE PLAN:    Follow up labs:   Home care RN to recheck BMP within 1 week of discharge from TCU w/results sent to PCP    Medical Follow Up:      Follow up with primary care provider in 1 week    Follow up with specialist: Oncologist     MTM referral needed and placed by this provider: Tara    Current [facility] scheduled appointments:  1. Patient to follow-up with PCP within 7 days of discharge from TCU.  2. Patient to follow-up with Oncology as directed      Discharge Services: Home Care:  Occupational Therapy, Physical Therapy, Registered Nurse, Home Health Aide and From:  [facility]      Discharge Instructions Verbalized to Patient at Discharge:     Monitor blood glucose monitoring 3-4 times a day. Keep a record and bring it to your next primary provider visit.     Ensure nutritional supplement recommended three times a day w/meals.     DO NOT DRIVE while taking narcotic pain medications.     24-hour supervision is recommended for safety.               TOTAL DISCHARGE TIME:   Greater than 30 minutes     Electronically signed by:  CARMINE Mclain CNP

## 2019-10-14 NOTE — PROGRESS NOTES
Hepatic drain tube removed at bedside. Dressing applied, will monitor for 15 minutes prior to discharge to TCU.  YARELIS Miranda

## 2019-10-14 NOTE — PROGRESS NOTES
Clinic Care Coordination Contact  Care Team Conversations    RN PETE spoke with PRIYANK oCates to discuss discharge plan for patient.    Patient will be discharging with PICC line ni place for intermittent IV hydration.  [facility] home infusion will be ordered to help manage Picc line needs.  Patient will also have [facility] and 24 hr pca that family is paying privately for.    Patient will need follow-up with pcp within next week.  SETH FREEMAN called daughterOksana to assist with scheduling.  Oksana will be providing transport and wants to accompany patient to medical appointments.  Left VM.  Awaiting return call.    Rosemarie Mann RN  Care Coordination  Phone:  [phone]  Email: [email or web address]  [facility], [place] and [facility]

## 2019-10-14 NOTE — PROGRESS NOTES
Clinic Care Coordination Contact    RN CC received message to call PRIYANK Steele with  Geriatrics [phone] to discuss TCU discharge tomorrow.    Left VM.  Awaiting return call.    Rosemarie Mann RN  Care Coordination  Phone:  [phone]  Email: [email or web address]  [facility] and [facility]

## 2019-10-14 NOTE — PATIENT INSTRUCTIONS
[facility] Discharge Orders    Name: Jesús Stock  : 1950  Planned Discharge Date: 10/15/19  Discharged to: previous independent home      MEDICAL FOLLOW UP  1. Patient to follow-up with PCP within 7 days of discharge from TCU.  2. Patient to follow-up with Oncology as directed      FUTURE LABS:  Home care RN to recheck BMP within 1 week of discharge from TCU w/results sent to PCP      ORDER CHANGES:  Discontinued remeron  Completed course of IV Cefazolin  Started on Bactrim through  (ID)  ID discontinued Levofloxacin    DISCHARGE MEDICATIONS:  The patient s pharmacy is authorized to dispense a 30-day supply of medications. Refill requests should be directed to the primary provider, Chirag Robert   Medication: MS Contin, 20 tabs given to patient at the time of discharge to take home  Medication: Oxycodone, 10 tabs given to patient at the time of discharge to take home  Future refills to be provided by PCP    Current Outpatient Medications   Medication Sig Dispense Refill     acetaminophen (TYLENOL) 500 MG tablet Take 1-2 tablets by mouth every 6 hours as needed for pain.       alpha-d-galactosidase (BEANO) tablet Take 1 tablet by mouth 3 times daily as needed for intestinal gas       dronabinol (MARINOL) 10 MG capsule Take 10 mg by mouth 2 times daily       insulin aspart (NOVOLOG VIAL) 100 UNITS/ML vial Inject Subcutaneous 3 times daily (with meals) Inject as per sliding scale: if 150 - 200 = 2 unit; 201 - 250 = 3 units;  251 - 300 = 4 units; 301 - 350 = 5 units; 351 - 400 = 6 units;  401+ = 7 units       morphine (MS CONTIN) 15 MG CR tablet Take 1 tablet (15 mg) by mouth every 12 hours 60 tablet 0     ondansetron (ZOFRAN) 4 MG tablet Take 4 mg by mouth 3 times daily And every 6 hours as needed       oxyCODONE (ROXICODONE) 5 MG tablet Take 1 tablet (5 mg) by mouth every 6 hours as needed for moderate to severe pain 60 tablet 0     polyethylene glycol (MIRALAX/GLYCOLAX) powder  Take 17 g by mouth daily        prochlorperazine (COMPAZINE) 5 MG tablet Take 5 mg by mouth every 6 hours as needed for nausea or vomiting       sildenafil (REVATIO) 20 MG tablet Take 20 mg by mouth as needed Hold while at U       simethicone (MYLICON) 125 MG chewable tablet Take 125 mg by mouth 2 times daily as needed (abdominal pain)       sulfamethoxazole-trimethoprim (BACTRIM DS/SEPTRA DS) 800-160 MG tablet Take 1 tablet by mouth 2 times daily       Triprolidine-Pseudoephedrine 2.5-60 MG TABS Take 0.5 tablets by mouth every 6 hours as needed (allergies)         SERVICES:  Home Care:  Occupational Therapy, Physical Therapy, Registered Nurse, Home Health Aide and From:  [facility]    home infusion     ADDITIONAL INSTRUCTIONS:    Monitor blood glucose monitoring 3-4 times a day. Keep a record and bring it to your next primary provider visit.     Ensure nutritional supplement recommended three times a day w/meals.     DO NOT DRIVE while taking narcotic pain medications.     24-hour supervision is recommended for safety.         CARMINE Mclain CNP  This document was electronically signed on October 14, 2019

## 2019-10-14 NOTE — TELEPHONE ENCOUNTER
Maya from [facility] stating patient will be discharging from the TCU tomorrow and needs some information.  Please call her back at [phone].  thanks

## 2019-10-14 NOTE — LETTER
10/14/2019        RE: Jesús Stock  [address] [facility] [address]        [facility] DISCHARGE SUMMARY  PATIENT'S NAME: Jesús Stock  YOB: 1950  MEDICAL RECORD NUMBER:  [id number]  Place of Service where encounter took place:  No question data found.    PRIMARY CARE PROVIDER AND CLINIC RESPONSIBLE AFTER TRANSFER:   Chirag Robert MD, [address] / [facility] [address] ***   { GERIATRIC DISCHARGE DISPOSITION:909102}     Transferring providers: CARMINE Mclain CNP, ***, MD  Recent Hospitalization/ED:  {LOCATION OF NURSING HOME ADMISSIONS:089654} stay *** to ***.  Date of SNF Admission: {DATE/MONTH/YEAR:1098}  Date of SNF (anticipated) Discharge: {DATE/MONTH/:}  Discharged to: {fgsdischargeto1:460158}  Cognitive Scores: {fgscog1:653152}  Physical Function: {fgsphysicalfunction:144941}  DME: {fgsdmedc:337277}    CODE STATUS/ADVANCE DIRECTIVES DISCUSSION:  {fgscodestatus:313276} {Provider, verify code status is accurate as an order in Epic}  ALLERGIES: Dust mites; Mold; and No known drug allergies    DISCHARGE DIAGNOSIS/NURSING FACILITY COURSE:   {fgsdia}    Past Medical History:  has a past medical history of Alcohol dependence in remission sober since  (H), Backache, unspecified, Colon polyps (11), Diverticulosis of colon (11), Major depressive disorder, single episode, mild (H), Obesity (BMI 30.0-34.9) (2017), Osteoarthrosis, unspecified whether generalized or localized, unspecified site, Pancreatic cancer (H), and Type 2 diabetes mellitus without complication, with long-term current use of insulin (H) (2019). He also has no past medical history of Chronic infection, History of blood transfusion, Malignant hyperthermia, or Sleep apnea.    Discharge Medications:  Current Outpatient Medications   Medication Sig Dispense Refill     acetaminophen (TYLENOL) 500 MG tablet Take 1-2 tablets by mouth every 6  "hours as needed for pain.       alpha-d-galactosidase (BEANO) tablet Take 1 tablet by mouth 3 times daily as needed for intestinal gas       bisacodyl (DULCOLAX) 10 MG suppository Place 1 suppository (10 mg) rectally daily as needed for constipation       dronabinol (MARINOL) 10 MG capsule Take 10 mg by mouth 2 times daily       insulin aspart (NOVOLOG VIAL) 100 UNITS/ML vial Inject Subcutaneous 3 times daily (with meals) Inject as per sliding scale: if 150 - 200 = 2 unit; 201 - 250 = 3 units;  251 - 300 = 4 units; 301 - 350 = 5 units; 351 - 400 = 6 units;  401+ = 7 units       morphine (MS CONTIN) 15 MG CR tablet Take 1 tablet (15 mg) by mouth every 12 hours 60 tablet 0     nystatin (MYCOSTATIN) 499855 UNIT/ML suspension Take 5 mLs (500,000 Units) by mouth 4 times daily 120 mL 0     ondansetron (ZOFRAN) 4 MG tablet Take 4 mg by mouth 3 times daily And every 6 hours as needed       oxyCODONE (ROXICODONE) 5 MG tablet Take 1 tablet (5 mg) by mouth every 6 hours as needed for moderate to severe pain 60 tablet 0     polyethylene glycol (MIRALAX/GLYCOLAX) powder Take 17 g by mouth daily        prochlorperazine (COMPAZINE) 5 MG tablet Take 1 tablet (5 mg) by mouth every 6 hours as needed for nausea or vomiting 60 tablet 1     sildenafil (REVATIO) 20 MG tablet Take 20 mg by mouth as needed Hold while at [facility]       simethicone (MYLICON) 125 MG chewable tablet Take 125 mg by mouth 2 times daily as needed (abdominal pain)       sulfamethoxazole-trimethoprim (BACTRIM DS/SEPTRA DS) 800-160 MG tablet Take 1 tablet by mouth 2 times daily       Triprolidine-Pseudoephedrine 2.5-60 MG TABS Take 0.5 tablets by mouth every 6 hours as needed (allergies)           Medication Changes/Rationale:   ***    Controlled medications sent with patient:   {fgscontrolledmeds1:755769}         ROS:   {ROS FGS:751261:::0}      Physical Exam:   Vitals: /72   Pulse 61   Temp 97  F (36.1  C)   Resp 18   Ht 1.803 m (5' 11\")   Wt 66.8 kg (147 lb " 3.2 oz)   SpO2 97%   BMI 20.53 kg/m     BMI= Body mass index is 20.53 kg/m .  {NURSING HOME PHYSICAL EXAM:483261}         SNF labs: Labs done in SNF are in [facility]. Please refer to them using EPIC/Care Everywhere., Recent labs in EPIC reviewed by me today.  and   Most Recent 3 CBC's:  Recent Labs   Lab Test 10/09/19  0558 10/02/19  0640 10/01/19  0636   WBC 4.3 3.5* 3.5*   HGB 11.3* 10.4* 10.2*   MCV 95 96 95   * 194 195     Most Recent 3 BMP's:  Recent Labs   Lab Test 10/14/19  0646 10/10/19  0646 10/09/19  0558 10/07/19  0625    141 139 136   POTASSIUM 4.0 3.7 3.6 3.8   CHLORIDE 107 109 105 103   CO2 27 29 30 30   BUN 11 11 10 8   CR 0.90 0.73 0.73 0.64*   ANIONGAP 6 3  --  3   MATTHEW 8.4* 7.9* 8.8 8.6   * 110* 135* 156*           DISCHARGE PLAN:    Follow up labs: {fgsfuturelabs1:019086}    Medical Follow Up:      {fgsdischargefollowup:941829}    MTM referral needed and placed by this provider: {YES (EXPLAIN)/NO:402384}    Current [place] scheduled appointments:   ***      Discharge Services: {fgsdcservices1:508093}      Discharge Instructions Verbalized to Patient at Discharge:     {fgsdischargeinstructions1:983515}          TOTAL DISCHARGE TIME:   Greater than 30 minutes     Electronically signed by:  CARMINE Mclain CNP ***    {fgshomecare F2F:192805}  {Providers Please double check the med list (in the plan section >> meds & orders tab) and Discontinue any of the meds flagged by the TC to be discontinued}                  Sincerely,        CARMINE Mclain CNP

## 2019-10-15 NOTE — TELEPHONE ENCOUNTER
[facility] calling needing verbal orders for IV hydration 1 litre daily as needed. Please call anyone who answers can help ok to call [phone]

## 2019-10-16 PROBLEM — C25.0 MALIGNANT NEOPLASM OF HEAD OF PANCREAS (H): Status: ACTIVE | Noted: 2019-01-01

## 2019-10-16 NOTE — PROGRESS NOTES
This is a recent snapshot of the patient's [facility] medical record.  For current drug dose and complete information and questions, call [phone] or In Basket pool, [facility] home infusion ([id number])  CSN Number:  [id number]

## 2019-10-16 NOTE — TELEPHONE ENCOUNTER
Ebony with [facility] calling to ask if they should continue with line care. Ebony can be reached at [phone].

## 2019-10-16 NOTE — TELEPHONE ENCOUNTER
Reviewed this with Ebony. Did not need line orders before. Ebony will move forward with this and if needed, will call back.

## 2019-10-17 NOTE — TELEPHONE ENCOUNTER
Dr. Robert  Hello, thank you for your coordination of care for Mr. Aguilar.  I have requested a 1 day 1 order for nurse visit today, because of the IV teaching needed, and PICC line dressing change due today.   Cherelle had entered request yesterday for overall orders to resume homecare services, but she is not working today and we did not have a visit today in the homecare orders.  Thank you so much for your help.    SN 1 visit today for IV teaching.    Sincerely,  Mary Cuellar Rn, BSN  Clinical Coordinator  [facility]  [phone]

## 2019-10-17 NOTE — PROGRESS NOTES
[facility] now requests orders and shares plan of care/discharge summaries for some patients through [employer].  Please REPLY TO THIS MESSAGE OR ROUTE BACK TO THE AUTHOR in order to give authorization for orders when needed.  This is considered a verbal order, you will still receive a faxed copy of orders for signature.  Thank you for your assistance in improving collaboration for our patients.    ORDER GABRIELLE Sn 1 week 1, 3 week 1, 2 week 2 and 2 prn for cp assessment, pain assessment, picc line cares, medication assessment.  PT/OT/ST eval/tx    Pt fell during visit today, he lost balance while getting out of chair, denies injuries, RN and friend assisted pt up. Dtr also is giving lorazepam 0.5mg 1 tab 2x.day, this was not on discontinue orders from TCU.    MD SUMMARY/PLAN OF CARE    DISCHARGE SUMMARY

## 2019-10-17 NOTE — PROGRESS NOTES
This is a recent snapshot of the patient's [facility] medical record.  For current drug dose and complete information and questions, call [phone] or In Basket pool, [facility] home infusion ([id number])  CSN Number:  [id number]

## 2019-10-20 NOTE — ED AVS SNAPSHOT
[facility] Emergency Department  [address]  [facility] [address]  Phone:  [phone]  Fax:  [phone]                                    Jesús Stock   MRN: [id number]    Department:  [facility] Emergency Department   Date of Visit:  10/20/2019           After Visit Summary Signature Page    I have received my discharge instructions, and my questions have been answered. I have discussed any challenges I see with this plan with the nurse or doctor.    ..........................................................................................................................................  Patient/Patient Representative Signature      ..........................................................................................................................................  Patient Representative Print Name and Relationship to Patient    ..................................................               ................................................  Date                                   Time    ..........................................................................................................................................  Reviewed by Signature/Title    ...................................................              ..............................................  Date                                               Time          22EPIC Rev 08/18

## 2019-10-20 NOTE — ED PROVIDER NOTES
History     Chief Complaint:  Nausea and vomiting    HPI:   The history is provided by the patient.      Jesús Stock is a 68 year old male with history of stage II pancreatic adenocarcinoma with recent initiation of chemotherapy (first round 8/29), type 2 diabetes, thrombocytopenia, and depression who presents with daughter for evaluation of nausea and vomiting. The patient had a liver abscess found on CT last month on 9/12 and had US guided drainage of this with drain placement on 9/13. He was treated with IV cefazolin and PO levofloxacin x3 weeks and was admitted to TCU and discharged home 2 days ago. The daughter states the patient began having nausea and vomiting shortly after coming home. He has not been eating or drinking for the past 24 hours because of his nausea. This morning he was short of breath and dry heaving. He does have PICC line for IV hydration. He has morphine and oral Zofran at home but he has not been able to keep the Zofran down for the past 24 hours. He recently started oral medical cannabis 4 days ago to help with his appetite. He has lost about 80 pounds over the past year. The patient states he believes anxiety is contributing to his symptoms. The daughter states he often identifies his anxiety as a contributing factor when he is ill.     Allergies:  Dust mites  Mold      Medications:    Ativan 0.5 mg PRN  Oxycodone 5 mg PRN  Marinol 5 mg BID  Triprolidine-pseudoephedrine, 2.5-60 mg  Simethicone 125 mg  Sildenafil 20 mg  Miralax   Zofran 4 mg  Insulin glargine 12 units    Past Medical History:    Alcohol dependence in remission, sober since 1984  Bipolar 1 disorder   Stage II pancreatic cancer  Type 2 diabetes   Transaminitis  Mild major depression   Osteoarthrosis     Past Surgical History:    Appendectomy   Colonoscopy with polypectomy   Cholangiopancreatogram tube/stent  EGD  Hernia repair x3  Tonsillectomy     Family History:    Diabetes- father  Hypertension- mother    Social  History:  The patient is accompanied to the ED by daughter  PCP: Chirag Robert   Marital Status:  Single  Smoking status: Negative, sober for 27 years  Alcohol use: Passive smoke exposure, never smoker  Drug use: Negative       Review of Systems   Constitutional: Negative for fever.   Respiratory: Positive for shortness of breath.    Cardiovascular: Negative for chest pain.   Gastrointestinal: Positive for nausea and vomiting.   All other systems reviewed and are negative.    Physical Exam     Patient Vitals for the past 24 hrs:   BP Temp Temp src Pulse Heart Rate Resp SpO2   10/20/19 1300 117/79 -- -- 77 77 -- 99 %   10/20/19 1200 109/74 -- -- 80 79 -- 97 %   10/20/19 1100 110/74 -- -- 90 89 14 95 %   10/20/19 1045 108/72 -- -- 84 85 10 95 %   10/20/19 1030 129/86 -- -- 98 101 15 98 %   10/20/19 1015 129/77 -- -- 105 -- -- 99 %   10/20/19 1006 (!) 136/96 96.9  F (36.1  C) Oral 114 -- 18 97 %        Physical Exam  Constitutional:       Appearance: He is well-developed. He is diaphoretic.   HENT:      Right Ear: Tympanic membrane and external ear normal.      Left Ear: Tympanic membrane and external ear normal.      Mouth/Throat:      Mouth: Mucous membranes are dry.      Pharynx: Oropharynx is clear. No oropharyngeal exudate.   Eyes:      General: No scleral icterus.     Conjunctiva/sclera: Conjunctivae normal.      Pupils: Pupils are equal, round, and reactive to light.   Neck:      Musculoskeletal: Normal range of motion and neck supple.      Vascular: No JVD.   Cardiovascular:      Rate and Rhythm: Regular rhythm. Tachycardia present.      Pulses: Normal pulses.      Heart sounds: Normal heart sounds. No murmur. No friction rub. No gallop.    Pulmonary:      Effort: Pulmonary effort is normal. No respiratory distress.      Breath sounds: Normal breath sounds. No wheezing or rales.   Abdominal:      General: Bowel sounds are normal. There is no distension.      Palpations: Abdomen is soft. There is no mass.       Tenderness: There is no tenderness.      Comments: Diffuse upper abd tenderness   Musculoskeletal: Normal range of motion.   Skin:     General: Skin is warm.      Findings: No rash.   Neurological:      Mental Status: He is alert. Mental status is at baseline.   Psychiatric:      Comments: Anxious in appearance       Emergency Department Course     ECG (10:18:28):  Rate 103 bpm. IL interval 146. QRS duration 78. QT/QTc 344/450. P-R-T axes 84 84 90.   Sinus tachycardia  Anteroseptal infarct, age undetermined  Abnormal ECG  Interpreted by Danial Witt MD.     Imaging:  Radiographic findings were communicated with the patient and family who voiced understanding of the findings.    CT Abdomen Pelvis w Contrast  Impression:   1. Pancreatic head mass with obstruction of the pancreatic duct is  again noted and unchanged since recent prior CT. Common bile duct  stent remains patent. Pneumobilia is again noted.  2. Significant colonic constipation but no evidence of bowel  obstruction.  3. Interval removal of hepatic drainage tube.  As read by Radiology.     Laboratory:  CBC: HGB 13.1, o/w WNL (WBC 6.3, )   (1023) BMP: , CO2 18, anion gap 18, glucose 200, o/w WNL (Creatinine 0.91)  (1257) BMP: , glucose 165, o/w WNL (Creatinine 0.84)  (1023) Lactic acid whole blood: 2.8  (1257) Lactic acid whole blood: 1.0  INR: 1.09  Magnesium: 1.8  Glucose by meter: 172    Interventions:  1031: lactated ringers bolus 1L, IV  1031: Dilaudid 1 mg, IV  1031: Ativan 1 mg, IV  1032: Zofran 4 mg, IV  1306: lactated ringers bolus 1L, IV      Emergency Department Course:  Past medical records, nursing notes, and vitals reviewed.  1000: I performed an exam of the patient and obtained history, as documented above.  IV started and blood drawn for laboratory testing. Results are as above.    The patient was sent for CT imaging while in the emergency department, findings above.       1400: I rechecked the patient. Explained  findings to the patient and daughter.     I rechecked the patient. Findings and plan explained to the Patient and family. Patient discharged home with instructions regarding supportive care, medications, and reasons to return. The importance of close follow-up was reviewed.      Impression & Plan      CMS Diagnoses:The Lactic acid level is elevated due to dehydration, at this time there is no sign of severe sepsis or septic shock.    Medical Decision Making:  Jesús Stock is a 68 year old male presenting with nausea/vomiting with recent diagnosis and initiation of treatment of pancreatic cancer. The patient did appear dehydrated on exam. We hooked up his PICC line to IV fluids and gave him ativan as well as Zofran. He actually improved significantly, and repeat labs are normalizing and looking a lot more improved. He does have home health care set up and the daughter is doing IV infusion every day. He does have a prescription for Ativan that they have not filled. The patient does express significant anxiety and I do believe that is likely the cause of at least some of his symptoms. I do not see any evidence of anything acute based on evaluation today. He wants to go home since he is able to do IV fluids at home. I spoke with daughter on the phone again and she is comfortable bringing him back and will be starting Ativan. They do also have an appointment with their doctor that they will keep tomorrow to discuss nutrition as well as other issues. He did drink and take in apple juice and crackers here and is feeling a lot better. The patient is discharged with daughter in stable condition.     Diagnosis:    ICD-10-CM    1. Nausea and vomiting, intractability of vomiting not specified, unspecified vomiting type R11.2        Disposition:  discharged to home    I, Megan Beh, am serving as a scribe at 10:00 AM on 10/20/2019 to document services personally performed by Danial Witt MD based on my observations and the  provider's statements to me.      10/20/2019   [facility] EMERGENCY DEPARTMENT       Danial Witt MD  10/20/19 [id number]

## 2019-10-20 NOTE — ED NOTES
ED Care Manager Note        Met with: Patient and Family (daughter Oksana).  Data:   Active Problems:    * No active hospital problems. *       Cognitive Status: awake, alert and oriented.        Contact information and PCP information verified: Yes                        Insurance concerns: No Insurance issues identified  Assessment:  Identified issues/concerns regarding health management:   No appetite; not eating; losing weight    Action/Resources:  Met with patient and his daughter.   He lives with daughter. Also has home health care and home infusion.    Pt and daughter agree that current services are adequate and going well.   No additional needs are identified.       Plan:  Will continue to follow and assist as needed.  Pt to return home with current services.     Amy Maloney RN, LICSW, CCM  RN Care Coordinator  [facility], Emergency Department  Phone  [phone]

## 2019-10-20 NOTE — ED TRIAGE NOTES
Patient presents to the ED with nausea and vomiting. Patient has pancreatic cancer. Was in a TCU and was discharged Tuesday. Patient states has not been eating and has had vomiting. Has PICC line in place and has been receiving IV fluids at home.

## 2019-10-22 NOTE — PROGRESS NOTES
The clinic Community Health Worker called the patient today at the request of the PCP to discuss possible Clinic Care Coordination enrollment.  The service was described to the patient and immediate needs were discussed.  The patient declined enrollement at this time.  The PCP is encouraged to refer in the future if the patient's needs change.    Spoke to Oksana(daughter)  Patient is doing better and is receiving home care.  Care coordination is not needed at this time.  Assisted with scheduling F/U with Dr. Robert for 11/01/19

## 2019-10-22 NOTE — PROGRESS NOTES
[facility] now requests orders and shares plan of care/discharge summaries for some patients through [employer].  Please REPLY TO THIS MESSAGE OR ROUTE BACK TO THE AUTHOR in order to give authorization for orders when needed.  This is considered a verbal order, you will still receive a faxed copy of orders for signature.  Thank you for your assistance in improving collaboration for our patients.    ORDER  OT POC for 1w2 for home safety eval and educate for pt/cgs for all ADLs and functional mobility.

## 2019-10-25 NOTE — TELEPHONE ENCOUNTER
Fax received from [facility] -  10/22/19 for review and signature.  Put in Dr. Robert's in basket.

## 2019-10-28 NOTE — TELEPHONE ENCOUNTER
Received call from patient's daughter, Oksana.  Patient is not doing well.  He is not eating and loosing weight rapidly.  They have tried appetite stimulants, such as medical marijuana and Marinol, but nether has worked. Oksana would like appt with PCP asap to discuss options such as initiating tube feeding.    Patient has history of Stage II Pancreatic cancer and currently on chemo.    Patient is open to [facility].  May consider palliative home care services.    [facility] - RN/PT/OT/SW  - SETH Gray [phone]    Patient scheduled for next available appointment.  Next 5 appointments (look out 90 days)    Oct 30, 2019  3:20 PM CDT  Office Visit with Chirag Robert MD  [facility] [address]  [facility] [id number] [phone]          Rosemarie Mann RN  Care Coordination  Phone:  [phone]  Email: [email or web address]

## 2019-10-30 NOTE — PROGRESS NOTES
Subjective     Jesús Stock is a 68 year old male who presents to clinic today for the following health issues:    HPI   Weight loss/ lack of appetite:  Stomach pain:    Patient has history of pancreatic cancer. Started on chemotherapy in August. Treatment stopped because of development of infection, abdominal abscess. Treated with antibiotic and IR drained. Now no fever, finished antibiotics. Has poor appetite, does not drink fluids, feel weak, has lost weight. Had had nausea, vomiting, seen in ED 10 days ago. Now feels better. Has aversion to food. No change in bowel habits. Uses a wheelchair, walker because of weakness. CT of the abdomen 10 days ago showed no change in the size of the pancreas tumor. Has a biliary stent, open. No jaundice.   Has h/o diabetes. Has been on insulin, now is on hold because of poor oral intake.   Has had IV fluids given by home care.           Patient Active Problem List   Diagnosis     CARDIOVASCULAR SCREENING; LDL GOAL LESS THAN 130     Mild major depression (H)     Colon polyps     Diverticulosis of colon     Health Care Home     Obesity (BMI 30.0-34.9)     Alcohol dependence in remission sober since 1984 (H)     Type 2 diabetes mellitus without complication, with long-term current use of insulin (H)     Pancreatic mass     Transaminitis     Malignant neoplasm of head of pancreas (H)     Failure to thrive in adult     Fever     At risk for healthcare associated infection     Protein-calorie malnutrition (H)     Past Surgical History:   Procedure Laterality Date     APPENDECTOMY       COLONOSCOPY  5/24/11    2 polyps, mild pan-colonic diverticulosis     COLONOSCOPY N/A 9/7/2017    Procedure: COMBINED COLONOSCOPY, SINGLE OR MULTIPLE BIOPSY/POLYPECTOMY BY BIOPSY;;  Surgeon: Gene Grimes MD;  Location:  GI     ENDOSCOPIC RETROGRADE CHOLANGIOPANCREATOGRAM N/A 6/5/2019    Procedure: ENDOSCOPIC RETROGRADE CHOLANGIOPANCREATOGRAPHY, ENDOSCOPIC UTRASOUND ESOPHAGOSCOPY WITH STENT  PLACEMENT. ;  Surgeon: Jana Nelson MD;  Location: [facility] OR     ENDOSCOPIC RETROGRADE CHOLANGIOPANCREATOGRAPHY, EXCHANGE TUBE/STENT N/A 7/18/2019    Procedure: ENDOSCOPIC RETROGRADE CHOLANGIOPANCREATOGRAPHY, WITH STENT EXCHANGE;  Surgeon: Jana Nelson MD;  Location: [facility] OR     ESOPHAGOSCOPY, GASTROSCOPY, DUODENOSCOPY (EGD), COMBINED N/A 6/5/2019    Procedure: Esophagoscopy, gastroscopy, duodenoscopy (EGD), combined, pancreatic needle biopsies;  Surgeon: Jana Nelson MD;  Location: [facility] OR     HERNIA REPAIR      X3     IR CHEST PORT PLACEMENT > 5 YRS OF AGE  8/28/2019     IR FOLLOW UP VISIT OUTPATIENT  10/11/2019     IR PORT REMOVAL RIGHT  9/11/2019     TONSILLECTOMY         Social History     Tobacco Use     Smoking status: Passive Smoke Exposure - Never Smoker     Smokeless tobacco: Never Used     Tobacco comment: occasional   Substance Use Topics     Alcohol use: No     Comment: sober x 27 years     Family History   Problem Relation Age of Onset     Diabetes Father         b:1926     Hypertension Mother         b:1925     Family History Negative Brother         b:1948     Family History Negative Brother         b:1951  deferred tremor     Family History Negative Sister         b:1953     Family History Negative Brother         b:1956     Family History Negative Brother         b:1958         Current Outpatient Medications   Medication Sig Dispense Refill     acetaminophen (TYLENOL) 500 MG tablet Take 1-2 tablets by mouth every 6 hours as needed for pain.       LORazepam (ATIVAN) 0.5 MG tablet Take 0.5 mg by mouth every 6 hours as needed for anxiety (As needed)       medical cannabis (Patient's own supply) daily (The purpose of this order is to document that the patient reports taking medical cannabis.  This is not a prescription, and is not used to certify that the patient has a qualifying medical condition.)       morphine (MS CONTIN) 15 MG CR tablet Take 1 tablet (15 mg) by mouth  "every 12 hours 60 tablet 0     ondansetron (ZOFRAN) 4 MG tablet Take 4 mg by mouth 3 times daily And every 6 hours as needed       oxyCODONE (ROXICODONE) 5 MG tablet Take 1 tablet (5 mg) by mouth every 6 hours as needed for moderate to severe pain 60 tablet 0     polyethylene glycol (MIRALAX/GLYCOLAX) powder Take 17 g by mouth daily        sulfamethoxazole-trimethoprim (BACTRIM DS/SEPTRA DS) 800-160 MG tablet Take 1 tablet by mouth 2 times daily       insulin aspart (NOVOLOG VIAL) 100 UNITS/ML vial Inject Subcutaneous 3 times daily (with meals) Inject as per sliding scale: if 150 - 200 = 2 unit; 201 - 250 = 3 units;  251 - 300 = 4 units; 301 - 350 = 5 units; 351 - 400 = 6 units;  401+ = 7 units       sildenafil (REVATIO) 20 MG tablet Take 20 mg by mouth as needed Hold while at TCU       simethicone (MYLICON) 125 MG chewable tablet Take 125 mg by mouth 2 times daily as needed (abdominal pain)       Triprolidine-Pseudoephedrine 2.5-60 MG TABS Take 0.5 tablets by mouth every 6 hours as needed (allergies)           Reviewed and updated as needed this visit by Provider         Review of Systems   ROS COMP: Constitutional, HEENT, cardiovascular, pulmonary, GI, , musculoskeletal, neuro, skin, endocrine and psych systems are negative, except as otherwise noted.      Objective    BP (!) 88/50   Pulse 109   Temp 97.4  F (36.3  C) (Oral)   Resp 12   Ht 1.803 m (5' 11\")   Wt 61.2 kg (135 lb)   SpO2 97%   BMI 18.83 kg/m    Body mass index is 18.83 kg/m .  Physical Exam   GENERAL:very frail, weak, underweight, in a wheelchair, no distress  EYES: Eyes grossly normal to inspection, PERRL and conjunctivae and sclerae normal  NECK: no adenopathy, no asymmetry, masses, or scars and thyroid normal to palpation  RESP: lungs clear to auscultation - no rales, rhonchi or wheezes  CV: regular rate and rhythm, normal S1 S2, no S3 or S4, no murmur, click or rub, no peripheral edema and peripheral pulses strong  ABDOMEN: soft, " nontender, no hepatosplenomegaly, no masses and bowel sounds normal  MS: no gross musculoskeletal defects noted, no edema, muscle wasting, weakness   SKIN: no suspicious lesions or rashes, dry mucosa     Diagnostic Test Results:  Labs reviewed in Epic  Results for orders placed or performed during the hospital encounter of 10/20/19   CT Abdomen Pelvis w Contrast    Narrative    CT ABDOMEN AND PELVIS WITH CONTRAST 10/20/2019 12:20 PM     HISTORY: Nausea, vomiting; pancreatic cancer.     COMPARISON: CT abdomen and pelvis 10/9/2019.    TECHNIQUE: Axial images from the lung bases to the symphysis are  performed with additional coronal reformatted images. 74 mL of Isovue  370 are given intravenously.  Radiation dose for this scan was reduced  using automated exposure control, adjustment of the mA and/or kV  according to patient size, or iterative reconstruction technique.    FINDINGS:   Chest:  Lung bases are clear where imaged.    Abdomen: Biliary stent remains in position. Hepatic drainage tube has  been removed. Pneumobilia is again present. Liver is otherwise  unremarkable. The spleen, gallbladder, adrenal glands, and kidneys are  unremarkable. No hydronephrosis or renal calculi. Tiny left renal  cortical cyst measures less than 1 cm and is stable in retrospect. No  hydronephrosis or renal calculi. No enlarged abdominal lymph nodes.  Prominent portacaval node measures nearly 1 cm short axis on series 2,  image 14 which is stable. Ill-defined pancreatic mass is again noted  measuring approximately 2.9 x 1.5 cm on series 2, image 18, previously  3.3 x 1.8 cm. Pancreatic duct within the body and tail is dilated  consistent with ductal obstruction. No evidence of bowel obstruction.  Significant colonic constipation is present and unchanged. No evidence  of diverticulitis. No evidence of gastric distention to suggest  gastric outlet or duodenal obstruction. Aorta is calcified without  aneurysm or dissection.    Pelvis:  The bladder, prostate, and rectum are unremarkable. No  enlarged pelvic lymph nodes or free fluid. Bone window examination is  unremarkable. No destructive bone lesions are appreciated.      Impression    IMPRESSION:  1. Pancreatic head mass with obstruction of the pancreatic duct is  again noted and unchanged since recent prior CT. Common bile duct  stent remains patent. Pneumobilia is again noted.  2. Significant colonic constipation but no evidence of bowel  obstruction.  3. Interval removal of hepatic drainage tube.    ALEJANDRA HERBERT MD   CBC with platelets differential   Result Value Ref Range    WBC 6.3 4.0 - 11.0 10e9/L    RBC Count 4.07 (L) 4.4 - 5.9 10e12/L    Hemoglobin 13.1 (L) 13.3 - 17.7 g/dL    Hematocrit 39.7 (L) 40.0 - 53.0 %    MCV 98 78 - 100 fl    MCH 32.2 26.5 - 33.0 pg    MCHC 33.0 31.5 - 36.5 g/dL    RDW 14.8 10.0 - 15.0 %    Platelet Count 180 150 - 450 10e9/L    Diff Method Automated Method     % Neutrophils 74.6 %    % Lymphocytes 19.6 %    % Monocytes 4.4 %    % Eosinophils 0.2 %    % Basophils 0.9 %    % Immature Granulocytes 0.3 %    Nucleated RBCs 0 0 /100    Absolute Neutrophil 4.7 1.6 - 8.3 10e9/L    Absolute Lymphocytes 1.2 0.8 - 5.3 10e9/L    Absolute Monocytes 0.3 0.0 - 1.3 10e9/L    Absolute Eosinophils 0.0 0.0 - 0.7 10e9/L    Absolute Basophils 0.1 0.0 - 0.2 10e9/L    Abs Immature Granulocytes 0.0 0 - 0.4 10e9/L    Absolute Nucleated RBC 0.0    INR   Result Value Ref Range    INR 1.09 0.86 - 1.14   Comprehensive metabolic panel   Result Value Ref Range    Sodium 131 (L) 133 - 144 mmol/L    Potassium 4.2 3.4 - 5.3 mmol/L    Chloride 95 94 - 109 mmol/L    Carbon Dioxide 18 (L) 20 - 32 mmol/L    Anion Gap 18 (H) 3 - 14 mmol/L    Glucose 200 (H) 70 - 99 mg/dL    Urea Nitrogen 15 7 - 30 mg/dL    Creatinine 0.91 0.66 - 1.25 mg/dL    GFR Estimate 85 >60 mL/min/[1.73_m2]    GFR Estimate If Black >90 >60 mL/min/[1.73_m2]    Calcium 9.5 8.5 - 10.1 mg/dL    Bilirubin Total 1.0 0.2 - 1.3 mg/dL     Albumin 3.9 3.4 - 5.0 g/dL    Protein Total 8.1 6.8 - 8.8 g/dL    Alkaline Phosphatase 145 40 - 150 U/L    ALT 16 0 - 70 U/L    AST 24 0 - 45 U/L   Magnesium   Result Value Ref Range    Magnesium 1.8 1.6 - 2.3 mg/dL   Lactic acid whole blood   Result Value Ref Range    Lactic Acid 2.8 (H) 0.7 - 2.0 mmol/L   Glucose by meter   Result Value Ref Range    Glucose 172 (H) 70 - 99 mg/dL   Basic metabolic panel   Result Value Ref Range    Sodium 132 (L) 133 - 144 mmol/L    Potassium 4.5 3.4 - 5.3 mmol/L    Chloride 97 94 - 109 mmol/L    Carbon Dioxide 22 20 - 32 mmol/L    Anion Gap 13 3 - 14 mmol/L    Glucose 165 (H) 70 - 99 mg/dL    Urea Nitrogen 14 7 - 30 mg/dL    Creatinine 0.84 0.66 - 1.25 mg/dL    GFR Estimate 89 >60 mL/min/[1.73_m2]    GFR Estimate If Black >90 >60 mL/min/[1.73_m2]    Calcium 8.6 8.5 - 10.1 mg/dL   Lactic acid whole blood   Result Value Ref Range    Lactic Acid 1.0 0.7 - 2.0 mmol/L   EKG 12-lead, tracing only   Result Value Ref Range    Interpretation ECG Click View Image link to view waveform and result            Assessment & Plan   Problem List Items Addressed This Visit     Type 2 diabetes mellitus without complication, with long-term current use of insulin (H)    Malignant neoplasm of head of pancreas (H)    Relevant Orders    IR Gastro Jejunostomy Tube Placement      Other Visit Diagnoses     Malnutrition, unspecified type (H)    -  Primary    Relevant Orders    IR REFERRAL    NUTRITION REFERRAL    IR Gastro Jejunostomy Tube Placement         Progressive weight loss, dehydration, weakness  Discussed possibility for tube feeding, possibly getting stronger to continue chemotherapy  Patient agrees with plan, will refer to IR for G tube placement  Refer to nutritionist to assess tube feeding needs, formula  Will need to restart insulin once feeding started       See Patient Instructions  Return in about 4 weeks (around 11/27/2019) for Routine Visit.    Chirag Robert MD  [facility]  [place]

## 2019-10-30 NOTE — NURSING NOTE
"Vital signs:  Temp: 97.4  F (36.3  C) Temp src: Oral BP: (!) 88/50 Pulse: 109   Resp: 12 SpO2: 97 %     Height: 180.3 cm (5' 11\") Weight: 61.2 kg (135 lb)  Estimated body mass index is 18.83 kg/m  as calculated from the following:    Height as of this encounter: 1.803 m (5' 11\").    Weight as of this encounter: 61.2 kg (135 lb).        "

## 2019-11-05 NOTE — PRE-PROCEDURE
GENERAL PRE-PROCEDURE:   Procedure:  Gastrostomy tube placement  Date/Time:  11/5/2019 10:10 AM    Verbal consent obtained?: Yes    Written consent obtained?: Yes    Risks and benefits: Risks, benefits and alternatives were discussed    Consent given by:  Patient  Patient states understanding of procedure being performed: Yes    Patient's understanding of procedure matches consent: Yes    Procedure consent matches procedure scheduled: Yes    Expected level of sedation:  Moderate  Appropriately NPO:  Yes  ASA Class:  Class 3- Severe systemic disease, definite functional limitations  Mallampati  :  Grade 3- soft palate visible, posterior pharyngeal wall not visible  Lungs:  Lungs clear with good breath sounds bilaterally  Heart:  Normal heart sounds and rate  History & Physical reviewed:  History and physical reviewed and no updates needed  Statement of review:  I have reviewed the lab findings, diagnostic data, medications, and the plan for sedation

## 2019-11-05 NOTE — TELEPHONE ENCOUNTER
[facility] called, stated patient is getting G-tube placed today but they do not have any orders for feeding.    Rn CC reviewed notes.  Plan was for Nutrition services to contact patient to assess for tube feeding needs and recommend formula.  Nutrition services did outreach to patient yesterday and left VM.   RN CC provided contact number to Oksana.  No further needs at this time.    Rosemarie Mann RN  Care Coordination  Phone:  [phone]  Email: [email or web address]  [facility] and [facility]

## 2019-11-05 NOTE — TELEPHONE ENCOUNTER
[facility] now requests orders and shares plan of care/discharge summaries for some patients through Private Practice.  Please REPLY TO THIS MESSAGE OR ROUTE BACK TO THE AUTHOR in order to give authorization for orders when needed.  This is considered a verbal order, you will still receive a faxed copy of orders for signature.  Thank you for your assistance in improving collaboration for our patients.    RECERTIFICATION ORDERS  SN  1 week 1, 2 week 2, 1 week 6, 3 as needed       To assess/educate on symptom/pain/med management, GT status, wound healing, anxiety, PICC line cares/patency protocol, and fall/ home safety precautions.    Marina Neves RN   [phone]  [email or web address]

## 2019-11-05 NOTE — TELEPHONE ENCOUNTER
Call to Marina, FRANKIEN, [phone]. She states she talked with Oksana and gave her information to set up appointment with Oncology Nutrition services. (Not sure if this is where the appt is at on 11/20)  She will follow up with Oksana. She states pt does eat a little and gets IVF as well.

## 2019-11-05 NOTE — TELEPHONE ENCOUNTER
[facility] calling back, was told patient cannot get in to see nutrition services until 11/20.  In mean time, home care is asking for orders to care for G tube and instruction on feeding.    Please advise.  Oksana would like return call today.    Rosemarie Mann RN  Care Coordination  Phone:  [phone]  Email: [email or web address]

## 2019-11-05 NOTE — PROGRESS NOTES
Consent obtained by Dr. Ervin.  14 Fr g-tube placed.  Tolerated well by pt.  Sedation given 25 mcg Fentanyl and 1 mg Versed.  Recovered one hour after.  Reviewed discharge instructions with pt and daughter.  Verbalized understanding and care of g-tube.  G-tube site had small amount of bloody drainage, dressing changed.  Questions answered.  Discharged via wc with daughter.

## 2019-11-08 PROBLEM — E46 MALNUTRITION (H): Status: ACTIVE | Noted: 2019-01-01

## 2019-11-08 NOTE — PLAN OF CARE
ROOM # 212-2    Living Situation (if not independent, order SW consult): Independent w/ daughter   Facility name:  : Oksana (daughter) [phone]    Activity level at baseline: Independent   Activity level on admit: SBA      Patient registered to observation; given Patient Bill of Rights; given the opportunity to ask questions about observation status and their plan of care.  Patient has been oriented to the observation room, bathroom and call light is in place.    Discussed discharge goals and expectations with patient/family.

## 2019-11-08 NOTE — ED TRIAGE NOTES
Per daughter; pt needs to be admitted to the hospital for malnourishment.  Pt had a G-tube placed on Tuesday.  Pt and daughter had an appointment today with [facility] to get tube feeding initiated, however the provider was concerned with patients appearance and would prefer pt be admitted for the initiation of the tube feeding so his labs can also be monitored.

## 2019-11-08 NOTE — ED NOTES
[facility]  ED Nurse Handoff Report    Jesús Stock is a 68 year old male   ED Chief complaint: Admission for tube feeding initiation  . ED Diagnosis:   Final diagnoses:   Failure to thrive in adult     Allergies:   Allergies   Allergen Reactions     Dust Mites      Mold      No Known Drug Allergies        Code Status: DNR / DNI listed previously Activity level - Baseline/Home:  Independent. Activity Level - Current:   SBA. Lift room needed: No. Bariatric: No   Needed: No   Isolation: No. Infection: Not Applicable.     Vital Signs:   Vitals:    11/08/19 1203   BP: 119/72   Resp: 16   Temp: 97.2  F (36.2  C)   TempSrc: Oral   SpO2: 98%       Cardiac Rhythm:  ,      Pain level:    Patient confused: No. Patient Falls Risk: Yes.   Elimination Status: Has voided   Patient Report - Initial Complaint: Admission for Tube Feed Initiation. Focused Assessment:    13:12 Neurological Cognitive - Cognitive/Neuro/Behavioral WDL: WDL  AB      13:10 Cardiac Cardiac - Cardiac WDL: -WDL except (Fatigue) AB     13:10 Gastrointestinal Gastrointestinal - GI WDL: -WDL except; all  Gastrointestinal Comment: Decreased/no appetite. Uses medical marajuana at home but hasn't improved. G tube in place, supposed to start feeds today. Recieving 1 bag of fluid at home daily.  AB     13:10 Respiratory Respiratory - Respiratory WDL: WDL  AB     13:10 Musculoskeletal Musculoskeletal - Musculoskeletal WDL: -WDL except  General Mobility: generalized weakness          Tests Performed: Labs. Abnormal Results:   Labs Ordered and Resulted from Time of ED Arrival Up to the Time of Departure from the ED   CBC WITH PLATELETS DIFFERENTIAL - Abnormal; Notable for the following components:       Result Value    WBC 3.2 (*)     RBC Count 3.22 (*)     Hemoglobin 10.1 (*)     Hematocrit 31.3 (*)     All other components within normal limits   BASIC METABOLIC PANEL - Abnormal; Notable for the following components:    Glucose 117 (*)      Urea Nitrogen 4 (*)     Calcium 8.2 (*)     All other components within normal limits   MAGNESIUM   PHOSPHORUS     .   Treatments provided: See MAR  Family Comments: Hasn't been eating. Dietician concerned for malnourishment. Gets IV fluids daily at home.  OBS brochure/video yes discussed/provided to patient:    ED Medications:   Medications   0.9% sodium chloride BOLUS (1,000 mLs Intravenous New Bag 11/8/19 1244)   ondansetron (ZOFRAN) injection 4 mg (4 mg Intravenous Given 11/8/19 1245)     Drips infusing:  No  For the majority of the shift, the patient's behavior Green. Interventions performed were NA.     Severe Sepsis OR Septic Shock Diagnosis Present: No      ED Nurse Name/Phone Number: Kendra Barr RN,   1:36 PM    RECEIVING UNIT ED HANDOFF REVIEW    Above ED Nurse Handoff Report was reviewed: Yes  Reviewed by: Mendy Gonzales RN on November 8, 2019 at 3:00 PM

## 2019-11-08 NOTE — CONSULTS
CLINICAL NUTRITION SERVICES  -  ASSESSMENT NOTE      RECOMMENDATIONS FOR MD/PROVIDER TO ORDER:     Electrolyte replacement protocol   Recommendations Ordered by Registered Dietitian (RD):     Peptamen 1.5 at 10 mL/hr via G tube - patient provided 1 L of formula in ED, do not advance past this rate 11/8 - will monitor in AM for ability to advance rate pending tolerance, electrolyte trends     Fluid flush of 60 mL q4 hours    Certavite, thiamine 100 mg    Electrolyte check in AM - high risk for refeeding syndrome   Future/Additional Recommendations:    Care coordinator/LSW consult to determine home coverage    Admit weight   Malnutrition:   % Weight Loss:> 5% in 1 month (severe malnutrition)  % Intake:</= 50% for >/= 1 month (severe malnutrition)  Subcutaneous Fat Loss: Suspect severe, unable to fully complete  Muscle Loss: Suspect severe, unable to fully complete  Fluid Retention:None noted    Malnutrition Diagnosis: Severe malnutrition  In Context of:  Chronic illness or disease     REASON FOR ASSESSMENT  Jesús Stock is a 68 year old male seen by Registered Dietitian for Provider Order - Registered Dietitian to Order TF per Medical Nutrition Therapy Guidelines    History of pancreatic cancer (diagnosed in 2019), type 2 diabetes no longer requiring insulin    NUTRITION HISTORY    Information obtained from patient and daughter - Dr. Geronimo called, patient still in the ED. Full diet history deferred - newly in ED, hospital MD awaiting to admit patient    Food allergies/intolerances: NKFA     Patient is on a regular diet at home with very minimal PO intake for >3 months. Taking 1-2 bites of solids for days and minimal to no intake of liquids    Living situation: with daughter, PCA support during the day    Was not able to see RD through oncology team until 11/20    Issues chewing or swallowing: denies    Marinol and trial of Medicinal marijuana from a dispensary hasn't helped    Occasional nausea and vomiting -  "weekly, still taking Zofran TID and daughter reports he can eat without vomiting, thinks the nausea \"is in his head\"    Daughter administers 1 L of IVF daily at home (1 L)      Admitted for enteral nutrition initiation, G tube placed 11/5    Limited info in chart: \"Decreased/no appetite. Uses medical marajuana at home but hasn't improved. G tube in place, supposed to start feeds today. Recieving 1 bag of fluid at home daily. \" \"Per daughter; pt needs to be admitted to the hospital for malnourishment.  Pt had a G-tube placed on Tuesday.  Pt and daughter had an appointment today with [facility] to get tube feeding initiated, however the provider was concerned with patients appearance and would prefer pt be admitted for the initiation of the tube feeding so his labs can also be monitored.\"    Per care everywhere 10/30: \"Patient has history of pancreatic cancer. Started on chemotherapy in August. Treatment stopped because of development of infection, abdominal abscess. Treated with antibiotic and IR drained. Now no fever, finished antibiotics. Has poor appetite, does not drink fluids, feel weak, has lost weight. Had had nausea, vomiting, seen in ED 10 days ago. Now feels better. Has aversion to food. No change in bowel habits. Uses a wheelchair, walker because of weakness.\"    CURRENT NUTRITION ORDERS    Diet: not yet entered, Juice at bedside    MD requesting TF initiation     NUTRITION FOCUSED PHYSICAL ASSESSMENT FOR DIAGNOSING MALNUTRITION + PHYSICAL FINDINGS  Completed and Observed:      Full nutrition focused physical exam deferred due to patient availability - visible and profound muscle atrophy and fat wasting on visual assessment only  Obtained from Chart/Interdisciplinary Team    Generalized weakness    Severe protein calorie malnutrition    ANTHROPOMETRICS  Height: 5'11\"  Weight: 61.2 kg (10/30/19)   There is no height or weight on file to calculate BMI.  Weight Status:  Deferred -  Need updated weight (18.7 " kg/m^2 using 61.2 kg)  Ideal body weight: 78.2 kg  Weight History:  6% weight loss in <1 months, as noted below  Wt Readings from Last 10 Encounters:   10/30/19 61.2 kg (135 lb)   10/14/19 66.8 kg (147 lb 3.2 oz)   10/11/19 65.7 kg (144 lb 14.4 oz)   10/10/19 65.7 kg (144 lb 14.4 oz)   10/09/19 65.7 kg (144 lb 14.4 oz)   10/03/19 66.1 kg (145 lb 11.2 oz)   09/30/19 60.3 kg (133 lb)   09/24/19 60.7 kg (133 lb 14.4 oz)   09/20/19 77.1 kg (170 lb)   09/15/19 80.2 kg (176 lb 11.2 oz)       ASSESSED NUTRITION NEEDS (PER APPROVED PRACTICE GUIDELINES, Dosing weight: 61 kg)  Estimated Energy Needs: 0715-1484 kcals (30-35 kcal per kg)  Justification: ?repletion   Estimated Protein Needs: >61 grams protein (>1 g pro/Kg)  Justification: Repletion  Estimated Fluid Needs: >1 mL/Kcal  Justification: maintenance    LABS  Labs reviewed    Electrolytes  Potassium (mmol/L)   Date Value   11/08/2019 3.6   10/20/2019 4.5   10/20/2019 4.2     Phosphorus (mg/dL)   Date Value   11/08/2019 3.0    Blood Glucose  Glucose (mg/dL)   Date Value   11/08/2019 117 (H)   10/20/2019 165 (H)   10/20/2019 200 (H)   10/14/2019 106 (H)   10/10/2019 110 (H)     Hemoglobin A1C (%)   Date Value   09/06/2019 7.7 (H)   06/03/2019 7.6 (H)   05/22/2019 8.5 (H)   03/13/2019 14.0 (H)    Inflammatory Markers  CRP Inflammation (mg/L)   Date Value   09/25/2019 7.6     WBC (10e9/L)   Date Value   11/08/2019 3.2 (L)   10/20/2019 6.3   10/09/2019 4.3     Albumin (g/dL)   Date Value   10/20/2019 3.9   10/10/2019 2.6 (L)   09/14/2019 1.7 (L)      Magnesium (mg/dL)   Date Value   11/08/2019 1.9   10/20/2019 1.8   09/11/2019 2.2     Sodium (mmol/L)   Date Value   11/08/2019 140   10/20/2019 132 (L)   10/20/2019 131 (L)    Renal  Urea Nitrogen (mg/dL)   Date Value   11/08/2019 4 (L)   10/20/2019 14   10/20/2019 15   10/14/2019 11   10/10/2019 11     Creatinine (mg/dL)   Date Value   11/08/2019 0.74   10/20/2019 0.84   10/20/2019 0.91   10/14/2019 0.90   10/10/2019 0.73      Additional  Triglycerides (mg/dL)   Date Value   03/13/2019 217 (H)   08/28/2017 111   04/20/2011 112     Ketones Urine (mg/dL)   Date Value   09/08/2019 Negative          MEDICATIONS    Medications reviewed    Newly admitted    PROCEDURES WITH NUTRITIONAL IMPLICATIONS  11/5: 14 [place] gastrostomy tube placed    NUTRITION DIAGNOSIS:  Inadequate oral intake related to poor appetite as evidenced by intake of < 50% of estimated needs for >1 month, 6% weight loss in <1 month, visible fat and muscle wasting    INTERVENTIONS  Recommendations / Nutrition Prescription  Diet advancement per MD discretion    Cautious initiation of enteral nutrition:    Type of Feeding Tube: G tube (11/5)    Enteral Frequency:  Continuous    Enteral Regimen: Peptamen 1.5 at 10mL/hr    Total Enteral Provisions: 240 mL provides 360 kcal, 16 g protein, 45 g CHO, 0 g fiber and 185 mL free water.    Free Water Flush: standard 60 mL q4 hours    Certavite (liquid MVI) + thiamine 100 mg given refeeding syndrome risk  Goal rate pending tolerance, electrolyte stabilization and complete nutrition assessment  Coverage at discharge needs to be established    Implementation  Nutrition education: briefly reviewed EN initiation and slow advancement given concerns for high risk of refeeding syndrome, potential for intolerance   EN Composition, EN Schedule and Feeding Tube Flush: Entered orders to reflect regimen outlined above  Multivitamin/Minerals: Certavite, thiamine 100 mg  Biochemical data: electrolyte check in AM  Collaboration and Referral of care: Discussed patient with Dr. Geronimo on phone, admitting provider re: electrolyte replacement protocols    Goals  Daily electrolyte checks WNL  TF to reach goal rate within 48 hours      MONITORING AND EVALUATION:  Progress towards goals will be monitored and evaluated per protocol and Practice Guidelines including complete nutrition focused physical exam      Erna Osborne, ERIK, LD, CNSC  Pager - 3rd  floor/ICU: [phone]  Pager - All other floors: [phone]  Pager - Weekend/holiday: [phone]  Office: [phone]

## 2019-11-08 NOTE — ED TRIAGE NOTES
Per patient, his daughter is bringing him here to have a feeding tube placed because he's not eating well.

## 2019-11-08 NOTE — ED PROVIDER NOTES
History     Chief Complaint:  Admission for tube feeding initiation    HPI   Jesús Stock is a 68 year old male who presents with his daughter requesting admission for tube feeding initiation. The patient was diagnosed in pancreatic cancer in June, initially misdiagnosed as diabetes, and received his first treatment on August 29. He was admitted in September for failure to thrive. He was later admitted due to a bacterial infection from his port with a liver abscess. Over the past few weeks, his daughter reports continued and worsening lack of appetite and low fluid intake. They have tried medical marijuana in attempt to increased the appetite and the patient is given a bag of fluids daily. A feeding tube was placed 3 days ago. The patient and his daughter visited a nutritionist at [facility], where they were in contact with the patient's oncologist Dr. Farrell, and recommended admission for tube feeding. Here in the Emergency Department, the patient states that he is tired, though comfortable with some persisting soreness from his GI tube placement. He denies nausea, abdominal pain, or chest pain.    Allergies:  Dust Mites  Mold  No Known Drug Allergies    Medications:    Novolog  Ativan  medical cannabis   morphine  ondansetron  Oxycodone  Miralax  sildenafil   simethicone   Triprolidine-Pseudoephedrine     Past Medical History:    osteoarthritis  Protein-calorie malnutrition  Failure to thrive in adult  Malignant neoplasm of head of pancreas  Type 2 diabetes   obesity  Diverticulosis  Bipolar 1 disorder  Depression    Past Surgical History:    Appendectomy  Colonoscopy x 2   Cholangiopancreatography x 2  Combined EGD  Hernia repair x 3   Chest port placement and removal  Tonsillectomy    Family History:    Father - diabetes  Mother - hypertension     Social History:  Tobacco use: negative, passive smoke exposure  Alcohol use: currently negative. History of dependence, remission since 1984  Marital Status:   Single [1]    Review of Systems   Constitutional: Positive for appetite change and fatigue.   Cardiovascular: Negative for chest pain.   Gastrointestinal: Negative for abdominal pain and nausea.   All other systems reviewed and are negative.      Physical Exam     Patient Vitals for the past 24 hrs:   BP Temp Temp src Heart Rate Resp SpO2   11/08/19 1203 119/72 97.2  F (36.2  C) Oral 78 16 98 %        Physical Exam   General: Patient is awake, alert and interactive when I enter the room.  Chronically ill cachectic appearing.  Head: Temporal wasting   Eyes: The pupils are equal, round, and reactive to light. Conjunctivae and sclerae are normal  ENT: External acoustic canals are normal. The oropharynx is normal without erythema. Uvula is in the midline. Mucus membranes appear dry.   Neck: Normal range of motion. No anterior cervical lymphadenopathy noted  CV: Regular rate. S1/S2. No murmurs.   Resp: Lungs are clear without wheezes or rales. No respiratory distress.   GI: Abdomen is soft, no rigidity, guarding, or rebound. No distension. No tenderness to palpation in any quadrant.  G-tube present in the left upper quadrant.  Clean dry and intact.   MS: Normal tone. Joints grossly normal without effusions. No asymmetric leg swelling, calf or thigh tenderness.    Skin: No rash or lesions noted. Normal capillary refill noted  Neuro: Speech is normal and fluent. Face is symmetric. Moving all extremities.   Psych:  Normal affect.  Appropriate interactions.      Emergency Department Course     Laboratory:  Laboratory findings were communicated with the patient who voiced understanding of the findings.    CBC: WBC 3.2 (L), HGB 10.1 (L),    BMP: glucose 117 (H), BUN 4 (L), calcium 8.2 (L) o/w WNL (Creatinine 0.74)    Magnesium: 1.9  Phosphorus: 3.0    Interventions:  1244 NS 1 L IV  1245 Zofran 4 mg IV    Emergency Department Course:  Past medical records, nursing notes, and vitals reviewed.    1158: I performed an exam  of the patient as documented above.     IV was inserted and blood was drawn for laboratory testing, results above.    1352: I consulted with Dr. Gonzales of the hospitalist services. They were in agreement to accept the patient for admission.    Findings and plan explained to the Patient who consents to admission. Discussed the patient with Dr. Gonzales, who will admit the patient to a an obs bed for further monitoring, evaluation, and treatment.    1356: Nutritionist consulted to ensure tube feeding initiation before the weekend admission.     Impression & Plan     Medical Decision Making:  Jesús Stock is a 68 year old male who presents to the emergency department today with failure to thrive.  Patient was sent here from his oncology clinic for initiation of tube feeds due to poor oral intake and failure to thrive.  Electrolytes were checked and were within normal limits.  Patient is nontoxic-appearing with reassuring abdominal exam.  I do not feel any advanced imaging was required here today.  Patient will be admitted for further evaluation and treatment.  I did discuss the patient with the nutritionist who will start the tube feedings hopefully prior to his admission.  Will need to be monitored for refeeding syndrome.  Discussed the plan with the patient and his daughter who are amenable at this time.  All questions were answered and patient be admitted in stable condition.      Discharge Diagnosis:    ICD-10-CM    1. Failure to thrive in adult R62.7        Disposition:  Admitted to obs     Scribe Disclosure:  I, Mrai Sterling, am serving as a scribe at 12:27 PM on 11/8/2019 to document services personally performed by Devin Geronimo based on my observations and the provider's statements to me.      Devin Geronimo MD  11/08/19 3710

## 2019-11-08 NOTE — H&P
[facility]    History and Physical - Hospitalist Service       Date of Admission:  11/8/2019    Assessment & Plan   Jesús Stock is a 68 year old male admitted on 11/8/2019 for malnutrition, initiation of tube feeding with monitoring for risk of re-feeding syndrome.    Severe protein-calorie malnutrition  --G-J tube placed by IR 11/5/2019 without issues as outpatient  --consult nutrition for tube feed recommendations- orders placed by dietitian, recommend monitoring likely at least until 11/10.  --daily BMP, Mg, Phos given high risk of re-feeding; electrolyte replacement protocols ordered    Pancreatic cancer, on chemotherapy  --follows with [facility] - Dr. Farrell  --chronic pain managed with MS Contin, PRN oxycodone  --PRN lorazepam for anxiety  --PRN ondansetron for nausea  --miralax daily     Diabetes mellitus type 2, uncontrolled  --not recently on insulin due to weight loss, poor PO intake  --glucose checks q4H, low dose sliding scale insulin, evaluate for possible long-acting insulin initiation pending glucose levels     Previous liver abscess: continue Bactrim DS BID (daughter reports has 4 more days of antibiotics).  Last note by Dr. Crocker 10/8/2019 suggests one month of Bactrim from that date     Diet: Regular, TF  DVT Prophylaxis: enoxaparin SQ  Huggins Catheter: not present  Code Status: DNR/DNI, discussed with patient    Disposition Plan   Expected discharge: 2 - 3 days, recommended to prior living arrangement once tolerating tube feeds.  Entered: Fabi Quintero PA-C 11/08/2019, 1:58 PM       Fabi Quintero PA-C  [facility]    ______________________________________________________________________    Chief Complaint   malnutrition    History is obtained from the patient    History of Present Illness   Jesús Stock is a 68 year old male with history of diabetes and pancreatic cancer admitted for initiation of tube feeds for severe malnutrition.  Mr. Stock  reports poor appetite, tolerating only bites of food.  Has some nausea but feels his appetite is the largest hindrance to eating.  Had a G-J tube placed earlier this week without complications; report mild soreness at insertion site, otherwise no abdominal pain. No vomiting.  Having bowel movements daily.  No cough or fevers.      Review of Systems    The 10 point Review of Systems is negative other than noted in the HPI or here.     Past Medical History    I have reviewed this patient's medical history and updated it with pertinent information if needed.   Past Medical History:   Diagnosis Date     Alcohol dependence in remission sober since 1984 (H)     sober since 8/25/84     Backache, unspecified      Colon polyps 5/24/11    2 colon polyps removed at colonoscopy     Diverticulosis of colon 5/24/11    mild pan-colonic diverticulosis seen as incidental finding on colonoscopy     Major depressive disorder, single episode, mild (H)      Obesity (BMI 30.0-34.9) 08/28/2017    BMI 31.4     Osteoarthrosis, unspecified whether generalized or localized, unspecified site      Pancreatic cancer (H)      Type 2 diabetes mellitus without complication, with long-term current use of insulin (H) 03/13/2019    glucose 395       Past Surgical History   I have reviewed this patient's surgical history and updated it with pertinent information if needed.  Past Surgical History:   Procedure Laterality Date     APPENDECTOMY       COLONOSCOPY  5/24/11    2 polyps, mild pan-colonic diverticulosis     COLONOSCOPY N/A 9/7/2017    Procedure: COMBINED COLONOSCOPY, SINGLE OR MULTIPLE BIOPSY/POLYPECTOMY BY BIOPSY;;  Surgeon: Gene Grimes MD;  Location:  GI     ENDOSCOPIC RETROGRADE CHOLANGIOPANCREATOGRAM N/A 6/5/2019    Procedure: ENDOSCOPIC RETROGRADE CHOLANGIOPANCREATOGRAPHY, ENDOSCOPIC UTRASOUND ESOPHAGOSCOPY WITH STENT PLACEMENT. ;  Surgeon: Jana Nelson MD;  Location:  OR     ENDOSCOPIC RETROGRADE  CHOLANGIOPANCREATOGRAPHY, EXCHANGE TUBE/STENT N/A 7/18/2019    Procedure: ENDOSCOPIC RETROGRADE CHOLANGIOPANCREATOGRAPHY, WITH STENT EXCHANGE;  Surgeon: Jana Nelson MD;  Location: [facility] OR     ESOPHAGOSCOPY, GASTROSCOPY, DUODENOSCOPY (EGD), COMBINED N/A 6/5/2019    Procedure: Esophagoscopy, gastroscopy, duodenoscopy (EGD), combined, pancreatic needle biopsies;  Surgeon: Jana Nelson MD;  Location: [facility] OR     HERNIA REPAIR      X3     IR CHEST PORT PLACEMENT > 5 YRS OF AGE  8/28/2019     IR FOLLOW UP VISIT OUTPATIENT  10/11/2019     IR PORT REMOVAL RIGHT  9/11/2019     TONSILLECTOMY         Social History   I have reviewed this patient's social history and updated it with pertinent information if needed.  Social History     Tobacco Use     Smoking status: Passive Smoke Exposure - Never Smoker     Smokeless tobacco: Never Used     Tobacco comment: occasional   Substance Use Topics     Alcohol use: No     Comment: sober x 27 years     Drug use: No       Family History   I have reviewed this patient's family history and updated it with pertinent information if needed.   Family History   Problem Relation Age of Onset     Diabetes Father         b:1926     Hypertension Mother         b:1925     Family History Negative Brother         b:1948     Family History Negative Brother         b:1951  deferred tremor     Family History Negative Sister         b:1953     Family History Negative Brother         b:1956     Family History Negative Brother         b:1958       Prior to Admission Medications   Prior to Admission Medications   Prescriptions Last Dose Informant Patient Reported? Taking?   LORazepam (ATIVAN) 0.5 MG tablet   Yes No   Sig: Take 0.5 mg by mouth every 6 hours as needed for anxiety (As needed)   Triprolidine-Pseudoephedrine 2.5-60 MG TABS  Daughter Yes No   Sig: Take 0.5 tablets by mouth every 6 hours as needed (allergies)   acetaminophen (TYLENOL) 500 MG tablet  Daughter Yes No   Sig:  Take 1-2 tablets by mouth every 6 hours as needed for pain.   insulin aspart (NOVOLOG VIAL) 100 UNITS/ML vial   Yes No   Sig: Inject Subcutaneous 3 times daily (with meals) Inject as per sliding scale: if 150 - 200 = 2 unit; 201 - 250 = 3 units;  251 - 300 = 4 units; 301 - 350 = 5 units; 351 - 400 = 6 units;  401+ = 7 units   medical cannabis (Patient's own supply)   Yes No   Sig: daily (The purpose of this order is to document that the patient reports taking medical cannabis.  This is not a prescription, and is not used to certify that the patient has a qualifying medical condition.)   morphine (MS CONTIN) 15 MG CR tablet   No No   Sig: Take 1 tablet (15 mg) by mouth every 12 hours   ondansetron (ZOFRAN) 4 MG tablet   Yes No   Sig: Take 4 mg by mouth 3 times daily And every 6 hours as needed   oxyCODONE (ROXICODONE) 5 MG tablet   No No   Sig: Take 1 tablet (5 mg) by mouth every 6 hours as needed for moderate to severe pain   polyethylene glycol (MIRALAX/GLYCOLAX) powder  Daughter Yes No   Sig: Take 17 g by mouth daily    sildenafil (REVATIO) 20 MG tablet  Daughter Yes No   Sig: Take 20 mg by mouth as needed Hold while at U   simethicone (MYLICON) 125 MG chewable tablet  Daughter Yes No   Sig: Take 125 mg by mouth 2 times daily as needed (abdominal pain)   sulfamethoxazole-trimethoprim (BACTRIM DS/SEPTRA DS) 800-160 MG tablet   Yes No   Sig: Take 1 tablet by mouth 2 times daily      Facility-Administered Medications: None     Allergies   Allergies   Allergen Reactions     Dust Mites      Mold      No Known Drug Allergies        Physical Exam   Vital Signs: Temp: 97.2  F (36.2  C) Temp src: Oral BP: 119/72   Heart Rate: 78 Resp: 16 SpO2: 98 % O2 Device: None (Room air)    Weight: 0 lbs 0 oz    Constitutional: frail, cachectic appearing man sitting up in bed.   Eyes: no icterus  HEENT: mucous membranes dry  Respiratory: clear bilaterally  Cardiovascular: RRR  GI: normoactive bowel sounds, soft, nontender, G-J tube  in place, dressings intact around insertion site without drainage and no surrounding erythema  Lymph/Hematologic: no bruising apparent  Genitourinary: no catheter  Skin: warm and dry  Musculoskeletal: decreased muscle bulk  Neurologic: nonfocal  Psychiatric: alert, oriented, joking        Data   Data reviewed today: I reviewed all medications, new labs and imaging results over the last 24 hours. I personally reviewed no images or EKG's today.    Recent Labs   Lab 11/08/19  1241   WBC 3.2*   HGB 10.1*   MCV 97         POTASSIUM 3.6   CHLORIDE 106   CO2 27   BUN 4*   CR 0.74   ANIONGAP 7   MATTHEW 8.2*   *

## 2019-11-09 NOTE — PROGRESS NOTES
[facility]    Medicine Progress Note - Hospitalist Service       Date of Admission:  11/8/2019  Assessment & Plan     Jesús Stock is a 68 year old male admitted on 11/8/2019 for malnutrition, initiation of tube feeding with monitoring for risk of re-feeding syndrome. Patient with pancreatic cancer, dx 6/2019, started chemotherapy 8/2019. Has 80+lb weight loss over the past year, with loss of 30+lb since 9/2019 after diagnosis of liver abscess. Due to malnutrition, patient underwent GJ-tube placement 11/5/2019.      Severe protein-calorie malnutrition  --G-J tube placed by IR 11/5/2019 without issues as outpatient  --Nutrition following for tube feed recommendations - recommend monitoring through at least 11/10/2019  -- SWS following to verify coverage for tube feeds as OP; patient still taking PO medications no evidence of dysphagia. Lack appetite 2/2 chemotherapy.  -- Follow daily BMP, Mg, Phos given high risk of re-feeding; electrolyte replacement protocols ordered  -- PICC line pulled, was in place for IV fluids at home, no longer needed as receiving fluids through G-J tube.     Pancreatic cancer, on chemotherapy  --follows with [facility] - Dr. Farrell  --chronic pain managed with MS Contin, PRN oxycodone  --PRN lorazepam for anxiety  --PRN ondansetron for nausea  --miralax daily      Diabetes mellitus type 2, uncontrolled  --not recently on insulin due to weight loss, poor PO intake  --glucose checks q4H, low dose sliding scale insulin, evaluate for possible long-acting insulin initiation pending glucose levels     Previous liver abscess: continue Bactrim DS BID (daughter reports has 4 more days of antibiotics, end date 11/11/2019.    Living situation issue: patient has been living in daughter's basement. Kitchen and bathroom on separate level. Daughter not sure she is able to continue cares for patient at home, but he has refused NH in the past. Patient agitated after daughter's visit  "today, states he wants restraining order against both his daughters and doesn't want them involved in his care. Patient weak but able to ambulate independently in his room, so no indication for PT consult at this time. SWS following, unclear disposition at this time pending clinical course.     Diet: Regular Diet Adult  Adult Formula Drip Feeding: Continuous Peptamen 1.5; Gastrostomy; Goal Rate: 25; mL/hr; Medication - Feeding Tube Flush Frequency: At least 15-30 mL water before and after medication administration and with tube clogging; Increase now to 25 ml/hr    DVT Prophylaxis: Enoxaparin (Lovenox) SQ  Huggins Catheter: not present  Code Status: DNR/DNI      Disposition Plan   Expected discharge: 2 - 3 days, recommended to unclear at this time once safe disposition plan/ TCU bed available and tolerating tube feeds.  Entered: Rachel Howard PA-C 11/09/2019, 11:40 AM       The patient's care was discussed with the Patient.    Rachel Howard PA-C  Hospitalist Service  [facility]    ______________________________________________________________________    Interval History   Patient continues to feel weak, somewhat confused but states this is normal since his liver abscess. Continues to have abd pain (baseline per patient). No appetite, reports odd taste with most foods and medications. Requests pill form of Bactrim as liquid \"tastes terrible.\"     Data reviewed today: I reviewed all medications, new labs and imaging results over the last 24 hours. I personally reviewed no images or EKG's today.    Physical Exam   Vital Signs: Temp: 97.4  F (36.3  C) Temp src: Oral BP: 124/63 Pulse: 66 Heart Rate: 76 Resp: 16 SpO2: 96 % O2 Device: None (Room air)    Weight: 142 lbs 0 oz  GENERAL: appears chronically ill, no acute distress  PSYCH: pleasant, cooperative  EYES: no discharge, no injection  HENT:  Normocephalic. Moist mucus membranes.  NECK:  Supple, symmetric  ABDOMEN:  Soft, non-tender, " non-distended. G-J tube in place, patent.  EXTREMITIES:  No gross deformities, moves all 4 limbs spontaneously, muscle wasting.  SKIN:  Warm and dry, no rash or suspicious lesions    NEUROLOGIC: alert, sensation grossly intact.    Data   Recent Labs   Lab 11/09/19  0615 11/08/19  1241   WBC  --  3.2*   HGB  --  10.1*   MCV  --  97   PLT  --  176    140   POTASSIUM 3.7 3.6   CHLORIDE 106 106   CO2 27 27   BUN 2* 4*   CR 0.71 0.74   ANIONGAP 6 7   MATTHEW 8.2* 8.2*   * 117*

## 2019-11-09 NOTE — PHARMACY-ADMISSION MEDICATION HISTORY
Admission medication history interview status for this patient is complete. See [facility] admission navigator for allergy information, prior to admission medications and immunization status.     Medication history interview source(s):Patient  Medication history resources (including written lists, pill bottles, clinic record):None  Primary pharmacy:-    Changes made to PTA medication list:  Added: -  Deleted: -  Changed: ativan to bid and prn , zofran to bid and prn    Actions taken by pharmacist (provider contacted, etc):Called daughter Oksana to confirm meds     Additional medication history information:None    Medication reconciliation/reorder completed by provider prior to medication history?  Yes     Do you take OTC medications (eg tylenol, ibuprofen, fish oil, eye/ear drops, etc)? No     For patients on insulin therapy: Yes  Lantus/levemir/NPH/toujeo/tresiba/Mix 70/30 dose:  No  Sliding scale Novolog: Yes but has not used for a while per daughter, BG have been < 120  If Yes, do you have a baseline novolog pre-meal dose:  0  units with meals  Patients eat three meals a day: No, poor appetite FT has been placed  How many episodes of hypoglycemia do you have per week: unknown  How many missed doses do you have per week: unknown  How many times do you check your blood glucose per day:  daily  Do you have a Continuous glucose monitor (CGM) : No (remind pt that not approved for hospital use)  Any Barriers to therapy - Be specific :  No  (cost of medications, comfortable with giving injections (if applicable), comfortable and confident with current diabetes regimen)      Prior to Admission medications    Medication Sig Last Dose Taking? Auth Provider   insulin aspart (NOVOLOG VIAL) 100 UNITS/ML vial Inject Subcutaneous 3 times daily (with meals) Inject as per sliding scale: if 150 - 200 = 2 unit; 201 - 250 = 3 units;  251 - 300 = 4 units; 301 - 350 = 5 units; 351 - 400 = 6 units;  401+ = 7 units Past Month at Unknown time  Yes Reported, Patient   LORazepam (ATIVAN) 0.5 MG tablet Take 0.5 mg by mouth 2 times daily And PRN 11/8/2019 at x1 Yes Reported, Patient   medical cannabis (Patient's own supply) daily (The purpose of this order is to document that the patient reports taking medical cannabis.  This is not a prescription, and is not used to certify that the patient has a qualifying medical condition.) 11/7/2019 at Unknown time Yes Reported, Patient   morphine (MS CONTIN) 15 MG CR tablet Take 1 tablet (15 mg) by mouth every 12 hours 11/8/2019 at x1 Yes Chirag Robert MD   ondansetron (ZOFRAN) 4 MG tablet Take 4 mg by mouth 2 times daily And every 6 hours as needed 11/8/2019 at x1 Yes Reported, Patient   oxyCODONE (ROXICODONE) 5 MG tablet Take 1 tablet (5 mg) by mouth every 6 hours as needed for moderate to severe pain  Yes Chirag Robert MD   polyethylene glycol (MIRALAX/GLYCOLAX) powder Take 17 g by mouth daily  11/8/2019 at Unknown time Yes Reported, Patient   sulfamethoxazole-trimethoprim (BACTRIM DS/SEPTRA DS) 800-160 MG tablet Take 1 tablet by mouth 2 times daily 11/8/2019 at x1 Yes Reported, Patient   Triprolidine-Pseudoephedrine 2.5-60 MG TABS Take 0.5 tablets by mouth every 6 hours as needed (allergies)  Yes Unknown, Entered By History   acetaminophen (TYLENOL) 500 MG tablet Take 1-2 tablets by mouth every 6 hours as needed for pain.   Tan Jaramillo MD   sildenafil (REVATIO) 20 MG tablet Take 20 mg by mouth as needed Hold while at [facility]   Reported, Patient   simethicone (MYLICON) 125 MG chewable tablet Take 125 mg by mouth 2 times daily as needed (abdominal pain)   Unknown, Entered By History

## 2019-11-09 NOTE — PLAN OF CARE
PRIMARY DIAGNOSIS: MALNUTRITION  OUTPATIENT/OBSERVATION GOALS TO BE MET BEFORE DISCHARGE:  ADLs back to baseline: Yes    Activity and level of assistance: Up with standby assistance.    Pain status: Pain free.    Return to near baseline physical activity: Yes     Discharge Planner Nurse   Safe discharge environment identified: Yes  Barriers to discharge: Yes- tolerate tube feeding.         Pt is tolerating tube feeding 10 ml/ Hr not acute events.     Entered by: Alban Portillo 11/09/2019 2:07 AM     Please review provider order for any additional goals.   Nurse to notify provider when observation goals have been met and patient is ready for discharge.

## 2019-11-09 NOTE — PLAN OF CARE
"PRIMARY DIAGNOSIS: MALNUTRITION  OUTPATIENT/OBSERVATION GOALS TO BE MET BEFORE DISCHARGE:  ADLs back to baseline: No    Activity and level of assistance: Up with standby assistance.    Pain status: Improved with use of alternative comfort measures i.e.: positioning    Return to near baseline physical activity: Yes     Discharge Planner Nurse   Safe discharge environment identified: Yes  Barriers to discharge: Yes       Entered by: Martine Nagel 11/08/2019 4:00 PM  /61 (BP Location: Left arm)   Temp 97.2  F (36.2  C) (Oral)   Resp 16   Ht 1.803 m (5' 11\")   Wt 62.8 kg (138 lb 8 oz)   SpO2 97%   BMI 19.32 kg/m      Alert to self and place. Up w/ SBA and gait belt. RA. PICC dressing changed today, some yellow drainage noted as well as redness/erythema to surrounding area. PA notified. G-tube in place, WDL. Tube feeding initiated, patient tolerating well. Continues Bactrim. Will continue to monitor.     Please review provider order for any additional goals.   Nurse to notify provider when observation goals have been met and patient is ready for discharge.  "

## 2019-11-09 NOTE — PLAN OF CARE
PRIMARY DIAGNOSIS: Malnutrition     OUTPATIENT/OBSERVATION GOALS TO BE MET BEFORE DISCHARGE  1. Orthostatic performed: NA    2. Tolerating PO medications: Yes    3. Return to near baseline physical activity: No    4. Cleared for discharge by consultants (if involved): No    Patient is alert and oriented but forgetful at times. VSS. Complains of 5 out of 10 pain at Gtube site. Feeding increased from 10 mL/hr to 25 mL/hr. Q4 BS checks. PICC to be removed this AM. Tolerating medications orally. SBA in room. SW and Pharm consulted. Patient lives at home with daughter and has a home care nurse and home PT, but states he has been unable to complete PT due to weakness lately.     Discharge Planner Nurse   Safe discharge environment identified: Yes  Barriers to discharge: No       Entered by: Ebony aSndoval 11/09/2019 8:44 AM     Please review provider order for any additional goals.   Nurse to notify provider when observation goals have been met and patient is ready for discharge.

## 2019-11-09 NOTE — PROGRESS NOTES
NUTRITION BRIEF NOTE  See RD note 11/8 for full assessment details    Interventions:  Goal rate pending electrolytes and tolerance:   Type of Feeding Tube: G tube  Enteral Frequency:  Continuous  Enteral Regimen: Peptamen 1.5 at 55 mL/hr  Total Enteral Provisions: 1320 mL daily to provide 1980 kcals, 90 g protein, 1016 ml free H2O, 74 g fat (70% from MCTs), 248 g CHO and no fiber daily. Meets >100% of DRI's  Free Water Flush: 60 mL q2 hours (may need to increase further based on PO intake)  Increase Peptamen 1.5 to 25 mL/hr now and keep at this rate until phos >/=2.4 on recheck (scheduled for 1400 today), then ok to advance by 10 mL q8 hours.   Daily electrolyte check. Continue thiamine 100 mg, Certavite  Home coverage and discharge plan will impact discharge goal rate (pump coverage vs bolus, TCU vs home)        Care coordinator/LSW for home coverage check     Collaboration and Referral of care: Discussed patient with bedside RN, MAK; called PharmD re: MAR to reflect administration for oral or feeding tube (for liquid options)    Nutrition education: family not present (see documentation from other providers for family dynamics) and patient soundly sleeping    New findings in last 24 hours:    Diet order: regular    Tolerated initiation of trophic feeds via G tube: peptamen 1.5 at 10 mL/hr, fluid flush of 60 mL q4 hours    BM: none documented    Meds: reviewed    Labs:     Electrolytes  Potassium (mmol/L)   Date Value   11/09/2019 3.7   11/08/2019 3.6   10/20/2019 4.5     Phosphorus (mg/dL)   Date Value   11/09/2019 2.6   11/08/2019 3.0    Blood Glucose  Glucose (mg/dL)   Date Value   11/09/2019 114 (H)   11/08/2019 117 (H)   10/20/2019 165 (H)   10/20/2019 200 (H)   10/14/2019 106 (H)     Hemoglobin A1C (%)   Date Value   09/06/2019 7.7 (H)   06/03/2019 7.6 (H)   05/22/2019 8.5 (H)   03/13/2019 14.0 (H)    Inflammatory Markers  CRP Inflammation (mg/L)   Date Value   09/25/2019 7.6     WBC (10e9/L)   Date Value    11/08/2019 3.2 (L)   10/20/2019 6.3   10/09/2019 4.3     Albumin (g/dL)   Date Value   10/20/2019 3.9   10/10/2019 2.6 (L)   09/14/2019 1.7 (L)        Updated nutrition focused physical exam:   Fat wasting:    Orbital region and surrounding area - hollow look    Upper and lower arm region - fingers touch    Thoracic and lumbar region - depression between ribs very apparent, iliac crest not observed  Muscle wasting:    Temple - severe deperession    Clavicle and acromion bone region: deltoid muscle - shoulder to arm joint look square, clavicle protruding    Scapular bone region - prominent and visible bones    Dorsal hand / Interosseous Muscle - depressed area between thumb and forefinger    Patellar/quadriceps region - obviously thin, protruding bone    Anterior thigh region - depression/line on thigh    Gastrocnemius/Posterior Calf region - thin with minimal muscle definition    Gross atrophy     ASSESSED NUTRITION NEEDS (PER APPROVED PRACTICE GUIDELINES, Dosing weight: 61 kg)  Estimated Energy Needs: 1000-5917+ kcals (30-35+ kcal per kg)  Justification: repletion   Estimated Protein Needs:  grams protein (1.5-2 g pro/Kg)  Justification: repletion  Estimated Fluid Needs: >1 mL/Kcal  Justification: maintenance    Malnutrition:   % Weight Loss:> 5% in 1 month (severe malnutrition)  % Intake:</= 50% for >/= 1 month (severe malnutrition)  Subcutaneous Fat Loss: Severe, as outlined above  Muscle Loss:  Severe, as outlined above  Fluid Retention:None noted     Malnutrition Diagnosis: Severe malnutrition  In Context of:  Chronic illness or disease      Please page/consult as needed.      Erna Osborne RDN, LD, CNSC  Pager - 3rd floor/ICU: [phone]  Pager - All other floors: [phone]  Pager - Weekend/holiday: [phone]  Office: [phone]

## 2019-11-09 NOTE — PLAN OF CARE
"PRIMARY DIAGNOSIS: Malnutrition     OUTPATIENT/OBSERVATION GOALS TO BE MET BEFORE DISCHARGE  1. Orthostatic performed: NA    2. Tolerating PO medications: Yes    3. Return to near baseline physical activity: No    4. Cleared for discharge by consultants (if involved): No    Patient is alert and oriented but forgetful at times. VSS. Complains of 5 out of 10 pain at Gtube site. Feeding increased from 10 mL/hr to 25 mL/hr. Complains of some Nausea but denies medications. Q4 BS checks. PICC removed this AM, sent down for cultures. No IV access, PA aware. Tolerating medications orally. SBA in room. SW and Pharm consulted. Patient lives at home with daughter and has a home care nurse and home PT, but states he has been unable to complete PT due to weakness lately. Daughters visited and patient became irritable and upset. Daughters left. Patient very upset with daughters and threatening to \"Cut them off\" from his care plans. Will continue to monitor.     Discharge Planner Nurse   Safe discharge environment identified: Yes  Barriers to discharge: No       Entered by: Ebony Sandoval 11/09/2019 1:02 PM     Please review provider order for any additional goals.   Nurse to notify provider when observation goals have been met and patient is ready for discharge.  "

## 2019-11-09 NOTE — PLAN OF CARE
"PRIMARY DIAGNOSIS: MALNUTRITION  OUTPATIENT/OBSERVATION GOALS TO BE MET BEFORE DISCHARGE:  1. ADLs back to baseline: Yes    2. Activity and level of assistance: Up with standby assistance.    3. Pain status: Pain free.    4. Return to near baseline physical activity: Yes     Discharge Planner Nurse   Safe discharge environment identified: Yes  Barriers to discharge: Yes       Entered by: Martine Nagel 11/08/2019 8:00 PM     BP 97/46 (BP Location: Left arm)   Temp 97.7  F (36.5  C) (Oral)   Resp 16   Ht 1.803 m (5' 11\")   Wt 62.8 kg (138 lb 8 oz)   SpO2 98%   BMI 19.32 kg/m      Alert to self and place. Intermittent confusion at times. Up w/ SBA and gait belt. RA. PICC dressing changed today, some yellow drainage noted as well as redness/erythema to surrounding area -PA notified.  Blood returned noted. Currently not infusing. G-tube in place, WDL. Tube feeding initiated, patient tolerating well. Blood glucose checks Q4H. Continues Bactrim. Scheduled Morphine for pain. Oxycodone prn. patient denied pain when offered. Patient is on Potassium (3.6), Phosphorus (3.0) and Magnesium (1.9) protocols. Daily weights. Will continue to monitor.      Please review provider order for any additional goals.   Nurse to notify provider when observation goals have been met and patient is ready for discharge.  "

## 2019-11-09 NOTE — PROGRESS NOTES
Discharge Planner   Discharge Plans in progress: CTS identifies potential need for Tube Feeding at discharge. A request was sent to [facility] for benefit check.   Barriers to discharge plan: awaiting response from [facility].   Follow up plan: CTS will continue to follow for discharge planning.        Entered by: Reyes Rojas 11/09/2019 9:31 AM        Update from [facility] regarding benefit:   Patient will meet Medicare s criteria for Enteral TF meaning there will be coverage for Enteral Therapy.   Coverage will be 80% and the patient will be responsible for the 20% Co-insurance.   For the Drug there will be a copay per drug dispense.     Due to the weekend, [facility] unable to give an update on the accumulations or OOP/Deductible till Monday.    CTS will continue to follow along for discharge planning.

## 2019-11-09 NOTE — PHARMACY-CONSULT NOTE
Pharmacy Tube Feeding Consult    Medication reviewed for administration by feeding tube and for potential food/drug interactions.    Recommendation: Recommend changing the following medications to a liquid dosage form: Acetaminophen, sulfamethoxazole-trimethoprim    Pharmacy will continue to follow as new medications are ordered.

## 2019-11-09 NOTE — PLAN OF CARE
PRIMARY DIAGNOSIS: MALNUTRITION  OUTPATIENT/OBSERVATION GOALS TO BE MET BEFORE DISCHARGE:  ADLs back to baseline: Yes     Activity and level of assistance: Up with standby assistance.     Pain status: Pain free.     Return to near baseline physical activity: Yes          Discharge Planner Nurse   Safe discharge environment identified: Yes  Barriers to discharge: Yes- tolerate tube feeding.         Pt is tolerating tube feeding 10 ml/ Hr not acute events overnight. Denied having any pain or nausea. + bowel sounds     Entered by: Alban Portillo 11/09/2019 4:35 AM.  Please review provider order for any additional goals.   Nurse to notify provider when observation goals have been met and patient is ready for discharge   Abdominal Pain, N/V/D

## 2019-11-10 NOTE — PLAN OF CARE
"PRIMARY DIAGNOSIS: MALNUTRITION     OUTPATIENT/OBSERVATION GOALS TO BE MET BEFORE DISCHARGE  1. Orthostatic performed: N/A    2. Tolerating PO medications: Yes    3. Return to near baseline physical activity: Yes    4. Cleared for discharge by consultants (if involved): N/A    Discharge Planner Nurse   Safe discharge environment identified: Yes  Barriers to discharge: Yes       Entered by: Martine Nagel 11/10/2019 4:00 PM    /67 (BP Location: Right arm)   Pulse 67   Temp 97  F (36.1  C) (Oral)   Resp 16   Ht 1.803 m (5' 11\")   Wt 62.9 kg (138 lb 9.6 oz)   SpO2 98%   BMI 19.33 kg/m      A&Ox3 (disoriented to time). Intermittent confusion and forgetfulness at times. Patient expresses being depressed due to his condition. Up w/ SBA and gait belt. RA. G-tube in place, patent, WDL. Tube feeding infusing at 35mL/hr. Patient was not able to tolerate increase to 45/mL earlier today w/ 3 episodes of projectile vomit. Currently tolerating 35mL/hr and free water flushes. Blood glucose checks Q4H. Continues Bactrim. Scheduled Morphine for pain. Patient c/o intermittent abdominal pain, Oxycodone prn. Patient is on Potassium (3.5), Phosphorus (2.8) and Magnesium (1.9) protocols. Daily weights.Cultures for PICC and blood pending. Social Work and Nutrition following. Will continue to monitor.     Please review provider order for any additional goals.   Nurse to notify provider when observation goals have been met and patient is ready for discharge.  "

## 2019-11-10 NOTE — PLAN OF CARE
PRIMARY DIAGNOSIS: WKNS/ Malnutrition     OUTPATIENT/OBSERVATION GOALS TO BE MET BEFORE DISCHARGE  1. Orthostatic performed: No    2. Tolerating PO medications: Yes    3. Return to near baseline physical activity: No    4. Cleared for discharge by consultants (if involved): No    Discharge Planner Nurse   Safe discharge environment identified: deciding whether to go back home with daughter or to a nursing facility   Barriers to discharge: Yes       Entered by: Evita Kendall 11/10/2019 10:52 AM     Please review provider order for any additional goals.   Nurse to notify provider when observation goals have been met and patient is ready for discharge.    VSS, on RA, afebrile Up SBA. He is very sensitive to smell, makes him more nauseous. Complaining of being constipated, on scheduled miralax. Prn oxy given for abdominal pain this morning, with slight relief. Prn ativan also given for anxiety. Regular diet with tube feedings. Tolerates oral medication but has no appetite. He was able to drink one apple juice. Pt also complaining of a dry mouth, biotene ordered. Plan- pain control, nausea control, keep tolerating tube feedings.

## 2019-11-10 NOTE — PLAN OF CARE
"PRIMARY DIAGNOSIS: MALNUTRITION  OUTPATIENT/OBSERVATION GOALS TO BE MET BEFORE DISCHARGE:     1. ADLs back to baseline: Yes     2. Activity and level of assistance: Up with standby assistance.     3. Pain status: Pain managed w/ scheduled Morphine and Oxycodone prn.      4. Return to near baseline physical activity: Yes     Discharge Planner Nurse   Safe discharge environment identified: Yes  Barriers to discharge: Yes       Entered by: Martine Nagel 11/09/2019 8:00 PM     /66 (BP Location: Left arm)   Pulse 68   Temp 97.6  F (36.4  C) (Oral)   Resp 16   Ht 1.803 m (5' 11\")   Wt 64.4 kg (142 lb)   SpO2 96%   BMI 19.80 kg/m        Alert to self and place. Sleeping between cares. Intermittent confusion at times. Up w/ SBA and gait belt. RA. G-tube in place, patent, WDL. Tube feeding infusing at 25mL/hr. Blood glucose checks Q4H. Continues Bactrim. Scheduled Morphine for pain. Patient c/o intermittent abdominal pain, pain relieved w/ Oxycodone prn. Patient is on Potassium (3.7), Phosphorus (2.9) and Magnesium (1.9) protocols. Daily weights. PICC sent for cultures by previous shift. Blood cultures ordered and drawn this shift. Results pending. Social Work following. Will continue to monitor.      Please review provider order for any additional goals.   Nurse to notify provider when observation goals have been met and patient is ready for discharge.  "

## 2019-11-10 NOTE — PLAN OF CARE
"PRIMARY DIAGNOSIS: MALNUTRITION  OUTPATIENT/OBSERVATION GOALS TO BE MET BEFORE DISCHARGE:    ADLs back to baseline: Yes     Activity and level of assistance: Up with standby assistance.     Pain status: Pain managed w/ scheduled Morphine and Oxycodone prn.      Return to near baseline physical activity: Yes          Discharge Planner Nurse   Safe discharge environment identified: Yes  Barriers to discharge: Yes       Entered by: Martine Nagel 11/09/2019 4:00 PM    /66 (BP Location: Left arm)   Pulse 68   Temp 97.6  F (36.4  C) (Oral)   Resp 16   Ht 1.803 m (5' 11\")   Wt 64.4 kg (142 lb)   SpO2 96%   BMI 19.80 kg/m       Alert to self and place. Intermittent confusion at times. Up w/ SBA and gait belt. RA. G-tube in place, WDL. Tube feeding infusing at 25mL/hr. Blood glucose checks Q4H. Continues Bactrim. Scheduled Morphine for pain. Patient c/o intermittent abdominal pain, pain relieved w/ Oxycodone prn. Patient is on Potassium (3.7), Phosphorus (2.9) and Magnesium (1.9) protocols. Daily weights. PICC sent for cultures by previous shift. Blood cultures ordered and drawn this shift. Results pending. Will continue to monitor.      Please review provider order for any additional goals.   Nurse to notify provider when observation goals have been met and patient is ready for discharge.  "

## 2019-11-10 NOTE — PROGRESS NOTES
[facility]    Medicine Progress Note - Hospitalist Service       Date of Admission:  11/8/2019  Assessment & Plan   Severe protein-calorie malnutrition  --G-J tube placed by IR 11/5/2019 without issues as outpatient  --Nutrition following for tube feed recommendations.  N/v with TF at 45 mL/hr, decreased to 35 mL/hr; goal rate 55 mL/hr   --coverage confirmed for TF at discharge (see  note from 11/9/2019)  --patient still taking PO medications no evidence of dysphagia. Lack appetite 2/2 chemotherapy  --daily K, Mg, Phos given high risk for re-feeding syndrome  --PICC line pulled, was in place for IV fluids at home, no longer needed as receiving fluids through G-J tube     Pancreatic cancer, on chemotherapy  --follows with [facility] - Dr. Farrell  --chronic pain managed with MS Contin, PRN oxycodone  --PRN lorazepam for anxiety  --PRN ondansetron for nausea  --miralax daily      Diabetes mellitus type 2, uncontrolled  --not recently on insulin due to weight loss, poor PO intake  --glucose checks q4H, low dose sliding scale insulin, evaluate for possible long-acting insulin initiation pending glucose levels (currently not needing)     Previous liver abscess: continue Bactrim DS BID, daughter reports has 4 more days of antibiotics, end date 11/11/2019.       Diet: Regular Diet Adult  Adult Formula Drip Feeding: Continuous Peptamen 1.5; Gastrostomy; Goal Rate: 25; mL/hr; Medication - Feeding Tube Flush Frequency: At least 15-30 mL water before and after medication administration and with tube clogging; Increase now to 25 ml/hr    DVT Prophylaxis: Enoxaparin (Lovenox) SQ  Huggins Catheter: not present  Code Status: DNR/DNI      Diet: Regular Diet Adult  Adult Formula Drip Feeding: Continuous Peptamen 1.5; Gastrostomy; Goal Rate: 55; mL/hr; Medication - Feeding Tube Flush Frequency: At least 15-30 mL water before and after medication administration and with tube clogging; Increase by 10 mL q8  hour...    DVT Prophylaxis: Enoxaparin (Lovenox) SQ  Huggins Catheter: not present  Code Status: DNR/DNI      Disposition Plan   Expected discharge: 1-2 days, recommended to prior living arrangement once tube feed tolerating and set up for home.    Patient agrees to return home with daughter.  Will need home health order for home SW at discharge.    Entered: Fabi Quintero PA-C 11/10/2019, 12:12 PM       The patient's care was discussed with the Attending Physician, Dr. Christian Quintero PA-C  Hospitalist Service  [facility]    ______________________________________________________________________    Interval History   Feels nauseated with smell of any food today. No pain.  Wondering if he is due for lorazepam for anxiety.  Frustrated with family members.    Data reviewed today: I reviewed all medications, new labs and imaging results over the last 24 hours. I personally reviewed no images or EKG's today.    Physical Exam   Vital Signs: Temp: 97.5  F (36.4  C) Temp src: Oral BP: 106/60 Pulse: 68 Heart Rate: 66 Resp: 16 SpO2: 100 % O2 Device: None (Room air)    Weight: 138 lbs 9.6 oz    Constitutional: chronically ill, frail appearing man sitting up in bed  Eyes: no icterus  HEENT: mucous membranes dry  Respiratory: clear bilaterally, no rales, rhonchi, wheezing  Cardiovascular: RRR  GI: hyperactive bowel sounds, G tube intact, abdomen soft, nontender  Lymph/Hematologic: no bruising  Genitourinary: no catheter  Skin: warm and dry  Musculoskeletal: frail, decreased muscle bulk  Neurologic: nonfocal  Psychiatric: alert, oriented, no active suicidal ideation        Data   Recent Labs   Lab 11/10/19  0552 11/09/19  0615 11/08/19  1241   WBC  --   --  3.2*   HGB  --   --  10.1*   MCV  --   --  97   PLT  --   --  176   NA  --  139 140   POTASSIUM 3.5 3.7 3.6   CHLORIDE  --  106 106   CO2  --  27 27   BUN  --  2* 4*   CR  --  0.71 0.74   ANIONGAP  --  6 7   MATTHEW  --  8.2* 8.2*   GLC  --  114* 117*      No results found for this or any previous visit (from the past 24 hour(s)).

## 2019-11-10 NOTE — PLAN OF CARE
PRIMARY DIAGNOSIS: MALNUTRITION   OUTPATIENT/OBSERVATION GOALS TO BE MET BEFORE DISCHARGE:  ADLs back to baseline: Yes    Activity and level of assistance: Up with standby assistance.    Pain status: Pain free.    Return to near baseline physical activity: Yes     Discharge Planner Nurse   Safe discharge environment identified: No  Barriers to discharge: Yes- tube feed rates, safe discharge        Entered by: Alban Portillo 11/10/2019 12:40 AM     Please review provider order for any additional goals.   Nurse to notify provider when observation goals have been met and patient is ready for discharge.

## 2019-11-10 NOTE — PLAN OF CARE
Results for GAGAN COHEN (MRN [id number]) as of 11/9/2019 23:20   Ref. Range 11/9/2019 15:11   Phosphorus Latest Ref Range: 2.5 - 4.5 mg/dL 2.9     Last phosphorus result was 2.9, tube feeding adjusted from 25mL/hr to 35mL/hr per active order (Continue at 25 mL/hr. Only when P04 >/=2.4 on recheck, increase by 10 mL q8 hours). Phosphorus recheck scheduled again for tomorrow AM.

## 2019-11-10 NOTE — PLAN OF CARE
PRIMARY DIAGNOSIS: WKNS/ Malnutrition      OUTPATIENT/OBSERVATION GOALS TO BE MET BEFORE DISCHARGE  1. Orthostatic performed: No     2. Tolerating PO medications: Yes     3. Return to near baseline physical activity: No     4. Cleared for discharge by consultants (if involved): No     Discharge Planner Nurse   Safe discharge environment identified: Yes   Barriers to discharge: Yes       Entered by: Evita Kendall 11/10/2019 10:52 AM  Please review provider order for any additional goals.   Nurse to notify provider when observation goals have been met and patient is ready for discharge.     VSS, on RA, afebrile Up SBA. He is very sensitive to smell, makes him more nauseous. Complaining of being constipated, on scheduled miralax. Prn compazine given for projectile vomit x3 with relief. Regular diet with tube feedings. Tolerates oral medication but has no appetite. His tube feedings were increased to 45mL/hr, however, after vomiting the provider was notified and his feedings went back to 35mL/hr. Pt also complaining of a dry mouth, biotene ordered and given and appears to help. Plan- pain control, nausea control, keep tolerating tube feedings.

## 2019-11-10 NOTE — PLAN OF CARE
PRIMARY DIAGNOSIS: MALNUTRITION   OUTPATIENT/OBSERVATION GOALS TO BE MET BEFORE DISCHARGE:  ADLs back to baseline: Yes     Activity and level of assistance: Up with standby assistance.     Pain status: Pain free.     Return to near baseline physical activity: Yes          Discharge Planner Nurse   Safe discharge environment identified: No  Barriers to discharge: Yes- tube feed rates, safe discharge          No acute events overnight. Pt is tolerating tube feeds at 35 mL/HR    Entered by: Alban Portillo 11/10/2019 4:50 AM.  Please review provider order for any additional goals.   Nurse to notify provider when observation goals have been met and patient is ready for discharge

## 2019-11-10 NOTE — PROGRESS NOTES
NUTRITION BRIEF NOTE   See RD note 11/8 for full assessment details    New findings:    Diet order: regular    IR notes indicate patient has a 14 fr gastrostomy tube. TF rate increased this AM, Peptamen 1.5 at 45 mL/hr (semi elemental formula). Paged by RN --> nausea, vomiting with rate increase (patient endorsed baseline nausea).     Anticipate ongoing issues with nausea and vomiting given underlying disease    Electrolytes  Potassium (mmol/L)   Date Value   11/10/2019 3.5   11/09/2019 3.7   11/08/2019 3.6     Phosphorus (mg/dL)   Date Value   11/10/2019 2.8   11/09/2019 2.9   11/09/2019 2.6   11/08/2019 3.0    Blood Glucose  Glucose (mg/dL)   Date Value   11/09/2019 114 (H)   11/08/2019 117 (H)   10/20/2019 165 (H)   10/20/2019 200 (H)   10/14/2019 106 (H)     Hemoglobin A1C (%)   Date Value   09/06/2019 7.7 (H)   06/03/2019 7.6 (H)   05/22/2019 8.5 (H)   03/13/2019 14.0 (H)    Inflammatory Markers  CRP Inflammation (mg/L)   Date Value   09/25/2019 7.6     WBC (10e9/L)   Date Value   11/08/2019 3.2 (L)   10/20/2019 6.3   10/09/2019 4.3     Albumin (g/dL)   Date Value   10/20/2019 3.9   10/10/2019 2.6 (L)   09/14/2019 1.7 (L)      Magnesium (mg/dL)   Date Value   11/10/2019 1.9   11/09/2019 1.9   11/08/2019 1.9     Sodium (mmol/L)   Date Value   11/09/2019 139   11/08/2019 140   10/20/2019 132 (L)    Renal  Urea Nitrogen (mg/dL)   Date Value   11/09/2019 2 (L)   11/08/2019 4 (L)   10/20/2019 14   10/20/2019 15   10/14/2019 11     Creatinine (mg/dL)   Date Value   11/09/2019 0.71   11/08/2019 0.74   10/20/2019 0.84   10/20/2019 0.91   10/14/2019 0.90     Additional  Triglycerides (mg/dL)   Date Value   03/13/2019 217 (H)   08/28/2017 111   04/20/2011 112     Ketones Urine (mg/dL)   Date Value   09/08/2019 Negative              Continue to infuse as tolerated, and attempt to run at least 10 mL/hr. If TF discontinued, close monitoring of blood glucose. Provider to address resumption of IVF if unable to tolerate fluid  flushes via G tube. If unable to tolerate low to moderate infusion rates, further discussion may be warranted re: J extension, pros/cons of enteral nutrition in the context of chronic/progressive disease process (?oncology input).    Please page/consult as needed.      Erna Osborne RDN, LD, [facility]  Pager - 3rd floor/ICU: [phone]  Pager - All other floors: [phone]  Pager - Weekend/holiday: [phone]  Office: [phone]

## 2019-11-10 NOTE — CONSULTS
Care Transition Initial Assessment - RN        Met with: Patient and spoke with daughter, Oksana, via phone    DATA   Active Problems:    Malnutrition (H)  G-J tube placement by IR 11/5/2019   Pancreatic Cancer       Cognitive Status: alert, awake, some confusion- able to maintain conversation.  Primary Care Clinic Name: [facility]  Primary Care MD Name: Dr. Chirag Robert  Contact information and PCP information verified: Yes  Lives With: child(red), adult      Quality of Family Relationships: involved  Description of Support System: Involved   Who is your support system?: Children       Insurance concerns: No Insurance issues identified  ASSESSMENT  Patient currently receives the following services:  Patient currently open with [facility]: SN, OT and PT        Identified issues/concerns regarding health management: Patient admitted 11/8 with severe protein-calorie malnutrition. He is s/p G-J tube placement by IR 11/5/2019 and was initiated on Tube feeding 11/8. CTS following for care coordination and discharge planning. Patient living in [facility] with his daughter, Oksana. He is on the lower level and reports having 14 steps to get to his living space. He is currently receiving services thru [facility] for SN, OT, and PT. He is independent with bathing, grooming dressing. States nursing sets up his medication and his daughter provides transportation, meals. Discussed discharge plan to return home with addition of [facility]. Patient reports he's become weaker in the last several weeks due to not eating and lost of weight. His relationship with his daughter is becoming taxing as he is needing more assistance. Daughter willing to take patient home however, she would like to explore other living arrangements.  Patient and daughter were given resources of [facility] Linkage Line and a Place for Mom. Discussed adding homecare SW for additional support with senior living resources and other community resources. Patient  currently observation status and will need to pay out of pocket for TCU placement. Patient and family prefers to return home and work along with homecare SW. Daughter mentions her mother and siblings are gathering today to figure out a plan moving forward.     Actions:   *[facility] benefit check placed 11/8. [facility] to follow when discharge.   *[facility] to connect with Home Care agency for HC follow up at discharge.   *Family given senior resources   *Will need new discharge orders for Home SW including Face to face.     PLAN  Financial costs for the patient include none .  Patient given options and choices for discharge yes .  Patient/family is agreeable to the plan?  Yes: home with daughter  Patient anticipates discharging to home with daughter, [facility] and home care services.        Transportation/person available to transport on day of discharge  is family.      Plan/Disposition: Home with daughter, [facility] and home care services.    Appointments:   Family to arrange and coordinate post hospitalization appointment with their schedule.     Care  (CTS) will continue to follow as needed.    Reyes Rojas RN BSN   Inpatient Care Coordination   [facility]   [phone]

## 2019-11-11 NOTE — PROGRESS NOTES
"Clinical Nutrition Brief Note     Pt to transition to comfort cares per MD note on 11/11.    Pt previously on TF regimen, will discontinue per MD request.   Nutrition Support:     Type of Feeding Tube: G tube    Enteral Frequency:  Continuous    Enteral Regimen: Peptamen 1.5 at 55 mL/hr    Total Enteral Provisions: 1320 mL daily to provide 1980 kcals, 90 g protein, 1016 ml free H2O, 74 g fat (70% from MCTs), 248 g CHO and no fiber daily. Meets >100% of DRI's    Free Water Flush: 60 mL q2 hours (may need to increase further based on PO intake)     Intake/Tolerance: TF completely stopped per pt request (4 large emesis) on 11/11 early am. Previously infusing at 35 mL/hr via G-Tube with plans to increase 10 mL every 8 hours if tolerating. Na, K, Mag and Phos are all WNL. Last BM on 11/10. Blood Glucose levels ranging from 124-188.     Per MD note on 11/11:  \"Severe protein malnutrition with recent placement of PEG tube    - patient vomited tube feeds last night    - had bilious residuals    - patient does not want tube feeds    - I spoke with Dr. Hahn in IR: PEG was just placed any typically should not be removed until after 4 weeks    - patient does not want IVF\"     \"He understands the concept of hospice and is requesting hospice care.\" Palliative to see.     Will sign-off, please re-consult if services needed.    Kinsey Wesley, RD, LD  Clinical Dietitian   "

## 2019-11-11 NOTE — PLAN OF CARE
PRIMARY DIAGNOSIS: malnutrition  OUTPATIENT/OBSERVATION GOALS TO BE MET BEFORE DISCHARGE:  1. ADLs back to baseline: Yes    2. Activity and level of assistance: Up with standby assistance.    3. Pain status: continuing to report generalized pain. PRN PO morphine given, pt reported he was able to tolerate taking the PO solution and his pain had decreased.    4. Return to near baseline physical activity: Yes, generalized weakness     Discharge Planner Nurse   Safe discharge environment identified: unknown - SW consulted  Barriers to discharge: No       Entered by: Esther Abdul 11/11/2019 12:47 PM     Please review provider order for any additional goals.   Nurse to notify provider when observation goals have been met and patient is ready for discharge.      Pt lying in bed. G-tube feeding stopped. Oral cares provided - has biotine PRN. Bed bath given, linens and gown changed. Pt reporting continuous nausea, PRN ativan given, stated his nausea was significantly better than before.  Plan for pt to transition to hospice with anticipated discharge home tomorrow.   SW and palliative following.  Family at bedside.

## 2019-11-11 NOTE — CONSULTS
Writer met with pt and his ex wife, daughters and son in law to discuss hospice benefit and philosophy. Pt would like to elect hospice care at discharge. He will return home tomorrow 11/12, with 24 hr care from family.  Please plan to discharge pt by noon. Writer ordered a hospital bed, 1/2 rails, obt and transfer shower bench for delivery this evening.  Paperwork completed so pt will have hospice care at discharge.  Please send POLST home with pt, copy in his chart.  Hospice team will see pt at home at 1pm tomorrow.  Please order the following comfort meds and send with pt upon discharge:    Atropine sulfate 1% drops, give 2-4 drops po/sl every 2 hours PRN to dry secretions  Lorazepam 2mg/ml, give 0.25-0.5mg (0.125ml-0.25ml) po/sl every 4 hours PRN anxiety/restlessness  Haloperidol 2mg/ml, give 0.5-1mg (0.25-0.5ml) po/sl every 6 hours PRN nausea/agitation  Acetaminophen 650mg supp, give 1 supp CT every 4 hours PRN mild pain/fever  Bisacodyl 10mg supp, give 1 supp BID PRN constipation  Morphine 20mg/ml, give 10 mg PO/SL every 2 hours PRN pain/dyspnea  Senna 8.6mg, give 1 tab PO BID PRN constipation    Please continue home meds as appropriate for symptom management.  Discussed plan of care with Aleta Kerr SW, and bedside nurse Jennifer.    Thank you for this consult.    Toby Subramanian RN,  Hospice referral specialist  [phone]

## 2019-11-11 NOTE — PLAN OF CARE
"PRIMARY DIAGNOSIS: MALNUTRITION      OUTPATIENT/OBSERVATION GOALS TO BE MET BEFORE DISCHARGE  1. Orthostatic performed: N/A     2. Tolerating PO medications: No     3. Return to near baseline physical activity: Yes     4. Cleared for discharge by consultants (if involved): N/A     Discharge Planner Nurse   Safe discharge environment identified: Yes  Barriers to discharge: Yes          /76 (BP Location: Right arm)   Pulse 65   Temp 97.7  F (36.5  C) (Oral)   Resp 16   Ht 1.803 m (5' 11\")   Wt 62.9 kg (138 lb 9.6 oz)   SpO2 97%   BMI 19.33 kg/m         A/O forgetful. Up w/ SBA and gait belt.  G-tube in place, patent, WDL. Tube feeding infusing at 35mL/hr. Pt had 4 extremely large emesis.  Tube feed stopped per pt request. Residual was 150cc of foul pungent yellow bile looking fluid. Pt states he wants to \" stop tube feeding and stop it all\". He expresses being depressed  and anxiety due to his condition.Says he wants hospice or AL due to anxiety and stress with living at his daughters house.  Blood glucose checks Q4H. BS was 167, no coverage given due to not eating and tube feed stopped. Pain is intermittent abdominal pain, Oxycodone prn.         "

## 2019-11-11 NOTE — PROGRESS NOTES
[facility]  Patient is currently open to home care services with [facility].  The patient is currently receiving RN OT SW services.  [facility]  and team have been notified of patient admission.  [facility] liaison will continue to follow patient during stay.

## 2019-11-11 NOTE — PLAN OF CARE
"PRIMARY DIAGNOSIS: MALNUTRITION      OUTPATIENT/OBSERVATION GOALS TO BE MET BEFORE DISCHARGE  1. Orthostatic performed: N/A     2. Tolerating PO medications: No     3. Return to near baseline physical activity: Yes     4. Cleared for discharge by consultants (if involved): N/A     Discharge Planner Nurse   Safe discharge environment identified: Yes  Barriers to discharge: Yes            /76 (BP Location: Right arm)   Pulse 67  Temp 97.7  F (36.2  C) (Oral)   Resp 16   Ht 1.803 m (5' 11\")   Wt 62.9 kg (138 lb 9.6 oz)   SpO2 94%   BMI 19.33 kg/m       A/O forgetful. Up w/ SBA and gait belt.  G-tube in place, patent, WDL. Tube feeding infusing at 35mL/hr. Pt had 4 extremely large emesis.  Tube feed stopped per pt request. Residual was 150cc of foul pungent yellow bile looking fluid. Pt states he wants to \" stop tube feeding and stop it all\". He expresses being depressed  and anxiety due to his condition.Says he wants hospice or AL due to anxiety and stress with living at his daughters house.  Blood glucose checks Q4H. BS was 167, no coverage given due to not eating and tube feed stopped. Pain is intermittent abdominal pain, Oxycodone prn.   "

## 2019-11-11 NOTE — PLAN OF CARE
"PRIMARY DIAGNOSIS: malnutrition  OUTPATIENT/OBSERVATION GOALS TO BE MET BEFORE DISCHARGE:  ADLs back to baseline: Yes    Activity and level of assistance: Up with standby assistance.    Pain status: reporting 8/10 generalized body pain, is feeling nauseated and does not wish to take pain medication at this time. He is able to reposition ind in bed and is sleeping for pain relief.    Return to near baseline physical activity: Yes, generalized weakness     Discharge Planner Nurse   Safe discharge environment identified: unknown - SW consulted  Barriers to discharge: No       Entered by: Esther Abdul 11/11/2019 8:23 AM     Please review provider order for any additional goals.   Nurse to notify provider when observation goals have been met and patient is ready for discharge.      /79 (BP Location: Right arm)   Pulse 65   Temp 97.7  F (36.5  C) (Axillary)   Resp 18   Ht 1.803 m (5' 11\")   Wt 62.9 kg (138 lb 9.6 oz)   SpO2 96%   BMI 19.33 kg/m      A&Ox4.  Pt feeling nauseated and projectile vomiting. G-tube feeding was stopped last night per pt. Had 75mL green residual. Able to take small sips of water, PRN zofran given. Pt reporting abd discomfort, side lying for relief.  LS clear. +BS.  Wishes to take meds around 9am to see if he can tolerate taking PO.  "

## 2019-11-11 NOTE — PROGRESS NOTES
[facility] now requests orders and shares plan of care/discharge summaries for some patients through [employer].  Please REPLY TO THIS MESSAGE OR ROUTE BACK TO THE AUTHOR in order to give authorization for orders when needed.  This is considered a verbal order, you will still receive a faxed copy of orders for signature.  Thank you for your assistance in improving collaboration for our patients.    ORDER    Will Dr. Robert follow pt as hospice attending by phone and fax? Pt will discharge home with hospice care on 11/12/19 with  Hospice. Is he ok with standing hospice orders?    Thank you  Toby Subramanian, RN,  Hospice referral specialist  [phone]  Main office [phone]

## 2019-11-11 NOTE — PROGRESS NOTES
[facility]    Medicine Progress Note - Hospitalist Service       Date of Admission:  11/8/2019  Assessment & Plan      69 yo with stage 2 pancreatic cancer (only tolerated one round of chemo), recent liver abscess, severe malnutrition. Admitted on 11/8/2019 after PEG placement for monitoring/starting feeds    Severe protein malnutrition with recent placement of PEG tube    - patient vomited tube feeds last night    - had bilious residuals    - patient does not want tube feeds    - I spoke with Dr. Hahn in IR: PEG was just placed any typically should not be removed until after 4 weeks    - patient does not want IVF    Pancreatic cancer    - underwent one round of gemcitabine and abraxane    - I personally spoke with Dr. Farrell today: she feels hospice is appropriate if that is what the patient wants    - patient is requesting hospice    - will have Pall Care see patient    - spoke with social work as likely he will need hospice care at a facility    Diabetes    - new onset    - covering with ISS    Comfort cares    - patient vomited his Morphine this am    - has 8/10 abdo pain    - has nausea    - will review meds and work with Pall Care to make him more comfortable    Spoke with daughter Oksana on the phone. Will meet in person once she arrives    No labs needed.    Diet: Regular Diet Adult  Adult Formula Drip Feeding: Continuous Peptamen 1.5; Gastrostomy; Goal Rate: 55; mL/hr; Medication - Feeding Tube Flush Frequency: At least 15-30 mL water before and after medication administration and with tube clogging; Increase by 10 mL q8 hour...    DVT Prophylaxis: Enoxaparin (Lovenox) SQ  Huggins Catheter: not present  Code Status: DNR/DNI      Disposition Plan   Expected discharge: 1-2 days, recommended to long term care facility once safe disposition plan/ TCU bed available.  Entered: Mukul Gonzales MD 11/11/2019, 9:53 AM       The patient's care was discussed with the Patient, Patient's Family and  Oncology and Palliative Care.    Mukul Gonzales MD  Hospitalist Service  [facility]    ______________________________________________________________________    Interval History   Patient in bed. Getting washed up. He states he is doing ok today. He confirms with me that he had vomiting last night. He is a little nauseous today but is tolerating sips of water. He confirms he does not want PEG tube feeds and would like the tube out. No chest pain, sob. He has good insight into his disease and his prognosis. He understands the concept of hospice and is requesting hospice care.    Data reviewed today: I reviewed all medications, new labs and imaging results over the last 24 hours. I personally reviewed no images or EKG's today.    Physical Exam   Vital Signs: Temp: 97.7  F (36.5  C)(patient had sips of water) Temp src: Axillary BP: 123/79 Pulse: 65 Heart Rate: 83 Resp: 18 SpO2: 96 % O2 Device: None (Room air)    Weight: 138 lbs 9.6 oz  Constitutional: cachectic  Eyes: Lids and lashes normal, pupils equal, round and reactive to light, extra ocular muscles intact, sclera clear, conjunctiva normal  ENT: Normocephalic, without obvious abnormality, atraumatic, sinuses nontender on palpation, external ears without lesions, oral pharynx with moist mucous membranes, tonsils without erythema or exudates, gums normal and good dentition.  Respiratory: No increased work of breathing, good air exchange, clear to auscultation bilaterally, no crackles or wheezing  Cardiovascular: Normal apical impulse, regular rate and rhythm, normal S1 and S2, no S3 or S4, and no murmur noted  GI: generalized tenderness. PEG in place, Dressing clean and dry  Skin: no bruising or bleeding  Musculoskeletal: no lower extremity pitting edema present    Data   Recent Labs   Lab 11/11/19  0553 11/10/19  0552 11/09/19  0615 11/08/19  1241   WBC  --   --   --  3.2*   HGB  --   --   --  10.1*   MCV  --   --   --  97     --   --  176      --  139 140   POTASSIUM 3.8 3.5 3.7 3.6   CHLORIDE 103  --  106 106   CO2 28  --  27 27   BUN 4*  --  2* 4*   CR 0.67  --  0.71 0.74   ANIONGAP 7  --  6 7   MATTHEW 9.0  --  8.2* 8.2*   *  --  114* 117*     No results found for this or any previous visit (from the past 24 hour(s)).

## 2019-11-11 NOTE — CONSULTS
[facility] Disposition Note:    Consult: for Disposition    D: patient and family have decided on hospice care home. Patient will be discharged to his daughter's home on [facility] and a private pay PCA.    A: Comfort Care    P: Discharge on 11/12/19 to daughter's home with [facility] and private pay PCA    EVERTON Adames   Inpatient Care Coordination   Supervisor  [facility]  [phone]

## 2019-11-11 NOTE — PLAN OF CARE
"PRIMARY DIAGNOSIS: MALNUTRITION      OUTPATIENT/OBSERVATION GOALS TO BE MET BEFORE DISCHARGE  1. Orthostatic performed: N/A     2. Tolerating PO medications: Yes     3. Return to near baseline physical activity: Yes     4. Cleared for discharge by consultants (if involved): N/A     Discharge Planner Nurse   Safe discharge environment identified: Yes  Barriers to discharge: Yes       Entered by: Martine Nagel 11/10/2019 8:00 PM     /64 (BP Location: Right arm)   Pulse 65   Temp 97.2  F (36.2  C) (Oral)   Resp 16   Ht 1.803 m (5' 11\")   Wt 62.9 kg (138 lb 9.6 oz)   SpO2 94%   BMI 19.33 kg/m       A&Ox3 (disoriented to time). Intermittent confusion and forgetfulness at times. Patient expresses being very depressed due to his condition. Up w/ SBA and gait belt. RA. G-tube in place, patent, WDL. Tube feeding infusing at 35mL/hr. Patient was not able to tolerate increase to 45/mL earlier today w/ 3 episodes of projectile vomit. No episodes of emesis this shift. Currently tolerating 35mL/hr and free water flushes. Blood glucose checks Q4H. Continues Bactrim. Scheduled Morphine for pain. Patient c/o intermittent abdominal pain, Oxycodone prn. Patient is on Potassium (3.5), Phosphorus (2.8) and Magnesium (1.9) protocols. Daily weights.Cultures for PICC and blood pending. Social Work and Nutrition following. Will continue to monitor.      Please review provider order for any additional goals.   Nurse to notify provider when observation goals have been met and patient is ready for discharge.  "

## 2019-11-11 NOTE — PLAN OF CARE
"PRIMARY DIAGNOSIS: malnutrition  OUTPATIENT/OBSERVATION GOALS TO BE MET BEFORE DISCHARGE:  1. ADLs back to baseline: Yes    2. Activity and level of assistance: Standby assistance with ambulation to the bathroom    3. Pain status: currently patient reports pain is tolerable.  Patient states that current pain medication regimen is satisfactory  4.   5. Return to near baseline physical activity: Yes, generalized weakness    Blood pressure 119/70, pulse 65, temperature 97.1  F (36.2  C), temperature source Oral, resp. rate 16, height 1.803 m (5' 11\"), weight 57.2 kg (126 lb 1.6 oz), SpO2 99 %.    VSS.  = Plan of care reviewed with patient, as well as review of current scheduled medications.  Patient that this current level of pain is tolerable.  G-tube clamped.  Patient reports improvement in nausea.   G tube dressing dry, intact. Patient is a standby assist with ambulation to the bathroom prn.  Plan:  Continue to provide supportive cares.  Planned discharge tomorrow to [facility] with hospice and PCA support.       Discharge Planner Nurse   Safe discharge environment identified: Yes,  Planned discharge to [facility] with hospice and PCA  Barriers to discharge: Yes, unless medically cleared       Entered by: Karen M. Lesch 11/11/2019 16:00 PM     Please review provider order for any additional goals.   Nurse to notify provider when observation goals have been met and patient is ready for discharge.      .  "

## 2019-11-11 NOTE — CONSULTS
[facility]    Palliative Care Consultation   Text Page    Date of Admission:  11/8/2019    Assessment & Plan   Jesús Stock is a 68 year old male who was admitted on 11/8/2019. I was asked by Hospitalist Mukul Gonzales MD to see the patient for end of life care.    Recommendations:  1.  Decisional Capacity -  Questionable. Patient acknowledges terminal trajectory, but lacks insight in recent health issues (did not recall having a G-tube placed last week). Patient has an advance directive dated 7/3/2019.  If patient becomes unable to demonstrate decisional capacity, his daughter Oksana Stock is the primary Health Care Agent.  Alternate is his daughter Keesha Barnett.     2. Pain -  Reasonable to continue chronic MS contin 15 mg every 12 hours and immediate release Morphine 10 mg oral or per feeding tube as need for breakthrough pain.  High concentrate morphine ordered for [facility] dismissal to home.     3.  Anxiety - Reasonable to continue Ativan 0.5 mg every 6 hours as needed.     4.  Nausea - Reasonable to continue Zofran for comfort. Patient and family acknowledged nausea was exacerbated with tube feedings, and plan to proceed with comfort focused plan with support of hospice on dismissal.     5.  Unintentional weight loss - Patient is cachetic and frail. Patient indicates in had been obese and weighed more than 300 pounds prior to pancreatic cancer diagnosis. March 13, 2019 he was documented at 199 pounds and today's weight at 126 pounds. Appreciate input of Registered Dietitian Erna Osborne, RD, LD recommendations for tube feeding. Patient had G-tube placed 11/5/2019. Patient previously using medical marijuana without success. Patient and family request comfort focus and to discontinue tube feedings.     6. Spiritual Care- Spiritual Health services declined at this time.     7. Care Planning - Patient and family to meet with [facility] RN Referral Specialist Toby  Marilynn RN at 2 or 2:30 PM this afternoon.     Goals of Care: DNR/DNI - Comfort focused care. POLST complete indicating the patient and families request for comfort focused care.      Disease Process/es & Symptoms:  Jesús Stock is a 68 year old patient admitted to observation for tube feeding initiation. He was diagnosed with pancreatic cancer with fine-needle aspiration June 5, 2019 and was followed by Oncologist Pradip Farrell MD. He began neoadjuvant gecitadine and abraxane chemotherapy August 28, 2019. He was hospitalized at [facility] September 6 through 18 for failure to thrive during which time he had port removed due to positive blood cultures and underwent liver abscess drainage on September 13. He was seen by psychiatry regarding depression and confusion Zonia psych was recommended, but the patient was not holdable and refused Zonia psych. He was started on Remeron and [facility] He returned to [facility] ED on September 20 for Social Work assistance as family was not happy with care at facility. Family took the patient home with home healthcare until he was admitted at [facility] September 27. He was discharged to home on October 15 with support of home care.     Findings & plan of care discussed with: [facility] RN Referral Specialist Toby Subramanian RN; bedside nurse Esther Abdul RN; and Hospitalist Mukul Gonzales MD  Follow-up plan from palliative team: Will follow closely while hospitalized.   Thank you for involving us in the patient's care.     Alissa Bradshaw MS, RN, CNS, APRN, ACHPN, FAACVPR  Pain and Palliative Care  Pager [phone]  Office [phone]     Time Spent on this Encounter   Total unit/floor time 11:45 until 12:50 PM, time consisted of the following, examination of the patient, reviewing the record and completing documentation. >50% of time spent in counseling and coordination of care.  Time spend counseling with patient and family  "consisted of the following topics, goals of care, advance care planning and symptom management.  Time spent in coordination of care with [facility] RN Referral Specialist Toby Subramanian RN and Hospitalist Mukul Gonzales MD.     Primary Care Physician   Chirag Robert    Chief Complaint   Admission for tube feeding initiation    History is obtained from the patient, electronic health record and patient's family    Decision-Making & Goals of Care:  Discussed on November 11, 2019 with Alissa LOU, CNS:     Met the patient as he was resting in bed. He introduced me to his two daughters Oksana and Keesha and his infant grandchild. His daughter Oksana provided much of the information as Jesús was having difficulty keeping on task of answering questions. Jesús explained \"I'm done with all of this. I just want to be done with everything\". Oksana acknowledged interest in using [facility] as other family members used this service. Jesús acknowledged he would not want to return to the hospital for care. I contacted [facility] RN Referral Specialist Toby Subramanian RN and arranged for a family meeting later today. Jesús signed a POLST indicating the patient/family preference for comfort focused care.  Jesús's ex-wife joined our discussion and offered Jesús support in his plan to begin hospice care.      Past Medical History   I have reviewed this patient's medical history and updated it with pertinent information if needed.   Past Medical History:   Diagnosis Date     Alcohol dependence in remission sober since 1984 (H)     sober since 8/25/84     Backache, unspecified      Colon polyps 5/24/11    2 colon polyps removed at colonoscopy     Diverticulosis of colon 5/24/11    mild pan-colonic diverticulosis seen as incidental finding on colonoscopy     Major depressive disorder, single episode, mild (H)      Obesity (BMI 30.0-34.9) 08/28/2017    BMI 31.4     Osteoarthrosis, unspecified whether " generalized or localized, unspecified site      Pancreatic cancer (H)      Type 2 diabetes mellitus without complication, with long-term current use of insulin (H) 03/13/2019    glucose 395       Past Surgical History   I have reviewed this patient's surgical history and updated it with pertinent information if needed.  Past Surgical History:   Procedure Laterality Date     APPENDECTOMY       COLONOSCOPY  5/24/11    2 polyps, mild pan-colonic diverticulosis     COLONOSCOPY N/A 9/7/2017    Procedure: COMBINED COLONOSCOPY, SINGLE OR MULTIPLE BIOPSY/POLYPECTOMY BY BIOPSY;;  Surgeon: Gene Grimse MD;  Location:  GI     ENDOSCOPIC RETROGRADE CHOLANGIOPANCREATOGRAM N/A 6/5/2019    Procedure: ENDOSCOPIC RETROGRADE CHOLANGIOPANCREATOGRAPHY, ENDOSCOPIC UTRASOUND ESOPHAGOSCOPY WITH STENT PLACEMENT. ;  Surgeon: Jana Nelson MD;  Location:  OR     ENDOSCOPIC RETROGRADE CHOLANGIOPANCREATOGRAPHY, EXCHANGE TUBE/STENT N/A 7/18/2019    Procedure: ENDOSCOPIC RETROGRADE CHOLANGIOPANCREATOGRAPHY, WITH STENT EXCHANGE;  Surgeon: Jana Nelson MD;  Location:  OR     ESOPHAGOSCOPY, GASTROSCOPY, DUODENOSCOPY (EGD), COMBINED N/A 6/5/2019    Procedure: Esophagoscopy, gastroscopy, duodenoscopy (EGD), combined, pancreatic needle biopsies;  Surgeon: Jana Nelson MD;  Location:  OR     HERNIA REPAIR      X3     IR CHEST PORT PLACEMENT > 5 YRS OF AGE  8/28/2019     IR FOLLOW UP VISIT OUTPATIENT  10/11/2019     IR PORT REMOVAL RIGHT  9/11/2019     TONSILLECTOMY         Prior to Admission Medications   Prior to Admission Medications   Prescriptions Last Dose Informant Patient Reported? Taking?   LORazepam (ATIVAN) 0.5 MG tablet 11/8/2019 at x1  Yes Yes   Sig: Take 0.5 mg by mouth 2 times daily And PRN   Triprolidine-Pseudoephedrine 2.5-60 MG TABS  Daughter Yes Yes   Sig: Take 0.5 tablets by mouth every 6 hours as needed (allergies)   acetaminophen (TYLENOL) 500 MG tablet  Daughter Yes No   Sig: Take  1-2 tablets by mouth every 6 hours as needed for pain.   insulin aspart (NOVOLOG VIAL) 100 UNITS/ML vial Past Month at Unknown time  Yes Yes   Sig: Inject Subcutaneous 3 times daily (with meals) Inject as per sliding scale: if 150 - 200 = 2 unit; 201 - 250 = 3 units;  251 - 300 = 4 units; 301 - 350 = 5 units; 351 - 400 = 6 units;  401+ = 7 units   medical cannabis (Patient's own supply) 11/7/2019 at Unknown time  Yes Yes   Sig: daily (The purpose of this order is to document that the patient reports taking medical cannabis.  This is not a prescription, and is not used to certify that the patient has a qualifying medical condition.)   morphine (MS CONTIN) 15 MG CR tablet 11/8/2019 at x1  No Yes   Sig: Take 1 tablet (15 mg) by mouth every 12 hours   ondansetron (ZOFRAN) 4 MG tablet 11/8/2019 at x1  Yes Yes   Sig: Take 4 mg by mouth 2 times daily And every 6 hours as needed   oxyCODONE (ROXICODONE) 5 MG tablet   No Yes   Sig: Take 1 tablet (5 mg) by mouth every 6 hours as needed for moderate to severe pain   polyethylene glycol (MIRALAX/GLYCOLAX) powder 11/8/2019 at Unknown time Daughter Yes Yes   Sig: Take 17 g by mouth daily    sildenafil (REVATIO) 20 MG tablet  Daughter Yes No   Sig: Take 20 mg by mouth as needed Hold while at U   simethicone (MYLICON) 125 MG chewable tablet  Daughter Yes No   Sig: Take 125 mg by mouth 2 times daily as needed (abdominal pain)   sulfamethoxazole-trimethoprim (BACTRIM DS/SEPTRA DS) 800-160 MG tablet 11/8/2019 at x1  Yes Yes   Sig: Take 1 tablet by mouth 2 times daily      Facility-Administered Medications: None     Allergies   Allergies   Allergen Reactions     Dust Mites      Mold      No Known Drug Allergies        Social History   Living situation: Lives with his daughter Oksana  Family system: Two daughters are supportive   Functional status: Needs help with ADLs   Employment/education: Retired  Use of community resources:  Home Care  History of substance use/abuse:  reports  that he is a non-smoker but has been exposed to tobacco smoke. He has never used smokeless tobacco.  reports no history of alcohol use.  Hindu affiliation: [employer]    Family History   I have reviewed this patient's family history and updated it with pertinent information if needed.   Family History   Problem Relation Age of Onset     Diabetes Father         b:1926     Hypertension Mother         b:1925     Family History Negative Brother         b:1948     Family History Negative Brother         b:1951  deferred tremor     Family History Negative Sister         b:1953     Family History Negative Brother         b:1956     Family History Negative Brother         b:1958       Review of Systems   The 10 point Review of Systems is negative other than noted in the HPI or here.     Palliative Symptom Review (0=no symptom/no concern, 1=mild, 2=moderate, 3=severe):      Pain: 1-mild      Fatigue: 3-severe      Nausea: 1-mild      Constipation: 0-none      Diarrhea: 0-none      Depressive Symptoms: 1-mild      Anxiety: 2-moderate      Drowsiness: 1-mild      Poor Appetite: 2-moderate      Shortness of Breath: 0-none      Insomnia: 0-none        Physical Exam   Temp:  [97  F (36.1  C)-97.7  F (36.5  C)] 97.7  F (36.5  C)  Pulse:  [65-67] 65  Heart Rate:  [65-83] 83  Resp:  [16-18] 18  BP: (119-132)/(64-79) 123/79  SpO2:  [94 %-98 %] 96 %  126 lbs 1.6 oz  GEN:  Cachetic and frail elderly  male. Alert, oriented to self and situation, appears comfortable, NAD.  HEENT:  Normocephalic/atraumatic, no scleral icterus, no nasal discharge, mouth moist.  CV:  RRR, S1, S2; no murmurs or other irregularities noted.  +3 DP/PT pulses bilaterally; no edema BLE.  RESP:  Clear to auscultation bilaterally without rales/rhonchi/wheezing/retractions.  Symmetric chest rise on inhalation noted.  Normal respiratory effort.  ABD:  Rounded, soft, non-tender/non-distended.  +BS Feeding tube in left upper quadrant of abdomen has dry  dressing intact.   EXT:  CMS intact x 4.     M/S:   Denies discomfort with palpation of extremities and spine.     SKIN:  Warm and dry to touch, no mottling.    NEURO:Deferred. .  Psych: Anxious affect, cooperative, conversant but frequently distracted and needing redirection. .       Data   Results for orders placed or performed during the hospital encounter of 11/08/19   CBC with platelets differential     Status: Abnormal   Result Value Ref Range    WBC 3.2 (L) 4.0 - 11.0 10e9/L    RBC Count 3.22 (L) 4.4 - 5.9 10e12/L    Hemoglobin 10.1 (L) 13.3 - 17.7 g/dL    Hematocrit 31.3 (L) 40.0 - 53.0 %    MCV 97 78 - 100 fl    MCH 31.4 26.5 - 33.0 pg    MCHC 32.3 31.5 - 36.5 g/dL    RDW 14.2 10.0 - 15.0 %    Platelet Count 176 150 - 450 10e9/L    Diff Method Automated Method     % Neutrophils 52.4 %    % Lymphocytes 31.4 %    % Monocytes 11.1 %    % Eosinophils 2.9 %    % Basophils 1.9 %    % Immature Granulocytes 0.3 %    Nucleated RBCs 0 0 /100    Absolute Neutrophil 1.7 1.6 - 8.3 10e9/L    Absolute Lymphocytes 1.0 0.8 - 5.3 10e9/L    Absolute Monocytes 0.4 0.0 - 1.3 10e9/L    Absolute Eosinophils 0.1 0.0 - 0.7 10e9/L    Absolute Basophils 0.1 0.0 - 0.2 10e9/L    Abs Immature Granulocytes 0.0 0 - 0.4 10e9/L    Absolute Nucleated RBC 0.0    Basic metabolic panel     Status: Abnormal   Result Value Ref Range    Sodium 140 133 - 144 mmol/L    Potassium 3.6 3.4 - 5.3 mmol/L    Chloride 106 94 - 109 mmol/L    Carbon Dioxide 27 20 - 32 mmol/L    Anion Gap 7 3 - 14 mmol/L    Glucose 117 (H) 70 - 99 mg/dL    Urea Nitrogen 4 (L) 7 - 30 mg/dL    Creatinine 0.74 0.66 - 1.25 mg/dL    GFR Estimate >90 >60 mL/min/[1.73_m2]    GFR Estimate If Black >90 >60 mL/min/[1.73_m2]    Calcium 8.2 (L) 8.5 - 10.1 mg/dL   Magnesium     Status: None   Result Value Ref Range    Magnesium 1.9 1.6 - 2.3 mg/dL   Phosphorus     Status: None   Result Value Ref Range    Phosphorus 3.0 2.5 - 4.5 mg/dL   Glucose by meter     Status: Abnormal   Result Value  Ref Range    Glucose 120 (H) 70 - 99 mg/dL   Glucose by meter     Status: Abnormal   Result Value Ref Range    Glucose 109 (H) 70 - 99 mg/dL   Basic metabolic panel     Status: Abnormal   Result Value Ref Range    Sodium 139 133 - 144 mmol/L    Potassium 3.7 3.4 - 5.3 mmol/L    Chloride 106 94 - 109 mmol/L    Carbon Dioxide 27 20 - 32 mmol/L    Anion Gap 6 3 - 14 mmol/L    Glucose 114 (H) 70 - 99 mg/dL    Urea Nitrogen 2 (L) 7 - 30 mg/dL    Creatinine 0.71 0.66 - 1.25 mg/dL    GFR Estimate >90 >60 mL/min/[1.73_m2]    GFR Estimate If Black >90 >60 mL/min/[1.73_m2]    Calcium 8.2 (L) 8.5 - 10.1 mg/dL   Magnesium     Status: None   Result Value Ref Range    Magnesium 1.9 1.6 - 2.3 mg/dL   Phosphorus     Status: None   Result Value Ref Range    Phosphorus 2.6 2.5 - 4.5 mg/dL   Glucose by meter     Status: Abnormal   Result Value Ref Range    Glucose 115 (H) 70 - 99 mg/dL   Glucose by meter     Status: Abnormal   Result Value Ref Range    Glucose 112 (H) 70 - 99 mg/dL   Glucose by meter     Status: Abnormal   Result Value Ref Range    Glucose 119 (H) 70 - 99 mg/dL   Glucose by meter     Status: Abnormal   Result Value Ref Range    Glucose 151 (H) 70 - 99 mg/dL   Phosphorus     Status: None   Result Value Ref Range    Phosphorus 2.9 2.5 - 4.5 mg/dL   Glucose by meter     Status: Abnormal   Result Value Ref Range    Glucose 153 (H) 70 - 99 mg/dL   Glucose by meter     Status: Abnormal   Result Value Ref Range    Glucose 147 (H) 70 - 99 mg/dL   Phosphorus     Status: None   Result Value Ref Range    Phosphorus 2.8 2.5 - 4.5 mg/dL   Glucose by meter     Status: Abnormal   Result Value Ref Range    Glucose 134 (H) 70 - 99 mg/dL   Glucose by meter     Status: Abnormal   Result Value Ref Range    Glucose 124 (H) 70 - 99 mg/dL   Potassium     Status: None   Result Value Ref Range    Potassium 3.5 3.4 - 5.3 mmol/L   Magnesium     Status: None   Result Value Ref Range    Magnesium 1.9 1.6 - 2.3 mg/dL   Glucose by meter     Status:  Abnormal   Result Value Ref Range    Glucose 140 (H) 70 - 99 mg/dL   Glucose by meter     Status: Abnormal   Result Value Ref Range    Glucose 140 (H) 70 - 99 mg/dL   Glucose by meter     Status: Abnormal   Result Value Ref Range    Glucose 161 (H) 70 - 99 mg/dL   Glucose by meter     Status: Abnormal   Result Value Ref Range    Glucose 188 (H) 70 - 99 mg/dL   Platelet count     Status: None   Result Value Ref Range    Platelet Count 224 150 - 450 10e9/L   Glucose by meter     Status: Abnormal   Result Value Ref Range    Glucose 167 (H) 70 - 99 mg/dL   Basic metabolic panel     Status: Abnormal   Result Value Ref Range    Sodium 138 133 - 144 mmol/L    Potassium 3.8 3.4 - 5.3 mmol/L    Chloride 103 94 - 109 mmol/L    Carbon Dioxide 28 20 - 32 mmol/L    Anion Gap 7 3 - 14 mmol/L    Glucose 136 (H) 70 - 99 mg/dL    Urea Nitrogen 4 (L) 7 - 30 mg/dL    Creatinine 0.67 0.66 - 1.25 mg/dL    GFR Estimate >90 >60 mL/min/[1.73_m2]    GFR Estimate If Black >90 >60 mL/min/[1.73_m2]    Calcium 9.0 8.5 - 10.1 mg/dL   Magnesium     Status: None   Result Value Ref Range    Magnesium 2.2 1.6 - 2.3 mg/dL   Phosphorus     Status: None   Result Value Ref Range    Phosphorus 2.7 2.5 - 4.5 mg/dL   CRP inflammation     Status: None   Result Value Ref Range    CRP Inflammation 5.8 0.0 - 8.0 mg/L   Glucose by meter     Status: Abnormal   Result Value Ref Range    Glucose 138 (H) 70 - 99 mg/dL   Glucose by meter     Status: Abnormal   Result Value Ref Range    Glucose 140 (H) 70 - 99 mg/dL   Catheter Tip Culture Aerobic Bacterial     Status: None   Result Value Ref Range    Specimen Description Catheter tip     Culture Micro No growth    Blood culture     Status: None (Preliminary result)   Result Value Ref Range    Specimen Description Blood Right Arm     Special Requests Aerobic and anaerobic bottles received     Culture Micro No growth after 2 days    Blood culture     Status: None (Preliminary result)   Result Value Ref Range     Specimen Description Blood Left Arm     Special Requests Aerobic and anaerobic bottles received     Culture Micro No growth after 2 days

## 2019-11-12 NOTE — PLAN OF CARE
PRIMARY DIAGNOSIS: Malnutrition  OUTPATIENT/OBSERVATION GOALS TO BE MET BEFORE DISCHARGE:  1. ADLs back to baseline: Yes    2. Activity and level of assistance: Up with standby assistance.    3. Pain status: Improved-controlled with oral pain medications.    4. Return to near baseline physical activity: Yes     Discharge Planner Nurse   Safe discharge environment identified: Yes  Barriers to discharge: Yes       Entered by: Hector Barker 11/12/2019 6:07 AM     Please review provider order for any additional goals.   Nurse to notify provider when observation goals have been met and patient is ready for discharge.  Temp: 97.3  F (36.3  C) Temp src: Oral BP: 127/78   Heart Rate: 75 Resp: 14 SpO2: 98 % O2 Device: None (Room air)    Pt A&Ox4. Pt denies pain, N/V/D, numbness, or tingling. Pt tolerating PO meds. Pt SBA for safety. Pt on comfort cares. No IV access. Plan: discharge today, family to transport home with cares.   Pt currently sleeping

## 2019-11-12 NOTE — PLAN OF CARE
PRIMARY DIAGNOSIS: malnutrition  OUTPATIENT/OBSERVATION GOALS TO BE MET BEFORE DISCHARGE:  1. ADLs back to baseline: Yes    2. Activity and level of assistance: Standby assistance with ambulation to the bathroom    3. Pain status: currently patient reports pain is tolerable  Morphine po q 8 hours scheduled    4. Return to near baseline physical activity: Yes, generalized weakness    Patient resting comfortably.  Schedule morphine given for pain with later improvement.  Patient is able to keep down oral medications without difficulty.  No further nausea or emesis with regularly scheduled  Zofran.  G tube remains clamped.  Patient is a standby assist with ambulation to the bathroom prn.   Plan:  Continue to provide supportive cares.  Planned discharge tomorrow to [facility] with hospice and PCA support.       Discharge Planner Nurse   Safe discharge environment identified: Yes,  Planned discharge to [facility] with hospice and PCA  Barriers to discharge: Yes, unless medically cleared       Entered by: Karen M. Lesch 11/11/2019 20:00 PM     Please review provider order for any additional goals.   Nurse to notify provider when observation goals have been met and patient is ready for discharge.      .

## 2019-11-12 NOTE — PLAN OF CARE
PRIMARY DIAGNOSIS: Malnutrition  OUTPATIENT/OBSERVATION GOALS TO BE MET BEFORE DISCHARGE:  1. ADLs back to baseline: Yes    2. Activity and level of assistance: Up with standby assistance.    3. Pain status: Improved-controlled with oral pain medications.    4. Return to near baseline physical activity: Yes     Discharge Planner Nurse   Safe discharge environment identified: Yes  Barriers to discharge: Yes       Entered by: Hector Barker 11/12/2019 5:58 AM     Please review provider order for any additional goals.   Nurse to notify provider when observation goals have been met and patient is ready for discharge.  Temp: 97.3  F (36.3  C) Temp src: Oral BP: 127/78   Heart Rate: 75 Resp: 14 SpO2: 98 % O2 Device: None (Room air)    Pt A&Ox4. Pt denies pain, N/V/D, numbness, or tingling. Pt tolerating PO meds. Pt SBA for safety. Pt on comfort cares. No IV access. Plan: discharge today, family to transport home with cares.

## 2019-11-12 NOTE — DISCHARGE SUMMARY
[facility] [place]  Hospitalist Discharge Summary       Date of Admission:  11/8/2019  Date of Discharge:  11/12/2019  Discharging Provider: Mukul Gonzales MD      Discharge Diagnoses   Failure to thrive, vomiting, transition to hospice    Follow-ups Needed After Discharge   Follow-up Appointments     Follow-up and recommended labs and tests       Follow up with Hospice and Hospice providers             Unresulted Labs Ordered in the Past 30 Days of this Admission     Date and Time Order Name Status Description    11/9/2019 1716 Blood culture Preliminary     11/9/2019 1716 Blood culture Preliminary       These results will be followed up by Hospitalist    Discharge Disposition   Discharged to home  Condition at discharge: Stable    Hospital Course   69 yo with stage 2 pancreatic cancer (only tolerated one round of chemo), recent liver abscess, severe malnutrition. Admitted on 11/8/2019 after PEG placement for monitoring/starting feeds     Severe protein malnutrition with recent placement of PEG tube    - patient vomited tube feeds     - decision made to not do any tube feeds    - patient does not want tube feeds    - I spoke with Dr. Hahn in IR: PEG was just placed any typically should not be removed until after 4 weeks    - patient does not want IVF     Pancreatic cancer    - underwent one round of gemcitabine and abraxane    - I personally spoke with Dr. Farrell today: she feels hospice is appropriate if that is what the patient wants    - patient is requesting hospice    - Pall care and hospice have met with patient and family. Plan to discharge on hospice. Pall care has already completed hospice orders     Diabetes    - new onset    - covering with ISS    - no monitoring needed on discharge     Comfort cares    - meds ordered    Patient doing well today. No pain. No complaints. Ready for discharge. Will contact family. Hospice to meet patient at home at 1.    Consultations This Hospital Stay   PHARMACY  IP CONSULT  NUTRITION SERVICES ADULT IP CONSULT  PHARMACY IP CONSULT  SOCIAL WORK IP CONSULT  PALLIATIVE CARE ADULT IP CONSULT  SOCIAL WORK IP CONSULT    Code Status   DNR/DNI    Time Spent on this Encounter   I, Mukul Gonzales MD, personally saw the patient today and spent greater than 30 minutes discharging this patient.       Mukul Gonzales MD  [facility]  ______________________________________________________________________    Physical Exam   Vital Signs: Temp: 97.9  F (36.6  C) Temp src: Oral BP: 99/66   Heart Rate: 93 Resp: 18 SpO2: 99 % O2 Device: None (Room air)    Weight: 127 lbs 0 oz  Constitutional: awake, alert, cooperative, no apparent distress, and appears stated age  Eyes: Lids and lashes normal, pupils equal, round and reactive to light, extra ocular muscles intact, sclera clear, conjunctiva normal  ENT: Normocephalic, without obvious abnormality, atraumatic, sinuses nontender on palpation, external ears without lesions, oral pharynx with moist mucous membranes, tonsils without erythema or exudates, gums normal and good dentition.  Respiratory: No increased work of breathing, good air exchange, clear to auscultation bilaterally, no crackles or wheezing  Cardiovascular: Normal apical impulse, regular rate and rhythm, normal S1 and S2, no S3 or S4, and no murmur noted  GI: No scars, normal bowel sounds, soft, non-distended, non-tender, no masses palpated, no hepatosplenomegally  Skin: no bruising or bleeding  Musculoskeletal: no lower extremity pitting edema present       Primary Care Physician   Chirag Robert    Discharge Orders      Reason for your hospital stay    New PEG tube. Vomiting. Transition to hospice care     Follow-up and recommended labs and tests     Follow up with Hospice and Hospice providers     Activity    Your activity upon discharge: activity as tolerated     Discharge Instructions    [facility]     Diet    Follow this diet upon discharge: Orders Placed  This Encounter      Regular Diet Adult       Significant Results and Procedures   Most Recent 3 CBC's:  Recent Labs   Lab Test 11/11/19  0553 11/08/19  1241 10/20/19  1023 10/09/19  0558   WBC  --  3.2* 6.3 4.3   HGB  --  10.1* 13.1* 11.3*   MCV  --  97 98 95    176 180 141*     Most Recent 3 BMP's:  Recent Labs   Lab Test 11/11/19  0553 11/10/19  0552 11/09/19  0615 11/08/19  1241     --  139 140   POTASSIUM 3.8 3.5 3.7 3.6   CHLORIDE 103  --  106 106   CO2 28  --  27 27   BUN 4*  --  2* 4*   CR 0.67  --  0.71 0.74   ANIONGAP 7  --  6 7   MATTHEW 9.0  --  8.2* 8.2*   *  --  114* 117*     Most Recent 2 LFT's:  Recent Labs   Lab Test 10/20/19  1023 10/10/19  0646   AST 24 14   ALT 16 6   ALKPHOS 145 128   BILITOTAL 1.0 0.5   ,   Results for orders placed or performed during the hospital encounter of 11/05/19   XR Percutaneous Gastrostomy Tube Plmt    Narrative    GASTROSTOMY TUBE PLACEMENT 11/5/2019 10:51 AM    An NG tube was placed in the stomach.  The stomach was distended with  air through the tube.  Ultrasound was used to kike the inferior edge  of the lobe of the liver.  From a subxiphoid subhepatic approach,  lidocaine was administered on the skin of the anterior abdomen.  A  short incision was made at this point.  Two T fasteners were placed in  the body of the stomach in standard fashion.  A third puncture was  made into the body of the stomach and a wire directed into the fundus.   A  peel-away sheath was placed in the stomach and a gastrostomy  catheter was advanced over the wire into the stomach.  The balloon was  inflated  and pulled back against the anterior wall of the stomach.   The sheath was removed.  Contrast was injected to confirm the catheter  position in the stomach.  The catheter was then secured in place.  The  T fasteners were secured on the skin with interrupted stitches.  There  were no initial complications.  The tube can be used in 6 hours if the  patient's exam is  stable.    Sedation: A moderate level of sedation achieved with 1 mg of midazolam                  25 mcg fentanyl    Sedation given by our nursing staff under my supervision.    Sedation time: 24 minutes    Fluoro time:  0.9 minutes  Air Kerma: 2 mGy    Local anesthetic:  20 mL of 1% lidocaine given without difficulty.  Contrast: 50 mL of Omnipaque 240 administered into the gastric lumen  without complication.    FINDINGS: Two spot fluoroscopic images confirm appropriate placement  of 14 French NAOHMY gastrostomy tube and adjacent T-tacks.      Impression    IMPRESSION:   1. Successful placement of 14 French NAHOMY gastrostomy tube and two  T-tacks.  2. May be used after 6 hours if vital signs remain normal and no  evidence complication (peritoneal signs).     JOSIANE WADDELL MD       Discharge Medications   Current Discharge Medication List      START taking these medications    Details   acetaminophen (TYLENOL) 650 MG suppository Place 1 suppository (650 mg) rectally every 4 hours as needed for fever or mild pain (Do not exceed 4000 mg total acetaminophen per day.) Unwrap prior to insertion.  Qty: 12 suppository, Refills: 1    Associated Diagnoses: Malignant neoplasm of head of pancreas (H)      atropine 1 % ophthalmic solution Take 2-4 drops by mouth, place under tongue or place inside cheek every 2 hours as needed for other (terminal respiratory secretions) Not for ophthalmic use.  Qty: 5 mL, Refills: 1    Associated Diagnoses: Malignant neoplasm of head of pancreas (H)      bisacodyl (DULCOLAX) 10 MG suppository Unwrap and insert 1 suppository rectally twice daily as needed for constipation.  Qty: 12 suppository, Refills: 1    Associated Diagnoses: Malignant neoplasm of head of pancreas (H)      haloperidol (HALDOL) 0.5 MG tablet Take 1-2 tablets (0.5-1 mg) by mouth, place under tongue or insert rectally every 6 hours as needed for agitation (nausea)  Qty: 30 tablet, Refills: 1    Associated Diagnoses: Malignant  neoplasm of head of pancreas (H)      !! LORazepam (ATIVAN) 0.5 MG tablet Take 0.5-1 tablets (0.25-0.5 mg) by mouth, place under tongue or insert rectally every 4 hours as needed for anxiety (restlessness)  Qty: 30 tablet, Refills: 1    Associated Diagnoses: Malignant neoplasm of head of pancreas (H)      MEDICATION INSTRUCTION If care facility cannot accept or use ranges, facility is instructed to use lower end of dosing range  Qty: 1 each, Refills: 0    Associated Diagnoses: Malignant neoplasm of head of pancreas (H)      !! morphine sulfate, high concentrate, (ROXANOL-CONCENTRATED) 20 MG/ML concentrated solution Take 0.5 mLs (10 mg) by mouth every 2 hours as needed for shortness of breath / dyspnea or breakthrough pain (dyspnea or respiratory rate greater than 20)  Qty: 30 mL, Refills: 0    Comments: [facility] patient  Associated Diagnoses: Malignant neoplasm of head of pancreas (H)      !! morphine sulfate, high concentrate, (ROXANOL-CONCENTRATED) 20 MG/ML concentrated solution Take 0.5 mLs (10 mg) by mouth every 2 hours as needed for shortness of breath / dyspnea or breakthrough pain (dyspnea or respiratory rate greater than 20)  Qty: 30 mL, Refills: 0    Associated Diagnoses: Malignant neoplasm of head of pancreas (H)       !! - Potential duplicate medications found. Please discuss with provider.      CONTINUE these medications which have NOT CHANGED    Details   insulin aspart (NOVOLOG VIAL) 100 UNITS/ML vial Inject Subcutaneous 3 times daily (with meals) Inject as per sliding scale: if 150 - 200 = 2 unit; 201 - 250 = 3 units;  251 - 300 = 4 units; 301 - 350 = 5 units; 351 - 400 = 6 units;  401+ = 7 units      !! LORazepam (ATIVAN) 0.5 MG tablet Take 0.5 mg by mouth 2 times daily And PRN      medical cannabis (Patient's own supply) daily (The purpose of this order is to document that the patient reports taking medical cannabis.  This is not a prescription, and is not used to certify that the patient  has a qualifying medical condition.)      morphine (MS CONTIN) 15 MG CR tablet Take 1 tablet (15 mg) by mouth every 12 hours  Qty: 60 tablet, Refills: 0    Associated Diagnoses: Other chronic pain      ondansetron (ZOFRAN) 4 MG tablet Take 4 mg by mouth 2 times daily And every 6 hours as needed      polyethylene glycol (MIRALAX/GLYCOLAX) powder Take 17 g by mouth daily       Triprolidine-Pseudoephedrine 2.5-60 MG TABS Take 0.5 tablets by mouth every 6 hours as needed (allergies)      acetaminophen (TYLENOL) 500 MG tablet Take 1-2 tablets by mouth every 6 hours as needed for pain.      sildenafil (REVATIO) 20 MG tablet Take 20 mg by mouth as needed Hold while at [facility]      simethicone (MYLICON) 125 MG chewable tablet Take 125 mg by mouth 2 times daily as needed (abdominal pain)       !! - Potential duplicate medications found. Please discuss with provider.      STOP taking these medications       oxyCODONE (ROXICODONE) 5 MG tablet Comments:   Reason for Stopping:         sulfamethoxazole-trimethoprim (BACTRIM DS/SEPTRA DS) 800-160 MG tablet Comments:   Reason for Stopping:             Allergies   Allergies   Allergen Reactions     Dust Mites      Mold      No Known Drug Allergies

## 2019-11-12 NOTE — PLAN OF CARE
"PRIMARY DIAGNOSIS: Malnutrition  OUTPATIENT/OBSERVATION GOALS TO BE MET BEFORE DISCHARGE:  ADLs back to baseline: Yes     Activity and level of assistance: Up with standby assistance.     Pain status: Improved-controlled with oral pain medications.     Return to near baseline physical activity: Yes          Discharge Planner Nurse   Safe discharge environment identified: Yes  Barriers to discharge: Yes       Entered by: Yelena Choudhary RN November 12, 2019  Please review provider order for any additional goals.   Nurse to notify provider when observation goals have been met and patient is ready for discharge.  Temp: BP 99/66 (BP Location: Right arm)   Pulse 65   Temp 97.9  F (36.6  C) (Oral)   Resp 18   Ht 1.803 m (5' 11\")   Wt 57.6 kg (127 lb)   SpO2 99%   BMI 17.71 kg/m      Pt A&Ox4. Pt denies pain, N/V/D, numbness, or tingling. Pt tolerating PO meds. Pt SBA for safety. Pt on comfort cares. No IV access. Plan: discharge today, family to transport home with cares.   Pt currently sleeping         "

## 2019-11-12 NOTE — PLAN OF CARE
Patient's After Visit Summary was reviewed with patient.   Patient verbalized understanding of After Visit Summary, recommended follow up and was given an opportunity to ask questions.   Discharge medications sent home with patient/family: Not applicable, hospice will be meeting the patient at home to set up hospice care  Discharged with daughter.    OBSERVATION patient END time: 11:28 AM

## 2019-11-13 NOTE — TELEPHONE ENCOUNTER
Fax received from [facility] for review and signature.  Put in Dr. Robert's in basket.

## 2019-11-13 NOTE — PROGRESS NOTES
CHW received referral from discharge report.  Upon further chart review, patient was discharge to Hospice care.  CC outreach will not be made at this time.  I have forwarded this message to PETE Clark.

## 2019-11-13 NOTE — TELEPHONE ENCOUNTER
IP F/U    Date: 11/12/196  Diagnosis: Failure To Thrive In Adult, Malignant Neoplasm Of Head Of Pancreas (H)  Is patient active in care coordination? Yes - Route to Care Coordination (P [id number])  Was patient in TCU? No    Patient currently being admitted in the Hospice program.  Will close encounter

## 2019-11-15 NOTE — TELEPHONE ENCOUNTER
Fax received from [facility] 11/05/19 for review and signature.  Put in Dr. Robert's in basket.

## 2019-11-19 NOTE — TELEPHONE ENCOUNTER
Fax received from [facility] 11/12/19 for review and signature.  Put in Dr. Robert's in basket.

## 2019-11-19 NOTE — TELEPHONE ENCOUNTER
Fax received from [facility] 11/15/19 for review and signature.  Put in Dr. Robert's in basket.

## 2019-12-04 NOTE — TELEPHONE ENCOUNTER
Fax received from [facility] 12/02/19 for review and signature.  Put in Dr. Robert's in basket.

## 2019-12-08 NOTE — ED AVS SNAPSHOT
[facility]  [address]  [facility] [address]  Phone:  [phone]  Fax:  [phone]                                    Jesús Stock   MRN: [id number]    Department:  [facility]   Date of Visit:  12/8/2019           After Visit Summary Signature Page    I have received my discharge instructions, and my questions have been answered. I have discussed any challenges I see with this plan with the nurse or doctor.    ..........................................................................................................................................  Patient/Patient Representative Signature      ..........................................................................................................................................  Patient Representative Print Name and Relationship to Patient    ..................................................               ................................................  Date                                   Time    ..........................................................................................................................................  Reviewed by Signature/Title    ...................................................              ..............................................  Date                                               Time          22EPIC Rev 08/18

## 2019-12-09 NOTE — ED TRIAGE NOTES
Patient presents to ED due to n/v unable to keep anything down.    Given    Ativan  Haldol  Morphine     Hx pancreatic CA

## 2019-12-09 NOTE — ED PROVIDER NOTES
"  History     Chief Complaint:  Vomiting    The history is provided by a relative (daughter).      Jesús Stock is a 68 year old male with a history of type 2 diabetes and pancreatic cancer who arrived via EMS from hospice and presents with nausea and vomiting. The patient's vomiting began at 1730 this evening and the vomit is dark, fibrous in appearance, and very mucousy. He also keeps getting hiccups and his feeding tube has \"dark stuff\" coming out of it. They deny any fevers. The patient is constipated and has not had a bowel movement in 3 days. He has only eaten a waffle this morning and drank water the rest of the day. Hospice tried ativan, haldol, and morphine for his symptoms with no relief. Of note: the patient has been in hospice since November 12th.    Allergies:  No known drug allergies     Medications:    Dulcolax  Haldol  Novolog vial  Ativan  Medical cannabis  MS contin  Morphine sulfate  Zofran  Miralax  Revatio  Mylicon  Triprolidine-Pseudoephedrine   Insulin glargine  Zofran  Simethicone  Actifed  Marinol  Roxicodone    Past Medical History:    Alcohol dependence in remission  Bipolar disorder  Cancer associated pain  Colon polyps  Depression  Diverticulosis of colon  Osteoarthrosis  Obesity  Pancreatic cancer  Type 2 diabetes    Past Surgical History:    Appendectomy  Colonoscopy x 2  Endoscopic retrograde cholangiopancreatogram  Endoscopic retrograde cholangiopancreatography, exchange tube/stent  EGD, combined  Hernia repair x 3  Chest port placement  Port removal right  Tonsillectomy    Family History:    Diabetes - father  Hypertension - mother    Social History:  Positive for tobacco use - occasional.  Negative for alcohol use - sober for 27 years.  Negative for drug use.  Patient accompanied to the ER by his wife and daughter.    Review of Systems   Constitutional: Negative for fever.   Gastrointestinal: Positive for constipation, nausea and vomiting.   All other systems reviewed and are " negative.      Physical Exam     Patient Vitals for the past 24 hrs:   BP Temp Temp src Pulse Heart Rate Resp SpO2   12/09/19 0230 132/87 -- -- 84 -- -- --   12/09/19 0039 -- -- -- -- -- -- 99 %   12/08/19 2330 102/88 -- -- -- -- -- 98 %   12/08/19 2326 102/88 97.9  F (36.6  C) Temporal -- -- -- --   12/08/19 2320 -- -- -- -- 98 18 95 %     Physical Exam    Nursing note and vitals reviewed.  Constitutional: Cooperative. Thin, cachectic, and chronically ill appearing.   HENT:   Mouth/Throat: Dry mucous membranes.  Cardiovascular: Normal rate, regular rhythm and normal heart sounds.  No murmur.  Pulmonary/Chest: Effort normal and breath sounds normal. No respiratory distress. No wheezes. No rales.   Abdominal: Soft. Normal appearance and bowel sounds are normal. No distension. There is no tenderness. There is no rigidity and no guarding. Feeding tube in place.  Neurological: Alert. Oriented x4  Skin: Skin is warm and dry.   Psychiatric: Normal mood and affect.     Emergency Department Course     Interventions:  2353: Zofran 4 mg IV  2353: 0.9% sodium chloride BOLUS 1 L IV  0028: Protonix 40 mg IV  0056: dextrose 5% lactated ringers infusion 1 L IV    Emergency Department Course:  Past medical records, nursing notes, and vitals reviewed.    2330: I performed an exam of the patient as documented above.     0052: I rechecked the patient and discussed the results of his workup thus far. Patient is feeling improved.    0055: I spoke with the patient's hospice physician Dr. Guerrero regarding his care so far.    0209: Patient rechecked and updated.    0238: I spoke with the patient's family over the phone.    I personally reviewed the results with the Patient and spouse and daughter and answered all related questions prior to discharge.     Findings and plan explained to the Patient and spouse and daughter. Patient discharged home with instructions regarding supportive care, medications, and reasons to return. The importance  of close follow-up was reviewed.     Impression & Plan     Medical Decision Making:  Mr. Stock is a 68 year old gentleman with end stage pancreatic cancer on hospice who presents for vomiting. His home anti-emetics have not been working. After 2 L of IV fluid as well as IV Zofran, he has been able to tolerate sips of water. Per his hospice and family request, we did not send any labs or perform any imaging. This is consistent with his plans and wishes. He has been able to tolerate oral fluids after the above interventions and after a discussion with his daughter, she will come to get him and take him home with a plan for close follow up with his hospice team to address his ongoing nausea issues.    Discharge Diagnosis:    ICD-10-CM   1. Non-intractable vomiting with nausea, unspecified vomiting type R11.2   2. Malignant neoplasm of pancreas, unspecified location of malignancy  C25.9   3. Hospice care patient Z51.5     Disposition:  Discharged to home.    Scribe Disclosure:  I, Carla Nick, am serving as a scribe at 11:28 PM on 12/8/2019 to document services personally performed by Micheal Covington MD based on my observations and the provider's statements to me.     Carla Nick  12/8/2019   [facility] EMERGENCY DEPARTMENT       Micheal Covington MD  12/09/19 0324

## 2019-12-10 NOTE — PROGRESS NOTES
This is a recent snapshot of the patient's [facility] medical record.  For current drug dose and complete information and questions, call [phone] or In Basket pool, [facility] ([id number])  CSN Number:  [id number]

## 2019-12-23 NOTE — PROGRESS NOTES
Dear Dr. Robert:      We are notifying you of a Missed Visit or Hospice Death.    Jesús Stock; MRN [id number]   peacefully On date 2019  Time 3:30 AM  Sincerely [facility]  Michelle Jade  [phone]

## 2019-12-26 ENCOUNTER — TELEPHONE (OUTPATIENT)
Dept: INTERNAL MEDICINE | Facility: CLINIC | Age: 69
End: 2019-12-26

## 2019-12-26 NOTE — TELEPHONE ENCOUNTER
Daughter calling wanting Dr Beard to sign off fathers death cert. The memorial service is this Monday and they would like his ashes to be present. Requesting this done today

## 2020-03-22 ENCOUNTER — HEALTH MAINTENANCE LETTER (OUTPATIENT)
Age: 70
End: 2020-03-22

## 2021-01-25 NOTE — PROGRESS NOTES
SUBJECTIVE:   Jesús Stock is a 66 year old male who presents for Preventive Visit.  Are you in the first 12 months of your Medicare coverage?  No    Physical   Annual:     Getting at least 3 servings of Calcium per day::  Yes    Bi-annual eye exam::  NO    Dental care twice a year::  NO    Sleep apnea or symptoms of sleep apnea::  Daytime drowsiness, Excessive snoring and Sleep apnea    Diet::  Regular (no restrictions)    Duration of exercise::  N/A    Taking medications regularly::  Yes    Medication side effects::  None    Additional concerns today::  No      COGNITIVE SCREEN  1) Repeat 3 items (Banana, Sunrise, Chair)    2) Clock draw: NORMAL  3) 3 item recall: Recalls 1 object   Results: NORMAL clock, 1-2 items recalled: COGNITIVE IMPAIRMENT LESS LIKELY    Mini-CogTM Copyright S [employer]. Licensed by the author for use in [facility]; reprinted with permission (simona@[email or web address]). All rights reserved.          Reviewed and updated as needed this visit by clinical staff  Tobacco  Allergies  Med Hx         Reviewed and updated as needed this visit by Provider  Med Hx        Social History   Substance Use Topics     Smoking status: Passive Smoke Exposure - Never Smoker     Types: Cigars     Smokeless tobacco: Never Used      Comment: occasional     Alcohol use No      Comment: sober x 27 years       The patient does not drink >3 drinks per day nor >7 drinks per week.            Today's PHQ-2 Score:   PHQ-2 ( 1999 [employer]) 8/28/2017   Q1: Little interest or pleasure in doing things 0   Q2: Feeling down, depressed or hopeless 0   PHQ-2 Score 0   Q1: Little interest or pleasure in doing things Not at all   Q2: Feeling down, depressed or hopeless Not at all   PHQ-2 Score 0       Do you feel safe in your environment - No - pt's brother lives with him and his mother, has been violent and drinking. Gives him anxiety.    Do you have a Health Care Directive?: No: Advance care planning reviewed with patient;  information given to patient to review.    Current providers sharing in care for this patient include: Patient Care Team:  Tan Jaramillo MD as PCP - General (Internal Medicine)      Hearing impairment: No    Ability to successfully perform activities of daily living: Yes, no assistance needed     Fall risk:  Fallen 2 or more times in the past year?: No  Any fall with injury in the past year?: No      Home safety:  none identified      The following health maintenance items are reviewed in Epic and correct as of today:  Health Maintenance   Topic Date Due     HEPATITIS C SCREENING  [birth year]/11/1968     FALL RISK ASSESSMENT  12/11/2014     PNEUMOCOCCAL (1 of 2 - PCV13) 12/11/2014     AORTIC ANEURYSM SCREENING (SYSTEM ASSIGNED)  12/11/2014     PHQ-9 Q6 MONTHS  05/05/2015     DEPRESSION ACTION PLAN Q1 YR  11/05/2015     LIPID SCREEN Q5 YR MALE (SYSTEM ASSIGNED)  04/20/2016     COLONOSCOPY Q5 YR  05/24/2016     INFLUENZA VACCINE (SYSTEM ASSIGNED)  09/01/2017     ADVANCE DIRECTIVE PLANNING Q5 YRS  12/19/2017     TETANUS IMMUNIZATION (SYSTEM ASSIGNED)  05/18/2021       Past Medical History:  ---------------------------  Past Medical History:   Diagnosis Date     Alcohol dependence in remission sober since 1984 (H)     sober since 8/25/84     Backache, unspecified      Colon polyps 5/24/11    2 colon polyps removed at colonoscopy     Diverticulosis of colon 5/24/11    mild pan-colonic diverticulosis seen as incidental finding on colonoscopy     Major depressive disorder, single episode, mild (H)      Obesity (BMI 30.0-34.9) 08/28/2017    BMI 31.4     Osteoarthrosis, unspecified whether generalized or localized, unspecified site        Past Surgical History:  ---------------------------  Past Surgical History:   Procedure Laterality Date     COLONOSCOPY  5/24/11    2 polyps, mild pan-colonic diverticulosis       Current Medications:  ---------------------------  Current Outpatient Prescriptions   Medication Sig Dispense  "Refill     sildenafil (REVATIO/VIAGRA) 20 MG tablet Take 1-3 tablets (20-60 mg) by mouth daily as needed Never use with nitroglycerin, terazosin or doxazosin. 30 tablet 0     ibuprofen (ADVIL,MOTRIN) 200 MG tablet Take 1 tablet by mouth every 4 hours as needed for pain.       acetaminophen (TYLENOL) 500 MG tablet Take 1-2 tablets by mouth every 6 hours as needed for pain.         Allergies:  -------------  Allergies   Allergen Reactions     Dust Mites      Mold      No Known Drug Allergies        Social History:  -------------------  Social History     Social History     Marital status: Single     Spouse name: N/A     Number of children: N/A     Years of education: N/A     Occupational History     Not on file.     Social History Main Topics     Smoking status: Passive Smoke Exposure - Never Smoker     Types: Cigars     Smokeless tobacco: Never Used      Comment: occasional     Alcohol use No      Comment: sober x 27 years     Drug use: No     Sexual activity: No     Other Topics Concern     Not on file     Social History Narrative       Family Medical History:  ------------------------------  Family History   Problem Relation Age of Onset     DIABETES Father      b:1926     Hypertension Mother      b:1925     Family History Negative Brother      b:1948     Family History Negative Brother      b:1951  deferred tremor     Family History Negative Sister      b:1953     Family History Negative Brother      b:1956     Family History Negative Brother      b:1958          ROS:  Constitutional, HEENT, cardiovascular, pulmonary, gi and gu systems are negative, except as otherwise noted.      OBJECTIVE:   /74  Pulse 68  Temp 98.5  F (36.9  C) (Oral)  Ht 5' 11\" (1.803 m)  Wt 228 lb 4.8 oz (103.6 kg)  SpO2 96%  BMI 31.84 kg/m2 Estimated body mass index is 31.84 kg/(m^2) as calculated from the following:    Height as of this encounter: 5' 11\" (1.803 m).    Weight as of this encounter: 228 lb 4.8 oz (103.6 kg).  EXAM: "   GENERAL alert and no distress.  EYES conjunctivae/corneas clear. PERRL, EOM's intact  HENT: NCAT,oral and posterior pharynx without lesions or erythema, facies symmetric  NECK: Neck supple. No LAD, without thyroidmegaly or JVD.  RESP: Clear to ausculation bilaterally without wheezes or crackles. Normal BS in all fields.  CV: RRR normal S1S2 without  murmurs, rubs or gallops. PMI normal  LYMPH: no cervical lymph adenopathy appreciated  GI: NTND, no organomegaly, normal BS in all quadrants, without rebound or guarding  MS: No cyanosis, clubbing or edema noted bilaterally in Upper and/or Lower Extremities  SKIN: no significant ulcers, lesions or rashes on the visualized portions of the skin  NEURO: Alert and Oriented x 3, Gait normal. Reflexes normal and symmetric. Sensation grossly WNL..  PSYCH: Alert and oriented times 3; speech- coherent , normal rate and volume; able to articulate logical thoughts, able to abstract reason, no tangential thoughts, no hallucinations or delusions, affect- normal GENERAL alert and no distress.  EYES conjunctivae/corneas clear. PERRL, EOM's intact  HENT: NCAT,oral and posterior pharynx without lesions or erythema, facies symmetric  NECK: Neck supple. No LAD, without thyroidmegaly or JVD.  RESP: Clear to ausculation bilaterally without wheezes or crackles. Normal BS in all fields.  CV: RRR normal S1S2 without  murmurs, rubs or gallops. PMI normal  LYMPH: no cervical lymph adenopathy appreciated  GI: NTND, no organomegaly, normal BS in all quadrants, without rebound or guarding  MS: No cyanosis, clubbing or edema noted bilaterally in Upper and/or Lower Extremities  SKIN: no significant ulcers, lesions or rashes on the visualized portions of the skin  NEURO: Alert and Oriented x 3, Gait normal. Reflexes normal and symmetric. Sensation grossly WNL..  PSYCH: Alert and oriented times 3; speech- coherent , normal rate and volume; able to articulate logical thoughts, able to abstract reason,  Vital Signs/Plan of Care/Assessment and Intervention Physical Therapy Evaluation no tangential thoughts, no hallucinations or delusions, affect- normal   ASSESSMENT / PLAN:     (Z00.00) Medicare annual wellness visit, subsequent  (primary encounter diagnosis)  Comment: Discussed cardiac disease risk factor modification including screening for and treating HTN, lipids, DM, and smoking cessation.  Also discussed age appropriate cancer screening recommendations including testicular, prostate, colon and lung cancer as dictated by age group.  Recommended low fat, low salt diet and moderation in any alcohol intake.  Recommended always using seatbelts when in a car.  Recommended never driving after drinking or riding with someone who has been drinking as well.       Plan:     (F32.0) Mild major depression (H)  Comment: has managed his symptosm with lifestyle cahnges and preferfs not to take any medications at this time.   He feels like he is doing well.   Denies any signs and symptoms of depression at this time.   Plan: CBC with platelets and differential, TSH with         free T4 reflex, Vitamin D Deficiency            (F10.21) Alcohol dependence in remission sober since 1984 (H)  Comment: doing well, sober since 1984 by his report.   Plan:     (Z12.11) Special screening for malignant neoplasms, colon  Comment: Discussed current colon cancer screening recommendations calling for colonoscopy as gold standard for colon cancer screening.  Instructed them to check with their insurance coverage to see what is covered, and then referral given for colonoscopy.    If colonoscopy not covered or the patient does not want to do this study, then we can do a FIT test annually, but I told them that this is a much inferior screening method for colon cancer.    Plan: GASTROENTEROLOGY ADULT REF PROCEDURE ONLY            (Z13.220) Screening for lipid disorders  Comment:   Plan:     (Z71.89) ACP (advance care planning)  Comment:   Plan:     (Z13.6) CARDIOVASCULAR SCREENING; LDL GOAL LESS THAN 130  Comment: Discussed  cardiac disease risk factors and cardiac disease risk factor modification.   Plan: CBC with platelets and differential, Basic         metabolic panel, Lipid panel reflex to direct         LDL            (Z23) Need for vaccination  Comment:   Plan: Pneumococcal vaccine 13 valent PCV13 IM         (Prevnar) [60019], ADMIN MEDICARE: Pneumococcal        Vaccine ()            (Z13.29) Screening for thyroid disorder  Comment:   Plan: TSH with free T4 reflex            (Z12.5) Special screening for malignant neoplasm of prostate  Comment: Discussed recent controversies in prostate cancer screening, including the utility and limitations of the PSA blood test (many false positives).  The patient understands this and wishes to have this checked.   Plan: PSA, screen            (Z11.59) Need for hepatitis C screening test  Comment:   Plan: **Hepatitis C Screen Reflex to RNA FUTURE         anytime            (E55.9) Vitamin D deficiency  Comment:   Plan: Vitamin D Deficiency            (E66.9) Obesity (BMI 30.0-34.9)  Comment: The patient's current BMI is: Body mass index is 31.84 kg/(m^2).  Obesity is a biological preventable and treatable disease, which is associated with significantly increased risk of many acute and chronic health conditions. Obesity has now been recognized as a chronic disease by the American Medical Association.  A range of serious co-morbidities are associated with obesity including increased risk for hypertension, stroke, coronary artery disease, dyslipidemia, Type II diabetes, depression, sleep apnea, cancers of the colon, breast and endometrium, obstructive sleep apnea, osteoarthritis and female infertility. Therefore, obesity is not just a problem; it s a disease that warrants serious evidence based treatements.  Recommended regular aerobic exercise, recommended improved diet aiming at lowering amount of processed foods, lower sugars, and lower wheat products, and moderation carbs and fat in the  "diet, establishing more regular meal times, always eating breakfast, front loading some of the calories and adding more protein to the diet.    Discussed the increased risks of developing or worsening all types of diabetes and other dysmetabolic syndromes.    Surgical therapy may be considered, especially in patients with BMI greater than or equal to 35 kg/m2 with multiple complications. Surgical treatments include lap-band, gastric sleeve or gastric bypass surgery.     Plan:     (N52.8) Other male erectile dysfunction  Comment: wants tro y viagra (generic sildenafil)  Discussed Viagra in detail including proposed mechanism of action, potential side effects including but not limited to dizziness, dyspepsia, nausea, blurred vision, priapism, and syncope.  Discussed that nitrates are never to be used when taking this medication.  Also recommended that this medication not be taken around alcohol as the risk of side effects is likely to increase.   Plan: sildenafil (REVATIO/VIAGRA) 20 MG tablet               End of Life Planning:  Patient currently has an advanced directive: Yes.  Practitioner is supportive of decision.    COUNSELING:  Reviewed preventive health counseling, as reflected in patient instructions       Regular exercise       Healthy diet/nutrition       Vision screening       Hearing screening       Dental care       Aspirin Prophylaxsis       Alcohol Use       Hepatitis C screening       Colon cancer screening       Prostate cancer screening        Estimated body mass index is 31.84 kg/(m^2) as calculated from the following:    Height as of this encounter: 5' 11\" (1.803 m).    Weight as of this encounter: 228 lb 4.8 oz (103.6 kg).  Weight management plan: better diet discussed   reports that he is a non-smoker but has been exposed to tobacco smoke. He has never used smokeless tobacco.        Appropriate preventive services were discussed with this patient, including applicable screening as appropriate for " cardiovascular disease, diabetes, osteopenia/osteoporosis, and glaucoma.  As appropriate for age/gender, discussed screening for colorectal cancer, prostate cancer, breast cancer, and cervical cancer. Checklist reviewing preventive services available has been given to the patient.    Reviewed patients plan of care and provided an AVS. The Basic Care Plan (routine screening as documented in Health Maintenance) for Jesús meets the Care Plan requirement. This Care Plan has been established and reviewed with the Patient.    Counseling Resources:  ATP IV Guidelines  Pooled Cohorts Equation Calculator  Breast Cancer Risk Calculator  FRAX Risk Assessment  ICSI Preventive Guidelines  Dietary Guidelines for Americans, 2010  [employer]'s MyPlate  ASA Prophylaxis  Lung CA Screening    Tan Jaramillo MD  [facility]   Answers for HPI/ROS submitted by the patient on 8/28/2017   PHQ-2 Score: 0

## 2022-04-30 NOTE — PROGRESS NOTES
"  SUBJECTIVE:   Jesús Stock is a 68 year old male who presents for Preventive Visit.    Discussed hat additional charges may be applied for addressing concerns at today's visit above and beyond what is covered by the \"routine physical exam\" diagnosis code.  Patient verbalized understanding and would like additional concerns addressed today.    Spent greater than 15 minutes above and beyond routine Health Care Maintenence issues on the above mentioned additional problem(s).     1.  Patient is daughter report an unintended weight loss over the past 3 months, approximately 30 lbs.   Reports feeling diffusely weak, lack of energy.  Eating fine, no changes in BMs. No blood in stool, no diarrhea.   Reports very dry mouth, frequent urination, drinking lots of water.  No dysphagia or difficulties swallowing.  Reports some intermittent abdominal cramping.  Possibly more shortness of breath, no fevers, no night sweats.  No chest pain with activity or exertion.  Patient is a former smoker who quit over 40 years ago.  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  Patient's daughter denies any significant changes in his mental health such as unusual behaviors, hallucinations.  Reports his anxiety has been higher over the past few years due to caring for his mother who recently passed away.  The daughter feels that the patient is \"wasting away \".  Patient denies any focal neurological symptoms such as worsening tremor, focal weakness, numbness or tingling.    2.  Prior history of depression with anxious features.  The patient's denies any active depressive symptoms at this time and feels his anxiety is relatively well controlled.  His daughter feels that he may be having a little more anxiety than usual due to recent death of his mother and moving into a home with his daughter.  She is not that this is an issue.    Are you in the first 12 months of your Medicare Part B coverage?  " "No    Physical Health:    In general, how would you rate your overall physical health? poor    Outside of work, how many days during the week do you exercise? none    Outside of work, approximately how many minutes a day do you exercise?not applicable    If you drink alcohol do you typically have >3 drinks per day or >7 drinks per week? No    Do you usually eat at least 4 servings of fruit and vegetables a day, include whole grains & fiber and avoid regularly eating high fat or \"junk\" foods? Yes    Do you have any problems taking medications regularly?  No    Do you have any side effects from medications? none    Needs assistance for the following daily activities: no assistance needed    Which of the following safety concerns are present in your home?  none identified     Hearing impairment: No    In the past 6 months, have you been bothered by leaking of urine? no    Mental Health:    In general, how would you rate your overall mental or emotional health? poor  PHQ-2 Score:      Do you feel safe in your environment? Yes    Do you have a Health Care Directive? No: Advance care planning was reviewed with patient; patient declined at this time.    Additional concerns to address?  See above    Fall risk:  Fallen 2 or more times in the past year?: No  Any fall with injury in the past year?: No    Cognitive Screenin) Repeat 3 items (Leader, Season, Table)    2) Clock draw: ABNORMAL missing numbers, not a nice round [place], hands in the appropraite spot.  Would be OK for a clock without numbers  3) 3 item recall: Recalls NO objects   Results: 0 items recalled: PROBABLE COGNITIVE IMPAIRMENT, **INFORM PROVIDER**    Mini-CogTM Copyright MARQUEZ Theodore. Licensed by the author for use in [facility]; reprinted with permission (simona@.[email or web address]). All rights reserved.      Do you have sleep apnea, excessive snoring or daytime drowsiness?: no            Reviewed and updated as needed this visit by clinical staff  Tobacco "  Allergies  Meds         Reviewed and updated as needed this visit by Provider        Social History     Tobacco Use     Smoking status: Passive Smoke Exposure - Never Smoker     Smokeless tobacco: Never Used     Tobacco comment: occasional   Substance Use Topics     Alcohol use: No     Comment: sober x 27 years                           Current providers sharing in care for this patient include:   Patient Care Team:  Tan Jaramillo MD as PCP - General (Internal Medicine)  Tan Jaramillo MD as Assigned PCP    The following health maintenance items are reviewed in Epic and correct as of today:  Health Maintenance   Topic Date Due     FOOT EXAM Q1 YEAR  12/11/1951     EYE EXAM Q1 YEAR  12/11/1951     A1C Q6 MO  12/11/1951     MICROALBUMIN Q1 YEAR  12/11/1951     ZOSTER IMMUNIZATION (1 of 2) 12/11/1999     AORTIC ANEURYSM SCREENING (SYSTEM ASSIGNED)  12/11/2014     PHQ-9 Q6 MONTHS  02/28/2018     CREATININE Q1 YEAR  08/28/2018     MEDICARE ANNUAL WELLNESS VISIT  08/28/2018     FALL RISK ASSESSMENT  08/28/2018     LIPID MONITORING Q1 YEAR  08/28/2018     PNEUMOCOCCAL IMMUNIZATION 65+ LOW/MEDIUM RISK (2 of 2 - PPSV23) 08/28/2018     INFLUENZA VACCINE (1) 09/01/2018     TSH W/ FREE T4 REFLEX Q2 YEAR  08/28/2019     DTAP/TDAP/TD IMMUNIZATION (2 - Td) 05/18/2021     ADVANCE DIRECTIVE PLANNING Q5 YRS  08/28/2022     COLONOSCOPY Q5 YR  09/07/2022     DEPRESSION ACTION PLAN  Completed     HEPATITIS C SCREENING  Completed     IPV IMMUNIZATION  Aged Out     MENINGITIS IMMUNIZATION  Aged Out       Past Medical History:  ---------------------------  Past Medical History:   Diagnosis Date     Alcohol dependence in remission sober since 1984 (H)     sober since 8/25/84     Backache, unspecified      Colon polyps 5/24/11    2 colon polyps removed at colonoscopy     Diverticulosis of colon 5/24/11    mild pan-colonic diverticulosis seen as incidental finding on colonoscopy     Major depressive disorder, single  episode, mild (H)      Obesity (BMI 30.0-34.9) 08/28/2017    BMI 31.4     Osteoarthrosis, unspecified whether generalized or localized, unspecified site      Type 2 diabetes mellitus without complication, with long-term current use of insulin (H) 03/13/2019    glucose 395       Past Surgical History:  ---------------------------  Past Surgical History:   Procedure Laterality Date     APPENDECTOMY       COLONOSCOPY  5/24/11    2 polyps, mild pan-colonic diverticulosis     COLONOSCOPY N/A 9/7/2017    Procedure: COMBINED COLONOSCOPY, SINGLE OR MULTIPLE BIOPSY/POLYPECTOMY BY BIOPSY;;  Surgeon: Gene Grimes MD;  Location: [facility]     HERNIA REPAIR      X3     TONSILLECTOMY         Current Medications:  ---------------------------  Current Outpatient Medications   Medication Sig Dispense Refill     acetaminophen (TYLENOL) 500 MG tablet Take 1-2 tablets by mouth every 6 hours as needed for pain.       alcohol swab prep pads Use to swab area of injection/valerie as directed. 100 each 3     blood glucose (NO BRAND SPECIFIED) test strip Use to test blood sugar 3 times daily or as directed. To accompany: Blood Glucose Monitor Brands: per insurance. 100 strip 6     blood glucose calibration (NO BRAND SPECIFIED) solution Check once per month To accompany: Blood Glucose Monitor Brands: per insurance. 1 Bottle 3     blood glucose monitoring (NO BRAND SPECIFIED) meter device kit Use to test blood sugar as directed. Preferred blood glucose meter OR supplies to accompany: Blood Glucose Monitor Brands: per insurance. 1 kit 0     glimepiride (AMARYL) 1 MG tablet Take 1 tablet (1 mg) by mouth every morning (before breakfast) 30 tablet 2     ibuprofen (ADVIL,MOTRIN) 200 MG tablet Take 1 tablet by mouth every 4 hours as needed for pain.       insulin glargine (LANTUS SOLOSTAR PEN) 100 UNIT/ML pen Inject 15 Units Subcutaneous daily Lantus Solostar Pen 15 mL 1     insulin pen needle (31G X 5 MM) 31G X 5 MM miscellaneous Use pen needles daily  or as directed. 100 each 0     metFORMIN (GLUCOPHAGE-XR) 500 MG 24 hr tablet Take 2 tablets (1,000 mg) by mouth daily (with dinner) 60 tablet 2     thin (NO BRAND SPECIFIED) lancets Check glucose 3-4 times per day; Use with lanceting device. To accompany: Blood Glucose Monitor Brands: per insurance. 100 each 6       Allergies:  -------------  Allergies   Allergen Reactions     Dust Mites      Mold      No Known Drug Allergies        Social History:  -------------------  Social History     Socioeconomic History     Marital status: Single     Spouse name: Not on file     Number of children: Not on file     Years of education: Not on file     Highest education level: Not on file   Occupational History     Not on file   Social Needs     Financial resource strain: Not on file     Food insecurity:     Worry: Not on file     Inability: Not on file     Transportation needs:     Medical: Not on file     Non-medical: Not on file   Tobacco Use     Smoking status: Passive Smoke Exposure - Never Smoker     Smokeless tobacco: Never Used     Tobacco comment: occasional   Substance and Sexual Activity     Alcohol use: No     Comment: sober x 27 years     Drug use: No     Sexual activity: No   Lifestyle     Physical activity:     Days per week: Not on file     Minutes per session: Not on file     Stress: Not on file   Relationships     Social connections:     Talks on phone: Not on file     Gets together: Not on file     Attends [employer] service: Not on file     Active member of club or organization: Not on file     Attends meetings of clubs or organizations: Not on file     Relationship status: Not on file     Intimate partner violence:     Fear of current or ex partner: Not on file     Emotionally abused: Not on file     Physically abused: Not on file     Forced sexual activity: Not on file   Other Topics Concern     Parent/sibling w/ CABG, MI or angioplasty before 65F 55M? Not Asked   Social History Narrative     Not on file  "      Family Medical History:  ------------------------------  Family History   Problem Relation Age of Onset     Diabetes Father         b:1926     Hypertension Mother         b:1925     Family History Negative Brother         b:1948     Family History Negative Brother         b:1951  deferred tremor     Family History Negative Sister         b:1953     Family History Negative Brother         b:1956     Family History Negative Brother         b:1958        ROS:  Constitutional, HEENT, cardiovascular, pulmonary, gi and gu systems are negative, except as otherwise noted.    OBJECTIVE:   /70   Pulse 85   Temp 97.5  F (36.4  C) (Oral)   Ht 1.803 m (5' 11\")   Wt 90.3 kg (199 lb)   SpO2 98%   BMI 27.75 kg/m   Estimated body mass index is 27.75 kg/m  as calculated from the following:    Height as of this encounter: 1.803 m (5' 11\").    Weight as of this encounter: 90.3 kg (199 lb).  EXAM:   GENERAL alert and no distress  EYES:  Normal sclera,conjunctiva, EOMI  HENT: oral and posterior pharynx mucosal membranes are dry, but without lesions or erythema, facies symmetric  NECK: Neck supple. No LAD, without thyroidmegaly or JVD., Carotids without bruits.  RESP: Clear to ausculation bilaterally without wheezes or crackles. Normal BS in all fields.  CV: RRR normal S1S2 without murmurs, rubs or gallops. PMI normal  LYMPH: no cervical lymph adenopathy appreciated  MS: extremities- no gross deformities of the visible extremities noted, no edema  SKIN:  No obvious significant skin lesions on visible portions of face   ABD:  Soft, nontedner, normla bowel sounds, no masses.     Results for orders placed or performed in visit on 03/13/19   Comprehensive metabolic panel   Result Value Ref [facility]    Sodium 134 133 - 144 mmol/L    Potassium 4.3 3.4 - 5.3 mmol/L    Chloride 101 94 - 109 mmol/L    Carbon Dioxide 25 20 - 32 mmol/L    Anion Gap 8 3 - 14 mmol/L    Glucose 395 (H) 70 - 99 mg/dL    Urea Nitrogen 22 7 - 30 mg/dL    " Creatinine 0.96 0.66 - 1.25 mg/dL    GFR Estimate 81 >60 mL/min/[1.73_m2]    GFR Estimate If Black >90 >60 mL/min/[1.73_m2]    Calcium 9.4 8.5 - 10.1 mg/dL    Bilirubin Total 0.5 0.2 - 1.3 mg/dL    Albumin 4.0 3.4 - 5.0 g/dL    Protein Total 7.7 6.8 - 8.8 g/dL    Alkaline Phosphatase 128 40 - 150 U/L    ALT 21 0 - 70 U/L    AST 15 0 - 45 U/L   TSH with free T4 reflex   Result Value Ref [facility]    TSH 1.75 0.40 - 4.00 mU/L   *UA reflex to Microscopic and Culture ([facility] (except [place] and [place])   Result Value Ref [facility]    Color Urine Yellow     Appearance Urine Clear     Glucose Urine >=1000 (A) NEG^Negative mg/dL    Bilirubin Urine Negative NEG^Negative    Ketones Urine 40 (A) NEG^Negative mg/dL    Specific Gravity Urine 1.025 1.003 - 1.035    Blood Urine Negative NEG^Negative    pH Urine 5.0 5.0 - 7.0 pH    Protein Albumin Urine Negative NEG^Negative mg/dL    Urobilinogen Urine 0.2 0.2 - 1.0 EU/dL    Nitrite Urine Negative NEG^Negative    Leukocyte Esterase Urine Negative NEG^Negative    Source Midstream Urine    Hemoglobin A1c   Result Value Ref [facility]    Hemoglobin A1C 14.0 (H) 0 - 5.6 %        ASSESSMENT / PLAN:     (Z00.00) Medicare annual wellness visit, subsequent  (primary encounter diagnosis)  Comment: Discussed cardiac disease risk factors and cardiac disease risk factor modification, including diabetes screening, blood pressure screening (and management if indicated), and cholesterol screening.   Reviewed immunzation guidelines, including pneumococcal vaccines, annual influenza, and shingles vaccines.   Discussed routine cancer screenings, including skin cancer, colon cancer screening for everyone until age 80, prostate cancer screening in men until age 75, mammogram and PAP/pelvic for women until age 75.   Recommended regular dentist visits to care for remaining teeth.   Recommended regular screening for vision and glaucoma.   Recommended safe driving and accident avoidance.    Plan:     (E11.9,  Z79.4) Type 2 diabetes mellitus without complication, with long-term current use of insulin (H)  Comment: Patient diffuse weakness unintended weight loss is due to new onset type 2 diabetes.  He has significant hyperglycemia on labs today with an A1c of 14.0.  Interesting the patient glucose 18 months ago was normal.  He does report a family history of diabetes  Given the degree of hyperglycemia, will start insulin Lantus 15 units once a day, will titrate up further as indicated by glucose readings..    Discussed more cost effective alternatives in case this is very expensive.  We will consider the Novolin N insulin in a vial or pen if need be.  Patient and his daughter state they are willing to pay for the Lantus pens even at $385 per tray.  The daughter has familiarity with self injections and will start the medication on her father and help understand.  Start metformin 1000 mg once a day with supper  Start glimepiride 1 mg with the first meal of meal of the day  Discussed home glucose monitoring.  We will have him check his glucose 3-4 times a day, with 1-2 fasting, 1-2 after a meal.  Strongly recommended diabetic education referral.  Given contact information, they will call for an appointment.  Patient is worried about type hypoglycemia that his father suffered occasionally with his diabetes.  Discussed the typical symptoms and warning signs.  Told him that we will titrate the medications up slowly to avoid hypoglycemia.  Spoke at length about low carbohydrate diet.  Discussed carbohydrates in the typical diet and places to consider reducing.  Briefly discussed continuous glucose monitoring and will consider this as an option in the future.  Left diabetic educators to place a continuous glucose monitor at 1 of his visits.    Discussed importance in compliance in all areas of diabetic control including diet, routine BS checks, absolute medication compliance, laboratory monitoring, and  attending regular follow up appointments as ordered.  Failure to comply with instructions regarding diabetes will lead to a greater chance of poor diabetic control and therefore a greater chance of diabetes related complications such as CAD, CVA, PVD, and retinopathy/neuropathy/nephropathy.  Based on level of diabetes control: testing frequency 3-4 TIME PER DAY DUE to the new onset diabetes with the significant degree of hyperglycemia, need for insulin use, and need for medication titration.    Return to see me in 1 week with glucose records to review response to the medications prescribed and to adjust medications.    Plan: Hemoglobin A1c, insulin glargine (LANTUS         SOLOSTAR PEN) 100 UNIT/ML pen, insulin pen         needle (31G X 5 MM) 31G X 5 MM miscellaneous,         glimepiride (AMARYL) 1 MG tablet, metFORMIN         (GLUCOPHAGE-XR) 500 MG 24 hr tablet, blood         glucose monitoring (NO BRAND SPECIFIED) meter         device kit, blood glucose (NO BRAND SPECIFIED)         test strip, blood glucose calibration (NO BRAND        SPECIFIED) solution, thin (NO BRAND SPECIFIED)         lancets, alcohol swab prep pads, DIABETES         EDUCATOR REFERRAL, ADMIN 1st VACCINE            (R63.4) Unintended weight loss  Comment: Before the labs came back, I told patient and his daughter that there could be any number of causes for significant unintended weight loss at his age.  Discussed a number of medical conditions including cancer, mental health, neurological, endocrine, and/or cardiac problems.    Chest x-ray appears normal to my reading.    However the reason for his unintended weight loss would be the significant hyperglycemia representing new onset diabetes.  Labs otherwise normal.  I will defer any further imaging studies for now.  Plan: CBC with platelets, Comprehensive metabolic         panel, XR Chest 2 Views, TSH with free T4         reflex, *UA reflex to Microscopic and Culture         ([facility] and  "[facility] (except [facility]        and [place]), Vitamin B12            (Z23) Need for prophylactic vaccination against Streptococcus pneumoniae (pneumococcus)  Comment:   Plan:     (F32.0) Mild major depression (H)  Comment: Not currently an issue.  Plan:     (F10.21) Alcohol dependence in remission (H)  Comment: Very distant history of alcohol overuse has not drunk alcohol for years.  Plan:           End of Life Planning:  Patient currently has an advanced directive: No.  I have verified the patient's ablity to prepare an advanced directive/make health care decisions.  Literature was provided to assist patient in preparing an advanced directive.    COUNSELING:  Reviewed preventive health counseling, as reflected in patient instructions       Regular exercise       Healthy diet/nutrition       Vision screening       Hearing screening       Dental care    BP Readings from Last 1 Encounters:   03/13/19 118/70     Estimated body mass index is 27.75 kg/m  as calculated from the following:    Height as of this encounter: 1.803 m (5' 11\").    Weight as of this encounter: 90.3 kg (199 lb).           reports that he is a non-smoker but has been exposed to tobacco smoke. he has never used smokeless tobacco.      Appropriate preventive services were discussed with this patient, including applicable screening as appropriate for cardiovascular disease, diabetes, osteopenia/osteoporosis, and glaucoma.  As appropriate for age/gender, discussed screening for colorectal cancer, prostate cancer, breast cancer, and cervical cancer. Checklist reviewing preventive services available has been given to the patient.    Reviewed patients plan of care and provided an AVS. The Complex Care Plan (for patients with higher acuity and needing more deliberate coordination of services) for Jesús meets the Care Plan requirement. This Care Plan has been established and reviewed with the Patient and daughter.    Counseling Resources:  ATP IV " "Guidelines  Pooled Cohorts Equation Calculator  Breast Cancer Risk Calculator  FRAX Risk Assessment  ICSI Preventive Guidelines  Dietary Guidelines for Americans, 2010  USDA's MyPlate  ASA Prophylaxis  Lung CA Screening    Tan Jaramillo MD  [facility]      Discussed hat additional charges may be applied for addressing concerns at today's visit above and beyond what is covered by the \"routine physical exam\" diagnosis code.  Patient verbalized understanding and would like additional concerns addressed today.    Spent greater than 25 minutes above and beyond routine Health Care Maintenence issues on the above mentioned additional problem(s).   " 6

## 2024-06-17 PROBLEM — Z76.89 HEALTH CARE HOME: Status: RESOLVED | Noted: 2017-06-29 | Resolved: 2024-06-17

## (undated) DEVICE — PACK ERCP CUSTOM RIDGES

## (undated) DEVICE — ENDO SNARE POLYPECTOMY OVAL 15MM LOOP SD-240U-15

## (undated) DEVICE — DECANTER BAG 2002S

## (undated) DEVICE — WIRE GUIDE 0.035"X450CM JAGWIRE HP STR TIP M00556580

## (undated) DEVICE — BALLOON EXTRACTION 3-LUMEN 15X190MM 2.8MM TL B-V233P-A

## (undated) DEVICE — ESU GROUND PAD ADULT W/CORD E7507

## (undated) DEVICE — GOWN XLG DISP 9545

## (undated) DEVICE — TAPE CLOTH ADHESIVE 3" LATEX FREE

## (undated) DEVICE — RAD RX ISOVUE 300 (50ML) 61% IOPAMIDOL CHARGE PER ML

## (undated) DEVICE — SOL WATER IRRIG 1000ML BOTTLE 2F7114

## (undated) DEVICE — SUCTION CANISTER MEDIVAC LINER 3000ML W/LID 65651-530

## (undated) DEVICE — SOL NACL 0.9% INJ 250ML BAG 2B1322Q

## (undated) RX ORDER — FENTANYL CITRATE 50 UG/ML
INJECTION, SOLUTION INTRAMUSCULAR; INTRAVENOUS
Status: DISPENSED
Start: 2019-01-01

## (undated) RX ORDER — PROPOFOL 10 MG/ML
INJECTION, EMULSION INTRAVENOUS
Status: DISPENSED
Start: 2019-01-01

## (undated) RX ORDER — ONDANSETRON 2 MG/ML
INJECTION INTRAMUSCULAR; INTRAVENOUS
Status: DISPENSED
Start: 2019-01-01

## (undated) RX ORDER — FLUMAZENIL 0.1 MG/ML
INJECTION, SOLUTION INTRAVENOUS
Status: DISPENSED
Start: 2019-01-01

## (undated) RX ORDER — FENTANYL CITRATE 50 UG/ML
INJECTION, SOLUTION INTRAMUSCULAR; INTRAVENOUS
Status: DISPENSED
Start: 2017-09-07

## (undated) RX ORDER — NALOXONE HYDROCHLORIDE 0.4 MG/ML
INJECTION, SOLUTION INTRAMUSCULAR; INTRAVENOUS; SUBCUTANEOUS
Status: DISPENSED
Start: 2019-01-01

## (undated) RX ORDER — GLYCOPYRROLATE 0.2 MG/ML
INJECTION INTRAMUSCULAR; INTRAVENOUS
Status: DISPENSED
Start: 2019-01-01